# Patient Record
Sex: FEMALE | Race: BLACK OR AFRICAN AMERICAN | NOT HISPANIC OR LATINO | Employment: FULL TIME | URBAN - METROPOLITAN AREA
[De-identification: names, ages, dates, MRNs, and addresses within clinical notes are randomized per-mention and may not be internally consistent; named-entity substitution may affect disease eponyms.]

---

## 2017-04-17 ENCOUNTER — LAB VISIT (OUTPATIENT)
Dept: LAB | Facility: HOSPITAL | Age: 47
End: 2017-04-17
Attending: FAMILY MEDICINE
Payer: COMMERCIAL

## 2017-04-17 ENCOUNTER — OFFICE VISIT (OUTPATIENT)
Dept: FAMILY MEDICINE | Facility: CLINIC | Age: 47
End: 2017-04-17
Payer: COMMERCIAL

## 2017-04-17 VITALS
HEIGHT: 69 IN | SYSTOLIC BLOOD PRESSURE: 110 MMHG | OXYGEN SATURATION: 98 % | WEIGHT: 225.31 LBS | TEMPERATURE: 98 F | BODY MASS INDEX: 33.37 KG/M2 | DIASTOLIC BLOOD PRESSURE: 80 MMHG | HEART RATE: 76 BPM

## 2017-04-17 DIAGNOSIS — Z23 NEED FOR PROPHYLACTIC VACCINATION WITH TETANUS-DIPHTHERIA (TD): ICD-10-CM

## 2017-04-17 DIAGNOSIS — Z00.00 ANNUAL PHYSICAL EXAM: ICD-10-CM

## 2017-04-17 DIAGNOSIS — N93.9 ABNORMAL UTERINE BLEEDING (AUB): ICD-10-CM

## 2017-04-17 DIAGNOSIS — Z00.00 GENERAL MEDICAL EXAMINATION: Primary | ICD-10-CM

## 2017-04-17 LAB
ALBUMIN SERPL BCP-MCNC: 3.5 G/DL
ALP SERPL-CCNC: 65 U/L
ALT SERPL W/O P-5'-P-CCNC: 12 U/L
ANION GAP SERPL CALC-SCNC: 7 MMOL/L
AST SERPL-CCNC: 21 U/L
BASOPHILS # BLD AUTO: 0.03 K/UL
BASOPHILS NFR BLD: 0.5 %
BILIRUB SERPL-MCNC: 0.3 MG/DL
BUN SERPL-MCNC: 10 MG/DL
CALCIUM SERPL-MCNC: 9.5 MG/DL
CHLORIDE SERPL-SCNC: 109 MMOL/L
CHOLEST/HDLC SERPL: 2.1 {RATIO}
CO2 SERPL-SCNC: 24 MMOL/L
CREAT SERPL-MCNC: 0.8 MG/DL
DIFFERENTIAL METHOD: ABNORMAL
EOSINOPHIL # BLD AUTO: 0.4 K/UL
EOSINOPHIL NFR BLD: 6.7 %
ERYTHROCYTE [DISTWIDTH] IN BLOOD BY AUTOMATED COUNT: 15.2 %
EST. GFR  (AFRICAN AMERICAN): >60 ML/MIN/1.73 M^2
EST. GFR  (NON AFRICAN AMERICAN): >60 ML/MIN/1.73 M^2
GLUCOSE SERPL-MCNC: 75 MG/DL
HCT VFR BLD AUTO: 27.3 %
HDL/CHOLESTEROL RATIO: 48.1 %
HDLC SERPL-MCNC: 162 MG/DL
HDLC SERPL-MCNC: 78 MG/DL
HGB BLD-MCNC: 8.8 G/DL
LDLC SERPL CALC-MCNC: 73 MG/DL
LYMPHOCYTES # BLD AUTO: 1.9 K/UL
LYMPHOCYTES NFR BLD: 33.7 %
MCH RBC QN AUTO: 25.1 PG
MCHC RBC AUTO-ENTMCNC: 32.2 %
MCV RBC AUTO: 78 FL
MONOCYTES # BLD AUTO: 0.5 K/UL
MONOCYTES NFR BLD: 9.5 %
NEUTROPHILS # BLD AUTO: 2.7 K/UL
NEUTROPHILS NFR BLD: 49.2 %
NONHDLC SERPL-MCNC: 84 MG/DL
PLATELET # BLD AUTO: 412 K/UL
PMV BLD AUTO: 9.4 FL
POTASSIUM SERPL-SCNC: 4.2 MMOL/L
PROT SERPL-MCNC: 7.5 G/DL
RBC # BLD AUTO: 3.5 M/UL
SODIUM SERPL-SCNC: 140 MMOL/L
TRIGL SERPL-MCNC: 55 MG/DL
TSH SERPL DL<=0.005 MIU/L-ACNC: 0.61 UIU/ML
WBC # BLD AUTO: 5.49 K/UL

## 2017-04-17 PROCEDURE — 36415 COLL VENOUS BLD VENIPUNCTURE: CPT | Mod: PO

## 2017-04-17 PROCEDURE — 90714 TD VACC NO PRESV 7 YRS+ IM: CPT | Mod: S$GLB,,, | Performed by: FAMILY MEDICINE

## 2017-04-17 PROCEDURE — 80053 COMPREHEN METABOLIC PANEL: CPT

## 2017-04-17 PROCEDURE — 85025 COMPLETE CBC W/AUTO DIFF WBC: CPT

## 2017-04-17 PROCEDURE — 1160F RVW MEDS BY RX/DR IN RCRD: CPT | Mod: S$GLB,,, | Performed by: FAMILY MEDICINE

## 2017-04-17 PROCEDURE — 99999 PR PBB SHADOW E&M-EST. PATIENT-LVL III: CPT | Mod: PBBFAC,,, | Performed by: FAMILY MEDICINE

## 2017-04-17 PROCEDURE — 90471 IMMUNIZATION ADMIN: CPT | Mod: S$GLB,,, | Performed by: FAMILY MEDICINE

## 2017-04-17 PROCEDURE — 99214 OFFICE O/P EST MOD 30 MIN: CPT | Mod: 25,S$GLB,, | Performed by: FAMILY MEDICINE

## 2017-04-17 PROCEDURE — 83036 HEMOGLOBIN GLYCOSYLATED A1C: CPT

## 2017-04-17 PROCEDURE — 80061 LIPID PANEL: CPT

## 2017-04-17 PROCEDURE — 84443 ASSAY THYROID STIM HORMONE: CPT

## 2017-04-17 NOTE — MR AVS SNAPSHOT
[facility]  [address]  [place] [address]  Phone: [phone]  Fax: [phone]                  Vanessa Millan Solitario   2017 8:00 AM   Office Visit    Description:  Female : 1970   Provider:  Bethanie Sandovla MD   Department:  [facility]           Reason for Visit     Annual Exam     irregular menstrual cycle           Diagnoses this Visit        Comments    Abnormal uterine bleeding (AUB)    -  Primary     Annual physical exam         Need for prophylactic vaccination with tetanus-diphtheria (TD)                To Do List           Future Appointments        Provider Department Dept Phone    2017 9:00 AM LAB, [place] [facility] [phone]      Goals (5 Years of Data)     None      [facility]     [facility] Care Line -  Assistance  Unless otherwise directed by your provider, please contact [facility], our nurse care line that is available for  assistance.     Registered nurses in the [facility] provide: appointment scheduling, clinical advisement, health education, and other advisory services.  Call: [phone] (toll free)               Medications           Message regarding Medications     Verify the changes and/or additions to your medication regime listed below are the same as discussed with your clinician today.  If any of these changes or additions are incorrect, please notify your healthcare provider.             Verify that the below list of medications is an accurate representation of the medications you are currently taking.  If none reported, the list may be blank. If incorrect, please contact your healthcare provider. Carry this list with you in case of emergency.                Clinical Reference Information           Your Vitals Were     BP Pulse Temp Height Weight Last Period    110/80 (BP Location: Right arm, Patient Position: Sitting, BP Method: Manual) 76 98.1 °F (36.7 °C)  "(Oral) 5' 9" (1.753 m) 102.2 kg (225 lb 5 oz) 04/01/2017    SpO2 BMI             98% 33.27 kg/m2         Blood Pressure          Most Recent Value    BP  110/80      Allergies as of 4/17/2017     No Known Allergies      Immunizations Administered on Date of Encounter - 4/17/2017     Name Date Dose VIS Date Route    TD  Incomplete 0.5 mL 2/24/2015 Intramuscular      Orders Placed During Today's Visit      Normal Orders This Visit    Td Vaccine (Adult) - Preservative Free     Future Labs/Procedures Expected by Expires    CBC auto differential  4/17/2017 6/16/2018    Comprehensive metabolic panel  4/17/2017 6/16/2018    Hemoglobin A1c  4/17/2017 6/16/2018    Lipid panel  4/17/2017 6/16/2018    TSH  4/17/2017 6/16/2018    US Pelvis Comp with Transvag NON-OB (xpd  4/17/2017 4/17/2018      Language Assistance Services     ATTENTION: Language assistance services are available, free of charge. Please call [phone].      ATENCIÓN: Si habla español, tiene a murphy disposición servicios gratuitos de asistencia lingüística. Llame al [phone].     CHÚ Ý: N?u b?n nói Ti?ng Vi?t, có các d?ch v? h? tr? ngôn ng? mi?n phí dành cho b?n. G?i s? [phone].         [facility] complies with applicable Federal civil rights laws and does not discriminate on the basis of race, color, national origin, age, disability, or sex.        "

## 2017-04-17 NOTE — PROGRESS NOTES
Routine Office Visit    Patient Name: Vanessa Jerez    : 1970  MRN: [id number]    Subjective:  Vanessa is a 46 y.o. female who presents today for     1. General medical exam  2. Abnormal uterine bleeding - pt states -  she had irregular bleeding. She has never had this in the past. She states that she had bleeding with heavy and big clots. She felt weak and tired. She is feeling better now.       Review of Systems   Constitutional: Negative for chills and fever.   HENT: Negative for congestion.    Eyes: Negative for blurred vision.   Respiratory: Negative for cough.    Cardiovascular: Negative for chest pain.   Gastrointestinal: Negative for abdominal pain, constipation, diarrhea, heartburn, nausea and vomiting.   Genitourinary: Negative for dysuria.   Musculoskeletal: Negative for myalgias.   Skin: Negative for itching and rash.   Neurological: Negative for dizziness and headaches.   Psychiatric/Behavioral: Negative for depression.       Active Problem List  There is no problem list on file for this patient.      Past Surgical History  Past Surgical History:   Procedure Laterality Date    TUBAL LIGATION         Family History  Family History   Problem Relation Age of Onset    Diabetes Maternal Grandmother     Diabetes Maternal Grandfather     Cancer Neg Hx     Heart disease Neg Hx     Hypertension Neg Hx     Stroke Neg Hx        Social History  Social History     Social History    Marital status: Single     Spouse name: N/A    Number of children: N/A    Years of education: N/A     Occupational History    Not on file.     Social History Main Topics    Smoking status: Never Smoker    Smokeless tobacco: Not on file    Alcohol use No    Drug use: Not on file    Sexual activity: Not on file     Other Topics Concern    Not on file     Social History Narrative       Medications and Allergies  Reviewed and updated.   No current outpatient prescriptions on file.     No current  "facility-administered medications for this visit.        Physical Exam  /80 (BP Location: Right arm, Patient Position: Sitting, BP Method: Manual)  Pulse 76  Temp 98.1 °F (36.7 °C) (Oral)   Ht 5' 9" (1.753 m)  Wt 102.2 kg (225 lb 5 oz)  LMP 04/01/2017  SpO2 98%  BMI 33.27 kg/m2  Physical Exam   Constitutional: She is oriented to person, place, and time. She appears well-developed and well-nourished.   HENT:   Head: Normocephalic and atraumatic.   Eyes: Conjunctivae and EOM are normal. Pupils are equal, round, and reactive to light.   Neck: Normal range of motion. Neck supple.   Cardiovascular: Normal rate, regular rhythm and normal heart sounds.  Exam reveals no gallop and no friction rub.    No murmur heard.  Pulmonary/Chest: Breath sounds normal. No respiratory distress.   Abdominal: Soft. Bowel sounds are normal. She exhibits no distension. There is no tenderness.   Musculoskeletal: Normal range of motion.   Lymphadenopathy:     She has no cervical adenopathy.   Neurological: She is alert and oriented to person, place, and time.   Skin: Skin is warm.   Psychiatric: She has a normal mood and affect.         Assessment/Plan:  Vanessa Jerez is a 46 y.o. female who presents today for :    General medical examination  -     Lipid panel; Future; Expected date: 4/17/17  -     TSH; Future; Expected date: 4/17/17  -     CBC auto differential; Future; Expected date: 4/17/17  -     Comprehensive metabolic panel; Future; Expected date: 4/17/17  -     Hemoglobin A1c; Future; Expected date: 4/17/17    Abnormal uterine bleeding (AUB)  -     US Pelvis Comp with Transvag NON-OB (xpd; Future; Expected date: 4/17/17    Need for prophylactic vaccination with tetanus-diphtheria (TD)  -     Td Vaccine (Adult) - Preservative Free        Return if symptoms worsen or fail to improve, for yearly exam.    "

## 2017-04-18 LAB
ESTIMATED AVG GLUCOSE: 123 MG/DL
HBA1C MFR BLD HPLC: 5.9 %

## 2018-05-09 ENCOUNTER — OFFICE VISIT (OUTPATIENT)
Dept: FAMILY MEDICINE | Facility: CLINIC | Age: 48
End: 2018-05-09
Payer: COMMERCIAL

## 2018-05-09 ENCOUNTER — TELEPHONE (OUTPATIENT)
Dept: FAMILY MEDICINE | Facility: CLINIC | Age: 48
End: 2018-05-09

## 2018-05-09 ENCOUNTER — LAB VISIT (OUTPATIENT)
Dept: LAB | Facility: HOSPITAL | Age: 48
End: 2018-05-09
Attending: NURSE PRACTITIONER
Payer: COMMERCIAL

## 2018-05-09 VITALS
WEIGHT: 234 LBS | OXYGEN SATURATION: 98 % | DIASTOLIC BLOOD PRESSURE: 80 MMHG | HEIGHT: 69 IN | BODY MASS INDEX: 34.66 KG/M2 | HEART RATE: 66 BPM | TEMPERATURE: 99 F | SYSTOLIC BLOOD PRESSURE: 112 MMHG

## 2018-05-09 DIAGNOSIS — Z86.2 HISTORY OF IRON DEFICIENCY ANEMIA: Primary | ICD-10-CM

## 2018-05-09 DIAGNOSIS — M77.31 HEEL SPUR, RIGHT: ICD-10-CM

## 2018-05-09 DIAGNOSIS — Z00.00 ANNUAL PHYSICAL EXAM: Primary | ICD-10-CM

## 2018-05-09 DIAGNOSIS — M25.561 ACUTE PAIN OF RIGHT KNEE: ICD-10-CM

## 2018-05-09 DIAGNOSIS — Z00.00 ANNUAL PHYSICAL EXAM: ICD-10-CM

## 2018-05-09 LAB
ALBUMIN SERPL BCP-MCNC: 3.6 G/DL
ALP SERPL-CCNC: 102 U/L
ALT SERPL W/O P-5'-P-CCNC: 19 U/L
ANION GAP SERPL CALC-SCNC: 7 MMOL/L
AST SERPL-CCNC: 24 U/L
BASOPHILS # BLD AUTO: 0.03 K/UL
BASOPHILS NFR BLD: 0.5 %
BILIRUB SERPL-MCNC: 0.5 MG/DL
BUN SERPL-MCNC: 12 MG/DL
CALCIUM SERPL-MCNC: 9.7 MG/DL
CHLORIDE SERPL-SCNC: 109 MMOL/L
CHOLEST SERPL-MCNC: 168 MG/DL
CHOLEST/HDLC SERPL: 2.1 {RATIO}
CO2 SERPL-SCNC: 25 MMOL/L
CREAT SERPL-MCNC: 0.8 MG/DL
DIFFERENTIAL METHOD: ABNORMAL
EOSINOPHIL # BLD AUTO: 0.4 K/UL
EOSINOPHIL NFR BLD: 7.4 %
ERYTHROCYTE [DISTWIDTH] IN BLOOD BY AUTOMATED COUNT: 17.3 %
EST. GFR  (AFRICAN AMERICAN): >60 ML/MIN/1.73 M^2
EST. GFR  (NON AFRICAN AMERICAN): >60 ML/MIN/1.73 M^2
ESTIMATED AVG GLUCOSE: 114 MG/DL
GLUCOSE SERPL-MCNC: 85 MG/DL
HBA1C MFR BLD HPLC: 5.6 %
HCT VFR BLD AUTO: 35.2 %
HDLC SERPL-MCNC: 81 MG/DL
HDLC SERPL: 48.2 %
HGB BLD-MCNC: 11 G/DL
IMM GRANULOCYTES # BLD AUTO: 0.03 K/UL
IMM GRANULOCYTES NFR BLD AUTO: 0.5 %
LDLC SERPL CALC-MCNC: 78.6 MG/DL
LYMPHOCYTES # BLD AUTO: 2.1 K/UL
LYMPHOCYTES NFR BLD: 35.2 %
MCH RBC QN AUTO: 27.6 PG
MCHC RBC AUTO-ENTMCNC: 31.3 G/DL
MCV RBC AUTO: 88 FL
MONOCYTES # BLD AUTO: 0.4 K/UL
MONOCYTES NFR BLD: 7.4 %
NEUTROPHILS # BLD AUTO: 2.9 K/UL
NEUTROPHILS NFR BLD: 49 %
NONHDLC SERPL-MCNC: 87 MG/DL
NRBC BLD-RTO: 0 /100 WBC
PLATELET # BLD AUTO: 275 K/UL
PMV BLD AUTO: 11.3 FL
POTASSIUM SERPL-SCNC: 4.1 MMOL/L
PROT SERPL-MCNC: 7.1 G/DL
RBC # BLD AUTO: 3.99 M/UL
SODIUM SERPL-SCNC: 141 MMOL/L
TRIGL SERPL-MCNC: 42 MG/DL
TSH SERPL DL<=0.005 MIU/L-ACNC: 0.97 UIU/ML
WBC # BLD AUTO: 5.94 K/UL

## 2018-05-09 PROCEDURE — 99396 PREV VISIT EST AGE 40-64: CPT | Mod: S$GLB,,, | Performed by: NURSE PRACTITIONER

## 2018-05-09 PROCEDURE — 99999 PR PBB SHADOW E&M-EST. PATIENT-LVL IV: CPT | Mod: PBBFAC,,, | Performed by: NURSE PRACTITIONER

## 2018-05-09 PROCEDURE — 85025 COMPLETE CBC W/AUTO DIFF WBC: CPT

## 2018-05-09 PROCEDURE — 83036 HEMOGLOBIN GLYCOSYLATED A1C: CPT

## 2018-05-09 PROCEDURE — 36415 COLL VENOUS BLD VENIPUNCTURE: CPT | Mod: PO

## 2018-05-09 PROCEDURE — 80061 LIPID PANEL: CPT

## 2018-05-09 PROCEDURE — 80053 COMPREHEN METABOLIC PANEL: CPT

## 2018-05-09 PROCEDURE — 84443 ASSAY THYROID STIM HORMONE: CPT

## 2018-05-09 RX ORDER — NAPROXEN 500 MG/1
500 TABLET ORAL 2 TIMES DAILY WITH MEALS
Qty: 60 TABLET | Refills: 0 | Status: SHIPPED | OUTPATIENT
Start: 2018-05-09 | End: 2018-06-08

## 2018-05-09 NOTE — TELEPHONE ENCOUNTER
Please let the patient know that her labs do show a very slight anemia, but it is much improved from her last check. We can do some iron studies to ensure that is not low, and I have ordered this. All of her other labs are within normal limits. Let me know if she has any questions.    Thanks!  MARCELINO MancusoC

## 2018-05-09 NOTE — PROGRESS NOTES
History of Present Illness   Vanessa Jerez is a 47 y.o. woman with medical history as listed below who presents today for annual physical exam. She is currently not taking daily medications. She states gets exercise while at work and tries to monitor her diet. 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 She is up to date on healthcare maintenance. She did have mammogram, 10/17 at [facility]. She would like routine labs today. She does complain of right heel pain described as a burning pain that seems to be worse with prolonged standing and walking. She denies injury or trauma. She does have some associated right knee pain with swelling that is worse after working a 12 hour shift. She has tried Aleve for the pain which does help. She has no additional complaints and is otherwise healthy on today's visit.      Past Medical History:   Diagnosis Date    Anemia        Past Surgical History:   Procedure Laterality Date    HYSTERECTOMY      TUBAL LIGATION         Social History     Social History    Marital status: Single     Spouse name: N/A    Number of children: N/A    Years of education: N/A     Social History Main Topics    Smoking status: Never Smoker    Smokeless tobacco: None    Alcohol use No    Drug use: Unknown    Sexual activity: Not Asked     Other Topics Concern    None     Social History Narrative    None       Family History   Problem Relation Age of Onset    Diabetes Maternal Grandmother     Diabetes Maternal Grandfather     Cancer Neg Hx     Heart disease Neg Hx     Hypertension Neg Hx     Stroke Neg Hx        Review of Systems  Review of Systems   Constitutional: Negative for chills, fever and weight loss.   Eyes: Negative for blurred vision and double vision.   Respiratory: Negative for shortness of breath.    Cardiovascular: Negative for chest pain, palpitations and leg swelling.   Gastrointestinal: Negative for abdominal pain, nausea and vomiting.   Genitourinary: Negative  "for dysuria, flank pain and hematuria.   Musculoskeletal: Positive for joint pain. Negative for back pain and falls.   Skin: Negative for rash.   Neurological: Negative for dizziness, loss of consciousness and headaches.   Endo/Heme/Allergies: Negative for environmental allergies and polydipsia.   Psychiatric/Behavioral: Negative for depression. The patient is not nervous/anxious.      A complete review of systems was otherwise negative.    Physical Exam  /80 (BP Location: Right arm, Patient Position: Sitting, BP Method: X-Large (Manual))   Pulse 66   Temp 98.5 °F (36.9 °C) (Oral)   Ht 5' 9" (1.753 m)   Wt 106.1 kg (234 lb)   LMP 04/01/2017   SpO2 98%   BMI 34.56 kg/m²   General appearance: alert, appears stated age, cooperative and no distress  Eyes: negative findings: lids and lashes normal and conjunctivae and sclerae normal  Ears: normal TM's and external ear canals both ears  Nose: Nares normal. Septum midline. Mucosa normal. No drainage or sinus tenderness.  Throat: lips, mucosa, and tongue normal; teeth and gums normal  Neck: supple, symmetrical, trachea midline and thyroid not enlarged, symmetric, no tenderness/mass/nodules  Back: symmetric, no curvature. ROM normal. No CVA tenderness.  Lungs: clear to auscultation bilaterally  Heart: regular rate and rhythm, S1, S2 normal, no murmur, click, rub or gallop  Abdomen: soft, non-tender; bowel sounds normal; no masses,  no organomegaly  Extremities: extremities normal, atraumatic, no cyanosis or edema  Pulses: 2+ and symmetric  Skin: Skin color, texture, turgor normal. No rashes or lesions  Lymph nodes: Cervical, supraclavicular, and axillary nodes normal.  Neurologic: Grossly normal  Musculoskeletal: Right knee exhibits minimal anterior swelling with no TTP. There is no evidence for instability, negative anterior and posterior drawer sign. There is no joint crepitus. Full ROM present. Right ankle with full ROM. There is TTP of right heel with no " swelling or obvious deformity.     Assessment/Plan  Annual physical exam  All healthcare maintenance and age appropriate screening recommendations discussed with patient.  Discussed importance of heart healthy diet and exercise with weight loss to maximize healthy lifestyle.  Routine labs as listed below.  -     CBC auto differential; Future; Expected date: 05/09/2018  -     Comprehensive metabolic panel; Future; Expected date: 05/09/2018  -     Lipid panel; Future; Expected date: 05/09/2018  -     TSH; Future; Expected date: 05/09/2018  -     Hemoglobin A1c; Future; Expected date: 05/09/2018    Heel spur, right  Conservative management with NSAID, ice, elevation, and rest.   Continue to wear arch supporting shoes when at work.  If no improvement refer to podiatry.  -     naproxen (NAPROSYN) 500 MG tablet; Take 1 tablet (500 mg total) by mouth 2 (two) times daily with meals.  Dispense: 60 tablet; Refill: 0    Acute pain of right knee  We discussed use of brace or ace wrap at work with continued use of arch supporting shoes.  Conservative management with NSAID, rest, ice, elevation.  If no improvement, consider imaging and potential referral to orthopedics.  -     naproxen (NAPROSYN) 500 MG tablet; Take 1 tablet (500 mg total) by mouth 2 (two) times daily with meals.  Dispense: 60 tablet; Refill: 0    She has verbalized understanding and is in agreement with plan of care.    Follow-up if symptoms worsen or fail to improve.

## 2018-05-09 NOTE — PATIENT INSTRUCTIONS
Heel Spurs    The plantar fascia is a thick, fibrous layer of tissue that covers the bones on the bottom of your foot. It holds the foot bones in an arched position. Plantar fasciitis is a painful swelling of the plantar fascia.  A heel spur is an overgrowth of bone where the plantar fascia attaches to the heel bone. The heel spur itself usually doesnt cause pain. However, the heel spur might be a sign of plantar fasciitis which may cause your foot pain. There is no specific treatment for heel spurs.   Plantar fasciitis can develop slowly or suddenly. It usually affects one foot at a time. Heel pain can feel sharp, like a knife sticking into the bottom of your foot. You may feel pain after exercising, long-distance jogging, stair climbing, long periods of standing, or after standing up.  Risk factors for plantar fasciitis include: arthritis, diabetes, obesity or recent weight gain, flat foot, and having high arches. Wearing high heels, loose shoes, or shoes with poor arch support adds to the risk.    Foot pain is usually worse in the morning. But it improves with walking. By the end of the day there may be a dull aching. Treatment includes short-term rest and controlling inflammation. It may take up to 9 months before all symptoms go away. In rare cases, a steroid injection in the foot, or surgery, may be needed.  Home care  · If you are overweight, lose weight to help healing.  · Choose supportive shoes with good arch support and shock absorbency. Replace athletic shoes when they become worn out. Dont walk or run barefoot.  · Premade or custom-fitted shoe inserts may be helpful. Inserts made of silicone seem to be the most effective. Custom-made inserts can be provided by a podiatrist or foot specialist, physical therapist, or orthopedist.  · Premade or custom-made night splints keep the heel stretched out while you sleep. They may prevent morning pain.  · Avoid activities that stress the feet: jogging,  prolonged standing or walking, contact sports, etc.  · First thing in the morning and before sports, stretch the bottom of your foot. Gently flex your ankle so the toes move toward your knee.  · Icing may help control heel pain. Apply an ice pack to the heel for 10-20 minutes as a preventive. Or ice your heel after a severe flare-up of symptoms. You may repeat this every 1-2 hours as needed.  · You may use over-the-counter pain medicine to control pain, unless another medicine was prescribed. Anti-inflammatory pain medicines, such as ibuprofen or naproxen, may work better than acetaminophen. If you have chronic liver or kidney disease or ever had a stomach ulcer or GI bleeding, talk with your healthcare provider before using these medicines.  · Shoe inserts, a night splint, or a special boot may be needed. Use these as directed by your healthcare provider.  Follow-up care   Follow up with your healthcare provider, physical therapist, or podiatrist or foot specialist as advised.  When to seek medical advice  Call your healthcare provider right away if any of these occur:  · The pain worsens.  · There is no relief after a few weeks of home treatment.   Date Last Reviewed: 11/23/2015  © 8485-3442 [employer]. [address] [place] [address], [place] [address]. All rights reserved. This information is not intended as a substitute for professional medical care. Always follow your healthcare professional's instructions.

## 2018-05-14 ENCOUNTER — LAB VISIT (OUTPATIENT)
Dept: LAB | Facility: HOSPITAL | Age: 48
End: 2018-05-14
Attending: NURSE PRACTITIONER
Payer: COMMERCIAL

## 2018-05-14 DIAGNOSIS — Z86.2 HISTORY OF IRON DEFICIENCY ANEMIA: ICD-10-CM

## 2018-05-14 LAB
FERRITIN SERPL-MCNC: 6 NG/ML
IRON SERPL-MCNC: 60 UG/DL
SATURATED IRON: 14 %
TOTAL IRON BINDING CAPACITY: 440 UG/DL
TRANSFERRIN SERPL-MCNC: 297 MG/DL

## 2018-05-14 PROCEDURE — 83540 ASSAY OF IRON: CPT

## 2018-05-14 PROCEDURE — 36415 COLL VENOUS BLD VENIPUNCTURE: CPT | Mod: PO

## 2018-05-14 PROCEDURE — 82728 ASSAY OF FERRITIN: CPT

## 2018-05-16 ENCOUNTER — PATIENT MESSAGE (OUTPATIENT)
Dept: FAMILY MEDICINE | Facility: CLINIC | Age: 48
End: 2018-05-16

## 2018-05-16 DIAGNOSIS — D64.9 ANEMIA, UNSPECIFIED TYPE: Primary | ICD-10-CM

## 2018-05-18 NOTE — TELEPHONE ENCOUNTER
Can we call patient with this information please, looks like she did not read her [employer] message.    Thanks!  FREDDY Mancuso

## 2018-07-23 ENCOUNTER — HOSPITAL ENCOUNTER (OUTPATIENT)
Dept: RADIOLOGY | Facility: HOSPITAL | Age: 48
Discharge: HOME | End: 2018-07-23
Attending: NURSE PRACTITIONER
Payer: COMMERCIAL

## 2018-07-23 ENCOUNTER — OFFICE VISIT (OUTPATIENT)
Dept: FAMILY MEDICINE | Facility: CLINIC | Age: 48
End: 2018-07-23
Payer: COMMERCIAL

## 2018-07-23 VITALS
SYSTOLIC BLOOD PRESSURE: 114 MMHG | WEIGHT: 235.88 LBS | BODY MASS INDEX: 34.94 KG/M2 | DIASTOLIC BLOOD PRESSURE: 84 MMHG | TEMPERATURE: 98 F | HEART RATE: 83 BPM | HEIGHT: 69 IN | OXYGEN SATURATION: 97 %

## 2018-07-23 DIAGNOSIS — L72.0 EPIDERMOID CYST OF SKIN OF SHOULDER: Primary | ICD-10-CM

## 2018-07-23 DIAGNOSIS — L72.0 EPIDERMOID CYST OF SKIN OF SHOULDER: ICD-10-CM

## 2018-07-23 PROCEDURE — 3008F BODY MASS INDEX DOCD: CPT | Mod: CPTII,S$GLB,, | Performed by: NURSE PRACTITIONER

## 2018-07-23 PROCEDURE — 76881 US COMPL JOINT R-T W/IMG: CPT | Mod: 26,,, | Performed by: RADIOLOGY

## 2018-07-23 PROCEDURE — 76881 US COMPL JOINT R-T W/IMG: CPT | Mod: TC

## 2018-07-23 PROCEDURE — 99214 OFFICE O/P EST MOD 30 MIN: CPT | Mod: S$GLB,,, | Performed by: NURSE PRACTITIONER

## 2018-07-23 PROCEDURE — 99999 PR PBB SHADOW E&M-EST. PATIENT-LVL III: CPT | Mod: PBBFAC,,, | Performed by: NURSE PRACTITIONER

## 2018-07-23 RX ORDER — IBUPROFEN 800 MG/1
800 TABLET ORAL
COMMUNITY
End: 2018-09-26

## 2018-07-23 RX ORDER — FERROUS SULFATE 325(65) MG
325 TABLET ORAL
COMMUNITY
Start: 2017-08-18 | End: 2018-08-18

## 2018-07-23 NOTE — PROGRESS NOTES
Subjective:       Patient ID: Vanessa Jerez is a 47 y.o. female.    Chief Complaint: Cyst (on right shoulder)    Cyst   This is a new problem. The current episode started in the past 7 days. The problem occurs constantly. The problem has been unchanged. Pertinent negatives include no abdominal pain, arthralgias, chest pain, chills, diaphoresis, fatigue, fever, headaches, joint swelling, myalgias, neck pain, numbness, swollen glands or weakness. Associated symptoms comments: Nodule on right shoulder   . Nothing aggravates the symptoms. She has tried nothing for the symptoms.     Review of Systems   Constitutional: Negative for chills, diaphoresis, fatigue and fever.   Respiratory: Negative for chest tightness and wheezing.    Cardiovascular: Negative for chest pain and palpitations.   Gastrointestinal: Negative for abdominal pain.   Musculoskeletal: Negative for arthralgias, joint swelling, myalgias and neck pain.   Skin: Negative for color change and wound.        Knot on right shoulder     Neurological: Negative for weakness, numbness and headaches.   Hematological: Negative for adenopathy. Does not bruise/bleed easily.       Objective:      Physical Exam   Constitutional: She is oriented to person, place, and time. She appears well-developed.   Cardiovascular: Normal rate, regular rhythm and normal heart sounds.    Pulmonary/Chest: Effort normal and breath sounds normal.   Neurological: She is alert and oriented to person, place, and time.   Skin: Skin is warm. Capillary refill takes less than 2 seconds.        Psychiatric: She has a normal mood and affect.       Assessment:       1. Epidermoid cyst of skin of shoulder        Plan:       Vanessa was seen today for cyst.    Diagnoses and all orders for this visit:    Epidermoid cyst of skin of shoulder  -     US Extremity Non Vascular Complete Right; Future

## 2018-07-27 ENCOUNTER — TELEPHONE (OUTPATIENT)
Dept: FAMILY MEDICINE | Facility: CLINIC | Age: 48
End: 2018-07-27

## 2018-07-27 NOTE — TELEPHONE ENCOUNTER
----- Message from Iona Rodarte sent at 7/27/2018  2:59 PM CDT -----  Contact: Self  Pt is calling to speak with staff to get her results. Please call pt at [phone].

## 2018-08-01 NOTE — TELEPHONE ENCOUNTER
----- Message from Stefani Akbar sent at 8/1/2018  9:03 AM CDT -----  Contact: Self/ [phone]  Pt returning call for results. Thank you.

## 2018-09-26 ENCOUNTER — HOSPITAL ENCOUNTER (EMERGENCY)
Facility: HOSPITAL | Age: 48
Discharge: HOME | End: 2018-09-26
Attending: EMERGENCY MEDICINE
Payer: COMMERCIAL

## 2018-09-26 VITALS
OXYGEN SATURATION: 100 % | TEMPERATURE: 98 F | BODY MASS INDEX: 34.07 KG/M2 | SYSTOLIC BLOOD PRESSURE: 158 MMHG | RESPIRATION RATE: 18 BRPM | HEIGHT: 69 IN | DIASTOLIC BLOOD PRESSURE: 86 MMHG | WEIGHT: 230 LBS | HEART RATE: 61 BPM

## 2018-09-26 DIAGNOSIS — M54.9 BACK PAIN: Primary | ICD-10-CM

## 2018-09-26 PROCEDURE — 99283 EMERGENCY DEPT VISIT LOW MDM: CPT

## 2018-09-26 PROCEDURE — 25000003 PHARM REV CODE 250: Performed by: PHYSICIAN ASSISTANT

## 2018-09-26 RX ORDER — IBUPROFEN 600 MG/1
600 TABLET ORAL
Status: COMPLETED | OUTPATIENT
Start: 2018-09-26 | End: 2018-09-26

## 2018-09-26 RX ORDER — METHOCARBAMOL 500 MG/1
1000 TABLET, FILM COATED ORAL 3 TIMES DAILY PRN
Qty: 20 TABLET | Refills: 0 | Status: SHIPPED | OUTPATIENT
Start: 2018-09-26 | End: 2018-10-01

## 2018-09-26 RX ORDER — IBUPROFEN 600 MG/1
600 TABLET ORAL EVERY 6 HOURS PRN
Qty: 20 TABLET | Refills: 0 | Status: SHIPPED | OUTPATIENT
Start: 2018-09-26 | End: 2018-12-14

## 2018-09-26 RX ADMIN — IBUPROFEN 600 MG: 600 TABLET, FILM COATED ORAL at 06:09

## 2018-09-26 NOTE — DISCHARGE INSTRUCTIONS
Ibuprofen as needed for discomfort.  Robaxin for muscle soreness.  Be aware, Robaxin is sedating.  Do not mix with 0 sedating medications.  Follow up with her primary for re-evaluation and further recommendations.  Return to this ED if pain persists despite by treatment, if pain worsens, if any other problems occur.

## 2018-09-26 NOTE — ED PROVIDER NOTES
Encounter Date: 9/26/2018  SORT: This is a 47 y.o. female who presents for emergent consideration of back pain after holding a patient while being changed earlier today. Patient is an [employer] employee. Patient will be moved to a room when one is available. Orders placed.  CANDACE Kumar PA-C        History     Chief Complaint   Patient presents with    Back Pain     Lower back pain after moving patient in bed today.     47-year-old female with past medical history anemia presents to ED complaining of acute onset low back pain which began around 5:00 p.m. today.  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  She states she was holding the patient on the patient's side for an extended period of time while coworkers were cleaning and changing patient.  Denies previous injury or surgery.  No radiculopathy or paresthesia.  Dull ache to midline low back.  Pain is sharp and worse with movement.  Pain mildly alleviated with rest.  No radiation of pain. Severity 5/10.          Review of patient's allergies indicates:   Allergen Reactions    Morphine      Slowed down heart rate     Past Medical History:   Diagnosis Date    Anemia      Past Surgical History:   Procedure Laterality Date    HYSTERECTOMY      TUBAL LIGATION       Family History   Problem Relation Age of Onset    Diabetes Maternal Grandmother     Diabetes Maternal Grandfather     Cancer Neg Hx     Heart disease Neg Hx     Hypertension Neg Hx     Stroke Neg Hx      Social History     Tobacco Use    Smoking status: Never Smoker    Smokeless tobacco: Never Used   Substance Use Topics    Alcohol use: No     Alcohol/week: 0.0 oz    Drug use: No     Review of Systems   Constitutional: Negative for chills and fever.   HENT: Negative for sore throat.    Eyes: Negative.    Respiratory: Negative for shortness of breath.    Cardiovascular: Negative for chest pain.   Gastrointestinal: Negative for abdominal  pain, nausea and vomiting.   Endocrine: Negative.    Genitourinary: Negative for dysuria.   Musculoskeletal: Positive for back pain. Negative for gait problem, neck pain and neck stiffness.   Skin: Negative for rash.   Neurological: Negative for dizziness, weakness, light-headedness and headaches.   Hematological: Does not bruise/bleed easily.   Psychiatric/Behavioral: Negative.    All other systems reviewed and are negative.      Physical Exam     Initial Vitals [09/26/18 1743]   BP Pulse Resp Temp SpO2   (!) 158/86 61 18 98.2 °F (36.8 °C) 100 %      MAP       --         Physical Exam    Nursing note and vitals reviewed.  Constitutional: She appears well-developed and well-nourished. She is not diaphoretic. No distress.   Overall well-appearing, nontoxic.  Resting comfortably on exam table.  Ambulating about the ED with normal, steady gait.   HENT:   Head: Normocephalic and atraumatic.   Eyes: Conjunctivae and EOM are normal. Pupils are equal, round, and reactive to light.   Neck: Normal range of motion. Neck supple. No tracheal deviation present.   Cardiovascular: Intact distal pulses.   Pulmonary/Chest: Breath sounds normal. No stridor. No respiratory distress. She has no wheezes.   Abdominal: Soft. Bowel sounds are normal. She exhibits no distension. There is no tenderness.   Musculoskeletal:   Mild ttp lumbar paraspinal musculature. No obvious spasm.  No midline C/T/L tenderness to palpation. Mild discomfort with extension of spine.  Positive straight leg bilaterally at approximately 30°.  No pain with hip range of motion. No tenderness to posterior iliac spine.  No obvious leg shortening.  1+ DP bilaterally.   Lymphadenopathy:     She has no cervical adenopathy.   Neurological: She is alert and oriented to person, place, and time.   Skin: Skin is warm and dry. Capillary refill takes less than 2 seconds.   Psychiatric: She has a normal mood and affect. Her behavior is normal. Judgment and thought content  normal.         ED Course   Procedures  Labs Reviewed - No data to display       Imaging Results    None          Medical Decision Making:   Differential Diagnosis:   Fracture, contusion, sprain/strain, arthritis, degenerative disc disease  ED Management:  47-year-old female with atraumatic low back pain.  No abdominal pain.  No pulsatile character to pain. No retching/tearing character to pain. Pain is reproducible with palpation, certain range of motion of spine.  Neurovascularly intact distally.  No midline spinal tenderness.  Presentation suspicious for muscle strain.  Will DC with muscle relaxers, anti-inflammatories, have patient follow up with PCP.  She does understand and agree.  Return precautions given.                      Clinical Impression:   The encounter diagnosis was Back pain.      Disposition:   Disposition: Discharged  Condition: Stable                        Phong Mccollum PA-C  09/26/18 [id number]

## 2018-09-28 ENCOUNTER — OFFICE VISIT (OUTPATIENT)
Dept: URGENT CARE | Facility: CLINIC | Age: 48
End: 2018-09-28
Payer: OTHER MISCELLANEOUS

## 2018-09-28 VITALS — HEART RATE: 56 BPM | SYSTOLIC BLOOD PRESSURE: 157 MMHG | DIASTOLIC BLOOD PRESSURE: 97 MMHG

## 2018-09-28 DIAGNOSIS — S39.012A ACUTE MYOFASCIAL STRAIN OF LUMBAR REGION, INITIAL ENCOUNTER: Primary | ICD-10-CM

## 2018-09-28 DIAGNOSIS — M54.50 ACUTE BILATERAL LOW BACK PAIN WITHOUT SCIATICA: ICD-10-CM

## 2018-09-28 DIAGNOSIS — R03.0 ELEVATED BLOOD PRESSURE READING: ICD-10-CM

## 2018-09-28 PROCEDURE — 99203 OFFICE O/P NEW LOW 30 MIN: CPT | Mod: S$GLB,,, | Performed by: PHYSICIAN ASSISTANT

## 2018-09-28 PROCEDURE — 72100 X-RAY EXAM L-S SPINE 2/3 VWS: CPT | Mod: S$GLB,,, | Performed by: RADIOLOGY

## 2018-09-28 NOTE — PATIENT INSTRUCTIONS
Understanding Lumbosacral Strain    Lumbosacral strain is a medical term for an injury that causes low back pain. The lumbosacral area (low back) is between the bottom of the ribcage and the top of the buttocks. A strain is tearing of muscles and tendons. These tears can be very small but still cause pain.  How a lumbosacral strain happens  Muscles and tendons connected to the spine can be strained in a number of ways:  · Sitting or standing in the same position for long periods of time. This can harm the low back over time. Poor posture can make low back pain more likely.  · Moving the muscles and tendons past their usual range of motion. This can cause a sudden injury. This can happen when you twist, bend over, or lift something heavy. Not using correct technique for sports or tasks like lifting can make back injury more likely.  · Accidents or falls  Lumbosacral strain can be caused by other problems, but these are less common.  Symptoms of lumbosacral strain  Symptoms may include:  · Pain in the back, often on one side  · Pain that gets worse with movement and gets better with rest  · Inability to move as freely as usual  · Swelling, slight redness, and skin warmth in the painful area  Treatment for lumbosacral strain  Low back pain often goes away by itself within several weeks. But it often comes back. Treatment focuses on reducing pain and avoiding further injury. Bed rest is usually not recommended for low back pain. Treatments may include:  · Avoiding or changing the action that caused the problem. This helps prevent injuring the tissues again.  · Prescription or over-the-counter pain medicines. These help reduce inflammation, swelling, and pain.  · Cold or heat packs. These help reduce pain and swelling.  · Stretching and other exercises. These improve flexibility and strength.  · Physical therapy. This usually includes exercises and other treatments.  · Injections of medicine. This may relieve  symptoms.  If these treatments do not relieve symptoms, your healthcare provider may order imaging tests to learn more about the problem. Sometimes you may need surgery.  Possible complications of lumbosacral strain  If the cause of the pain is not addressed, symptoms may return or get worse. Follow your healthcare providers instructions on lifestyle changes and treating your back.     When to call your healthcare provider  Call your healthcare provider right away if you have any of these:  · Fever of 100.4°F (38°C) or higher, or as directed  · Numbness, tingling, or weakness  · Problems with bowel or bladder control, or problems having sex  · Pain that does not go away, or gets worse  · New symptoms   Date Last Reviewed: 3/10/2016  © 2856-9483 [employer]. [address] [place] [address]. All rights reserved. This information is not intended as a substitute for professional medical care. Always follow your healthcare professional's instructions.        Lumbar Stretch (Flexibility)    1. Lie on your back on the floor, with your knees bent and your feet flat on the floor. Dont press your neck or lower back to the floor.  2. Pull one knee up toward your chest. Clasp your hands under your thigh to help pull.  3. Hold for 30 to 60 seconds. Lower your leg back down to the floor.  4. Repeat 2 times, or as instructed.  5. Switch legs and repeat.  Date Last Reviewed: 3/10/2016  © 3753-9698 [employer]. [address] [place] [address]. All rights reserved. This information is not intended as a substitute for professional medical care. Always follow your healthcare professional's instructions.        Lumbar Rotation    6. Lie on your back on the floor, with your knees bent and your feet flat on the floor. Dont press your neck or lower back to the floor.  7. Lean both of your knees to one side. Turn your head in the opposite direction. Keep your shoulders flat on the floor. Be  gentle and dont push through pain.  8. Hold for 20 seconds. Then slowly move your knees and head in the other direction.  9. Repeat 2 to 5 times, or as instructed.   Date Last Reviewed: 3/10/2016  © 2169-3332 [employer]. [address] [place] [address]. All rights reserved. This information is not intended as a substitute for professional medical care. Always follow your healthcare professional's instructions.        Lumbar Extension (Flexibility)    10. Lie face down on your stomach, forehead on the floor. You can lie on a mat or towel.  11. Bend your arms next to your body and lift your upper body up on your forearms. Your palms and forearms should be flat on the floor. Keep your stomach and hips on the floor.  12. Hold your upper body up with your forearms for 20 seconds. Slowly lower back down to the floor.  13. Repeat 2 times, or as instructed.  Date Last Reviewed: 3/10/2016  © 8658-4393 [employer]. [address] [place] [address]. All rights reserved. This information is not intended as a substitute for professional medical care. Always follow your healthcare professional's instructions.

## 2018-09-28 NOTE — PROGRESS NOTES
Subjective:       Patient ID: Vanessa Jerez is a 47 y.o. female.    Chief Complaint: Work Related Injury and Back Pain    Pt states that she is having lower back pain from work related injury.  Pt states no previous injury to lower back.  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  She felt pain in her back and went to the ER.  There were no x-rays taken.  She was given pain medicine this however has not gotten them filled by the pharmacy yet.  She denies bilateral leg pain, tingling, or numbness.  She denies saddle paresthesias.  She denies bowel or bladder incontinence.      Back Pain   This is a new problem. The current episode started in the past 7 days. The problem occurs intermittently. The problem is unchanged. The pain is present in the lumbar spine. The quality of the pain is described as aching and burning. The pain does not radiate. The pain is at a severity of 7/10. The pain is mild. The symptoms are aggravated by bending, lying down and position. Pertinent negatives include no abdominal pain, bladder incontinence, bowel incontinence, chest pain, fever, numbness or paresthesias. She has tried NSAIDs for the symptoms. The treatment provided mild relief.     Review of Systems   Constitution: Negative for chills and fever.   HENT: Negative for hearing loss and nosebleeds.    Eyes: Negative for blurred vision and redness.   Cardiovascular: Negative for chest pain and syncope.   Respiratory: Negative for cough and shortness of breath.    Skin: Negative for itching and rash.   Musculoskeletal: Positive for back pain. Negative for joint pain.   Gastrointestinal: Negative for abdominal pain and bowel incontinence.   Genitourinary: Negative for bladder incontinence.   Neurological: Negative for numbness and paresthesias.   Psychiatric/Behavioral: The patient is not nervous/anxious.        Objective:      Physical Exam   Constitutional: She appears well-developed and well-nourished. She is active. No  distress.   HENT:   Head: Normocephalic and atraumatic.   Right Ear: Hearing and external ear normal.   Left Ear: Hearing and external ear normal.   Nose: Nose normal. No nasal deformity. No epistaxis.   Mouth/Throat: Oropharynx is clear and moist and mucous membranes are normal.   Eyes: Conjunctivae and lids are normal. Right conjunctiva is not injected. Left conjunctiva is not injected.   Neck: Trachea normal and normal range of motion. No spinous process tenderness and no muscular tenderness present.   Cardiovascular: Intact distal pulses and normal pulses.   Pulses:       Dorsalis pedis pulses are 2+ on the right side, and 2+ on the left side.        Posterior tibial pulses are 2+ on the right side, and 2+ on the left side.   Pulmonary/Chest: Effort normal. No stridor. No respiratory distress.   Abdominal: Normal appearance.   Musculoskeletal:        Cervical back: Normal.        Thoracic back: Normal.        Lumbar back: She exhibits tenderness. She exhibits normal range of motion, no deformity and normal pulse.        Back:    Neurological: She is alert. She has normal strength and normal reflexes. No sensory deficit. GCS eye subscore is 4. GCS verbal subscore is 5. GCS motor subscore is 6.   Reflex Scores:       Patellar reflexes are 2+ on the right side and 2+ on the left side.       Achilles reflexes are 2+ on the right side and 2+ on the left side.  SLR negative bilaterally.    Skin: Skin is warm, dry and intact. No abrasion and no bruising noted.   Psychiatric: She has a normal mood and affect. Her speech is normal and behavior is normal. Thought content normal. Cognition and memory are normal. She is attentive.   Nursing note reviewed.        Type of Reading: me.  Radiology Procedure Done: Lumbar Spine X-Ray.  Interpretation: 3V, No acute fx, spondylolisthesis. Degenerative changes.       Assessment:       1. Acute myofascial strain of lumbar region, initial encounter    2. Acute bilateral low back pain  without sciatica    3. Elevated blood pressure reading        Plan:            Patient Instructions: Daily home exercises/warm soaks   Restrictions: No lifting/pushing/pulling more than 25 lbs  Follow-up in about 1 week (around 10/5/2018).        Patient Instructions       Understanding Lumbosacral Strain    Lumbosacral strain is a medical term for an injury that causes low back pain. The lumbosacral area (low back) is between the bottom of the ribcage and the top of the buttocks. A strain is tearing of muscles and tendons. These tears can be very small but still cause pain.  How a lumbosacral strain happens  Muscles and tendons connected to the spine can be strained in a number of ways:  · Sitting or standing in the same position for long periods of time. This can harm the low back over time. Poor posture can make low back pain more likely.  · Moving the muscles and tendons past their usual range of motion. This can cause a sudden injury. This can happen when you twist, bend over, or lift something heavy. Not using correct technique for sports or tasks like lifting can make back injury more likely.  · Accidents or falls  Lumbosacral strain can be caused by other problems, but these are less common.  Symptoms of lumbosacral strain  Symptoms may include:  · Pain in the back, often on one side  · Pain that gets worse with movement and gets better with rest  · Inability to move as freely as usual  · Swelling, slight redness, and skin warmth in the painful area  Treatment for lumbosacral strain  Low back pain often goes away by itself within several weeks. But it often comes back. Treatment focuses on reducing pain and avoiding further injury. Bed rest is usually not recommended for low back pain. Treatments may include:  · Avoiding or changing the action that caused the problem. This helps prevent injuring the tissues again.  · Prescription or over-the-counter pain medicines. These help reduce inflammation, swelling,  and pain.  · Cold or heat packs. These help reduce pain and swelling.  · Stretching and other exercises. These improve flexibility and strength.  · Physical therapy. This usually includes exercises and other treatments.  · Injections of medicine. This may relieve symptoms.  If these treatments do not relieve symptoms, your healthcare provider may order imaging tests to learn more about the problem. Sometimes you may need surgery.  Possible complications of lumbosacral strain  If the cause of the pain is not addressed, symptoms may return or get worse. Follow your healthcare providers instructions on lifestyle changes and treating your back.     When to call your healthcare provider  Call your healthcare provider right away if you have any of these:  · Fever of 100.4°F (38°C) or higher, or as directed  · Numbness, tingling, or weakness  · Problems with bowel or bladder control, or problems having sex  · Pain that does not go away, or gets worse  · New symptoms   Date Last Reviewed: 3/10/2016  © 6536-7868 [employer]. [address] [place] [address]. All rights reserved. This information is not intended as a substitute for professional medical care. Always follow your healthcare professional's instructions.        Lumbar Stretch (Flexibility)    1. Lie on your back on the floor, with your knees bent and your feet flat on the floor. Dont press your neck or lower back to the floor.  2. Pull one knee up toward your chest. Clasp your hands under your thigh to help pull.  3. Hold for 30 to 60 seconds. Lower your leg back down to the floor.  4. Repeat 2 times, or as instructed.  5. Switch legs and repeat.  Date Last Reviewed: 3/10/2016  © 6950-3713 [employer]. [address] [place] [address]. All rights reserved. This information is not intended as a substitute for professional medical care. Always follow your healthcare professional's instructions.        Lumbar  Rotation    6. Lie on your back on the floor, with your knees bent and your feet flat on the floor. Dont press your neck or lower back to the floor.  7. Lean both of your knees to one side. Turn your head in the opposite direction. Keep your shoulders flat on the floor. Be gentle and dont push through pain.  8. Hold for 20 seconds. Then slowly move your knees and head in the other direction.  9. Repeat 2 to 5 times, or as instructed.   Date Last Reviewed: 3/10/2016  © 0256-7272 [employer]. [address] [place] [address]. All rights reserved. This information is not intended as a substitute for professional medical care. Always follow your healthcare professional's instructions.        Lumbar Extension (Flexibility)    10. Lie face down on your stomach, forehead on the floor. You can lie on a mat or towel.  11. Bend your arms next to your body and lift your upper body up on your forearms. Your palms and forearms should be flat on the floor. Keep your stomach and hips on the floor.  12. Hold your upper body up with your forearms for 20 seconds. Slowly lower back down to the floor.  13. Repeat 2 times, or as instructed.  Date Last Reviewed: 3/10/2016  © 7609-4985 [employer]. [address] [place] [address]. All rights reserved. This information is not intended as a substitute for professional medical care. Always follow your healthcare professional's instructions.

## 2018-09-28 NOTE — LETTER
[facility] [address], [place] [address]  Phone: [phone]  Fax: [phone]    Pt Name: Vanessa Jerez  Injury Date: 09/26/2018   Employee ID:  Date of First Treatment: 09/28/2018   Company: [employer]      Appointment Time: 08:45 AM Arrived: 9:11 AM   Provider: Benitez Garcia PA-C Time Out: 10:45 AM     Office Treatment:   1. Acute myofascial strain of lumbar region, initial encounter    2. Acute bilateral low back pain without sciatica    3. Elevated blood pressure reading          Light Duty   Patient Instructions: Daily home exercises/warm soaks    Restrictions: No lifting/pushing/pulling more than 25 lbs     Return Appointment: Friday 10/5/2018 at 8:30 AM

## 2018-10-05 ENCOUNTER — OFFICE VISIT (OUTPATIENT)
Dept: URGENT CARE | Facility: CLINIC | Age: 48
End: 2018-10-05
Payer: OTHER MISCELLANEOUS

## 2018-10-05 VITALS — HEART RATE: 97 BPM | SYSTOLIC BLOOD PRESSURE: 124 MMHG | DIASTOLIC BLOOD PRESSURE: 82 MMHG
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
## 2018-10-05 DIAGNOSIS — S39.012D ACUTE MYOFASCIAL STRAIN OF LUMBAR REGION, SUBSEQUENT ENCOUNTER: ICD-10-CM

## 2018-10-05 DIAGNOSIS — M54.50 ACUTE BILATERAL LOW BACK PAIN WITHOUT SCIATICA: Primary | ICD-10-CM

## 2018-10-05 PROCEDURE — 99214 OFFICE O/P EST MOD 30 MIN: CPT | Mod: S$GLB,,, | Performed by: NURSE PRACTITIONER

## 2018-10-05 RX ORDER — METHOCARBAMOL 750 MG/1
750 TABLET, FILM COATED ORAL 3 TIMES DAILY
Qty: 21 TABLET | Refills: 0 | Status: SHIPPED | OUTPATIENT
Start: 2018-10-05 | End: 2018-10-12 | Stop reason: SDUPTHER

## 2018-10-05 RX ORDER — IBUPROFEN 600 MG/1
600 TABLET ORAL 3 TIMES DAILY
Qty: 30 TABLET | Refills: 0 | Status: SHIPPED | OUTPATIENT
Start: 2018-10-05 | End: 2018-10-05

## 2018-10-05 RX ORDER — IBUPROFEN 600 MG/1
600 TABLET ORAL 3 TIMES DAILY
Qty: 30 TABLET | Refills: 0 | Status: SHIPPED | OUTPATIENT
Start: 2018-10-05 | End: 2018-10-12 | Stop reason: SDUPTHER

## 2018-10-05 RX ORDER — METHOCARBAMOL 750 MG/1
750 TABLET, FILM COATED ORAL 3 TIMES DAILY
Qty: 21 TABLET | Refills: 0 | Status: SHIPPED | OUTPATIENT
Start: 2018-10-05 | End: 2018-10-05

## 2018-10-05 NOTE — LETTER
[facility] [address], [address]  [place] [address]  Phone: [phone]  Fax: [phone]    Pt Name: Vanessa Jerez  Injury Date: 09/26/2018   Employee ID:  Date of First Treatment: 10/05/2018   Company: [employer]      Appointment Time: 08:20 AM Arrived: 835am   Provider: Mary Kay Yanez NP Time Out:905am     Office Treatment:   1. Acute bilateral low back pain without sciatica    2. Acute myofascial strain of lumbar region, subsequent encounter    3. Work related injury      Medications Ordered This Encounter   Medications    ibuprofen (ADVIL,MOTRIN) 600 MG tablet    methocarbamol (ROBAXIN) 750 MG Tab      Patient Instructions: Attention not to aggravate affected area, Daily home exercises/warm soaks, Apply ice 24-48 hours then apply heat/warm soaks, PT to be scheduled once authorized(please take medications as directed for pain and discomfort)    Restrictions: Sit or stand as needed, Avoid frequent bending/lifting/twisting, Avoid climbing/kneeling/squatting, No lifting/pushing/pulling more than 25 lbs(light duty)     Return Appointment: 10/12/2018 at 9am

## 2018-10-05 NOTE — PROGRESS NOTES
Subjective:       Patient ID: Vanessa Jerez is a 47 y.o. female.    Chief Complaint: Work Related Injury and Back Pain    Pt states that she is still having lower back pain from work related injury . Pt states that the Meds and ice and heat help.  Patient has been taking muscle relaxers and ibuprofen with some relief.  States that she is having stiffness and cannot sit for long.      Back Pain   This is a new problem. The current episode started 1 to 4 weeks ago. The problem occurs intermittently. The problem is unchanged. The pain is present in the lumbar spine. The quality of the pain is described as aching and burning. The pain does not radiate. The pain is at a severity of 5/10. The pain is moderate. The symptoms are aggravated by sitting, bending and position. Stiffness is present all day. Pertinent negatives include no abdominal pain, bladder incontinence, bowel incontinence, dysuria, leg pain, numbness, paresis, paresthesias, perianal numbness, tingling or weakness. Risk factors include recent trauma. She has tried ice, heat, NSAIDs and muscle relaxant (prescription meds) for the symptoms. The treatment provided mild relief.     Review of Systems   Constitution: Negative for weakness and malaise/fatigue.   Skin: Negative for rash.   Musculoskeletal: Positive for back pain and stiffness. Negative for muscle cramps, muscle weakness and neck pain.   Gastrointestinal: Negative for abdominal pain and bowel incontinence.   Genitourinary: Negative for bladder incontinence, dysuria, hematuria and urgency.   Neurological: Negative for disturbances in coordination, numbness, paresthesias, sensory change and tingling.   All other systems reviewed and are negative.      Objective:      Physical Exam   Constitutional: She is oriented to person, place, and time. Vital signs are normal. She appears well-developed and well-nourished. She is active. No distress.   Uncomfortable on assessment   HENT:   Head:  Normocephalic and atraumatic.   Right Ear: Hearing and external ear normal.   Left Ear: Hearing and external ear normal.   Nose: Nose normal. No nasal deformity. No epistaxis.   Mouth/Throat: Mucous membranes are normal.   Eyes: Conjunctivae and lids are normal. Right conjunctiva is not injected. Left conjunctiva is not injected.   Neck: Trachea normal, normal range of motion and full passive range of motion without pain. Neck supple. No spinous process tenderness and no muscular tenderness present. No neck rigidity. Normal range of motion present.   Cardiovascular: Intact distal pulses and normal pulses.   Pulses:       Radial pulses are 2+ on the right side, and 2+ on the left side.        Dorsalis pedis pulses are 2+ on the right side, and 2+ on the left side.        Posterior tibial pulses are 2+ on the right side, and 2+ on the left side.   Pulmonary/Chest: Effort normal. No stridor. No respiratory distress.   Abdominal: Soft. Normal appearance.   Musculoskeletal: She exhibits tenderness.        Cervical back: Normal.        Thoracic back: Normal.        Lumbar back: She exhibits tenderness, pain and spasm. She exhibits normal range of motion, no deformity and normal pulse.        Back:         Right upper leg: Normal.        Left upper leg: Normal.   Neurological: She is alert and oriented to person, place, and time. She has normal strength and normal reflexes. She displays normal reflexes. No sensory deficit. She exhibits normal muscle tone. Coordination normal. GCS eye subscore is 4. GCS verbal subscore is 5. GCS motor subscore is 6.   Reflex Scores:       Patellar reflexes are 2+ on the right side and 2+ on the left side.  SLR negative bilaterally.    Skin: Skin is warm, dry and intact. Capillary refill takes less than 2 seconds. No abrasion and no bruising noted.   Psychiatric: She has a normal mood and affect. Her speech is normal and behavior is normal. Thought content normal. Cognition and memory are  normal. She is attentive.   Nursing note and vitals reviewed.      Assessment:       1. Acute bilateral low back pain without sciatica    2. Acute myofascial strain of lumbar region, subsequent encounter    3. Work related injury        Plan:         Medications Ordered This Encounter   Medications    ibuprofen (ADVIL,MOTRIN) 600 MG tablet     Sig: Take 1 tablet (600 mg total) by mouth 3 (three) times daily.     Dispense:  30 tablet     Refill:  0    methocarbamol (ROBAXIN) 750 MG Tab     Sig: Take 1 tablet (750 mg total) by mouth 3 (three) times daily. for 7 days     Dispense:  21 tablet     Refill:  0     Patient Instructions: Attention not to aggravate affected area, Daily home exercises/warm soaks, Apply ice 24-48 hours then apply heat/warm soaks, PT to be scheduled once authorized(please take medications as directed for pain and discomfort)   Restrictions: Sit or stand as needed, Avoid frequent bending/lifting/twisting, Avoid climbing/kneeling/squatting, No lifting/pushing/pulling more than 25 lbs(light duty)  Follow-up in about 7 days (around 10/12/2018).

## 2018-10-11 ENCOUNTER — CLINICAL SUPPORT (OUTPATIENT)
Dept: REHABILITATION | Facility: HOSPITAL | Age: 48
End: 2018-10-11
Payer: OTHER MISCELLANEOUS

## 2018-10-11 DIAGNOSIS — M51.9 ACUTE LOW BACK PAIN WITH DISC SYMPTOMS, DURATION LESS THAN 6 WEEKS: ICD-10-CM

## 2018-10-11 DIAGNOSIS — M54.50 ACUTE LOW BACK PAIN WITH DISC SYMPTOMS, DURATION LESS THAN 6 WEEKS: ICD-10-CM

## 2018-10-11 PROCEDURE — 97140 MANUAL THERAPY 1/> REGIONS: CPT | Mod: PN

## 2018-10-11 PROCEDURE — 97161 PT EVAL LOW COMPLEX 20 MIN: CPT | Mod: PN

## 2018-10-11 NOTE — PROGRESS NOTES
Physical Therapy Evaluation    Name: Vanessa Jerez  Clinic Number: [id number]      Diagnosis: No diagnosis found.  Physician: Mary Kay Yanez NP  Treatment Orders: PT Eval and Treat    Past Medical History:   Diagnosis Date    Anemia      Current Outpatient Medications   Medication Sig    ibuprofen (ADVIL,MOTRIN) 600 MG tablet Take 1 tablet (600 mg total) by mouth every 6 (six) hours as needed for Pain.    ibuprofen (ADVIL,MOTRIN) 600 MG tablet Take 1 tablet (600 mg total) by mouth 3 (three) times daily.    methocarbamol (ROBAXIN) 750 MG Tab Take 1 tablet (750 mg total) by mouth 3 (three) times daily. for 7 days     No current facility-administered medications for this visit.      Review of patient's allergies indicates:   Allergen Reactions    Morphine      Slowed down heart rate     Precautions: Standard     Evaluation Date: 10/11/2018  Visit # authorized: 20  Authorization period: 12/31/18      Soco Mendez is a 47 y.o. female that presents to [facility] outpatient clinic secondary to low back pain. Patient reports that the pain began suddenly on September 26th, 2018 when patient was assisting a patient to roll at her job. Patient reports that she was standing on one side of the patient with two CNAs on the opposite side of the patient and the patient rolled easily toward her but when attempting to roll toward the opposite side to the other CNAs the patient had increased difficulty and then she felt increased back pain suddenly. Patient reports she was unable to support the patient and dropper her as she was unable to hold her up any longer.   Patient reports that initially the pain was severe and she was unable to find any relief. Patient reports that currently pain is physically limiting however is not excruciating. Patient reports that she has continued to work, but on light duty. Patient reports that she can stand for one hour prior to needing to sit down for relief, patient states she is  also unable to sit for a prolonged period. Patient also reports difficulty finding a comfortable position in bed.     Patient states that while she is still greatly limited by pain she feels a lot better than the day of injury.     Patient c/o: intermittent   Radicular symptoms: no   Onset: sudden, September 26, 2018  Pain Scale: Vanessa rates pain on a scale of 0-10 to be 10 at worst; 5 currently; 5 at best .  Aggravating factors: prolonged standing, prolonged sitting  Pain with coughing/sneezing, B&B, sleep disturbance: none   Relieving factors: hot tub relieves   Previous treatment: none   Bowel/Bladder Changes: none   Imaging: xray - no noted findings   Past surgical history: partial hysterectomy   Functional deficits: unable to work at full duties   Prior level of function: independent without pain   Occupation: CNA, work duties include: lifting, pushing, pulling   Environment: one story home with 3 steps to enter, 12 steps in home to upstairs  No cultural or spiritual barriers identified to treatment or learning.  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   Right Rotation   50% with P!    *= pain      Lower Extremity Strength    Right LE  Left LE    Hip flexion: 4+/5 Hip flexion: 4+/5   Knee extension: 5/5 Knee extension: 5/5   Knee flexion: 5/5 Knee flexion: 5/5   Hip IR: 4+/5 Hip IR: 4+/5   Hip ER: 4+/5 Hip ER: 4+/5   Hip extension:  4-/5 Hip extension: 4-/5   Hip abduction: 4-/5 Hip abduction: 4-/5   Hip adduction: 4/5 Hip adduction 4+/5   Ankle dorsiflexion: 5/5 Ankle dorsiflexion: 5/5   Ankle plantarflexion: 5/5 Ankle plantarflexion: 5/5       Special Tests:     Right (combined) Left   Trendelenburg Test - -   Quadrant  testing - -   STEPHEN + +   Scour - -   Repeated Flexion +    Repeated Ext -    Piriformis Test     Prone Instability Test     Bridge Test     Sustained extension          Neuro Dynamic Testing:  Sciatic nerve:      SLR: + with pain       Joint Mobility: unable to properly test due to patient pain level     Palpation: increased tone along lumbar paraspinals     Sensation: intact     Flexibility: HS testing limited due to patient pain     PT Evaluation Completed? Yes  Discussed Plan of Care with patient: Yes    TREATMENT:  Vanessa received therapeutic exercises to develop strength and endurance, flexibility for 10 minutes including:  Prone, repeated flexion x 10     Vanessa  received the following manual therapy techniques x 8 min: Joint mobilizations and soft tissue mobilization were applied to the lumbar spine to improve/decrease tissue tension      Pt received cold pack x 10 min to lumbar spine following treatment.    HEP provided: no  Instructed pt. Regarding: Proper technique with all exercises. Pt demo good understanding of the education provided. Vanessa demonstrated good return demonstration of activities.      Assessment     This is a 47 y.o. female referred to outpatient physical therapy and presents with a medical diagnosis of acute LBP and demonstrates limitations as described in the problem list. Patient will benefit from physcial therapy services in order to maximize pain free functional independence. The following goals were discussed with the patient and patient is in agreement with them as to be addressed in the treatment plan. Patient was given a HEP consisting of avoiding flexion. Patient verbally understood the instructions as they were given and demonstrated proper form and technique during therapy. Patient was advised to perform these exercises free of pain, and to stop performing them if pain occurs. Patient would benefit from skilled PT to address above stated problems, as well as, achieve pt goals  within a timely manner. Patient has set realistic goals and has verbalized good understanding and agreement with reported diagnosis, prognosis and treatment. Patient demonstrates no additional cultural, spiritual or educational need and currently has no barriers to learning.    History  Co-morbidities and personal factors that may impact the plan of care Examination  Body Structures and Functions, activity limitations and participation restrictions that may impact the plan of care Clinical Presentation   Decision Making/ Complexity Score   Co-morbidities:   Weight        Personal Factors:   Age 47  Occupation: CNA  Lifestyle: sedentary   Attitudes: pessimistic, pain focused    Body Regions: lumbar spine     Body Systems: Musculoskeletal (symmetry, ROM, strength, flexibility, joint mobility), Neuromuscular (coordination, posture, balance, gait, transfers, motor control/learning), Cardiovascular (endurance)    Activity limitations: dec motion, weakness     Participation Restrictions: unable to work without pain    Pain: 8/10   Complexity:  low         Prognosis: good    Anticipated barriers to physical therapy: weight    Medical necessity is demonstrated by the following IMPAIRMENTS/PROBLEM LIST:   1) Increase in pain level limiting function   2) decreased lumbar spine ROM due to pain    3) B LE weakness    4) poor seated, standing posture    5) Lack of HEP    GOALS: Short Term Goals:  4 weeks  1. Patient will be able to report decreased lumbar resting pain  <   / =  5 /10  to increase tolerance for increased standing.   2. Patient will be able to report a functional improvement in ability to stand without pain for greater than 1 hour at work  3. Patient will demonstrate an increased MMT in hip flexion  to 4+/5  to increase tolerance for walking  4. Patient demonstrate improved seated posture without cues   5. Patient will be able to tolerate HEP to improve ROM and independence with ADL's    Long Term Goals: 8  weeks  1.Report decreased in resting L hip pain  <   / =  2  /10  to increase tolerance for working tasks.   2.Patient will be able to demonstrate an increase in hamstring flexibility by 10 deg with no pain in testing to improve mobility   3. Patient will demonstrate an increased MMT  for  All affected LE musculature  to increase tolerance for ADL and work activities.  4. Patient will report at CJ level (20-40% impaired) on LEFS  to demonstrate increase in LE function with every day tasks.   5. Patient to be Independent with HEP to improve ROM and independence with ADL's      Plan     Patient will be treated by physical therapy 1-3 times a week for 8 weeks for Electrical Stimulation PRN, Iontophoresis (with dexamethasone PRN), Manual Therapy, Moist Heat/ Ice, Neuromuscular Re-ed, Patient Education, Therapeutic Activites, Therapeutic Exercise and Other therapeutic taping, dry needling, aquatic therapy to achieve established goals. Vanessa   may at times be seen by a PTA as part of the Rehab Team.        I certify the need for these services furnished under this plan of treatment and while under my care.______________________________ Physician/Referring Practitioner  Date of Signature      Alejandro Mariee, PT  10/11/2018

## 2018-10-12 ENCOUNTER — OFFICE VISIT (OUTPATIENT)
Dept: URGENT CARE | Facility: CLINIC | Age: 48
End: 2018-10-12
Payer: OTHER MISCELLANEOUS

## 2018-10-12 VITALS
TEMPERATURE: 98 F | WEIGHT: 230 LBS | HEART RATE: 78 BPM | HEIGHT: 69 IN | RESPIRATION RATE: 18 BRPM | DIASTOLIC BLOOD PRESSURE: 67 MMHG | OXYGEN SATURATION: 100 % | BODY MASS INDEX: 34.07 KG/M2 | SYSTOLIC BLOOD PRESSURE: 112 MMHG
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
## 2018-10-12 DIAGNOSIS — M54.50 ACUTE BILATERAL LOW BACK PAIN WITHOUT SCIATICA: ICD-10-CM

## 2018-10-12 DIAGNOSIS — S39.012D ACUTE MYOFASCIAL STRAIN OF LUMBAR REGION, SUBSEQUENT ENCOUNTER: ICD-10-CM

## 2018-10-12 PROBLEM — M51.9 ACUTE LOW BACK PAIN WITH DISC SYMPTOMS, DURATION LESS THAN 6 WEEKS: Status: ACTIVE | Noted: 2018-10-12

## 2018-10-12 PROCEDURE — 99214 OFFICE O/P EST MOD 30 MIN: CPT | Mod: S$GLB,,, | Performed by: NURSE PRACTITIONER

## 2018-10-12 RX ORDER — METHOCARBAMOL 750 MG/1
750 TABLET, FILM COATED ORAL 3 TIMES DAILY
Qty: 21 TABLET | Refills: 0 | Status: SHIPPED | OUTPATIENT
Start: 2018-10-12 | End: 2018-10-19

## 2018-10-12 RX ORDER — IBUPROFEN 600 MG/1
600 TABLET ORAL 3 TIMES DAILY
Qty: 30 TABLET | Refills: 0 | Status: SHIPPED | OUTPATIENT
Start: 2018-10-12 | End: 2018-12-14

## 2018-10-12 NOTE — LETTER
[facility] [address], [address]  [place] [address]  Phone: [phone]  Fax: [phone]    Pt Name: Vanessa Jerez  Injury Date: 09/26/2018   Employee ID:  Date of First Treatment: 10/12/2018   Company: [employer]      Appointment Time: 08:45 AM Arrived: 845am   Provider: Mary Kay Yanez NP Time Out:915am     Office Treatment:   1. Acute bilateral low back pain without sciatica    2. Acute myofascial strain of lumbar region, subsequent encounter    3. Work related injury      Medications Ordered This Encounter   Medications    ibuprofen (ADVIL,MOTRIN) 600 MG tablet    methocarbamol (ROBAXIN) 750 MG Tab      Patient Instructions: Attention not to aggravate affected area, Daily home exercises/warm soaks, Apply ice 24-48 hours then apply heat/warm soaks, Continue Physical Therapy    Restrictions: Sit or stand as needed, Avoid frequent bending/lifting/twisting, Avoid climbing/kneeling/squatting, No lifting/pushing/pulling more than 25 lbs(light duty)     Return Appointment: 10/26/18 at 9am

## 2018-10-12 NOTE — PLAN OF CARE
Physical Therapy Evaluation    Name: Vanessa Jerez  Clinic Number: [id number]      Diagnosis: No diagnosis found.  Physician: Mary Kay Yanez NP  Treatment Orders: PT Eval and Treat    Past Medical History:   Diagnosis Date    Anemia      Current Outpatient Medications   Medication Sig    ibuprofen (ADVIL,MOTRIN) 600 MG tablet Take 1 tablet (600 mg total) by mouth every 6 (six) hours as needed for Pain.    ibuprofen (ADVIL,MOTRIN) 600 MG tablet Take 1 tablet (600 mg total) by mouth 3 (three) times daily.    methocarbamol (ROBAXIN) 750 MG Tab Take 1 tablet (750 mg total) by mouth 3 (three) times daily. for 7 days     No current facility-administered medications for this visit.      Review of patient's allergies indicates:   Allergen Reactions    Morphine      Slowed down heart rate     Precautions: Standard     Evaluation Date: 10/11/2018  Visit # authorized: 20  Authorization period: 12/31/18      Soco Mendez is a 47 y.o. female that presents to [facility] outpatient clinic secondary to low back pain. Patient reports that the pain began suddenly on September 26th, 2018 when patient was assisting a patient to roll at her job. Patient reports that she was standing on one side of the patient with two CNAs on the opposite side of the patient and the patient rolled easily toward her but when attempting to roll toward the opposite side to the other CNAs the patient had increased difficulty and then she felt increased back pain suddenly. Patient reports she was unable to support the patient and dropper her as she was unable to hold her up any longer.   Patient reports that initially the pain was severe and she was unable to find any relief. Patient reports that currently pain is physically limiting however is not excruciating. Patient reports that she has continued to work, but on light duty. Patient reports that she can stand for one hour prior to needing to sit down for relief, patient states she is  also unable to sit for a prolonged period. Patient also reports difficulty finding a comfortable position in bed.     Patient states that while she is still greatly limited by pain she feels a lot better than the day of injury.     Patient c/o: intermittent   Radicular symptoms: no   Onset: sudden, September 26, 2018  Pain Scale: Vanessa rates pain on a scale of 0-10 to be 10 at worst; 5 currently; 5 at best .  Aggravating factors: prolonged standing, prolonged sitting  Pain with coughing/sneezing, B&B, sleep disturbance: none   Relieving factors: hot tub relieves   Previous treatment: none   Bowel/Bladder Changes: none   Imaging: xray - no noted findings   Past surgical history: partial hysterectomy   Functional deficits: unable to work at full duties   Prior level of function: independent without pain   Occupation: CNA, work duties include: lifting, pushing, pulling   Environment: one story home with 3 steps to enter, 12 steps in home to upstairs  No cultural or spiritual barriers identified to treatment or learning.  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   Right Rotation   50% with P!    *= pain      Lower Extremity Strength    Right LE  Left LE    Hip flexion: 4+/5 Hip flexion: 4+/5   Knee extension: 5/5 Knee extension: 5/5   Knee flexion: 5/5 Knee flexion: 5/5   Hip IR: 4+/5 Hip IR: 4+/5   Hip ER: 4+/5 Hip ER: 4+/5   Hip extension:  4-/5 Hip extension: 4-/5   Hip abduction: 4-/5 Hip abduction: 4-/5   Hip adduction: 4/5 Hip adduction 4+/5   Ankle dorsiflexion: 5/5 Ankle dorsiflexion: 5/5   Ankle plantarflexion: 5/5 Ankle plantarflexion: 5/5       Special Tests:     Right (combined) Left   Trendelenburg Test - -   Quadrant  testing - -   STEPHEN + +   Scour - -   Repeated Flexion +    Repeated Ext -    Piriformis Test     Prone Instability Test     Bridge Test     Sustained extension          Neuro Dynamic Testing:  Sciatic nerve:      SLR: + with pain       Joint Mobility: unable to properly test due to patient pain level     Palpation: increased tone along lumbar paraspinals     Sensation: intact     Flexibility: HS testing limited due to patient pain     PT Evaluation Completed? Yes  Discussed Plan of Care with patient: Yes    TREATMENT:  Vanessa received therapeutic exercises to develop strength and endurance, flexibility for 10 minutes including:  Prone, repeated flexion x 10     Vanessa  received the following manual therapy techniques x 8 min: Joint mobilizations and soft tissue mobilization were applied to the lumbar spine to improve/decrease tissue tension      Pt received cold pack x 10 min to lumbar spine following treatment.    HEP provided: no  Instructed pt. Regarding: Proper technique with all exercises. Pt demo good understanding of the education provided. Vanessa demonstrated good return demonstration of activities.      Assessment     This is a 47 y.o. female referred to outpatient physical therapy and presents with a medical diagnosis of acute LBP and demonstrates limitations as described in the problem list. Patient will benefit from physcial therapy services in order to maximize pain free functional independence. The following goals were discussed with the patient and patient is in agreement with them as to be addressed in the treatment plan. Patient was given a HEP consisting of avoiding flexion. Patient verbally understood the instructions as they were given and demonstrated proper form and technique during therapy. Patient was advised to perform these exercises free of pain, and to stop performing them if pain occurs. Patient would benefit from skilled PT to address above stated problems, as well as, achieve pt goals  within a timely manner. Patient has set realistic goals and has verbalized good understanding and agreement with reported diagnosis, prognosis and treatment. Patient demonstrates no additional cultural, spiritual or educational need and currently has no barriers to learning.    History  Co-morbidities and personal factors that may impact the plan of care Examination  Body Structures and Functions, activity limitations and participation restrictions that may impact the plan of care Clinical Presentation   Decision Making/ Complexity Score   Co-morbidities:   Weight        Personal Factors:   Age 47  Occupation: CNA  Lifestyle: sedentary   Attitudes: pessimistic, pain focused    Body Regions: lumbar spine     Body Systems: Musculoskeletal (symmetry, ROM, strength, flexibility, joint mobility), Neuromuscular (coordination, posture, balance, gait, transfers, motor control/learning), Cardiovascular (endurance)    Activity limitations: dec motion, weakness     Participation Restrictions: unable to work without pain    Pain: 8/10   Complexity:  low         Prognosis: good    Anticipated barriers to physical therapy: weight    Medical necessity is demonstrated by the following IMPAIRMENTS/PROBLEM LIST:   1) Increase in pain level limiting function   2) decreased lumbar spine ROM due to pain    3) B LE weakness    4) poor seated, standing posture    5) Lack of HEP    GOALS: Short Term Goals:  4 weeks  1. Patient will be able to report decreased lumbar resting pain  <   / =  5 /10  to increase tolerance for increased standing.   2. Patient will be able to report a functional improvement in ability to stand without pain for greater than 1 hour at work  3. Patient will demonstrate an increased MMT in hip flexion  to 4+/5  to increase tolerance for walking  4. Patient demonstrate improved seated posture without cues   5. Patient will be able to tolerate HEP to improve ROM and independence with ADL's    Long Term Goals: 8  weeks  1.Report decreased in resting L hip pain  <   / =  2  /10  to increase tolerance for working tasks.   2.Patient will be able to demonstrate an increase in hamstring flexibility by 10 deg with no pain in testing to improve mobility   3. Patient will demonstrate an increased MMT  for  All affected LE musculature  to increase tolerance for ADL and work activities.  4. Patient will report at CJ level (20-40% impaired) on LEFS  to demonstrate increase in LE function with every day tasks.   5. Patient to be Independent with HEP to improve ROM and independence with ADL's      Plan     Patient will be treated by physical therapy 1-3 times a week for 8 weeks for Electrical Stimulation PRN, Iontophoresis (with dexamethasone PRN), Manual Therapy, Moist Heat/ Ice, Neuromuscular Re-ed, Patient Education, Therapeutic Activites, Therapeutic Exercise and Other therapeutic taping, dry needling, aquatic therapy to achieve established goals. Vanessa   may at times be seen by a PTA as part of the Rehab Team.        I certify the need for these services furnished under this plan of treatment and while under my care.______________________________ Physician/Referring Practitioner  Date of Signature      Alejandro Mariee, PT  10/11/2018

## 2018-10-12 NOTE — PROGRESS NOTES
Subjective:       Patient ID: Vanessa Jerez is a 47 y.o. female.    Chief Complaint: Work Related Injury    Patient states her pain is getting a little better.  Had her physical therapy eval yesterday.  Starts PT next week.  States the medications are helping some.      Back Pain   This is a new problem. The current episode started 1 to 4 weeks ago. The problem occurs constantly. The problem has been gradually improving since onset. The pain is present in the lumbar spine. The quality of the pain is described as stabbing and aching. The pain does not radiate. The pain is at a severity of 6/10. The pain is mild. The pain is the same all the time. The symptoms are aggravated by bending, position and twisting. Stiffness is present all day. Pertinent negatives include no abdominal pain, bladder incontinence, bowel incontinence, dysuria, leg pain, numbness, paresis, paresthesias, perianal numbness, tingling or weakness. Risk factors include recent trauma and obesity. She has tried NSAIDs and muscle relaxant for the symptoms. The treatment provided mild relief.     Review of Systems   Constitution: Negative for weakness and malaise/fatigue.   Skin: Negative for rash.   Musculoskeletal: Positive for back pain and stiffness. Negative for muscle cramps and muscle weakness.   Gastrointestinal: Negative for abdominal pain and bowel incontinence.   Genitourinary: Negative for bladder incontinence, dysuria, hematuria and urgency.   Neurological: Negative for disturbances in coordination, numbness, paresthesias and tingling.   All other systems reviewed and are negative.      Objective:      Physical Exam   Constitutional: She is oriented to person, place, and time. Vital signs are normal. She appears well-developed and well-nourished. She is active. No distress.   HENT:   Head: Normocephalic and atraumatic.   Right Ear: Hearing and external ear normal.   Left Ear: Hearing and external ear normal.   Nose: Nose normal. No  nasal deformity. No epistaxis.   Mouth/Throat: Mucous membranes are normal.   Eyes: Conjunctivae and lids are normal. Right conjunctiva is not injected. Left conjunctiva is not injected.   Neck: Trachea normal, normal range of motion and full passive range of motion without pain. Neck supple. No spinous process tenderness and no muscular tenderness present. No neck rigidity. Normal range of motion present.   Cardiovascular: Normal rate, intact distal pulses and normal pulses.   Pulses:       Radial pulses are 2+ on the right side, and 2+ on the left side.        Dorsalis pedis pulses are 2+ on the right side, and 2+ on the left side.        Posterior tibial pulses are 2+ on the right side, and 2+ on the left side.   Pulmonary/Chest: Effort normal. No stridor. No respiratory distress.   Abdominal: Soft. Normal appearance.   Musculoskeletal: She exhibits tenderness.        Cervical back: Normal.        Thoracic back: Normal.        Lumbar back: She exhibits tenderness, pain and spasm. She exhibits normal range of motion, no bony tenderness, no deformity and normal pulse.        Back:         Right upper leg: Normal.        Left upper leg: Normal.   Neurological: She is alert and oriented to person, place, and time. She has normal strength and normal reflexes. She displays normal reflexes. No sensory deficit. She exhibits normal muscle tone. Coordination normal. GCS eye subscore is 4. GCS verbal subscore is 5. GCS motor subscore is 6.   Reflex Scores:       Patellar reflexes are 2+ on the right side and 2+ on the left side.  SLR negative bilaterally.    Skin: Skin is warm, dry and intact. Capillary refill takes less than 2 seconds. No abrasion and no bruising noted.   Psychiatric: She has a normal mood and affect. Her speech is normal and behavior is normal. Thought content normal. Cognition and memory are normal. She is attentive.   Nursing note and vitals reviewed.      Assessment:       1. Acute bilateral low back  pain without sciatica    2. Acute myofascial strain of lumbar region, subsequent encounter    3. Work related injury        Plan:         Medications Ordered This Encounter   Medications    ibuprofen (ADVIL,MOTRIN) 600 MG tablet     Sig: Take 1 tablet (600 mg total) by mouth 3 (three) times daily.     Dispense:  30 tablet     Refill:  0    methocarbamol (ROBAXIN) 750 MG Tab     Sig: Take 1 tablet (750 mg total) by mouth 3 (three) times daily. for 7 days     Dispense:  21 tablet     Refill:  0     Patient Instructions: Attention not to aggravate affected area, Daily home exercises/warm soaks, Apply ice 24-48 hours then apply heat/warm soaks, Continue Physical Therapy   Restrictions: Sit or stand as needed, Avoid frequent bending/lifting/twisting, Avoid climbing/kneeling/squatting, No lifting/pushing/pulling more than 25 lbs(light duty)  Follow-up in about 2 weeks (around 10/26/2018).

## 2018-10-24 ENCOUNTER — CLINICAL SUPPORT (OUTPATIENT)
Dept: REHABILITATION | Facility: HOSPITAL | Age: 48
End: 2018-10-24
Payer: OTHER MISCELLANEOUS

## 2018-10-24 DIAGNOSIS — M54.50 ACUTE LOW BACK PAIN WITH DISC SYMPTOMS, DURATION LESS THAN 6 WEEKS: ICD-10-CM

## 2018-10-24 DIAGNOSIS — M51.9 ACUTE LOW BACK PAIN WITH DISC SYMPTOMS, DURATION LESS THAN 6 WEEKS: ICD-10-CM

## 2018-10-24 PROCEDURE — 97140 MANUAL THERAPY 1/> REGIONS: CPT | Mod: PN

## 2018-10-24 PROCEDURE — 97110 THERAPEUTIC EXERCISES: CPT | Mod: PN

## 2018-10-24 NOTE — PROGRESS NOTES
"  Physical Therapy Daily Treatment Note     Name: Vanessa Jerez  Clinic Number: [id number]    Therapy Diagnosis:   Encounter Diagnosis   Name Primary?    Acute low back pain with disc symptoms, duration less than 6 weeks      Physician: Mary Kay Yanez NP    Visit Date: 10/24/2018  Visit #/Visits authorized: 2/ 20     Time In: 904AM  Time Out: 1005AM  Total Billable Time: 60 minutes    Precautions: Standard    Subjective     Pt reports: Patient reports that she feels "so, so". Patient continues to be on light duty at work. States she is not performing HEP as prescribed.   She was not compliant with home exercise program.  Response to previous treatment: "fine"  Functional change: none     Pain: 3/10  Location: bilateral, midline low back      Objective     Vanessa received therapeutic exercises to develop strength, ROM, flexibility, posture and core stabilization for 45 minutes including:  +LTR on theraball, 10" hold x 10 reps bilaterally   +SKTC on theraball, 10" hold x 10 reps   +HS stretch with strap, 20" hold x 3 reps B   +piriformis stretch, 20" hold x 3 reps B  +PPT, 5" hold x 12  +PPT with leg lift, x 10 B  +resisted knee flexion, 5" hold x 12 reps  +bridging, x 12 with 3" hold   +PPT with hip abduction, x 12 reps with 5" hold    Vanessa received the following manual therapy techniques: Soft tissue Mobilization were applied to the lumber spine, QL for 8 minutes, including:  STM to QL, trigger points noted     Vanessa participated in neuromuscular re-education activities to improve:  for  minutes. The following activities were included:      Vanessa received hot pack for 0 minutes to increase circulation and promote tissue healing.      Home Exercises Provided and Patient Education Provided     Education provided:   - HEP, written  - log roll technique when attempting to stand     Written Home Exercises Provided: yes.  Exercises were reviewed and Vanessa was able to demonstrate them prior to the end of the " session.  Vanessa demonstrated fair  understanding of the education provided.     See EMR under Patient Instructions for exercises provided 10/24/2018.    Assessment     Patient tolerated session well with no significant increase in symptoms with therapeutic exercises. Patient introduced to supine, supported core stabilization program in spinal neutral with cues required for appropriate recruitment. Manual therapy to lumbar spine performed to trigger points in QL. Patient with relief post treatment.   Vanessa is progressing well towards her goals.   Pt prognosis is Guarded.     Pt will continue to benefit from skilled outpatient physical therapy to address the deficits listed in the problem list box on initial evaluation, provide pt/family education and to maximize pt's level of independence in the home and community environment.     Pt's spiritual, cultural and educational needs considered and pt agreeable to plan of care and goals.    Anticipated barriers to physical therapy: patient condition    Goals: see assessment     Plan     Continue per BREE Mariee, PT

## 2018-10-25 ENCOUNTER — CLINICAL SUPPORT (OUTPATIENT)
Dept: REHABILITATION | Facility: HOSPITAL | Age: 48
End: 2018-10-25
Payer: OTHER MISCELLANEOUS

## 2018-10-25 DIAGNOSIS — M54.50 ACUTE LOW BACK PAIN WITH DISC SYMPTOMS, DURATION LESS THAN 6 WEEKS: ICD-10-CM

## 2018-10-25 DIAGNOSIS — M51.9 ACUTE LOW BACK PAIN WITH DISC SYMPTOMS, DURATION LESS THAN 6 WEEKS: ICD-10-CM

## 2018-10-25 PROCEDURE — 97110 THERAPEUTIC EXERCISES: CPT | Mod: PN

## 2018-10-25 NOTE — PROGRESS NOTES
"  Physical Therapy Daily Treatment Note     Name: Vanessa Jerez  Clinic Number: [id number]    Therapy Diagnosis:   Encounter Diagnosis   Name Primary?    Acute low back pain with disc symptoms, duration less than 6 weeks      Physician: Mary Kay Yanez NP    Visit Date: 10/25/2018  Visit #/Visits authorized: 2/ 20     Time In: 11:00AM  Time Out: 1205PM  Total Billable Time: 65 minutes (Pt 1;1 with PT for 45 minutes throughout session)    Precautions: Standard    Subjective     Pt reports: Patient reports that following previous treatment she was not sore however, she did feel fatigued. Patient did not perform HEP as her previous appointment was yesterday.   She was not compliant with home exercise program.  Response to previous treatment: "fine"  Functional change: none     Pain: 3/10  Location: bilateral, midline low back      Objective     Vanessa received therapeutic exercises to develop strength, ROM, flexibility, posture and core stabilization for 45 minutes including:    Elliptical, 8'    LTR on theraball, 10" hold x 10 reps bilaterally   SKTC on theraball, 10" hold x 10 reps   HS stretch with strap, 20" hold x 3 reps B   piriformis stretch, 20" hold x 3 reps B  PPT, 5" hold x 12  PPT with leg lift, x 10 B  resisted knee flexion, 5" hold x 12 reps  bridging, x 12 with 3" hold   PPT with hip abduction, x 12 reps with 5" hold  +gastroc stretch on wedge, 30" hold x 3 reps  +SLR with PPT, 1# ankle weight, x 10 B LE with 3" hold   +ITB stretch, 20" hold x3 reps B      Next visit:   +supine, shoulder pull downs  +anti rotation step outs, x 10 reps to each side       Vanessa received the following manual therapy techniques: Soft tissue Mobilization were applied to the lumber spine, QL for 8 minutes, including:  STM to QL, trigger points noted     Vanessa participated in neuromuscular re-education activities to improve:  for  minutes. The following activities were included:      Vanessa received hot pack for 0 " minutes to increase circulation and promote tissue healing.      Home Exercises Provided and Patient Education Provided     Education provided:   - HEP, written  - log roll technique when attempting to stand     Written Home Exercises Provided: yes.  Exercises were reviewed and Vanessa was able to demonstrate them prior to the end of the session.  Vanessa demonstrated fair  understanding of the education provided.     See EMR under Patient Instructions for exercises provided 10/24/2018.    Assessment     Patient tolerated session well with no significant increase in symptoms with therapeutic exercises. Patient introduced to supine, supported core stabilization program in spinal neutral with cues required for appropriate recruitment. Progressed patient to hip strengthening tasks today. Patient with no complaints of increased symptoms post treatment.   Vanessa is progressing well towards her goals.   Pt prognosis is Guarded.     Pt will continue to benefit from skilled outpatient physical therapy to address the deficits listed in the problem list box on initial evaluation, provide pt/family education and to maximize pt's level of independence in the home and community environment.     Pt's spiritual, cultural and educational needs considered and pt agreeable to plan of care and goals.    Anticipated barriers to physical therapy: patient condition    Goals: see assessment     Plan     Continue per BREE Mariee, PT

## 2018-10-26 ENCOUNTER — OFFICE VISIT (OUTPATIENT)
Dept: URGENT CARE | Facility: CLINIC | Age: 48
End: 2018-10-26
Payer: OTHER MISCELLANEOUS

## 2018-10-26 VITALS
SYSTOLIC BLOOD PRESSURE: 111 MMHG | HEART RATE: 93 BPM | WEIGHT: 234 LBS | TEMPERATURE: 97 F | HEIGHT: 69 IN | RESPIRATION RATE: 18 BRPM | DIASTOLIC BLOOD PRESSURE: 69 MMHG | OXYGEN SATURATION: 98 % | BODY MASS INDEX: 34.66 KG/M2

## 2018-10-26 DIAGNOSIS — S39.012D ACUTE MYOFASCIAL STRAIN OF LUMBAR REGION, SUBSEQUENT ENCOUNTER: ICD-10-CM

## 2018-10-26 DIAGNOSIS — M54.50 ACUTE BILATERAL LOW BACK PAIN WITHOUT SCIATICA: Primary | ICD-10-CM
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
## 2018-10-26 PROCEDURE — 99214 OFFICE O/P EST MOD 30 MIN: CPT | Mod: S$GLB,,, | Performed by: NURSE PRACTITIONER

## 2018-10-26 RX ORDER — IBUPROFEN 200 MG
400 TABLET ORAL EVERY 6 HOURS PRN
Refills: 0 | COMMUNITY
Start: 2018-10-26 | End: 2018-11-09 | Stop reason: SDUPTHER

## 2018-10-26 NOTE — PROGRESS NOTES
Subjective:       Patient ID: Vanessa Jerez is a 47 y.o. female.    Chief Complaint: Work Related Injury    States that her pain is slowly getting better.  Has been going to PT with some relief.      Back Pain   This is a new problem. The current episode started 1 to 4 weeks ago. The problem occurs constantly. The problem has been gradually improving since onset. The pain is present in the lumbar spine. The quality of the pain is described as aching. The pain does not radiate. The pain is at a severity of 3/10. The pain is mild. The pain is the same all the time. The symptoms are aggravated by bending, position and twisting. Stiffness is present all day. Pertinent negatives include no abdominal pain, bladder incontinence, bowel incontinence, dysuria, numbness, paresthesias or weakness. Risk factors include recent trauma. She has tried NSAIDs for the symptoms. The treatment provided moderate relief.     Review of Systems   Constitution: Negative for weakness and malaise/fatigue.   Skin: Negative for rash.   Musculoskeletal: Positive for back pain and stiffness. Negative for muscle cramps and muscle weakness.   Gastrointestinal: Negative for abdominal pain and bowel incontinence.   Genitourinary: Negative for bladder incontinence, dysuria, hematuria and urgency.   Neurological: Negative for disturbances in coordination, numbness and paresthesias.   All other systems reviewed and are negative.      Objective:      Physical Exam   Constitutional: She is oriented to person, place, and time. Vital signs are normal. She appears well-developed and well-nourished. She is active. No distress.   HENT:   Head: Normocephalic and atraumatic.   Right Ear: Hearing and external ear normal.   Left Ear: Hearing and external ear normal.   Nose: Nose normal. No nasal deformity. No epistaxis.   Mouth/Throat: Mucous membranes are normal.   Eyes: Conjunctivae and lids are normal. Right conjunctiva is not injected. Left conjunctiva is  not injected.   Neck: Trachea normal, normal range of motion and full passive range of motion without pain. Neck supple. No spinous process tenderness and no muscular tenderness present. No neck rigidity. Normal range of motion present.   Cardiovascular: Normal rate, intact distal pulses and normal pulses.   Pulses:       Radial pulses are 2+ on the right side, and 2+ on the left side.        Dorsalis pedis pulses are 2+ on the right side, and 2+ on the left side.        Posterior tibial pulses are 2+ on the right side, and 2+ on the left side.   Pulmonary/Chest: Effort normal. No stridor. No respiratory distress.   Abdominal: Soft. Normal appearance.   Musculoskeletal: She exhibits tenderness.        Cervical back: Normal.        Thoracic back: Normal.        Lumbar back: She exhibits tenderness, pain and spasm. She exhibits normal range of motion, no bony tenderness, no deformity and normal pulse.        Back:         Right upper leg: Normal.        Left upper leg: Normal.   Neurological: She is alert and oriented to person, place, and time. She has normal strength and normal reflexes. She displays normal reflexes. No sensory deficit. She exhibits normal muscle tone. Coordination normal. GCS eye subscore is 4. GCS verbal subscore is 5. GCS motor subscore is 6.   Reflex Scores:       Patellar reflexes are 2+ on the right side and 2+ on the left side.  SLR negative bilaterally.    Skin: Skin is warm, dry and intact. Capillary refill takes less than 2 seconds. No abrasion and no bruising noted.   Psychiatric: She has a normal mood and affect. Her speech is normal and behavior is normal. Thought content normal. Cognition and memory are normal. She is attentive.   Nursing note and vitals reviewed.      Assessment:       1. Acute bilateral low back pain without sciatica    2. Acute myofascial strain of lumbar region, subsequent encounter    3. Work related injury        Plan:         Medications Ordered This Encounter    Medications    ibuprofen (ADVIL,MOTRIN) 200 MG tablet     Sig: Take 2 tablets (400 mg total) by mouth every 6 (six) hours as needed for Pain.     Refill:  0     Patient Instructions: Attention not to aggravate affected area, Daily home exercises/warm soaks, Apply ice 24-48 hours then apply heat/warm soaks, Continue Physical Therapy(Please continue Robaxin and ibuprofen as directed for pain and discomfort)   Restrictions: Sit or stand as needed, Avoid frequent bending/lifting/twisting, No lifting/pushing/pulling more than 50 lbs  Follow-up in about 2 weeks (around 11/9/2018).

## 2018-10-26 NOTE — LETTER
[facility] [address] [facility], [place] [address]  Phone: [phone]  Fax: [phone]  [facility]: [id number] Pt Name: Vanessa Jerez  Injury Date: 09/26/2018   Employee ID:  Date of First Treatment: 10/26/2018   Company: [employer]      Appointment Time: 08:45 AM Arrived: 845am   Provider: Mary Kay Yanez NP Time Out:945am     Office Treatment:   1. Acute bilateral low back pain without sciatica    2. Acute myofascial strain of lumbar region, subsequent encounter    3. Work related injury      Medications Ordered This Encounter   Medications    ibuprofen (ADVIL,MOTRIN) 200 MG tablet      Patient Instructions: Attention not to aggravate affected area, Daily home exercises/warm soaks, Apply ice 24-48 hours then apply heat/warm soaks, Continue Physical Therapy(Please continue Robaxin and ibuprofen as directed for pain and discomfort)    Restrictions: Sit or stand as needed, Avoid frequent bending/lifting/twisting, No lifting/pushing/pulling more than 50 lbs     Return Appointment: 11/09/18 at 830am

## 2018-11-09 ENCOUNTER — OFFICE VISIT (OUTPATIENT)
Dept: URGENT CARE | Facility: CLINIC | Age: 48
End: 2018-11-09
Payer: OTHER MISCELLANEOUS

## 2018-11-09 VITALS
WEIGHT: 234 LBS | HEART RATE: 85 BPM | SYSTOLIC BLOOD PRESSURE: 116 MMHG | HEIGHT: 69 IN | BODY MASS INDEX: 34.66 KG/M2 | DIASTOLIC BLOOD PRESSURE: 82 MMHG

## 2018-11-09 DIAGNOSIS — M54.50 ACUTE BILATERAL LOW BACK PAIN WITHOUT SCIATICA: ICD-10-CM
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
## 2018-11-09 DIAGNOSIS — S39.012D ACUTE MYOFASCIAL STRAIN OF LUMBAR REGION, SUBSEQUENT ENCOUNTER: ICD-10-CM

## 2018-11-09 PROCEDURE — 99214 OFFICE O/P EST MOD 30 MIN: CPT | Mod: S$GLB,,, | Performed by: NURSE PRACTITIONER

## 2018-11-09 RX ORDER — IBUPROFEN 200 MG
400 TABLET ORAL EVERY 6 HOURS PRN
Refills: 0 | COMMUNITY
Start: 2018-11-09 | End: 2018-12-14 | Stop reason: SDUPTHER

## 2018-11-09 NOTE — PROGRESS NOTES
Subjective:       Patient ID: Vanessa Jerez is a 48 y.o. female.    Chief Complaint: Work Related Injury    Patient states that she is slowly getting better.  Patient has been seen by physical therapy 3 times.  Last appointment was on 10/24 and 1025.  Was told that they were too busy to schedule her again in till November 21st.  Stated they would have to look at the schedule to see when they could schedule her after that.  Has been taking ibuprofen with some relief and has been doing stretches at home.      Back Pain   This is a new problem. The current episode started more than 1 month ago. The problem occurs intermittently. The problem has been gradually improving since onset. The pain is present in the lumbar spine. The quality of the pain is described as aching. The pain does not radiate. The pain is at a severity of 3/10. The pain is mild. The symptoms are aggravated by sitting. Pertinent negatives include no abdominal pain, bladder incontinence, bowel incontinence, dysuria or numbness. She has tried muscle relaxant and NSAIDs (physical therapy) for the symptoms. The treatment provided moderate relief.     Review of Systems   Constitution: Negative for malaise/fatigue.   Skin: Negative for rash.   Musculoskeletal: Positive for back pain and stiffness. Negative for muscle cramps and muscle weakness.   Gastrointestinal: Negative for abdominal pain and bowel incontinence.   Genitourinary: Negative for bladder incontinence, dysuria, hematuria and urgency.   Neurological: Negative for disturbances in coordination and numbness.   All other systems reviewed and are negative.      Objective:      Physical Exam   Constitutional: She is oriented to person, place, and time. Vital signs are normal. She appears well-developed and well-nourished. She is active. No distress.   HENT:   Head: Normocephalic and atraumatic.   Right Ear: Hearing and external ear normal.   Left Ear: Hearing and external ear normal.   Nose:  Nose normal. No nasal deformity. No epistaxis.   Mouth/Throat: Mucous membranes are normal.   Eyes: Conjunctivae and lids are normal. Right conjunctiva is not injected. Left conjunctiva is not injected.   Neck: Trachea normal, normal range of motion and full passive range of motion without pain. Neck supple. No spinous process tenderness and no muscular tenderness present. No neck rigidity. Normal range of motion present.   Cardiovascular: Normal rate, intact distal pulses and normal pulses.   Pulses:       Radial pulses are 2+ on the right side, and 2+ on the left side.        Dorsalis pedis pulses are 2+ on the right side, and 2+ on the left side.        Posterior tibial pulses are 2+ on the right side, and 2+ on the left side.   Pulmonary/Chest: Effort normal. No stridor. No respiratory distress.   Abdominal: Soft. Normal appearance.   Musculoskeletal: She exhibits tenderness.        Cervical back: Normal.        Thoracic back: Normal.        Lumbar back: She exhibits tenderness, pain and spasm. She exhibits normal range of motion, no bony tenderness, no deformity and normal pulse.        Back:         Right upper leg: Normal.        Left upper leg: Normal.   Neurological: She is alert and oriented to person, place, and time. She has normal strength and normal reflexes. She displays normal reflexes. No sensory deficit. She exhibits normal muscle tone. Coordination normal. GCS eye subscore is 4. GCS verbal subscore is 5. GCS motor subscore is 6.   Reflex Scores:       Patellar reflexes are 2+ on the right side and 2+ on the left side.  SLR negative bilaterally.    Skin: Skin is warm, dry and intact. Capillary refill takes less than 2 seconds. No abrasion and no bruising noted.   Psychiatric: She has a normal mood and affect. Her speech is normal and behavior is normal. Thought content normal. Cognition and memory are normal. She is attentive.   Nursing note and vitals reviewed.      Assessment:       1. Work  related injury    2. Acute myofascial strain of lumbar region, subsequent encounter    3. Acute bilateral low back pain without sciatica        Plan:       Discussed physical therapy with patient.  Informed that I was going to be calling the higher ups to discuss getting her into physical therapy more consistently in sooner.  Voiced understanding.  Medications Ordered This Encounter   Medications    ibuprofen (ADVIL,MOTRIN) 200 MG tablet     Sig: Take 2 tablets (400 mg total) by mouth every 6 (six) hours as needed for Pain.     Refill:  0     Patient Instructions: Attention not to aggravate affected area, Daily home exercises/warm soaks, Continue Physical Therapy, Apply ice 24-48 hours then apply heat/warm soaks(Please continue medications as directed for pain and discomfort\)   Restrictions: Sit or stand as needed, Avoid frequent bending/lifting/twisting, No lifting/pushing/pulling more than 50 lbs(11/09/18)  Follow-up in about 3 weeks (around 11/30/2018).

## 2018-11-09 NOTE — LETTER
[facility] [address] [facility], [place] [address]  Phone: [phone]  Fax: [phone]  [facility]: [id number]    Pt Name: Vanessa Jerez  Injury Date: 09/26/2018   Employee ID:  Date of First Treatment: 11/09/2018   Company: [employer]      Appointment Time: 08:15 AM Arrived: 815am   Provider: Mary Kay Yanez NP Time Out:841pm     Office Treatment:   1. Work related injury    2. Acute myofascial strain of lumbar region, subsequent encounter    3. Acute bilateral low back pain without sciatica      Medications Ordered This Encounter   Medications    ibuprofen (ADVIL,MOTRIN) 200 MG tablet      Patient Instructions: Attention not to aggravate affected area, Daily home exercises/warm soaks, Continue Physical Therapy, Apply ice 24-48 hours then apply heat/warm soaks(Please continue medications as directed for pain and discomfort\)    Restrictions: Sit or stand as needed, Avoid frequent bending/lifting/twisting, No lifting/pushing/pulling more than 50 lbs(11/09/18)     Return Appointment: 11/30/2018 at 9am

## 2018-11-15 ENCOUNTER — OFFICE VISIT (OUTPATIENT)
Dept: FAMILY MEDICINE | Facility: CLINIC | Age: 48
End: 2018-11-15
Payer: COMMERCIAL

## 2018-11-15 VITALS
HEIGHT: 69 IN | WEIGHT: 233.44 LBS | TEMPERATURE: 98 F | DIASTOLIC BLOOD PRESSURE: 60 MMHG | HEART RATE: 90 BPM | OXYGEN SATURATION: 98 % | SYSTOLIC BLOOD PRESSURE: 120 MMHG | BODY MASS INDEX: 34.58 KG/M2

## 2018-11-15 DIAGNOSIS — J32.9 SINUSITIS, UNSPECIFIED CHRONICITY, UNSPECIFIED LOCATION: Primary | ICD-10-CM

## 2018-11-15 DIAGNOSIS — R35.0 URINARY FREQUENCY: ICD-10-CM

## 2018-11-15 DIAGNOSIS — N39.0 URINARY TRACT INFECTION WITH HEMATURIA, SITE UNSPECIFIED: ICD-10-CM

## 2018-11-15 DIAGNOSIS — R31.9 URINARY TRACT INFECTION WITH HEMATURIA, SITE UNSPECIFIED: ICD-10-CM

## 2018-11-15 DIAGNOSIS — R05.9 COUGHING: ICD-10-CM

## 2018-11-15 LAB
BACTERIA #/AREA URNS AUTO: ABNORMAL /HPF
BILIRUB SERPL-MCNC: ABNORMAL MG/DL
BILIRUB UR QL STRIP: NEGATIVE
BLOOD URINE, POC: 250
CLARITY UR REFRACT.AUTO: ABNORMAL
COLOR UR AUTO: YELLOW
COLOR, POC UA: ABNORMAL
GLUCOSE UR QL STRIP: NEGATIVE
GLUCOSE UR QL STRIP: NORMAL
HGB UR QL STRIP: ABNORMAL
HYALINE CASTS UR QL AUTO: 0 /LPF
KETONES UR QL STRIP: ABNORMAL
KETONES UR QL STRIP: NEGATIVE
LEUKOCYTE ESTERASE UR QL STRIP: ABNORMAL
LEUKOCYTE ESTERASE URINE, POC: ABNORMAL
MICROSCOPIC COMMENT: ABNORMAL
NITRITE UR QL STRIP: NEGATIVE
NITRITE, POC UA: ABNORMAL
NON-SQ EPI CELLS #/AREA URNS AUTO: 1 /HPF
PH UR STRIP: 5 [PH] (ref 5–8)
PH, POC UA: 5
PROT UR QL STRIP: ABNORMAL
PROTEIN, POC: ABNORMAL
RBC #/AREA URNS AUTO: 21 /HPF (ref 0–4)
SP GR UR STRIP: 1.01 (ref 1–1.03)
SPECIFIC GRAVITY, POC UA: 1.01
SQUAMOUS #/AREA URNS AUTO: 5 /HPF
URN SPEC COLLECT METH UR: ABNORMAL
UROBILINOGEN, POC UA: NORMAL
WBC #/AREA URNS AUTO: >100 /HPF (ref 0–5)
WBC CLUMPS UR QL AUTO: ABNORMAL

## 2018-11-15 PROCEDURE — 3008F BODY MASS INDEX DOCD: CPT | Mod: CPTII,S$GLB,, | Performed by: NURSE PRACTITIONER

## 2018-11-15 PROCEDURE — 81001 URINALYSIS AUTO W/SCOPE: CPT | Mod: S$GLB,,, | Performed by: NURSE PRACTITIONER

## 2018-11-15 PROCEDURE — 99214 OFFICE O/P EST MOD 30 MIN: CPT | Mod: 25,S$GLB,, | Performed by: NURSE PRACTITIONER

## 2018-11-15 PROCEDURE — 81001 URINALYSIS AUTO W/SCOPE: CPT

## 2018-11-15 PROCEDURE — 87186 SC STD MICRODIL/AGAR DIL: CPT

## 2018-11-15 PROCEDURE — 99999 PR PBB SHADOW E&M-EST. PATIENT-LVL IV: CPT | Mod: PBBFAC,,, | Performed by: NURSE PRACTITIONER

## 2018-11-15 PROCEDURE — 87086 URINE CULTURE/COLONY COUNT: CPT

## 2018-11-15 PROCEDURE — 87077 CULTURE AEROBIC IDENTIFY: CPT

## 2018-11-15 PROCEDURE — 87088 URINE BACTERIA CULTURE: CPT

## 2018-11-15 RX ORDER — PROMETHAZINE HYDROCHLORIDE AND DEXTROMETHORPHAN HYDROBROMIDE 6.25; 15 MG/5ML; MG/5ML
5 SYRUP ORAL EVERY 12 HOURS PRN
Qty: 180 ML | Refills: 0 | Status: SHIPPED | OUTPATIENT
Start: 2018-11-15 | End: 2018-11-25

## 2018-11-15 RX ORDER — AMOXICILLIN AND CLAVULANATE POTASSIUM 875; 125 MG/1; MG/1
1 TABLET, FILM COATED ORAL 2 TIMES DAILY
Qty: 20 TABLET | Refills: 0 | Status: SHIPPED | OUTPATIENT
Start: 2018-11-15 | End: 2018-11-25

## 2018-11-15 NOTE — PATIENT INSTRUCTIONS
Be sure to complete all of the antibiotics and take them with food  Drink lots of water  Cough syrup may cause downiness   AZO standard one three a day with food will cause urine to be bright orange   Urine culture is pending if not sensitive to Augmentin will call you   Mucinex DM during the day   Claritin

## 2018-11-15 NOTE — PROGRESS NOTES
Subjective:       Patient ID: Vanessa Jerez is a 48 y.o. female.    Chief Complaint: Urinary Tract Infection and Cough    48-year-old female presents to the clinic today with a fever of 99.8 yesterday with chills.  She also complains of urinary frequency and pain after urination since yesterday.  She denies any hematuria, difficulty with urination, back pain, abdominal pain, nausea, vomiting, or diarrhea.  She states she has had a normal appetite.  She denies any flank pain. She has also had a cold for the past 2 weeks.  She said she has had sinus congestion, ear pain, coughing, and headache with coughing.  She denies any sore throat, shortness of breath, wheezing, blurred vision, or dizziness.  She denies any cardiac chest pain, heart palpitations, shortness of breath, or swelling to lower extremities.  She has been taking Le[place] Plus and Robitussin which she states is not helping.  The cough is keeping her up at night.      Past Medical History:   Diagnosis Date    Anemia      Past Surgical History:   Procedure Laterality Date    HYSTERECTOMY      TUBAL LIGATION        reports that  has never smoked. she has never used smokeless tobacco. She reports that she does not drink alcohol or use drugs.  Review of Systems   Constitutional: Positive for chills and fever.   HENT: Positive for congestion, ear pain, rhinorrhea and sinus pressure. Negative for ear discharge, postnasal drip, sneezing and sore throat.    Eyes: Negative for pain, discharge and itching.   Respiratory: Positive for cough. Negative for shortness of breath and wheezing.    Cardiovascular: Negative for chest pain, palpitations and leg swelling.   Gastrointestinal: Negative for abdominal pain, constipation, diarrhea, nausea and vomiting.   Genitourinary: Positive for frequency. Negative for difficulty urinating, dysuria, flank pain and hematuria.   Musculoskeletal: Negative for back pain, gait problem, neck pain and neck stiffness.    Skin: Negative for rash.   Neurological: Positive for headaches. Negative for dizziness and light-headedness.   Hematological: Negative for adenopathy.       Objective:      Physical Exam   Constitutional: She is oriented to person, place, and time. She appears well-developed and well-nourished. No distress.   HENT:   Head: Normocephalic and atraumatic.   Right Ear: External ear normal.   Left Ear: External ear normal.   Mouth/Throat: Oropharynx is clear and moist. No oropharyngeal exudate.   Both nares red and inflamed with tenderness over both maxillary sinuses    Eyes: Conjunctivae and EOM are normal. Pupils are equal, round, and reactive to light. Right eye exhibits no discharge. Left eye exhibits no discharge. No scleral icterus.   Neck: Normal range of motion. Neck supple.   Bilateral cervical adenopathy    Cardiovascular: Normal rate and regular rhythm. Exam reveals no gallop and no friction rub.   No murmur heard.  Pulmonary/Chest: Effort normal and breath sounds normal. No respiratory distress. She has no wheezes. She has no rales.   Abdominal: Soft. Bowel sounds are normal. There is no tenderness.   Genitourinary:   Genitourinary Comments: No CVA tendereness   Musculoskeletal: Normal range of motion. She exhibits no edema.   Lymphadenopathy:     She has cervical adenopathy.   Neurological: She is alert and oriented to person, place, and time.   Skin: Skin is warm and dry. No rash noted. She is not diaphoretic.   Psychiatric: She has a normal mood and affect.       Assessment:       1. Sinusitis, unspecified chronicity, unspecified location    2. Urinary frequency    3. Urinary tract infection with hematuria, site unspecified    4. Coughing        Plan:         Sinusitis, unspecified chronicity, unspecified location  -     amoxicillin-clavulanate 875-125mg (AUGMENTIN) 875-125 mg per tablet; Take 1 tablet by mouth 2 (two) times daily. for 10 days  Dispense: 20 tablet; Refill: 0    Urinary frequency  -      POCT urinalysis, dipstick or tablet reag  - UA plus 2 Leukocytes 250 blood    Urinary tract infection with hematuria, site unspecified  -     Urinalysis  -     Urine culture    Coughing  -     promethazine-dextromethorphan (PROMETHAZINE-DM) 6.25-15 mg/5 mL Syrp; Take 5 mLs by mouth every 12 (twelve) hours as needed.  Dispense: 180 mL; Refill: 0

## 2018-11-17 LAB — BACTERIA UR CULT: NORMAL

## 2018-11-19 ENCOUNTER — TELEPHONE (OUTPATIENT)
Dept: FAMILY MEDICINE | Facility: CLINIC | Age: 48
End: 2018-11-19

## 2018-11-19 DIAGNOSIS — N39.0 URINARY TRACT INFECTION WITHOUT HEMATURIA, SITE UNSPECIFIED: Primary | ICD-10-CM

## 2018-11-19 RX ORDER — FLUCONAZOLE 150 MG/1
150 TABLET ORAL ONCE
Qty: 1 TABLET | Refills: 0 | Status: SHIPPED | OUTPATIENT
Start: 2018-11-19 | End: 2018-11-19

## 2018-11-19 RX ORDER — CIPROFLOXACIN 500 MG/1
500 TABLET ORAL EVERY 12 HOURS
Qty: 10 TABLET | Refills: 0 | Status: SHIPPED | OUTPATIENT
Start: 2018-11-19 | End: 2018-11-24

## 2018-11-19 NOTE — TELEPHONE ENCOUNTER
I spoke with the patient and explained that Augmentin sensitivity was not run. She said that she still is having discomfort with urination. I told her that I would send a prescription of Cipro and Diflucan to her pharmacy and I also wanted her to complete the Augmentin prescription for her sinus infection. Patient verbalized understanding of above.

## 2018-11-21 ENCOUNTER — CLINICAL SUPPORT (OUTPATIENT)
Dept: REHABILITATION | Facility: HOSPITAL | Age: 48
End: 2018-11-21
Payer: OTHER MISCELLANEOUS

## 2018-11-21 DIAGNOSIS — M51.9 ACUTE LOW BACK PAIN WITH DISC SYMPTOMS, DURATION LESS THAN 6 WEEKS: ICD-10-CM

## 2018-11-21 DIAGNOSIS — M54.50 ACUTE LOW BACK PAIN WITH DISC SYMPTOMS, DURATION LESS THAN 6 WEEKS: ICD-10-CM

## 2018-11-21 PROCEDURE — 97110 THERAPEUTIC EXERCISES: CPT | Mod: PN

## 2018-11-21 NOTE — PROGRESS NOTES
"  Physical Therapy Daily Treatment Note     Name: Vanessa Jerez  Clinic Number: [id number]    Therapy Diagnosis:   Encounter Diagnosis   Name Primary?    Acute low back pain with disc symptoms, duration less than 6 weeks      Physician: Mary Kay Yanez NP    Visit Date: 11/21/2018  Visit #/Visits authorized: 4 / 20     Time In: 0800  Time Out: 0900  Total Billable Time: 50 minutes (1:1 with PTA for duration of treatment session)    Precautions: Standard   Certification Period: 10/11/18 to 12/6/18 (PN due by 12/6/18)    Subjective     Pt reports: to therapy after not being in since her last appointment on 10/25. Patient states that she has been performing her HEP and is currently on light duty at work. Patient reports that her low back will continue to bother her and comes on randomly while she is sitting down at work. Patient notes that she will need to stand up and walk around to help relieve her symptoms. Patient reports no change in her status since evaluation.   She was compliant with home exercise program.  Response to previous treatment: good  Functional change: none     Pain: 3/10, currently and 5/10 at worst and 2-3/10 at best  Location: bilateral, midline low back      Objective     Lower Extremity Strength     Right LE   Left LE     Hip flexion: 4+/5 Hip flexion: 4+/5   Knee extension: 5/5 Knee extension: 5/5   Knee flexion: 5/5 Knee flexion: 5/5   Hip IR: 4+/5 Hip IR: 4+/5   Hip ER: 4+/5 Hip ER: 4+/5   Hip extension:  4-/5 Hip extension: 4-/5   Hip abduction: 4-/5 Hip abduction: 4-/5   Hip adduction: 4/5 Hip adduction 4+/5   Ankle dorsiflexion: 5/5 Ankle dorsiflexion: 5/5   Ankle plantarflexion: 5/5 Ankle plantarflexion: 5/5      Vanessa received therapeutic exercises to develop strength, ROM, flexibility, posture and core stabilization for 50 minutes including:    Elliptical x 6 minutes  LTR on theraball, 10" hold x 10 reps bilaterally   SKTC on theraball, 10" hold x 10 reps   HS stretch with " "strap, 20" hold x 3 reps B   Piriformis stretch, 20" hold x 3 reps B  PPT, 5" hold x 12  PPT with leg lift, x 10 B  Resisted knee flexion, 5" hold x 12 reps  Bridging, x 12 with 3" hold   PPT with hip abduction, x 12 reps with 5" hold  Gastroc stretch on wedge, 30" hold x 3 reps  SLR with PPT, 1# ankle weight, x 10 B LE with 3" hold   ITB stretch, 20" hold x 3 reps B    Consider adding next visit: Supine, shoulder pull downs; Anti rotation step outs, x 10 reps to each side     Vanessa received the following manual therapy techniques: Soft tissue Mobilization were applied to the lumber spine, QL for 00 minutes including:  STM to QL, trigger points noted     Vansesa received hot pack for 10 minutes to increase circulation and promote tissue healing in Z-lying.    Home Exercises Provided and Patient Education Provided.    Education provided:   - HEP, written   - log roll technique when attempting to stand     Written Home Exercises Provided: yes.  Exercises were reviewed and Vanessa was able to demonstrate them prior to the end of the session.  Vanessa demonstrated fair  understanding of the education provided.     See EMR under Patient Instructions for exercises provided 10/24/2018.    CMS Impairment/Limitation/Restriction for FOTO Lumbar Spine Survey  Status Limitation G-Code CMS Severity Modifier  Intake 56% 44%  Predicted 71% 29% Goal Status+ CJ - At least 20 percent but less than 40 percent  11/21/2018 44% 56% Current Status CK - At least 40 percent but less than 60 percent  D/C Status CK **only report if this is discharge survey    Assessment     Patient tolerated treatment session well today. Patient with good recall of exercises requiring minimal verbal cueing for proper technique. Patient with visible decrease in bilateral hamstring tissue extensibility. Patient has met 1/5 STG's and 1/5 LTG's since her initial evaluation.     Vanessa is progressing well towards her goals.   Pt prognosis is Guarded.     Pt will " continue to benefit from skilled outpatient physical therapy to address the deficits listed in the problem list box on initial evaluation, provide pt/family education and to maximize pt's level of independence in the home and community environment.     Pt's spiritual, cultural and educational needs considered and pt agreeable to plan of care and goals.    Anticipated barriers to physical therapy: patient condition    Goals:  Short Term Goals:  4 weeks  1. Patient will be able to report decreased lumbar resting pain  <  / =  5 /10  to increase tolerance for increased standing. - Appropriate, ongoing  2. Patient will be able to report a functional improvement in ability to stand without pain for greater than 1 hour at work - Met (11/21/2018)  3. Patient will demonstrate an increased MMT in hip flexion  to 4+/5  to increase tolerance for walking - Appropriate, ongoing  4. Patient demonstrate improved seated posture without cues - Appropriate, ongoing  5. Patient will be able to tolerate HEP to improve ROM and independence with ADL's - Appropriate, ongoing     Long Term Goals: 8 weeks  1.Report decreased in resting L hip pain  <   / =  2  /10  to increase tolerance for working tasks. - Met (11/21/2018)  2.Patient will be able to demonstrate an increase in hamstring flexibility by 10 deg with no pain in testing to improve mobility - Appropriate, ongoing  3. Patient will demonstrate an increased MMT for all affected LE musculature  to increase tolerance for ADL and work activities. - Appropriate, ongoing  4. Patient will report at CJ level (20-40% impaired) on LEFS  to demonstrate increase in LE function with every day tasks. - Appropriate, ongoing  5. Patient to be Independent with HEP to improve ROM and independence with ADL's - Appropriate, ongoing    Plan     Patient will be treated by physical therapy 1-3 times a week for 8 weeks for Electrical Stimulation PRN, Iontophoresis (with dexamethasone PRN), Manual Therapy,  Moist Heat/ Ice, Neuromuscular Re-ed, Patient Education, Therapeutic Activites, Therapeutic Exercise and Other therapeutic taping, dry needling, aquatic therapy to achieve established goals. Vanessa may at times be seen by a PTA as part of the Rehab Team.     Shellie Vasquez, PTA   11/21/2018

## 2018-11-30 ENCOUNTER — OFFICE VISIT (OUTPATIENT)
Dept: URGENT CARE | Facility: CLINIC | Age: 48
End: 2018-11-30
Payer: OTHER MISCELLANEOUS

## 2018-11-30 VITALS
TEMPERATURE: 98 F | HEART RATE: 71 BPM | HEIGHT: 69 IN | BODY MASS INDEX: 34.51 KG/M2 | DIASTOLIC BLOOD PRESSURE: 70 MMHG | SYSTOLIC BLOOD PRESSURE: 116 MMHG | WEIGHT: 233 LBS | RESPIRATION RATE: 18 BRPM | OXYGEN SATURATION: 98 %

## 2018-11-30 DIAGNOSIS — S39.012D ACUTE MYOFASCIAL STRAIN OF LUMBAR REGION, SUBSEQUENT ENCOUNTER: ICD-10-CM

## 2018-11-30 DIAGNOSIS — M54.50 ACUTE BILATERAL LOW BACK PAIN WITHOUT SCIATICA: ICD-10-CM
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
## 2018-11-30 PROCEDURE — 99214 OFFICE O/P EST MOD 30 MIN: CPT | Mod: S$GLB,,, | Performed by: NURSE PRACTITIONER

## 2018-11-30 RX ORDER — METHOCARBAMOL 750 MG/1
750 TABLET, FILM COATED ORAL 4 TIMES DAILY
Qty: 60 TABLET | Refills: 0 | Status: SHIPPED | OUTPATIENT
Start: 2018-11-30 | End: 2018-12-15

## 2018-11-30 NOTE — LETTER
[facility] [address], [place] [address]  Phone: [phone]  Fax: [phone]  [facility]: [phone]    Pt Name: Vanessa Jerez  Injury Date: 09/26/2018   Employee ID:  Date of First Treatment: 11/30/2018   Company: [employer]      Appointment Time: 08:45 AM Arrived: 845AM   Provider: Mary Kay Yanez NP Time Out:939AM     Office Treatment:   1. Work related injury    2. Acute myofascial strain of lumbar region, subsequent encounter    3. Acute bilateral low back pain without sciatica      Medications Ordered This Encounter   Medications    methocarbamol (ROBAXIN) 750 MG Tab      Patient Instructions: Attention not to aggravate affected area, Daily home exercises/warm soaks, Apply ice 24-48 hours then apply heat/warm soaks, Continue Physical Therapy(please continue meds as directed for pain)    Restrictions: Sit or stand as needed, Avoid frequent bending/lifting/twisting, Avoid climbing/kneeling/squatting, No lifting/pushing/pulling more than 10 lbs, No Prolonged standing/walking(NO FREQUENT BENDING/LIFTING/TURNING; LIGHT DUTY 11/30/18)     Return Appointment: 12/14/2018 at 9AM

## 2018-11-30 NOTE — PROGRESS NOTES
"Subjective:       Patient ID: Vanessa Jerez is a 48 y.o. female.    Vitals:  height is 5' 9" (1.753 m) and weight is 105.7 kg (233 lb). Her temperature is 97.8 °F (36.6 °C). Her blood pressure is 116/70 and her pulse is 71. Her respiration is 18 and oxygen saturation is 98%.     Chief Complaint: Work Related Injury    Patient states they have been having her do a lot of bending and lifting at work and it is aggravating her back.  States she was last seen at PT last week and has another appointment next week.  States that they are only able to fit her in once a week.  Has been taking medication with mild relief.  Pain is worse on the right than the left.      Back Pain   This is a new problem. The current episode started more than 1 month ago. The problem occurs constantly. The problem has been gradually improving since onset. The pain is present in the lumbar spine. The quality of the pain is described as aching. The pain does not radiate. The pain is at a severity of 4/10. The pain is moderate. The pain is the same all the time. The symptoms are aggravated by position, twisting and bending. Stiffness is present in the morning. Pertinent negatives include no abdominal pain, bladder incontinence, bowel incontinence, dysuria or numbness. Risk factors include recent trauma. She has tried NSAIDs and muscle relaxant for the symptoms. The treatment provided moderate relief.       Constitution: Negative for fatigue.   Gastrointestinal: Negative for abdominal pain and bowel incontinence.   Genitourinary: Negative for dysuria, urgency, bladder incontinence and hematuria.   Musculoskeletal: Positive for pain, trauma and back pain. Negative for muscle cramps and history of spine disorder.   Skin: Negative for rash.   Neurological: Negative for coordination disturbances, numbness and tingling.       Objective:      Physical Exam   Constitutional: She is oriented to person, place, and time. Vital signs are normal. She " appears well-developed and well-nourished. She is active. No distress.   HENT:   Head: Normocephalic and atraumatic.   Right Ear: Hearing and external ear normal.   Left Ear: Hearing and external ear normal.   Nose: Nose normal. No nasal deformity. No epistaxis.   Mouth/Throat: Mucous membranes are normal.   Eyes: Conjunctivae and lids are normal. Right conjunctiva is not injected. Left conjunctiva is not injected.   Neck: Trachea normal, normal range of motion and full passive range of motion without pain. Neck supple. No spinous process tenderness and no muscular tenderness present. No neck rigidity. Normal range of motion present.   Cardiovascular: Normal rate, intact distal pulses and normal pulses.   Pulses:       Radial pulses are 2+ on the right side, and 2+ on the left side.        Dorsalis pedis pulses are 2+ on the right side, and 2+ on the left side.        Posterior tibial pulses are 2+ on the right side, and 2+ on the left side.   Pulmonary/Chest: Effort normal. No stridor. No respiratory distress.   Abdominal: Soft. Normal appearance.   Musculoskeletal: She exhibits tenderness.        Cervical back: Normal.        Thoracic back: Normal.        Lumbar back: She exhibits tenderness, pain and spasm. She exhibits normal range of motion, no bony tenderness, no deformity and normal pulse.        Back:         Right upper leg: Normal.        Left upper leg: Normal.   Neurological: She is alert and oriented to person, place, and time. She has normal strength and normal reflexes. She displays normal reflexes. No sensory deficit. She exhibits normal muscle tone. Coordination normal. GCS eye subscore is 4. GCS verbal subscore is 5. GCS motor subscore is 6.   Reflex Scores:       Patellar reflexes are 2+ on the right side and 2+ on the left side.  SLR negative bilaterally.    Skin: Skin is warm, dry and intact. Capillary refill takes less than 2 seconds. No abrasion and no bruising noted.   Psychiatric: She has a  normal mood and affect. Her speech is normal and behavior is normal. Thought content normal. Cognition and memory are normal. She is attentive.   Nursing note and vitals reviewed.      Assessment:       1. Work related injury    2. Acute myofascial strain of lumbar region, subsequent encounter    3. Acute bilateral low back pain without sciatica        Plan:         Work related injury  -     methocarbamol (ROBAXIN) 750 MG Tab; Take 1 tablet (750 mg total) by mouth 4 (four) times daily. for 15 days  Dispense: 60 tablet; Refill: 0    Acute myofascial strain of lumbar region, subsequent encounter  -     methocarbamol (ROBAXIN) 750 MG Tab; Take 1 tablet (750 mg total) by mouth 4 (four) times daily. for 15 days  Dispense: 60 tablet; Refill: 0    Acute bilateral low back pain without sciatica  -     methocarbamol (ROBAXIN) 750 MG Tab; Take 1 tablet (750 mg total) by mouth 4 (four) times daily. for 15 days  Dispense: 60 tablet; Refill: 0

## 2018-12-07 ENCOUNTER — CLINICAL SUPPORT (OUTPATIENT)
Dept: REHABILITATION | Facility: HOSPITAL | Age: 48
End: 2018-12-07
Payer: OTHER MISCELLANEOUS

## 2018-12-07 DIAGNOSIS — M51.9 ACUTE LOW BACK PAIN WITH DISC SYMPTOMS, DURATION LESS THAN 6 WEEKS: ICD-10-CM

## 2018-12-07 DIAGNOSIS — M54.50 ACUTE LOW BACK PAIN WITH DISC SYMPTOMS, DURATION LESS THAN 6 WEEKS: ICD-10-CM

## 2018-12-07 PROCEDURE — 97110 THERAPEUTIC EXERCISES: CPT | Mod: PN

## 2018-12-07 NOTE — PROGRESS NOTES
"  Physical Therapy Progress Note     Name: Vanessa Jerez  Clinic Number: [id number]    Therapy Diagnosis:   No diagnosis found.  Physician: Mary Kay Yanez NP    Visit Date: 12/7/2018  Visit #/Visits authorized: 5 / 20     Time In: 0300PM  Time Out: 0355PM  Total Billable Time: 55 minutes (1:1 with PT for 30 minutes of session)    Precautions: Standard   Certification Period: 10/11/18 to 12/6/18 (PN due by 1/6/18)    Subjective     Pt reports: that since previous visit she has had a "flare up"; pt reports significant increase in symptoms after she returned to full duty at work after 1 12 hours shift on the floor at work. Patient reports that she was limited for 48 hours and did seek medical treatment at urgent care. Patient has since been returned to light duty. Patient reports non compliance to HEP after re injury due to increased pain   Patient reports that she did feel as if she was improving initially but states she feels like she has gone backwards with care since re injury.   She was not compliant with home exercise program.  Response to previous treatment: good  Functional change: none     Pain: 3/10, currently and 5/10 at worst and 2-3/10 at best  Location: bilateral, midline low back      Objective     Lower Extremity Strength     Right LE   Left LE     Hip flexion: 4+/5 Hip flexion: 4+/5   Knee extension: 5/5 Knee extension: 5/5   Knee flexion: 5/5 Knee flexion: 5/5   Hip IR: 4+/5 Hip IR: 4+/5   Hip ER: 4+/5 Hip ER: 4+/5   Hip extension:  4-/5 Hip extension: 4-/5   Hip abduction: 4-/5 Hip abduction: 4-/5   Hip adduction: 4/5 Hip adduction 4+/5   Ankle dorsiflexion: 5/5 Ankle dorsiflexion: 5/5   Ankle plantarflexion: 5/5 Ankle plantarflexion: 5/5      Vanessa received therapeutic exercises to develop strength, ROM, flexibility, posture and core stabilization for 50 minutes including:    Elliptical x 6 minutes  LTR on theraball, 10" hold x 10 reps bilaterally   SKTC on theraball, 10" hold x 10 reps " "  HS stretch with strap, 20" hold x 3 reps B   Piriformis stretch, 20" hold x 3 reps B  PPT, 5" hold x 12  PPT with leg lift, x 10 B  Resisted knee flexion, 5" hold x 12 reps  Bridging, x 12 with 3" hold   PPT with hip abduction, x 12 reps with 5" hold  Gastroc stretch on wedge, 30" hold x 3 reps  SLR with PPT, 1# ankle weight, x 10 B LE with 3" hold   ITB stretch, 20" hold x 3 reps B    Consider adding next visit: Supine, shoulder pull downs; Anti rotation step outs, x 10 reps to each side     Vanessa received the following manual therapy techniques: Soft tissue Mobilization were applied to the lumber spine, QL for 10 minutes including:  STM to QL, trigger points noted     Vanessa received hot pack for 10 minutes to increase circulation and promote tissue healing in Z-lying.    Home Exercises Provided and Patient Education Provided.    Education provided:   - HEP, written   - log roll technique when attempting to stand     Written Home Exercises Provided: yes.  Exercises were reviewed and Vanessa was able to demonstrate them prior to the end of the session.  Vanessa demonstrated fair  understanding of the education provided.       Assessment     Patient is making fair progress toward goals. Patient with initial improvement that has since declined after return to full duty at work prior to resolution of initial deficits. Patient treatment has utilized a core stabilization program in supine supported in combination with a posterior chain stretching program to improve tissue length and manual therapy. Patient will continue to benefit from skilled care to address remaining deficits and return patient to prior level of pain free function.     Vanessa is progressing well towards her goals.   Pt prognosis is Guarded.     Pt will continue to benefit from skilled outpatient physical therapy to address the deficits listed in the problem list box on initial evaluation, provide pt/family education and to maximize pt's level of " independence in the home and community environment.     Pt's spiritual, cultural and educational needs considered and pt agreeable to plan of care and goals.    Anticipated barriers to physical therapy: patient condition    Goals:  Short Term Goals:  4 weeks  1. Patient will be able to report decreased lumbar resting pain  <  / =  5 /10  to increase tolerance for increased standing. - Appropriate, ongoing  2. Patient will be able to report a functional improvement in ability to stand without pain for greater than 1 hour at work - Met (11/21/2018); currently not met @45 min 12/7/18  3. Patient will demonstrate an increased MMT in hip flexion  to 4+/5  to increase tolerance for walking - Appropriate, ongoing  4. Patient demonstrate improved seated posture without cues - Appropriate, ongoing  5. Patient will be able to tolerate HEP to improve ROM and independence with ADL's - Appropriate, ongoing     Long Term Goals: 8 weeks  1.Report decreased in resting L hip pain  <   / =  2  /10  to increase tolerance for working tasks. - Met (11/21/2018)  2.Patient will be able to demonstrate an increase in hamstring flexibility by 10 deg with no pain in testing to improve mobility - Appropriate, ongoing  3. Patient will demonstrate an increased MMT for all affected LE musculature  to increase tolerance for ADL and work activities. - Appropriate, ongoing  4. Patient will report at CJ level (20-40% impaired) on LEFS  to demonstrate increase in LE function with every day tasks. - Appropriate, ongoing  5. Patient to be Independent with HEP to improve ROM and independence with ADL's - Appropriate, ongoing    Plan     Patient will be treated by physical therapy 1-2 times a week for 4 additional weeks for Electrical Stimulation PRN, Iontophoresis (with dexamethasone PRN), Manual Therapy, Moist Heat/ Ice, Neuromuscular Re-ed, Patient Education, Therapeutic Activites, Therapeutic Exercise and Other therapeutic taping, dry needling,  aquatic therapy to achieve established goals. Vanessa may at times be seen by a PTA as part of the Rehab Team.     Alejandro Mariee, PT   12/07/2018

## 2018-12-07 NOTE — PLAN OF CARE
"  Physical Therapy Progress Note     Name: Vanessa Jerez  Clinic Number: [id number]    Therapy Diagnosis:   No diagnosis found.  Physician: Mary Kay Yanez NP    Visit Date: 12/7/2018  Visit #/Visits authorized: 5 / 20     Time In: 0300PM  Time Out: 0355PM  Total Billable Time: 55 minutes (1:1 with PT for 30 minutes of session)    Precautions: Standard   Certification Period: 10/11/18 to 12/6/18 (PN due by 1/6/18)    Subjective     Pt reports: that since previous visit she has had a "flare up"; pt reports significant increase in symptoms after she returned to full duty at work after 1 12 hours shift on the floor at work. Patient reports that she was limited for 48 hours and did seek medical treatment at urgent care. Patient has since been returned to light duty. Patient reports non compliance to HEP after re injury due to increased pain   Patient reports that she did feel as if she was improving initially but states she feels like she has gone backwards with care since re injury.   She was not compliant with home exercise program.  Response to previous treatment: good  Functional change: none     Pain: 3/10, currently and 5/10 at worst and 2-3/10 at best  Location: bilateral, midline low back      Objective     Lower Extremity Strength     Right LE   Left LE     Hip flexion: 4+/5 Hip flexion: 4+/5   Knee extension: 5/5 Knee extension: 5/5   Knee flexion: 5/5 Knee flexion: 5/5   Hip IR: 4+/5 Hip IR: 4+/5   Hip ER: 4+/5 Hip ER: 4+/5   Hip extension:  4-/5 Hip extension: 4-/5   Hip abduction: 4-/5 Hip abduction: 4-/5   Hip adduction: 4/5 Hip adduction 4+/5   Ankle dorsiflexion: 5/5 Ankle dorsiflexion: 5/5   Ankle plantarflexion: 5/5 Ankle plantarflexion: 5/5      Vanessa received therapeutic exercises to develop strength, ROM, flexibility, posture and core stabilization for 50 minutes including:    Elliptical x 6 minutes  LTR on theraball, 10" hold x 10 reps bilaterally   SKTC on theraball, 10" hold x 10 reps " "  HS stretch with strap, 20" hold x 3 reps B   Piriformis stretch, 20" hold x 3 reps B  PPT, 5" hold x 12  PPT with leg lift, x 10 B  Resisted knee flexion, 5" hold x 12 reps  Bridging, x 12 with 3" hold   PPT with hip abduction, x 12 reps with 5" hold  Gastroc stretch on wedge, 30" hold x 3 reps  SLR with PPT, 1# ankle weight, x 10 B LE with 3" hold   ITB stretch, 20" hold x 3 reps B    Consider adding next visit: Supine, shoulder pull downs; Anti rotation step outs, x 10 reps to each side     Vanessa received the following manual therapy techniques: Soft tissue Mobilization were applied to the lumber spine, QL for 10 minutes including:  STM to QL, trigger points noted     Vanessa received hot pack for 10 minutes to increase circulation and promote tissue healing in Z-lying.    Home Exercises Provided and Patient Education Provided.    Education provided:   - HEP, written   - log roll technique when attempting to stand     Written Home Exercises Provided: yes.  Exercises were reviewed and Vanessa was able to demonstrate them prior to the end of the session.  Vanessa demonstrated fair  understanding of the education provided.       Assessment     Patient is making fair progress toward goals. Patient with initial improvement that has since declined after return to full duty at work prior to resolution of initial deficits. Patient treatment has utilized a core stabilization program in supine supported in combination with a posterior chain stretching program to improve tissue length and manual therapy. Patient will continue to benefit from skilled care to address remaining deficits and return patient to prior level of pain free function.     Vanessa is progressing well towards her goals.   Pt prognosis is Guarded.     Pt will continue to benefit from skilled outpatient physical therapy to address the deficits listed in the problem list box on initial evaluation, provide pt/family education and to maximize pt's level of " independence in the home and community environment.     Pt's spiritual, cultural and educational needs considered and pt agreeable to plan of care and goals.    Anticipated barriers to physical therapy: patient condition    Goals:  Short Term Goals:  4 weeks  1. Patient will be able to report decreased lumbar resting pain  <  / =  5 /10  to increase tolerance for increased standing. - Appropriate, ongoing  2. Patient will be able to report a functional improvement in ability to stand without pain for greater than 1 hour at work - Met (11/21/2018); currently not met @45 min 12/7/18  3. Patient will demonstrate an increased MMT in hip flexion  to 4+/5  to increase tolerance for walking - Appropriate, ongoing  4. Patient demonstrate improved seated posture without cues - Appropriate, ongoing  5. Patient will be able to tolerate HEP to improve ROM and independence with ADL's - Appropriate, ongoing     Long Term Goals: 8 weeks  1.Report decreased in resting L hip pain  <   / =  2  /10  to increase tolerance for working tasks. - Met (11/21/2018)  2.Patient will be able to demonstrate an increase in hamstring flexibility by 10 deg with no pain in testing to improve mobility - Appropriate, ongoing  3. Patient will demonstrate an increased MMT for all affected LE musculature  to increase tolerance for ADL and work activities. - Appropriate, ongoing  4. Patient will report at CJ level (20-40% impaired) on LEFS  to demonstrate increase in LE function with every day tasks. - Appropriate, ongoing  5. Patient to be Independent with HEP to improve ROM and independence with ADL's - Appropriate, ongoing    Plan     Patient will be treated by physical therapy 1-2 times a week for 4 additional weeks for Electrical Stimulation PRN, Iontophoresis (with dexamethasone PRN), Manual Therapy, Moist Heat/ Ice, Neuromuscular Re-ed, Patient Education, Therapeutic Activites, Therapeutic Exercise and Other therapeutic taping, dry needling,  aquatic therapy to achieve established goals. Vanessa may at times be seen by a PTA as part of the Rehab Team.   Patient has been seen only 1 time per week due to limitations in availability however, patient will be scheduled 2x per week as often as possible in hopes to speed recovery process.   Alejandro Mariee, PT   12/07/2018

## 2018-12-12 ENCOUNTER — CLINICAL SUPPORT (OUTPATIENT)
Dept: REHABILITATION | Facility: HOSPITAL | Age: 48
End: 2018-12-12
Payer: OTHER MISCELLANEOUS

## 2018-12-12 DIAGNOSIS — M54.50 ACUTE LOW BACK PAIN WITH DISC SYMPTOMS, DURATION LESS THAN 6 WEEKS: ICD-10-CM

## 2018-12-12 DIAGNOSIS — M51.9 ACUTE LOW BACK PAIN WITH DISC SYMPTOMS, DURATION LESS THAN 6 WEEKS: ICD-10-CM

## 2018-12-12 PROCEDURE — 97110 THERAPEUTIC EXERCISES: CPT | Mod: PN

## 2018-12-12 NOTE — PROGRESS NOTES
"  Physical Therapy Progress Note     Name: Vanessa Jerez  Clinic Number: [id number]    Therapy Diagnosis:   Encounter Diagnosis   Name Primary?    Acute low back pain with disc symptoms, duration less than 6 weeks      Physician: Mary Kay Yanez NP    Visit Date: 12/12/2018  Visit #/Visits authorized: 6/20     Time In: 0300PM  Time Out: 0355PM  Total Billable Time: 55 minutes (1:1 with PT for 30 minutes of session)    Precautions: Standard   Certification Period: 10/11/18 to 12/6/18 (PN due by 1/6/18)    Subjective     Pt reports: she continues to work "light duty" at work with slight inc in pain  Patient reports that she did feel as if she was improving initially but states she feels like she has gone backwards with care since re injury.   She was not compliant with home exercise program.  Response to previous treatment: good  Functional change: none     Pain: 3/10, currently and 5/10 at worst and 2-3/10 at best  Location: bilateral, midline low back      Objective     Lower Extremity Strength     Right LE   Left LE     Hip flexion: 4+/5 Hip flexion: 4+/5   Knee extension: 5/5 Knee extension: 5/5   Knee flexion: 5/5 Knee flexion: 5/5   Hip IR: 4+/5 Hip IR: 4+/5   Hip ER: 4+/5 Hip ER: 4+/5   Hip extension:  4-/5 Hip extension: 4-/5   Hip abduction: 4-/5 Hip abduction: 4-/5   Hip adduction: 4/5 Hip adduction 4+/5   Ankle dorsiflexion: 5/5 Ankle dorsiflexion: 5/5   Ankle plantarflexion: 5/5 Ankle plantarflexion: 5/5      Vanessa received therapeutic exercises to develop strength, ROM, flexibility, posture and core stabilization for 45 minutes including:    Elliptical x 6 minutes - np 12/12/18  LTR on theraball, 10" hold x 10 reps bilaterally   SKTC on theraball, 10" hold x 10 reps   HS stretch with strap, 20" hold x 3 reps B   Piriformis stretch, 20" hold x 3 reps B  PPT, 5" hold x 12  PPT with leg lift, x 10 B  Resisted knee flexion, 5" hold x 12 reps  Bridging, x 12 with 3" hold   PPT with hip abduction, x " "12 reps with 5" hold  Gastroc stretch on wedge, 30" hold x 3 reps  SLR with PPT, 1# ankle weight, x 10 B LE with 3" hold   ITB stretch, 20" hold x 3 reps B    Consider adding next visit: Supine, shoulder pull downs; Anti rotation step outs, x 10 reps to each side     Vanessa received the following manual therapy techniques: Soft tissue Mobilization were applied to the lumber spine, QL for 10 minutes including:  STM to QL, trigger points noted     Vanessa received hot pack for 10 minutes to increase circulation and promote tissue healing in Z-lying.    Home Exercises Provided and Patient Education Provided.    Education provided:   - HEP, written   -reviewed    Written Home Exercises Provided: yes.  Exercises were reviewed and Vanessa was able to demonstrate them prior to the end of the session.  Vanessa demonstrated fair  understanding of the education provided.       Assessment     Patient reports decreased symptoms post treatment. Patient with min cues required for technique today. Patient with good posture.     Vanessa is progressing well towards her goals.   Pt prognosis is Guarded.     Pt will continue to benefit from skilled outpatient physical therapy to address the deficits listed in the problem list box on initial evaluation, provide pt/family education and to maximize pt's level of independence in the home and community environment.     Pt's spiritual, cultural and educational needs considered and pt agreeable to plan of care and goals.    Anticipated barriers to physical therapy: patient condition    Goals:  Short Term Goals:  4 weeks  1. Patient will be able to report decreased lumbar resting pain  <  / =  5 /10  to increase tolerance for increased standing. - Appropriate, ongoing  2. Patient will be able to report a functional improvement in ability to stand without pain for greater than 1 hour at work - Met (11/21/2018); currently not met @45 min 12/7/18  3. Patient will demonstrate an increased MMT in hip " flexion  to 4+/5  to increase tolerance for walking - Appropriate, ongoing  4. Patient demonstrate improved seated posture without cues - Appropriate, ongoing  5. Patient will be able to tolerate HEP to improve ROM and independence with ADL's - Appropriate, ongoing     Long Term Goals: 8 weeks  1.Report decreased in resting L hip pain  <   / =  2  /10  to increase tolerance for working tasks. - Met (11/21/2018)  2.Patient will be able to demonstrate an increase in hamstring flexibility by 10 deg with no pain in testing to improve mobility - Appropriate, ongoing  3. Patient will demonstrate an increased MMT for all affected LE musculature  to increase tolerance for ADL and work activities. - Appropriate, ongoing  4. Patient will report at CJ level (20-40% impaired) on LEFS  to demonstrate increase in LE function with every day tasks. - Appropriate, ongoing  5. Patient to be Independent with HEP to improve ROM and independence with ADL's - Appropriate, ongoing    Plan     Patient will be treated by physical therapy 1-2 times a week for 4 additional weeks for Electrical Stimulation PRN, Iontophoresis (with dexamethasone PRN), Manual Therapy, Moist Heat/ Ice, Neuromuscular Re-ed, Patient Education, Therapeutic Activites, Therapeutic Exercise and Other therapeutic taping, dry needling, aquatic therapy to achieve established goals. Vanessa may at times be seen by a PTA as part of the Rehab Team.     Alejandro Mariee, PT   12/12/2018

## 2018-12-14 ENCOUNTER — CLINICAL SUPPORT (OUTPATIENT)
Dept: REHABILITATION | Facility: HOSPITAL | Age: 48
End: 2018-12-14
Payer: OTHER MISCELLANEOUS

## 2018-12-14 ENCOUNTER — OFFICE VISIT (OUTPATIENT)
Dept: URGENT CARE | Facility: CLINIC | Age: 48
End: 2018-12-14
Payer: OTHER MISCELLANEOUS

## 2018-12-14 VITALS — HEART RATE: 74 BPM | SYSTOLIC BLOOD PRESSURE: 119 MMHG | DIASTOLIC BLOOD PRESSURE: 75 MMHG

## 2018-12-14 DIAGNOSIS — M54.50 ACUTE LOW BACK PAIN WITH DISC SYMPTOMS, DURATION LESS THAN 6 WEEKS: ICD-10-CM

## 2018-12-14 DIAGNOSIS — M51.9 ACUTE LOW BACK PAIN WITH DISC SYMPTOMS, DURATION LESS THAN 6 WEEKS: ICD-10-CM
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
## 2018-12-14 DIAGNOSIS — S39.012D ACUTE MYOFASCIAL STRAIN OF LUMBAR REGION, SUBSEQUENT ENCOUNTER: ICD-10-CM

## 2018-12-14 DIAGNOSIS — M54.50 ACUTE BILATERAL LOW BACK PAIN WITHOUT SCIATICA: ICD-10-CM

## 2018-12-14 PROCEDURE — 99214 OFFICE O/P EST MOD 30 MIN: CPT | Mod: S$GLB,,, | Performed by: NURSE PRACTITIONER

## 2018-12-14 PROCEDURE — 97110 THERAPEUTIC EXERCISES: CPT | Mod: PN

## 2018-12-14 RX ORDER — IBUPROFEN 200 MG
400 TABLET ORAL EVERY 6 HOURS PRN
Refills: 0 | COMMUNITY
Start: 2018-12-14 | End: 2018-12-28 | Stop reason: SDUPTHER

## 2018-12-14 NOTE — PROGRESS NOTES
Subjective:       Patient ID: Vanessa Jerez is a 48 y.o. female.    Chief Complaint: Work Related Injury and Back Pain    Pt states that she is still having some lower back pain .  States that physical therapy has been helping.      Back Pain   This is a new problem. The current episode started more than 1 month ago. The problem occurs intermittently. The problem has been gradually improving since onset. The pain is present in the lumbar spine. The quality of the pain is described as aching. The pain does not radiate. The pain is at a severity of 3/10. The symptoms are aggravated by bending, standing and sitting. Stiffness is present all day. Pertinent negatives include no bladder incontinence, bowel incontinence, leg pain, numbness or tingling. The treatment provided mild relief.     Review of Systems   Musculoskeletal: Positive for back pain and stiffness.   Gastrointestinal: Negative for bowel incontinence.   Genitourinary: Negative for bladder incontinence.   Neurological: Negative for numbness and tingling.   All other systems reviewed and are negative.      Objective:      Physical Exam   Constitutional: She is oriented to person, place, and time. Vital signs are normal. She appears well-developed and well-nourished. She is active. No distress.   HENT:   Head: Normocephalic and atraumatic.   Right Ear: Hearing and external ear normal.   Left Ear: Hearing and external ear normal.   Nose: Nose normal. No nasal deformity. No epistaxis.   Mouth/Throat: Mucous membranes are normal.   Eyes: Conjunctivae and lids are normal. Right conjunctiva is not injected. Left conjunctiva is not injected.   Neck: Trachea normal, normal range of motion and full passive range of motion without pain. Neck supple. No spinous process tenderness and no muscular tenderness present. No neck rigidity. Normal range of motion present.   Cardiovascular: Normal rate, intact distal pulses and normal pulses.   Pulses:       Radial pulses  are 2+ on the right side, and 2+ on the left side.        Dorsalis pedis pulses are 2+ on the right side, and 2+ on the left side.        Posterior tibial pulses are 2+ on the right side, and 2+ on the left side.   Pulmonary/Chest: Effort normal. No stridor. No respiratory distress.   Abdominal: Soft. Normal appearance.   Musculoskeletal: She exhibits tenderness.        Cervical back: Normal.        Thoracic back: Normal.        Lumbar back: She exhibits tenderness, pain and spasm. She exhibits normal range of motion, no bony tenderness, no deformity and normal pulse.        Back:         Right upper leg: Normal.        Left upper leg: Normal.   Neurological: She is alert and oriented to person, place, and time. She has normal strength and normal reflexes. She displays normal reflexes. No sensory deficit. She exhibits normal muscle tone. Coordination normal. GCS eye subscore is 4. GCS verbal subscore is 5. GCS motor subscore is 6.   Reflex Scores:       Patellar reflexes are 2+ on the right side and 2+ on the left side.  SLR negative bilaterally.    Skin: Skin is warm, dry and intact. Capillary refill takes less than 2 seconds. No abrasion and no bruising noted.   Psychiatric: She has a normal mood and affect. Her speech is normal and behavior is normal. Thought content normal. Cognition and memory are normal. She is attentive.   Nursing note and vitals reviewed.      Assessment:       1. Work related injury    2. Acute myofascial strain of lumbar region, subsequent encounter    3. Acute bilateral low back pain without sciatica        Plan:         Medications Ordered This Encounter   Medications    ibuprofen (ADVIL,MOTRIN) 200 MG tablet     Sig: Take 2 tablets (400 mg total) by mouth every 6 (six) hours as needed for Pain.     Refill:  0     Patient Instructions: Attention not to aggravate affected area, Daily home exercises/warm soaks, Apply ice 24-48 hours then apply heat/warm soaks, Continue Physical  Therapy(Please take meds as directed for pain and discomfort)   Restrictions: Sit or stand as needed, Avoid frequent bending/lifting/twisting, Avoid climbing/kneeling/squatting, No lifting/pushing/pulling more than 25 lbs(light duty; 12/14/18)  Follow-up in about 7 days (around 12/21/2018).

## 2018-12-14 NOTE — PROGRESS NOTES
"  Physical Therapy Progress Note     Name: Vanessa Jerez  Clinic Number: [id number]    Therapy Diagnosis:   Encounter Diagnosis   Name Primary?    Acute low back pain with disc symptoms, duration less than 6 weeks      Physician: Mary Kay Yanez NP    Visit Date: 12/14/2018  Visit #/Visits authorized: 6/20     Time In: 0701M  Time Out: 0800APM  Total Billable Time: 59 minutes (1:1 with PT for 60 minutes of session)    Precautions: Standard   Certification Period: 10/11/18 to 12/6/18 (PN due by 1/6/18)    Subjective     Pt reports: patient with no new complaints today; patient is following up with MD today after PT session   Patient reports that she did feel as if she was improving initially but states she feels like she has gone backwards with care since re injury.   She was not compliant with home exercise program.  Response to previous treatment: good  Functional change: none     Pain: 1/10, currently and 5/10 at worst and 2-3/10 at best  Location: bilateral, midline low back      Objective     Lower Extremity Strength     Right LE   Left LE     Hip flexion: 4+/5 Hip flexion: 4+/5   Knee extension: 5/5 Knee extension: 5/5   Knee flexion: 5/5 Knee flexion: 5/5   Hip IR: 4+/5 Hip IR: 4+/5   Hip ER: 4+/5 Hip ER: 4+/5   Hip extension:  4-/5 Hip extension: 4-/5   Hip abduction: 4-/5 Hip abduction: 4-/5   Hip adduction: 4/5 Hip adduction 4+/5   Ankle dorsiflexion: 5/5 Ankle dorsiflexion: 5/5   Ankle plantarflexion: 5/5 Ankle plantarflexion: 5/5      Vanessa received therapeutic exercises to develop strength, ROM, flexibility, posture and core stabilization for 45 minutes including:    Elliptical x 6 minutes - np 12/12/18  LTR on theraball, 10" hold x 10 reps bilaterally   SKTC on theraball, 10" hold x 10 reps   HS stretch with strap, 20" hold x 3 reps B   Piriformis stretch, 20" hold x 3 reps B  PPT, 5" hold x 12  PPT with leg lift, x 10 B  Resisted knee flexion, 5" hold x 12 reps  Bridging, x 12 with 3" hold " "  PPT with hip abduction, x 12 reps with 5" hold  Gastroc stretch on wedge, 30" hold x 3 reps  SLR with PPT, 1# ankle weight, x 10 B LE with 3" hold   ITB stretch, 20" hold x 3 reps B    Consider adding next visit: Supine, shoulder pull downs; Anti rotation step outs, x 10 reps to each side     Vanessa received the following manual therapy techniques: Soft tissue Mobilization were applied to the lumber spine, QL for 10 minutes including:  STM to QL, trigger points noted     Vanessa received hot pack for 10 minutes to increase circulation and promote tissue healing in Z-lying.    Home Exercises Provided and Patient Education Provided.    Education provided:   - HEP, written   -reviewed    Written Home Exercises Provided: yes.  Exercises were reviewed and Vanessa was able to demonstrate them prior to the end of the session.  Vanessa demonstrated fair  understanding of the education provided.       Assessment     Patient reports decreased symptoms post treatment. Patient with min cues required for technique today. Patient with good posture.     Vanessa is progressing well towards her goals.   Pt prognosis is Guarded.     Pt will continue to benefit from skilled outpatient physical therapy to address the deficits listed in the problem list box on initial evaluation, provide pt/family education and to maximize pt's level of independence in the home and community environment.     Pt's spiritual, cultural and educational needs considered and pt agreeable to plan of care and goals.    Anticipated barriers to physical therapy: patient condition    Goals:  Short Term Goals:  4 weeks  1. Patient will be able to report decreased lumbar resting pain  <  / =  5 /10  to increase tolerance for increased standing. - Appropriate, ongoing  2. Patient will be able to report a functional improvement in ability to stand without pain for greater than 1 hour at work - Met (11/21/2018); currently not met @45 min 12/7/18  3. Patient will " demonstrate an increased MMT in hip flexion  to 4+/5  to increase tolerance for walking - Appropriate, ongoing  4. Patient demonstrate improved seated posture without cues - Appropriate, ongoing  5. Patient will be able to tolerate HEP to improve ROM and independence with ADL's - Appropriate, ongoing     Long Term Goals: 8 weeks  1.Report decreased in resting L hip pain  <   / =  2  /10  to increase tolerance for working tasks. - Met (11/21/2018)  2.Patient will be able to demonstrate an increase in hamstring flexibility by 10 deg with no pain in testing to improve mobility - Appropriate, ongoing  3. Patient will demonstrate an increased MMT for all affected LE musculature  to increase tolerance for ADL and work activities. - Appropriate, ongoing  4. Patient will report at CJ level (20-40% impaired) on LEFS  to demonstrate increase in LE function with every day tasks. - Appropriate, ongoing  5. Patient to be Independent with HEP to improve ROM and independence with ADL's - Appropriate, ongoing    Plan     Patient will be treated by physical therapy 1-2 times a week for 4 additional weeks for Electrical Stimulation PRN, Iontophoresis (with dexamethasone PRN), Manual Therapy, Moist Heat/ Ice, Neuromuscular Re-ed, Patient Education, Therapeutic Activites, Therapeutic Exercise and Other therapeutic taping, dry needling, aquatic therapy to achieve established goals. Vanessa may at times be seen by a PTA as part of the Rehab Team.     Alejandro Mariee, PT   12/14/2018

## 2018-12-21 ENCOUNTER — CLINICAL SUPPORT (OUTPATIENT)
Dept: REHABILITATION | Facility: HOSPITAL | Age: 48
End: 2018-12-21
Payer: OTHER MISCELLANEOUS

## 2018-12-21 ENCOUNTER — OFFICE VISIT (OUTPATIENT)
Dept: URGENT CARE | Facility: CLINIC | Age: 48
End: 2018-12-21
Payer: OTHER MISCELLANEOUS

## 2018-12-21 VITALS
DIASTOLIC BLOOD PRESSURE: 70 MMHG | HEIGHT: 69 IN | HEART RATE: 76 BPM | WEIGHT: 233 LBS | SYSTOLIC BLOOD PRESSURE: 115 MMHG | BODY MASS INDEX: 34.51 KG/M2

## 2018-12-21 DIAGNOSIS — M54.50 ACUTE BILATERAL LOW BACK PAIN WITHOUT SCIATICA: ICD-10-CM
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
## 2018-12-21 DIAGNOSIS — S39.012D ACUTE MYOFASCIAL STRAIN OF LUMBAR REGION, SUBSEQUENT ENCOUNTER: ICD-10-CM

## 2018-12-21 DIAGNOSIS — M51.9 ACUTE LOW BACK PAIN WITH DISC SYMPTOMS, DURATION LESS THAN 6 WEEKS: ICD-10-CM

## 2018-12-21 DIAGNOSIS — M54.50 ACUTE LOW BACK PAIN WITH DISC SYMPTOMS, DURATION LESS THAN 6 WEEKS: ICD-10-CM

## 2018-12-21 PROCEDURE — 97110 THERAPEUTIC EXERCISES: CPT | Mod: PN

## 2018-12-21 PROCEDURE — 97140 MANUAL THERAPY 1/> REGIONS: CPT | Mod: PN

## 2018-12-21 PROCEDURE — 99214 OFFICE O/P EST MOD 30 MIN: CPT | Mod: 25,S$GLB,, | Performed by: NURSE PRACTITIONER

## 2018-12-21 RX ORDER — DEXTROMETHORPHAN HYDROBROMIDE, GUAIFENESIN 5; 100 MG/5ML; MG/5ML
650 LIQUID ORAL EVERY 8 HOURS
Refills: 0 | COMMUNITY
Start: 2018-12-21 | End: 2018-12-28 | Stop reason: SDUPTHER

## 2018-12-21 NOTE — PROGRESS NOTES
Subjective:       Patient ID: Vanessa Jerez is a 48 y.o. female.    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  States her back is now hurting again.  Has been taking her medication with some relief.  Has physical therapy today.      Back Pain   This is a new problem. The current episode started more than 1 month ago. The problem occurs intermittently. The problem has been gradually improving since onset. The pain is present in the lumbar spine. The quality of the pain is described as aching. The pain does not radiate. The pain is at a severity of 4/10. The pain is mild. The pain is the same all the time (pain came yesterday). The symptoms are aggravated by sitting, standing, position and bending. Pertinent negatives include no abdominal pain, bladder incontinence, bowel incontinence, dysuria, numbness, perianal numbness or tingling. She has tried NSAIDs and muscle relaxant (physical therapy) for the symptoms. The treatment provided significant relief.     Review of Systems   Constitution: Negative for malaise/fatigue.   Skin: Negative for rash.   Musculoskeletal: Positive for back pain and stiffness. Negative for muscle cramps and muscle weakness.   Gastrointestinal: Negative for abdominal pain and bowel incontinence.   Genitourinary: Negative for bladder incontinence, dysuria, hematuria and urgency.   Neurological: Negative for disturbances in coordination, numbness and tingling.   All other systems reviewed and are negative.      Objective:      Physical Exam   Constitutional: She is oriented to person, place, and time. Vital signs are normal. She appears well-developed and well-nourished. She is active. No distress.   HENT:   Head: Normocephalic and atraumatic.   Right Ear: Hearing and external ear normal.   Left Ear: Hearing and external ear normal.   Nose: Nose normal. No nasal deformity. No  epistaxis.   Mouth/Throat: Mucous membranes are normal.   Eyes: Conjunctivae and lids are normal. Right conjunctiva is not injected. Left conjunctiva is not injected.   Neck: Trachea normal, normal range of motion and full passive range of motion without pain. Neck supple. No spinous process tenderness and no muscular tenderness present. No neck rigidity. Normal range of motion present.   Cardiovascular: Normal rate, intact distal pulses and normal pulses.   Pulses:       Radial pulses are 2+ on the right side, and 2+ on the left side.        Dorsalis pedis pulses are 2+ on the right side, and 2+ on the left side.        Posterior tibial pulses are 2+ on the right side, and 2+ on the left side.   Pulmonary/Chest: Effort normal. No stridor. No respiratory distress.   Abdominal: Soft. Normal appearance.   Musculoskeletal: She exhibits tenderness. She exhibits no edema or deformity.        Cervical back: Normal.        Thoracic back: Normal.        Lumbar back: She exhibits tenderness, pain and spasm. She exhibits normal range of motion, no bony tenderness, no deformity and normal pulse.        Back:         Right upper leg: Normal.        Left upper leg: Normal.   Neurological: She is alert and oriented to person, place, and time. She has normal strength and normal reflexes. She displays normal reflexes. No sensory deficit. She exhibits normal muscle tone. Coordination normal. GCS eye subscore is 4. GCS verbal subscore is 5. GCS motor subscore is 6.   Reflex Scores:       Patellar reflexes are 2+ on the right side and 2+ on the left side.  SLR negative bilaterally.    Skin: Skin is warm, dry and intact. Capillary refill takes less than 2 seconds. No abrasion and no bruising noted.   Psychiatric: She has a normal mood and affect. Her speech is normal and behavior is normal. Thought content normal. Cognition and memory are normal. She is attentive.   Nursing note and vitals reviewed.      Assessment:       1. Work  related injury    2. Acute myofascial strain of lumbar region, subsequent encounter    3. Acute bilateral low back pain without sciatica        Plan:         Medications Ordered This Encounter   Medications    acetaminophen (TYLENOL) 650 MG TbSR     Sig: Take 1 tablet (650 mg total) by mouth every 8 (eight) hours.     Refill:  0     Patient Instructions: Attention not to aggravate affected area, Daily home exercises/warm soaks, Apply ice 24-48 hours then apply heat/warm soaks, Continue Physical Therapy(Please continue take medications as directed for pain and discomfort)   Restrictions: Sit or stand as needed, Avoid frequent bending/lifting/twisting, Avoid climbing/kneeling/squatting, No lifting/pushing/pulling more than 10 lbs, No Prolonged standing/walking(light duty; 12/21/18)  Follow-up in about 7 days (around 12/28/2018).
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
## 2018-12-21 NOTE — LETTER
[facility] [address] [facility], [facility], [place] [address]  Phone: [phone]  Fax: [phone]  [employer] [employer]: [email or web address]    Pt Name: Vanessa Jerez  Injury Date: 09/26/2018   Employee ID:  Date of First Treatment: 12/21/2018   Company: [employer]      Appointment Time: 09:45 AM Arrived: 945am   Provider: Mary Kay Yanez NP Time Out:1035am     Office Treatment:   1. Work related injury    2. Acute myofascial strain of lumbar region, subsequent encounter    3. Acute bilateral low back pain without sciatica      Medications Ordered This Encounter   Medications    acetaminophen (TYLENOL) 650 MG TbSR      Patient Instructions: Attention not to aggravate affected area, Daily home exercises/warm soaks, Apply ice 24-48 hours then apply heat/warm soaks, Continue Physical Therapy(Please continue take medications as directed for pain and discomfort)    Restrictions: Sit or stand as needed, Avoid frequent bending/lifting/twisting, Avoid climbing/kneeling/squatting, No lifting/pushing/pulling more than 10 lbs, No Prolonged standing/walking(light duty; 12/21/18)     Return Appointment: 12/28/18 at 10am

## 2018-12-28 ENCOUNTER — OFFICE VISIT (OUTPATIENT)
Dept: URGENT CARE | Facility: CLINIC | Age: 48
End: 2018-12-28
Payer: OTHER MISCELLANEOUS

## 2018-12-28 VITALS
TEMPERATURE: 98 F | OXYGEN SATURATION: 98 % | DIASTOLIC BLOOD PRESSURE: 72 MMHG | RESPIRATION RATE: 18 BRPM | HEIGHT: 69 IN | HEART RATE: 64 BPM | SYSTOLIC BLOOD PRESSURE: 110 MMHG | WEIGHT: 233 LBS | BODY MASS INDEX: 34.51 KG/M2
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
## 2018-12-28 DIAGNOSIS — S39.012D ACUTE MYOFASCIAL STRAIN OF LUMBAR REGION, SUBSEQUENT ENCOUNTER: ICD-10-CM

## 2018-12-28 DIAGNOSIS — M54.50 ACUTE BILATERAL LOW BACK PAIN WITHOUT SCIATICA: ICD-10-CM

## 2018-12-28 PROCEDURE — 99214 OFFICE O/P EST MOD 30 MIN: CPT | Mod: S$GLB,,, | Performed by: NURSE PRACTITIONER

## 2018-12-28 RX ORDER — DEXTROMETHORPHAN HYDROBROMIDE, GUAIFENESIN 5; 100 MG/5ML; MG/5ML
650 LIQUID ORAL EVERY 8 HOURS
Refills: 0 | COMMUNITY
Start: 2018-12-28 | End: 2019-01-11 | Stop reason: SDUPTHER

## 2018-12-28 RX ORDER — METHOCARBAMOL 750 MG/1
750 TABLET, FILM COATED ORAL 4 TIMES DAILY
Qty: 56 TABLET | Refills: 0 | Status: SHIPPED | OUTPATIENT
Start: 2018-12-28 | End: 2019-01-11 | Stop reason: SDUPTHER

## 2018-12-28 RX ORDER — IBUPROFEN 200 MG
400 TABLET ORAL EVERY 6 HOURS PRN
Refills: 0 | COMMUNITY
Start: 2018-12-28 | End: 2019-01-11 | Stop reason: SDUPTHER

## 2018-12-28 NOTE — PROGRESS NOTES
"Subjective:       Patient ID: Vanessa Jerez is a 48 y.o. female.    Vitals:  height is 5' 9" (1.753 m) and weight is 105.7 kg (233 lb). Her temperature is 98.3 °F (36.8 °C). Her blood pressure is 110/72 and her pulse is 64. Her respiration is 18 and oxygen saturation is 98%.     Chief Complaint: Work Related Injury and Back Injury    Patient states that the pain has been the same.  Her last 2 physical therapy appointments were canceled as they said they were still and pending mood.  Has been taking a muscle relaxers with some relief.      Back Pain   This is a new problem. The current episode started more than 1 month ago. The problem occurs constantly. The problem has been gradually improving since onset. The pain is present in the lumbar spine. The quality of the pain is described as aching. The pain does not radiate. The pain is at a severity of 2/10. The pain is mild. The pain is worse during the night. The symptoms are aggravated by position and bending. Stiffness is present in the morning. Pertinent negatives include no abdominal pain, bladder incontinence, bowel incontinence, dysuria or numbness. Risk factors include recent trauma. She has tried NSAIDs and muscle relaxant (physical therapy) for the symptoms. The treatment provided moderate relief.       Constitution: Negative for fatigue.   Gastrointestinal: Negative for abdominal pain and bowel incontinence.   Genitourinary: Negative for dysuria, urgency, bladder incontinence and hematuria.   Musculoskeletal: Positive for back pain. Negative for muscle cramps and history of spine disorder.   Skin: Negative for rash.   Neurological: Negative for coordination disturbances, numbness and tingling.       Objective:      Physical Exam   Constitutional: She is oriented to person, place, and time. Vital signs are normal. She appears well-developed and well-nourished. She is active. No distress.   HENT:   Head: Normocephalic and atraumatic.   Right Ear: Hearing " and external ear normal.   Left Ear: Hearing and external ear normal.   Nose: Nose normal. No nasal deformity. No epistaxis.   Mouth/Throat: Mucous membranes are normal.   Eyes: Conjunctivae and lids are normal. Right conjunctiva is not injected. Left conjunctiva is not injected.   Neck: Trachea normal, normal range of motion and full passive range of motion without pain. Neck supple. No spinous process tenderness and no muscular tenderness present. No neck rigidity. Normal range of motion present.   Cardiovascular: Normal rate, intact distal pulses and normal pulses.   Pulses:       Radial pulses are 2+ on the right side, and 2+ on the left side.        Dorsalis pedis pulses are 2+ on the right side, and 2+ on the left side.        Posterior tibial pulses are 2+ on the right side, and 2+ on the left side.   Pulmonary/Chest: Effort normal. No stridor. No respiratory distress.   Abdominal: Soft. Normal appearance.   Musculoskeletal: She exhibits tenderness. She exhibits no edema or deformity.        Cervical back: Normal.        Thoracic back: Normal.        Lumbar back: She exhibits tenderness, pain and spasm. She exhibits normal range of motion, no bony tenderness, no deformity and normal pulse.        Back:         Right upper leg: Normal.        Left upper leg: Normal.   Neurological: She is alert and oriented to person, place, and time. She has normal strength and normal reflexes. She displays normal reflexes. No sensory deficit. She exhibits normal muscle tone. Coordination normal. GCS eye subscore is 4. GCS verbal subscore is 5. GCS motor subscore is 6.   Reflex Scores:       Patellar reflexes are 2+ on the right side and 2+ on the left side.  SLR negative bilaterally.    Skin: Skin is warm, dry and intact. Capillary refill takes less than 2 seconds. No abrasion and no bruising noted.   Psychiatric: She has a normal mood and affect. Her speech is normal and behavior is normal. Thought content normal. Cognition  and memory are normal. She is attentive.   Nursing note and vitals reviewed.      Assessment:       1. Work related injury    2. Acute myofascial strain of lumbar region, subsequent encounter    3. Acute bilateral low back pain without sciatica        Plan:         Work related injury  -     ibuprofen (ADVIL,MOTRIN) 200 MG tablet; Take 2 tablets (400 mg total) by mouth every 6 (six) hours as needed for Pain.; Refill: 0    Acute myofascial strain of lumbar region, subsequent encounter  -     ibuprofen (ADVIL,MOTRIN) 200 MG tablet; Take 2 tablets (400 mg total) by mouth every 6 (six) hours as needed for Pain.; Refill: 0    Acute bilateral low back pain without sciatica  -     ibuprofen (ADVIL,MOTRIN) 200 MG tablet; Take 2 tablets (400 mg total) by mouth every 6 (six) hours as needed for Pain.; Refill: 0    Other orders  -     acetaminophen (TYLENOL) 650 MG TbSR; Take 1 tablet (650 mg total) by mouth every 8 (eight) hours.; Refill: 0  -     methocarbamol (ROBAXIN) 750 MG Tab; Take 1 tablet (750 mg total) by mouth 4 (four) times daily. for 14 days  Dispense: 56 tablet; Refill: 0

## 2018-12-28 NOTE — LETTER
[facility] [address] [facility], [place] [address]  Phone: [phone]  Fax: [phone]  [facility]: [id number]    Pt Name: Vanessa Jerez  Injury Date: 09/26/2018   Employee ID:  Date of First Treatment: 12/28/2018   Company: [employer]      Appointment Time: 09:45 AM Arrived: 945am   Provider: Mary Kay Yanez NP Time Out:1038am     Office Treatment:   1. Work related injury    2. Acute myofascial strain of lumbar region, subsequent encounter    3. Acute bilateral low back pain without sciatica      Medications Ordered This Encounter   Medications    acetaminophen (TYLENOL) 650 MG TbSR    ibuprofen (ADVIL,MOTRIN) 200 MG tablet    methocarbamol (ROBAXIN) 750 MG Tab      Patient Instructions: Attention not to aggravate affected area, Daily home exercises/warm soaks, Apply ice 24-48 hours then apply heat/warm soaks, Continue Physical Therapy(Please take medications as directed for pain and discomfort)    Restrictions: Sit or stand as needed, Avoid frequent bending/lifting/twisting, Avoid climbing/kneeling/squatting, No lifting/pushing/pulling more than 25 lbs(Light duty; 12/28/2018)     Return Appointment: 1/11/2019 at 930am

## 2019-01-09 ENCOUNTER — CLINICAL SUPPORT (OUTPATIENT)
Dept: REHABILITATION | Facility: HOSPITAL | Age: 49
End: 2019-01-09
Payer: OTHER MISCELLANEOUS

## 2019-01-09 DIAGNOSIS — M51.9 ACUTE LOW BACK PAIN WITH DISC SYMPTOMS, DURATION LESS THAN 6 WEEKS: ICD-10-CM

## 2019-01-09 DIAGNOSIS — M54.50 ACUTE LOW BACK PAIN WITH DISC SYMPTOMS, DURATION LESS THAN 6 WEEKS: ICD-10-CM

## 2019-01-09 PROCEDURE — 97140 MANUAL THERAPY 1/> REGIONS: CPT | Mod: PN

## 2019-01-09 PROCEDURE — 97110 THERAPEUTIC EXERCISES: CPT | Mod: PN

## 2019-01-09 NOTE — PROGRESS NOTES
"  Physical Therapy Progress Note     Name: Vanessa Jerez  Clinic Number: [id number]    Therapy Diagnosis:   No diagnosis found.  Physician: Mary Kay Yanez NP    Visit Date: 1/9/2019  Visit #/Visits authorized: 9/20     Time In: 1100AM  Time Out: 1155AM  Total Billable Time: 55 minutes (1:1 with PT for 55 minutes of session)    Precautions: Standard   Certification Period: 11/9/19-2/20/19 (PN due 2/6/19)    Subjective     Pt reports:Patient reports that since her initial evaluation she does feel significant improvements in symptoms. Patient reports that she does feel like she has had multiple set backs including the insurance not approving her visits on time so there was a lapse in her therapy. Patient reports that she feels like she might need to find a new job as this one is too unpredictable    She was not compliant with home exercise program.  Response to previous treatment: good  Functional change: none     Pain: 1/10, currently and 5/10 at worst and 2-3/10 at best  Location: bilateral, midline low back      Objective     Lower Extremity Strength     Right LE   Left LE     Hip flexion: 4+/5 Hip flexion: 4+/5   Knee extension: 5/5 Knee extension: 5/5   Knee flexion: 5/5 Knee flexion: 5/5   Hip IR: 4+/5 Hip IR: 4+/5   Hip ER: 4+/5 Hip ER: 4+/5   Hip extension:  4/5 Hip extension: 4-/5   Hip abduction: 4/5 Hip abduction: 4-/5   Hip adduction: 4/5 Hip adduction 4+/5   Ankle dorsiflexion: 5/5 Ankle dorsiflexion: 5/5   Ankle plantarflexion: 5/5 Ankle plantarflexion: 5/5      Vanessa received therapeutic exercises to develop strength, ROM, flexibility, posture and core stabilization for 45 minutes including:    Elliptical x 6 minutes   LTR on theraball, 10" hold x 10 reps bilaterally   SKTC on theraball, 10" hold x 10 reps   HS stretch with strap, 20" hold x 3 reps B    Piriformis stretch, 20" hold x 3 reps B  PPT, 5" hold x 12  PPT with leg lift, x 10 B  Resisted knee flexion, 5" hold x 12 reps  Bridging, x 12 " "with 3" hold   PPT with hip abduction, x 12 reps with 5" hold  Gastroc stretch on wedge, 30" hold x 3 reps  SLR with PPT, 1# ankle weight, x 10 B LE with 3" hold   ITB stretch, 20" hold x 3 reps B  open book stretch, 10" x 10, B    Consider adding next visit: Supine, shoulder pull downs; Anti rotation step outs, x 10 reps to each side     Vanessa received the following manual therapy techniques: Soft tissue Mobilization were applied to the lumber spine, QL for 10 minutes including:  STM to QL, trigger points noted     Vanessa received hot pack for 10 minutes to increase circulation and promote tissue healing in Z-lying.    Home Exercises Provided and Patient Education Provided.    Education provided:   - HEP, written   -reviewed    Written Home Exercises Provided: yes.  Exercises were reviewed and Vanessa was able to demonstrate them prior to the end of the session.  Vanessa demonstrated fair  understanding of the education provided.       Assessment     Modified treatment session today secondary to increased patient pain level. Patient reported relief post session. Patient will return to strengthening program at next visit.     Vanessa is progressing well towards her goals.   Pt prognosis is Guarded.     Pt will continue to benefit from skilled outpatient physical therapy to address the deficits listed in the problem list box on initial evaluation, provide pt/family education and to maximize pt's level of independence in the home and community environment.     Pt's spiritual, cultural and educational needs considered and pt agreeable to plan of care and goals.    Anticipated barriers to physical therapy: patient condition    Goals:  Short Term Goals:  4 weeks  1. Patient will be able to report decreased lumbar resting pain  <  / =  5 /10  to increase tolerance for increased standing. - met (1/9/19)  2. Patient will be able to report a functional improvement in ability to stand without pain for greater than 1 hour at " work - Met (11/21/2018); met (1/9/18)  3. Patient will demonstrate an increased MMT in hip flexion  to 4+/5  to increase tolerance for walking - Appropriate, ongoing  4. Patient demonstrate improved seated posture without cues - met (1/9/19)  5. Patient will be able to tolerate HEP to improve ROM and independence with ADL's - Appropriate, ongoing     Long Term Goals: 8 weeks  1.Report decreased in resting L hip pain  <   / =  2  /10  to increase tolerance for working tasks. - Met (11/21/2018)  2.Patient will be able to demonstrate an increase in hamstring flexibility by 10 deg with no pain in testing to improve mobility - Appropriate, ongoing  3. Patient will demonstrate an increased MMT for all affected LE musculature  to increase tolerance for ADL and work activities. - Appropriate, ongoing  4. Patient will report at CJ level (20-40% impaired) on LEFS  to demonstrate increase in LE function with every day tasks. - Appropriate, ongoing  5. Patient to be Independent with HEP to improve ROM and independence with ADL's - Appropriate, ongoing    Plan     Patient will be treated by physical therapy 1-2 times a week for 4 additional weeks for Electrical Stimulation PRN, Iontophoresis (with dexamethasone PRN), Manual Therapy, Moist Heat/ Ice, Neuromuscular Re-ed, Patient Education, Therapeutic Activites, Therapeutic Exercise and Other therapeutic taping, dry needling, aquatic therapy to achieve established goals. Vanessa may at times be seen by a PTA as part of the Rehab Team.     Alejandro Mariee, PT   01/09/2019

## 2019-01-11 ENCOUNTER — OFFICE VISIT (OUTPATIENT)
Dept: URGENT CARE | Facility: CLINIC | Age: 49
End: 2019-01-11
Payer: OTHER MISCELLANEOUS

## 2019-01-11 VITALS — SYSTOLIC BLOOD PRESSURE: 133 MMHG | DIASTOLIC BLOOD PRESSURE: 85 MMHG | HEART RATE: 71 BPM
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
## 2019-01-11 DIAGNOSIS — S39.012D ACUTE MYOFASCIAL STRAIN OF LUMBAR REGION, SUBSEQUENT ENCOUNTER: ICD-10-CM

## 2019-01-11 DIAGNOSIS — M54.50 ACUTE BILATERAL LOW BACK PAIN WITHOUT SCIATICA: ICD-10-CM

## 2019-01-11 PROCEDURE — 99214 OFFICE O/P EST MOD 30 MIN: CPT | Mod: S$GLB,,, | Performed by: NURSE PRACTITIONER

## 2019-01-11 PROCEDURE — 99214 PR OFFICE/OUTPT VISIT, EST, LEVL IV, 30-39 MIN: ICD-10-PCS | Mod: S$GLB,,, | Performed by: NURSE PRACTITIONER

## 2019-01-11 RX ORDER — METHOCARBAMOL 750 MG/1
750 TABLET, FILM COATED ORAL 4 TIMES DAILY
Qty: 56 TABLET | Refills: 0 | Status: SHIPPED | OUTPATIENT
Start: 2019-01-11 | End: 2019-01-25

## 2019-01-11 RX ORDER — IBUPROFEN 200 MG
400 TABLET ORAL EVERY 6 HOURS PRN
Refills: 0 | COMMUNITY
Start: 2019-01-11 | End: 2019-01-16 | Stop reason: ALTCHOICE

## 2019-01-11 RX ORDER — DEXTROMETHORPHAN HYDROBROMIDE, GUAIFENESIN 5; 100 MG/5ML; MG/5ML
650 LIQUID ORAL EVERY 8 HOURS
Refills: 0 | COMMUNITY
Start: 2019-01-11 | End: 2019-09-12 | Stop reason: SDUPTHER

## 2019-01-11 NOTE — PROGRESS NOTES
Subjective:       Patient ID: Vanessa Jerez is a 48 y.o. female.    Chief Complaint: Work Related Injury and Back Injury    Pt states that she is still having some lower back pain  From her work related injury .  Patient has been going to physical therapy.  They have extended physical therapy for another 12 visit secondary to the pain she is having.      Back Pain   This is a new problem. The current episode started more than 1 month ago. The problem occurs intermittently. The problem is unchanged. The pain is present in the lumbar spine. The quality of the pain is described as shooting. The pain does not radiate. The pain is at a severity of 2/10 (Ranges from a 2 to a 7/10 of pain). The pain is mild. The pain is the same all the time. The symptoms are aggravated by bending, position, standing, twisting and sitting. Stiffness is present all day. Pertinent negatives include no bladder incontinence, bowel incontinence, leg pain, numbness, tingling or weakness. She has tried NSAIDs and muscle relaxant (Physical therapy) for the symptoms. The treatment provided mild relief.     Review of Systems   Constitution: Negative for weakness.   Musculoskeletal: Positive for back pain and stiffness.   Gastrointestinal: Negative for bowel incontinence.   Genitourinary: Negative for bladder incontinence.   Neurological: Negative for numbness and tingling.   All other systems reviewed and are negative.      Objective:      Physical Exam   Constitutional: She is oriented to person, place, and time. Vital signs are normal. She appears well-developed and well-nourished. She is active. No distress.   HENT:   Head: Normocephalic and atraumatic.   Right Ear: Hearing and external ear normal.   Left Ear: Hearing and external ear normal.   Nose: Nose normal. No nasal deformity. No epistaxis.   Mouth/Throat: Mucous membranes are normal.   Eyes: Conjunctivae and lids are normal. Right conjunctiva is not injected. Left conjunctiva is not  injected.   Neck: Trachea normal, normal range of motion and full passive range of motion without pain. Neck supple. No spinous process tenderness and no muscular tenderness present. No neck rigidity. Normal range of motion present.   Cardiovascular: Normal rate, intact distal pulses and normal pulses.   Pulses:       Radial pulses are 2+ on the right side, and 2+ on the left side.        Dorsalis pedis pulses are 2+ on the right side, and 2+ on the left side.        Posterior tibial pulses are 2+ on the right side, and 2+ on the left side.   Pulmonary/Chest: Effort normal. No stridor. No respiratory distress.   Abdominal: Soft. Normal appearance.   Musculoskeletal: She exhibits tenderness. She exhibits no edema or deformity.        Cervical back: Normal.        Thoracic back: Normal.        Lumbar back: She exhibits tenderness, pain and spasm. She exhibits normal range of motion, no bony tenderness, no deformity and normal pulse.        Back:         Right upper leg: Normal.        Left upper leg: Normal.   Neurological: She is alert and oriented to person, place, and time. She has normal strength and normal reflexes. She displays normal reflexes. No sensory deficit. She exhibits normal muscle tone. Coordination normal. GCS eye subscore is 4. GCS verbal subscore is 5. GCS motor subscore is 6.   Reflex Scores:       Patellar reflexes are 2+ on the right side and 2+ on the left side.  SLR negative bilaterally.    Skin: Skin is warm, dry and intact. Capillary refill takes less than 2 seconds. No abrasion and no bruising noted.   Psychiatric: She has a normal mood and affect. Her speech is normal and behavior is normal. Thought content normal. Cognition and memory are normal. She is attentive.   Nursing note and vitals reviewed.      Assessment:       1. Work related injury    2. Acute myofascial strain of lumbar region, subsequent encounter    3. Acute bilateral low back pain without sciatica        Plan:          Medications Ordered This Encounter   Medications    acetaminophen (TYLENOL) 650 MG TbSR     Sig: Take 1 tablet (650 mg total) by mouth every 8 (eight) hours.     Refill:  0    ibuprofen (ADVIL,MOTRIN) 200 MG tablet     Sig: Take 2 tablets (400 mg total) by mouth every 6 (six) hours as needed for Pain.     Refill:  0    methocarbamol (ROBAXIN) 750 MG Tab     Sig: Take 1 tablet (750 mg total) by mouth 4 (four) times daily. for 14 days     Dispense:  56 tablet     Refill:  0     Patient Instructions: Attention not to aggravate affected area, Daily home exercises/warm soaks, Apply ice 24-48 hours then apply heat/warm soaks, Continue Physical Therapy(Please take medications as directed for pain and discomfort)   Restrictions: Sit or stand as needed, Avoid frequent bending/lifting/twisting, Avoid climbing/kneeling/squatting, No lifting/pushing/pulling more than 25 lbs(Light duty; 01/11/2019)  Follow-up in about 5 days (around 1/16/2019).

## 2019-01-11 NOTE — LETTER
[facility] [address], [address]  [place] [address]  Phone: [phone]  Fax: [phone]  [facility]: [id number]    Pt Name: Vanessa Jerez  Injury Date: 09/26/2018   Employee ID:  Date of First Treatment: 01/11/2019   Company: [employer]      Appointment Time: 09:15 AM Arrived: 915am   Provider: Mary Kay Yanez NP Time Out:1006am     Office Treatment:   1. Work related injury    2. Acute myofascial strain of lumbar region, subsequent encounter    3. Acute bilateral low back pain without sciatica      Medications Ordered This Encounter   Medications    acetaminophen (TYLENOL) 650 MG TbSR    ibuprofen (ADVIL,MOTRIN) 200 MG tablet    methocarbamol (ROBAXIN) 750 MG Tab      Patient Instructions: Attention not to aggravate affected area, Daily home exercises/warm soaks, Apply ice 24-48 hours then apply heat/warm soaks, Continue Physical Therapy(Please take medications as directed for pain and discomfort)    Restrictions: Sit or stand as needed, Avoid frequent bending/lifting/twisting, Avoid climbing/kneeling/squatting, No lifting/pushing/pulling more than 25 lbs(Light duty; 01/11/2019)     Return Appointment: 01/16/19 at 2pm at our [place] clinic with Dr. Beth  [address], [address]  [place], [place] [address] [phone]

## 2019-01-16 ENCOUNTER — OFFICE VISIT (OUTPATIENT)
Dept: URGENT CARE | Facility: CLINIC | Age: 49
End: 2019-01-16
Payer: OTHER MISCELLANEOUS

## 2019-01-16 ENCOUNTER — CLINICAL SUPPORT (OUTPATIENT)
Dept: REHABILITATION | Facility: HOSPITAL | Age: 49
End: 2019-01-16
Payer: OTHER MISCELLANEOUS

## 2019-01-16 DIAGNOSIS — M51.9 ACUTE LOW BACK PAIN WITH DISC SYMPTOMS, DURATION LESS THAN 6 WEEKS: ICD-10-CM

## 2019-01-16 DIAGNOSIS — S39.012D ACUTE MYOFASCIAL STRAIN OF LUMBAR REGION, SUBSEQUENT ENCOUNTER: Primary | ICD-10-CM

## 2019-01-16 DIAGNOSIS — M54.50 ACUTE LOW BACK PAIN WITH DISC SYMPTOMS, DURATION LESS THAN 6 WEEKS: ICD-10-CM

## 2019-01-16 DIAGNOSIS — M54.50 ACUTE BILATERAL LOW BACK PAIN WITHOUT SCIATICA: ICD-10-CM

## 2019-01-16 PROCEDURE — 99214 PR OFFICE/OUTPT VISIT, EST, LEVL IV, 30-39 MIN: ICD-10-PCS | Mod: S$GLB,,, | Performed by: PREVENTIVE MEDICINE

## 2019-01-16 PROCEDURE — 97140 MANUAL THERAPY 1/> REGIONS: CPT | Mod: PN

## 2019-01-16 PROCEDURE — 97110 THERAPEUTIC EXERCISES: CPT | Mod: PN

## 2019-01-16 PROCEDURE — 99214 OFFICE O/P EST MOD 30 MIN: CPT | Mod: S$GLB,,, | Performed by: PREVENTIVE MEDICINE

## 2019-01-16 RX ORDER — ACETAMINOPHEN AND CODEINE PHOSPHATE 300; 30 MG/1; MG/1
1 TABLET ORAL
Qty: 30 TABLET | Refills: 0 | Status: SHIPPED | OUTPATIENT
Start: 2019-01-16 | End: 2019-01-30 | Stop reason: SDUPTHER

## 2019-01-16 RX ORDER — IBUPROFEN 600 MG/1
600 TABLET ORAL EVERY 6 HOURS PRN
Qty: 60 TABLET | Refills: 0 | Status: SHIPPED | OUTPATIENT
Start: 2019-01-16 | End: 2019-01-30 | Stop reason: SDUPTHER

## 2019-01-16 NOTE — LETTER
[facility] [place]  [address]  [place] [address]  Phone: [phone]  Fax: [phone]  [facility]: [id number]    Pt Name: Vanessa Jerez  Injury Date: 09/26/2018   Employee ID: [id number] Date of Treatment: 01/16/2019   Company: [employer]      Appointment Time: 02:00 PM PM Arrived: 1:30 PM   Provider: Kobi Beth MD Time Out: 3:15 PM     Office Treatment:   1. Acute myofascial strain of lumbar region, subsequent encounter    2. Acute bilateral low back pain without sciatica      Medications Ordered This Encounter   Medications    acetaminophen-codeine 300-30mg (TYLENOL #3) 300-30 mg Tab    ibuprofen (ADVIL,MOTRIN) 600 MG tablet      Patient Instructions: Daily home exercises/warm soaks, Begin Physical Therapy(Continue taking Robaxin at night regulary)    Restrictions: No lifting/pushing/pulling more than 10 lbs, Sedentary work only, Avoid frequent bending/lifting/twisting     Return Appointment: 1/30/2019 at  3:30 PM

## 2019-01-16 NOTE — PROGRESS NOTES
Subjective:       Patient ID: Vanessa Jerez is a 48 y.o. female.    Chief Complaint: Back Injury.    F/u from [facility]. (doi 9/26/2018) Pt has been treating for a low back injury with Mary Kay Yanez NP. She began therapy in October with mild temporary relief. Pt continues to experience intermittent low back pain that is worse with increased activity.      Review of Systems   Constitution: Negative for malaise/fatigue.   Skin: Negative for rash.   Musculoskeletal: Positive for back pain. Negative for muscle cramps, muscle weakness and stiffness.   Gastrointestinal: Negative for abdominal pain and bowel incontinence.   Genitourinary: Negative for bladder incontinence, dysuria, hematuria and urgency.   Neurological: Negative for disturbances in coordination and numbness.   All other systems reviewed and are negative.      Objective:      Physical Exam   Constitutional: She appears well-developed and well-nourished.   HENT:   Head: Normocephalic and atraumatic.   Eyes: EOM are normal.   Neck: Normal range of motion.   Cardiovascular: Normal rate.   Pulmonary/Chest: Effort normal.   Musculoskeletal:        Lumbar back: She exhibits decreased range of motion, tenderness, pain and spasm. She exhibits no bony tenderness, no swelling, no edema, no deformity, no laceration and normal pulse.        Back:    Pain and spasm noted with palpation of low back. Pain with flexion to 45 degrees, extension to 10 degrees,and rotation and bending to 25 degrees of low back. Pain radiates to buttocks bilaterally. No motor or sensory deficits about the lower extremities.    Neurological: She is alert.   No focal neurologic deficits   Skin: Skin is warm and dry.   Psychiatric: She has a normal mood and affect.   Nursing note and vitals reviewed.      Assessment:       1. Acute myofascial strain of lumbar region, subsequent encounter    2. Acute bilateral low back pain without sciatica        Plan:         Medications Ordered  This Encounter   Medications    acetaminophen-codeine 300-30mg (TYLENOL #3) 300-30 mg Tab     Sig: Take 1 tablet by mouth every 4 to 6 hours as needed.     Dispense:  30 tablet     Refill:  0    ibuprofen (ADVIL,MOTRIN) 600 MG tablet     Sig: Take 1 tablet (600 mg total) by mouth every 6 (six) hours as needed for Pain (Take medication with food).     Dispense:  60 tablet     Refill:  0     Patient Instructions: Daily home exercises/warm soaks, Begin Physical Therapy(Continue taking Robaxin at night regulary)   Restrictions: No lifting/pushing/pulling more than 10 lbs, Sedentary work only, Avoid frequent bending/lifting/twisting  Follow-up in about 2 weeks (around 1/30/2019).

## 2019-01-17 NOTE — PROGRESS NOTES
"  Physical Therapy Daily Treatment Note     Name: Vanessa Jerez  Clinic Number: [id number]    Therapy Diagnosis:   Encounter Diagnosis   Name Primary?   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  Response to previous treatment: good  Functional change: none     Pain: 1/10, currently and 5/10 at worst and 2-3/10 at best  Location: bilateral, midline low back      Objective     Lower Extremity Strength     Right LE   Left LE     Hip flexion: 4+/5 Hip flexion: 4+/5   Knee extension: 5/5 Knee extension: 5/5   Knee flexion: 5/5 Knee flexion: 5/5   Hip IR: 4+/5 Hip IR: 4+/5   Hip ER: 4+/5 Hip ER: 4+/5   Hip extension:  4-/5 Hip extension: 4-/5   Hip abduction: 4-/5 Hip abduction: 4-/5   Hip adduction: 4/5 Hip adduction 4+/5   Ankle dorsiflexion: 5/5 Ankle dorsiflexion: 5/5   Ankle plantarflexion: 5/5 Ankle plantarflexion: 5/5      Vanessa received therapeutic exercises to develop strength, ROM, flexibility, posture and core stabilization for 45 minutes including:    Elliptical x 6 minutes   LTR on theraball, 10" hold x 10 reps bilaterally   SKTC on theraball, 10" hold x 10 reps   HS stretch with strap, 20" hold x 3 reps B    Piriformis stretch, 20" hold x 3 reps B  PPT, 5" hold x 12  PPT with leg lift, x 10 B  Resisted knee flexion, 5" hold x 12 reps  Bridging, x 12 with 3" hold   PPT with hip abduction, x 12 reps with 5" hold  Gastroc stretch on wedge, 30" hold x 3 reps  SLR with PPT, 1# ankle weight, x 10 B LE " "with 3" hold   ITB stretch, 20" hold x 3 reps B  open book stretch, 10" x 10, B    Consider adding next visit: Supine, shoulder pull downs; Anti rotation step outs, x 10 reps to each side     Vanessa received the following manual therapy techniques: Soft tissue Mobilization were applied to the lumber spine, QL for 10 minutes including:  STM to QL, trigger points noted     Vanessa received hot pack for 10 minutes to increase circulation and promote tissue healing in Z-lying.    Home Exercises Provided and Patient Education Provided.    Education provided:   - HEP, written   -reviewed    Written Home Exercises Provided: yes.  Exercises were reviewed and Vanessa was able to demonstrate them prior to the end of the session.  Vanessa demonstrated fair  understanding of the education provided.       Assessment     Modified treatment session today secondary to increased patient pain level. Patient reported relief post session. Patient will return to strengthening program at next visit if patient is able to tolerate. Patient encouraged to share lack of progress with physician due to poor improvement with slight increases in activity level.      Vanessa is progressing well towards her goals.   Pt prognosis is Guarded.     Pt will continue to benefit from skilled outpatient physical therapy to address the deficits listed in the problem list box on initial evaluation, provide pt/family education and to maximize pt's level of independence in the home and community environment.     Pt's spiritual, cultural and educational needs considered and pt agreeable to plan of care and goals.    Anticipated barriers to physical therapy: patient condition    Goals:  Short Term Goals:  4 weeks  1. Patient will be able to report decreased lumbar resting pain  <  / =  5 /10  to increase tolerance for increased standing. - Appropriate, ongoing  2. Patient will be able to report a functional improvement in ability to stand without pain for greater " than 1 hour at work - Met (11/21/2018); currently not met @45 min 12/7/18  3. Patient will demonstrate an increased MMT in hip flexion  to 4+/5  to increase tolerance for walking - Appropriate, ongoing  4. Patient demonstrate improved seated posture without cues - Appropriate, ongoing  5. Patient will be able to tolerate HEP to improve ROM and independence with ADL's - Appropriate, ongoing     Long Term Goals: 8 weeks  1.Report decreased in resting L hip pain  <   / =  2  /10  to increase tolerance for working tasks. - Met (11/21/2018)  2.Patient will be able to demonstrate an increase in hamstring flexibility by 10 deg with no pain in testing to improve mobility - Appropriate, ongoing  3. Patient will demonstrate an increased MMT for all affected LE musculature  to increase tolerance for ADL and work activities. - Appropriate, ongoing  4. Patient will report at CJ level (20-40% impaired) on LEFS  to demonstrate increase in LE function with every day tasks. - Appropriate, ongoing  5. Patient to be Independent with HEP to improve ROM and independence with ADL's - Appropriate, ongoing    Plan     Patient will be treated by physical therapy 1-2 times a week for 4 additional weeks for Electrical Stimulation PRN, Iontophoresis (with dexamethasone PRN), Manual Therapy, Moist Heat/ Ice, Neuromuscular Re-ed, Patient Education, Therapeutic Activites, Therapeutic Exercise and Other therapeutic taping, dry needling, aquatic therapy to achieve established goals. Vanessa may at times be seen by a PTA as part of the Rehab Team.     Alejandro Mariee, PT   01/17/2019

## 2019-01-18 ENCOUNTER — CLINICAL SUPPORT (OUTPATIENT)
Dept: REHABILITATION | Facility: HOSPITAL | Age: 49
End: 2019-01-18
Payer: OTHER MISCELLANEOUS

## 2019-01-18 DIAGNOSIS — M51.9 ACUTE LOW BACK PAIN WITH DISC SYMPTOMS, DURATION LESS THAN 6 WEEKS: ICD-10-CM

## 2019-01-18 DIAGNOSIS — M54.50 ACUTE LOW BACK PAIN WITH DISC SYMPTOMS, DURATION LESS THAN 6 WEEKS: ICD-10-CM

## 2019-01-18 PROCEDURE — 97110 THERAPEUTIC EXERCISES: CPT | Mod: PN

## 2019-01-18 PROCEDURE — 97140 MANUAL THERAPY 1/> REGIONS: CPT | Mod: PN

## 2019-01-18 NOTE — PROGRESS NOTES
"  Physical Therapy Daily Treatment Note     Name: Vanessa Jerez  Clinic Number: [id number]    Therapy Diagnosis:   Encounter Diagnosis   Name Primary?   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 Patient reported follow up with MD who advised continued  PT and continued light duty.   She was not compliant with home exercise program.  Response to previous treatment: good  Functional change: none     Pain: 1/10, currently and 5/10 at worst and 2-3/10 at best  Location: bilateral, midline low back      Objective     Lower Extremity Strength     Right LE   Left LE     Hip flexion: 4+/5 Hip flexion: 4+/5   Knee extension: 5/5 Knee extension: 5/5   Knee flexion: 5/5 Knee flexion: 5/5   Hip IR: 4+/5 Hip IR: 4+/5   Hip ER: 4+/5 Hip ER: 4+/5   Hip extension:  4-/5 Hip extension: 4-/5   Hip abduction: 4-/5 Hip abduction: 4-/5   Hip adduction: 4/5 Hip adduction 4+/5   Ankle dorsiflexion: 5/5 Ankle dorsiflexion: 5/5   Ankle plantarflexion: 5/5 Ankle plantarflexion: 5/5      Vanessa received therapeutic exercises to develop strength, ROM, flexibility, posture and core stabilization for 45 minutes including:    Elliptical x 6 minutes   LTR on theraball, 10" hold x 10 reps bilaterally   SKTC on theraball, 10" hold x 10 reps   HS stretch with strap, 20" hold x 3 reps B    Piriformis stretch, 20" hold x 3 reps B  PPT, 5" hold x 12  PPT with leg lift, x 10 B  Resisted knee flexion, 5" hold x 12 reps  Bridging, x 12 with 3" hold   PPT with hip abduction, x 12 reps with 5" " "hold  Gastroc stretch on wedge, 30" hold x 3 reps  SLR with PPT, 1# ankle weight, x 10 B LE with 3" hold   ITB stretch, 20" hold x 3 reps B  open book stretch, 10" x 10, B    Consider adding next visit: Supine, shoulder pull downs; Anti rotation step outs, x 10 reps to each side     Vanessa received the following manual therapy techniques: Soft tissue Mobilization were applied to the lumber spine, QL for 10 minutes including:  STM to QL, trigger points noted     Vanessa received hot pack for 10 minutes to increase circulation and promote tissue healing in Z-lying.    Home Exercises Provided and Patient Education Provided.    Education provided:   - HEP, written   -reviewed    Written Home Exercises Provided: yes.  Exercises were reviewed and Vanessa was able to demonstrate them prior to the end of the session.  Vanessa demonstrated fair  understanding of the education provided.       Assessment     Patient able to tolerate progressed treatment session today due to decreased pain level. Manual therapy performed for release of lumbar paraspinal tightness and increased tone in right QL. Patient reported relief post session. Patient will return to strengthening program at next visit if patient is able to tolerate. Patient encouraged to share lack of progress with physician due to poor improvement with slight increases in activity level.      Vanessa is progressing well towards her goals.   Pt prognosis is Guarded.     Pt will continue to benefit from skilled outpatient physical therapy to address the deficits listed in the problem list box on initial evaluation, provide pt/family education and to maximize pt's level of independence in the home and community environment.     Pt's spiritual, cultural and educational needs considered and pt agreeable to plan of care and goals.    Anticipated barriers to physical therapy: patient condition    Goals:  Short Term Goals:  4 weeks  1. Patient will be able to report decreased lumbar " resting pain  <  / =  5 /10  to increase tolerance for increased standing. - Appropriate, ongoing  2. Patient will be able to report a functional improvement in ability to stand without pain for greater than 1 hour at work - Met (11/21/2018); currently not met @45 min 12/7/18  3. Patient will demonstrate an increased MMT in hip flexion  to 4+/5  to increase tolerance for walking - Appropriate, ongoing  4. Patient demonstrate improved seated posture without cues - Appropriate, ongoing  5. Patient will be able to tolerate HEP to improve ROM and independence with ADL's - Appropriate, ongoing     Long Term Goals: 8 weeks  1.Report decreased in resting L hip pain  <   / =  2  /10  to increase tolerance for working tasks. - Met (11/21/2018)  2.Patient will be able to demonstrate an increase in hamstring flexibility by 10 deg with no pain in testing to improve mobility - Appropriate, ongoing  3. Patient will demonstrate an increased MMT for all affected LE musculature  to increase tolerance for ADL and work activities. - Appropriate, ongoing  4. Patient will report at CJ level (20-40% impaired) on LEFS  to demonstrate increase in LE function with every day tasks. - Appropriate, ongoing  5. Patient to be Independent with HEP to improve ROM and independence with ADL's - Appropriate, ongoing    Plan     Patient will be treated by physical therapy 1-2 times a week for 4 additional weeks for Electrical Stimulation PRN, Iontophoresis (with dexamethasone PRN), Manual Therapy, Moist Heat/ Ice, Neuromuscular Re-ed, Patient Education, Therapeutic Activites, Therapeutic Exercise and Other therapeutic taping, dry needling, aquatic therapy to achieve established goals. Vanessa may at times be seen by a PTA as part of the Rehab Team.     Alejandro Mariee, PT   01/18/2019

## 2019-01-21 ENCOUNTER — CLINICAL SUPPORT (OUTPATIENT)
Dept: REHABILITATION | Facility: HOSPITAL | Age: 49
End: 2019-01-21
Payer: OTHER MISCELLANEOUS

## 2019-01-21 DIAGNOSIS — M51.9 ACUTE LOW BACK PAIN WITH DISC SYMPTOMS, DURATION LESS THAN 6 WEEKS: ICD-10-CM

## 2019-01-21 DIAGNOSIS — M54.50 ACUTE LOW BACK PAIN WITH DISC SYMPTOMS, DURATION LESS THAN 6 WEEKS: ICD-10-CM

## 2019-01-21 PROCEDURE — 97110 THERAPEUTIC EXERCISES: CPT | Mod: PN

## 2019-01-21 NOTE — PROGRESS NOTES
"  Physical Therapy Daily Treatment Note     Name: Vanessa Jerez  Clinic Number: [id number]    Therapy Diagnosis:   No diagnosis found.  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 Patient reported follow up with MD who advised continued  PT and continued light duty.   She was not compliant with home exercise program.  Response to previous treatment: good  Functional change: none     Pain: 1/10, currently and 5/10 at worst and 2-3/10 at best  Location: bilateral, midline low back      Objective     Lower Extremity Strength     Right LE   Left LE     Hip flexion: 4+/5 Hip flexion: 4+/5   Knee extension: 5/5 Knee extension: 5/5   Knee flexion: 5/5 Knee flexion: 5/5   Hip IR: 4+/5 Hip IR: 4+/5   Hip ER: 4+/5 Hip ER: 4+/5   Hip extension:  4-/5 Hip extension: 4-/5   Hip abduction: 4-/5 Hip abduction: 4-/5   Hip adduction: 4/5 Hip adduction 4+/5   Ankle dorsiflexion: 5/5 Ankle dorsiflexion: 5/5   Ankle plantarflexion: 5/5 Ankle plantarflexion: 5/5      Vanessa received therapeutic exercises to develop strength, ROM, flexibility, posture and core stabilization for 45 minutes including:    Elliptical x 6 minutes   LTR on theraball, 10" hold x 10 reps bilaterally   SKTC on theraball, 10" hold x 10 reps   HS stretch with strap, 20" hold x 3 reps B    Piriformis stretch, 20" hold x 3 reps B  PPT, 5" hold x 12  PPT with leg lift, x 10 B  Resisted knee flexion, 5" hold x 12 reps  Bridging, x 12 with 3" hold   PPT with hip abduction, x 12 reps with 5" hold  Gastroc stretch on wedge, 30" hold x 3 reps  SLR with PPT, 1# ankle weight, x 10 B LE with " "3" hold   ITB stretch, 20" hold x 3 reps B  open book stretch, 10" x 10, B    Consider adding next visit: Supine, shoulder pull downs; Anti rotation step outs, x 10 reps to each side     Vanessa received the following manual therapy techniques: Soft tissue Mobilization were applied to the lumber spine, QL for 10 minutes including:  STM to QL, trigger points noted     Vanessa received hot pack for 10 minutes to increase circulation and promote tissue healing in Z-lying.    Home Exercises Provided and Patient Education Provided.    Education provided:   - HEP, written   -reviewed    Written Home Exercises Provided: yes.  Exercises were reviewed and Vanessa was able to demonstrate them prior to the end of the session.  Vanessa demonstrated fair  understanding of the education provided.       Assessment     Patient able to tolerate progressed treatment session today due to decreased pain level. Manual therapy performed for release of lumbar paraspinal tightness and increased tone in right QL. Patient reported relief post session. Patient will return to progression of core strengthening program at next visit if patient is able to tolerate.   Vanessa is progressing well towards her goals.   Pt prognosis is Guarded.     Pt will continue to benefit from skilled outpatient physical therapy to address the deficits listed in the problem list box on initial evaluation, provide pt/family education and to maximize pt's level of independence in the home and community environment.     Pt's spiritual, cultural and educational needs considered and pt agreeable to plan of care and goals.    Anticipated barriers to physical therapy: patient condition    Goals:  Short Term Goals:  4 weeks  1. Patient will be able to report decreased lumbar resting pain  <  / =  5 /10  to increase tolerance for increased standing. - Appropriate, ongoing  2. Patient will be able to report a functional improvement in ability to stand without pain for greater " than 1 hour at work - Met (11/21/2018); currently not met @45 min 12/7/18  3. Patient will demonstrate an increased MMT in hip flexion  to 4+/5  to increase tolerance for walking - Appropriate, ongoing  4. Patient demonstrate improved seated posture without cues - Appropriate, ongoing  5. Patient will be able to tolerate HEP to improve ROM and independence with ADL's - Appropriate, ongoing     Long Term Goals: 8 weeks  1.Report decreased in resting L hip pain  <   / =  2  /10  to increase tolerance for working tasks. - Met (11/21/2018)  2.Patient will be able to demonstrate an increase in hamstring flexibility by 10 deg with no pain in testing to improve mobility - Appropriate, ongoing  3. Patient will demonstrate an increased MMT for all affected LE musculature  to increase tolerance for ADL and work activities. - Appropriate, ongoing  4. Patient will report at CJ level (20-40% impaired) on LEFS  to demonstrate increase in LE function with every day tasks. - Appropriate, ongoing  5. Patient to be Independent with HEP to improve ROM and independence with ADL's - Appropriate, ongoing    Plan     Patient will be treated by physical therapy 1-2 times a week for 4 additional weeks for Electrical Stimulation PRN, Iontophoresis (with dexamethasone PRN), Manual Therapy, Moist Heat/ Ice, Neuromuscular Re-ed, Patient Education, Therapeutic Activites, Therapeutic Exercise and Other therapeutic taping, dry needling, aquatic therapy to achieve established goals. Vanessa may at times be seen by a PTA as part of the Rehab Team.     Alejandro Mariee, PT   01/21/2019

## 2019-01-29 ENCOUNTER — CLINICAL SUPPORT (OUTPATIENT)
Dept: REHABILITATION | Facility: HOSPITAL | Age: 49
End: 2019-01-29
Payer: OTHER MISCELLANEOUS

## 2019-01-29 DIAGNOSIS — M51.9 ACUTE LOW BACK PAIN WITH DISC SYMPTOMS, DURATION LESS THAN 6 WEEKS: ICD-10-CM

## 2019-01-29 DIAGNOSIS — M54.50 ACUTE LOW BACK PAIN WITH DISC SYMPTOMS, DURATION LESS THAN 6 WEEKS: ICD-10-CM

## 2019-01-29 PROCEDURE — 97110 THERAPEUTIC EXERCISES: CPT | Mod: PN

## 2019-01-29 NOTE — PROGRESS NOTES
"  Physical Therapy Daily Treatment Note     Name: Vanessa Jerez  Clinic Number: [id number]    Therapy Diagnosis:   Encounter Diagnosis   Name Primary?    Acute low back pain with disc symptoms, duration less than 6 weeks      Physician: Mary Kay Yanez NP    Visit Date: 1/29/2019  Visit #/Visits authorized: 5/20  Exp: 2/15/19  Total 13 visits    Time In: 1400  Time Out: 1500  Total Billable Time: 60 minutes (1:1 with PTA for 30 minutes of session)    Precautions: Standard   Certification Period: 1/17/19-2/20/19 (PN due by 2/21/18)    Subjective     Pt reports: that she is having some increased pain in her back today.     She was not compliant with home exercise program.  Response to previous treatment: good  Functional change: none     Pain: 5/10   Location: bilateral, midline low back      Objective       Vanessa received therapeutic exercises to develop strength, ROM, flexibility, posture and core stabilization for 45 minutes including:    + Upright bike x 8'   Elliptical x 6 minutes   Gastroc stretch on wedge, 30" hold x 3 reps  LTR on theraball, 10" hold x 10 reps bilaterally   DKTC on theraball, 10" hold x 10 reps   HS stretch with strap, 20" hold x 3 reps B    Piriformis stretch, 20" hold x 3 reps B  PPT, 5" hold x 12  PPT with leg lift, x 10 B  Resisted knee flexion, 5" hold x 12 reps  Bridging, x 15 with 3" hold   PPT with hip abduction, x 12 reps with 5" hold    SLR with PPT, 1# ankle weight, x 10 B LE with 3" hold   ITB stretch, 20" hold x 3 reps B  open book stretch, 10" x 10, B     shoulder pull downs   Anti rotation step outs, x 10 reps to each side     Vanessa received the following manual therapy techniques: Soft tissue Mobilization were applied to the lumber spine, QL for 10 minutes including:  STM to QL, trigger points noted     Vanessa received hot pack for 10 minutes to increase circulation and promote tissue healing in z-lying    Home Exercises Provided and Patient Education " Provided.    Education provided:   - HEP, written   -reviewed    Written Home Exercises Provided: no   Exercises were reviewed and Vanessa was able to demonstrate them prior to the end of the session.  Vanessa demonstrated fair  understanding of the education provided.       Assessment     Patient tolerated treatment session well.  Patient with good tolerance to exercises with appropriate training effect achieved.   Pt continues with increased tissue restrictions throughout the right QL, however responded favorably to manual care with reduction of pain achieved.     Vanessa is progressing well towards her goals.   Pt prognosis is Guarded.     Pt will continue to benefit from skilled outpatient physical therapy to address the deficits listed in the problem list box on initial evaluation, provide pt/family education and to maximize pt's level of independence in the home and community environment.     Pt's spiritual, cultural and educational needs considered and pt agreeable to plan of care and goals.    Anticipated barriers to physical therapy: patient condition    Goals:  Short Term Goals:  4 weeks  1. Patient will be able to report decreased lumbar resting pain  <  / =  5 /10  to increase tolerance for increased standing. - Appropriate, ongoing  2. Patient will be able to report a functional improvement in ability to stand without pain for greater than 1 hour at work - Met (11/21/2018); currently not met @45 min 12/7/18  3. Patient will demonstrate an increased MMT in hip flexion  to 4+/5  to increase tolerance for walking - Appropriate, ongoing  4. Patient demonstrate improved seated posture without cues - Appropriate, ongoing  5. Patient will be able to tolerate HEP to improve ROM and independence with ADL's - Appropriate, ongoing     Long Term Goals: 8 weeks  1.Report decreased in resting L hip pain  <   / =  2  /10  to increase tolerance for working tasks. - Met (11/21/2018)  2.Patient will be able to demonstrate  an increase in hamstring flexibility by 10 deg with no pain in testing to improve mobility - Appropriate, ongoing  3. Patient will demonstrate an increased MMT for all affected LE musculature  to increase tolerance for ADL and work activities. - Appropriate, ongoing  4. Patient will report at CJ level (20-40% impaired) on LEFS  to demonstrate increase in LE function with every day tasks. - Appropriate, ongoing  5. Patient to be Independent with HEP to improve ROM and independence with ADL's - Appropriate, ongoing    Plan     Continue with current Plan of care      Rolanda Antony, ABIGAIL   01/29/2019

## 2019-01-30 ENCOUNTER — OFFICE VISIT (OUTPATIENT)
Dept: URGENT CARE | Facility: CLINIC | Age: 49
End: 2019-01-30
Payer: OTHER MISCELLANEOUS

## 2019-01-30 DIAGNOSIS — S33.5XXD LUMBAR SPRAIN, SUBSEQUENT ENCOUNTER: ICD-10-CM

## 2019-01-30 DIAGNOSIS — S39.012D ACUTE MYOFASCIAL STRAIN OF LUMBAR REGION, SUBSEQUENT ENCOUNTER: Primary | ICD-10-CM

## 2019-01-30 DIAGNOSIS — M54.50 ACUTE BILATERAL LOW BACK PAIN WITHOUT SCIATICA: ICD-10-CM

## 2019-01-30 PROCEDURE — 99214 OFFICE O/P EST MOD 30 MIN: CPT | Mod: S$GLB,,, | Performed by: PREVENTIVE MEDICINE

## 2019-01-30 PROCEDURE — 99214 PR OFFICE/OUTPT VISIT, EST, LEVL IV, 30-39 MIN: ICD-10-PCS | Mod: S$GLB,,, | Performed by: PREVENTIVE MEDICINE

## 2019-01-30 RX ORDER — ACETAMINOPHEN AND CODEINE PHOSPHATE 300; 30 MG/1; MG/1
1 TABLET ORAL
Qty: 30 TABLET | Refills: 0 | Status: SHIPPED | OUTPATIENT
Start: 2019-01-30 | End: 2019-04-05 | Stop reason: SDUPTHER

## 2019-01-30 RX ORDER — IBUPROFEN 600 MG/1
600 TABLET ORAL EVERY 6 HOURS PRN
Qty: 60 TABLET | Refills: 0 | Status: SHIPPED | OUTPATIENT
Start: 2019-01-30 | End: 2020-09-14

## 2019-01-30 NOTE — LETTER
[facility] [place]  [address]  [place] [address]  Phone: [phone]  Fax: [phone]  [facility]: [email or web address]    Pt Name: Vanessa Jerez  Injury Date: 09/26/2018   Employee ID: [id number] Date 01/30/2019   Company: [employer]      Appointment Time: 03:30 PM Arrived: 3:10 PM   Provider: Kobi Beth MD Time Out: 4:06 PM     Office Treatment:   1. Acute myofascial strain of lumbar region, subsequent encounter    2. Acute bilateral low back pain without sciatica    3. Lumbar sprain, subsequent encounter      Medications Ordered This Encounter   Medications    acetaminophen-codeine 300-30mg (TYLENOL #3) 300-30 mg Tab    ibuprofen (ADVIL,MOTRIN) 600 MG tablet      Patient Instructions: Daily home exercises/warm soaks, Continue Physical Therapy(Increase physical therapy to 3X per week if possible.)      Restrictions: No lifting/pushing/pulling more than 10 lbs,   Sedentary work only,   Avoid frequent bending/lifting/twisting     Return Appointment: 2/6/2019 at 11:30 AM       hollie

## 2019-01-30 NOTE — PROGRESS NOTES
Subjective:       Patient ID: Vanessa Jerez is a 48 y.o. female.    Chief Complaint: Back Pain    F/u for low back injury (9/26/2018) Pt c/o constant low back pain that radiates up her spine to mid back. Pt states that pain is worse with increased activity. Pt continues to go to therapy. Pt states that her job has not been compliant with light duty work restrictions. Pt is doing her regular tech work. She takes tylenol # 3 at bedtime with moderate relief. Current pain 6/10 on pain scale. ajd       Review of Systems   Constitution: Negative for malaise/fatigue.   Skin: Negative for rash.   Musculoskeletal: Positive for back pain, muscle cramps, myalgias and stiffness. Negative for muscle weakness.   Gastrointestinal: Negative for abdominal pain and bowel incontinence.   Genitourinary: Negative for bladder incontinence, dysuria, hematuria and urgency.   Neurological: Negative for disturbances in coordination and numbness.   All other systems reviewed and are negative.      Objective:      Physical Exam   Constitutional: She appears well-developed and well-nourished.   HENT:   Head: Normocephalic.   Eyes: Pupils are equal, round, and reactive to light.   Neck: Normal range of motion.   Cardiovascular: Normal rate.   Pulmonary/Chest: Effort normal.   Musculoskeletal:        Lumbar back: She exhibits decreased range of motion, tenderness and pain. She exhibits no bony tenderness, no swelling, no edema, no deformity, no laceration, no spasm and normal pulse.        Back:    Pain across low back radiating to both buttocks persists with palpation and all range of motion testing. Pain with flexion to 45 degrees, extension to 10 degrees and lateral rotation to 25 degrees. No motor of sensory deficits.   Neurological: She is alert.   No focal neurologic deficits   Skin: Skin is warm.   Psychiatric: She has a normal mood and affect.   Nursing note and vitals reviewed.      Assessment:       1. Acute myofascial strain of  lumbar region, subsequent encounter    2. Acute bilateral low back pain without sciatica    3. Lumbar sprain, subsequent encounter        Plan:         Medications Ordered This Encounter   Medications    acetaminophen-codeine 300-30mg (TYLENOL #3) 300-30 mg Tab     Sig: Take 1 tablet by mouth every 4 to 6 hours as needed.     Dispense:  30 tablet     Refill:  0    ibuprofen (ADVIL,MOTRIN) 600 MG tablet     Sig: Take 1 tablet (600 mg total) by mouth every 6 (six) hours as needed for Pain (Take medication with food).     Dispense:  60 tablet     Refill:  0     Patient Instructions: Daily home exercises/warm soaks, Continue Physical Therapy(Increase physical therapy to 3X per week if possible.)   Restrictions: No lifting/pushing/pulling more than 10 lbs, Sedentary work only, Avoid frequent bending/lifting/twisting  Follow-up in about 1 week (around 2/6/2019).

## 2019-02-05 ENCOUNTER — DOCUMENTATION ONLY (OUTPATIENT)
Dept: REHABILITATION | Facility: HOSPITAL | Age: 49
End: 2019-02-05

## 2019-02-05 NOTE — PROGRESS NOTES
Physical Therapy: No show/Cancellation of Visit  Date: 02/05/2019    Patient was a no show to today's PT appointment.  Pt stated that she thought her appt was for tomorrow. Patient's next scheduled appointment is 2/12.        Cancel: 4 since start of care ( 2 due to insurance pending)   No show: 1    Therapist: Rolanda Antony, PTA

## 2019-02-06 ENCOUNTER — OFFICE VISIT (OUTPATIENT)
Dept: URGENT CARE | Facility: CLINIC | Age: 49
End: 2019-02-06
Payer: OTHER MISCELLANEOUS

## 2019-02-06 DIAGNOSIS — M54.50 ACUTE BILATERAL LOW BACK PAIN WITHOUT SCIATICA: ICD-10-CM

## 2019-02-06 DIAGNOSIS — S39.012D ACUTE MYOFASCIAL STRAIN OF LUMBAR REGION, SUBSEQUENT ENCOUNTER: Primary | ICD-10-CM

## 2019-02-06 DIAGNOSIS — S33.5XXD LUMBAR SPRAIN, SUBSEQUENT ENCOUNTER: ICD-10-CM

## 2019-02-06 PROCEDURE — 99213 OFFICE O/P EST LOW 20 MIN: CPT | Mod: S$GLB,,, | Performed by: PREVENTIVE MEDICINE

## 2019-02-06 PROCEDURE — 99213 PR OFFICE/OUTPT VISIT, EST, LEVL III, 20-29 MIN: ICD-10-PCS | Mod: S$GLB,,, | Performed by: PREVENTIVE MEDICINE

## 2019-02-06 NOTE — LETTER
[facility]  [address], [address]  [facility] [place] [address]  Phone: [phone]  Fax: [phone]  [facility]: [id number]    Pt Name: Vanessa Jerez  Injury Date: 09/26/2018   Employee ID: [id number] Date : 02/06/2019   Company: [employer]      Appointment Time: 10:30 AM Arrived: 10:10 AM   Provider: Kobi Beth MD Time Out:11:58 AM     Office Treatment:   1. Acute myofascial strain of lumbar region, subsequent encounter    2. Acute bilateral low back pain without sciatica    3. Lumbar sprain, subsequent encounter          Patient Instructions: Daily home exercises/warm soaks, Continue Physical Therapy(Continue ibuprofen 600mg TID with food and Tylenol #3 for pain as needed. Increase physical therapy to 3X per week if possible.)      Restrictions: No lifting/pushing/pulling more than 10 lbs,   Avoid frequent bending/lifting/twisting,   Sedentary work only     Return Appointment: 2/13/2019 at 9:30 AM       hollie

## 2019-02-06 NOTE — PROGRESS NOTES
Subjective:       Patient ID: Vanessa Jerez is a 48 y.o. female.    Chief Complaint: Back Injury    F/u for low back injury (9/26/2018) Pt c/o constant low back pain that radiates up her spine to mid back. Pt reports no changes in her condition. She states that her job continues to have her do her regular job duties. Current pain 5/10 on pain scale. ajd       Review of Systems   Constitution: Negative for chills, fever and weakness.   HENT: Negative for congestion and sore throat.    Eyes: Negative for blurred vision and pain.   Cardiovascular: Negative for chest pain, palpitations and syncope.   Respiratory: Negative for cough, shortness of breath and wheezing.    Skin: Negative for dry skin, itching and rash.   Musculoskeletal: Positive for back pain and myalgias. Negative for muscle weakness and stiffness.   Gastrointestinal: Negative for abdominal pain, constipation, diarrhea, nausea and vomiting.   Genitourinary: Negative for bladder incontinence, dysuria and hematuria.   Neurological: Negative for dizziness, headaches and numbness.   All other systems reviewed and are negative.      Objective:      Physical Exam   Musculoskeletal:        Lumbar back: She exhibits decreased range of motion, tenderness and pain. She exhibits no bony tenderness, no swelling, no edema, no deformity, no laceration, no spasm and normal pulse.        Back:    Pain across low back persists with palpation and all range of motion testing. She has mild spasm with range of motion and palpation. Pain does not radiate. No motor or sensory deficits in lower extremities.       Assessment:       1. Acute myofascial strain of lumbar region, subsequent encounter    2. Acute bilateral low back pain without sciatica    3. Lumbar sprain, subsequent encounter        Plan:            Patient Instructions: Daily home exercises/warm soaks, Continue Physical Therapy(Continue ibuprofen 600mg TID with food and Tylenol #3 for pain as needed. Increase  physical therapy to 3X per week if possible.)   Restrictions: No lifting/pushing/pulling more than 10 lbs, Avoid frequent bending/lifting/twisting, Sedentary work only  Follow-up in about 1 week (around 2/13/2019).

## 2019-02-12 ENCOUNTER — CLINICAL SUPPORT (OUTPATIENT)
Dept: REHABILITATION | Facility: HOSPITAL | Age: 49
End: 2019-02-12
Payer: OTHER MISCELLANEOUS

## 2019-02-12 DIAGNOSIS — M51.9 ACUTE LOW BACK PAIN WITH DISC SYMPTOMS, DURATION LESS THAN 6 WEEKS: ICD-10-CM

## 2019-02-12 DIAGNOSIS — M54.50 ACUTE LOW BACK PAIN WITH DISC SYMPTOMS, DURATION LESS THAN 6 WEEKS: ICD-10-CM

## 2019-02-12 PROCEDURE — 97110 THERAPEUTIC EXERCISES: CPT | Mod: PN

## 2019-02-12 NOTE — PROGRESS NOTES
"  Physical Therapy Daily Treatment Note     Name: Vanessa Jerez  Clinic Number: [id number]    Therapy Diagnosis:   Encounter Diagnosis   Name Primary?    Acute low back pain with disc symptoms, duration less than 6 weeks      Physician: Mary Kay Yanez NP    Visit Date: 2/12/2019  Visit #/Visits authorized:  6/20  Exp: 2/15/19  Total 14 visits    Time In: 1400  Time Out: 1455  Total Billable Time:  30'     Precautions: Standard   Certification Period: 1/17/19-2/20/19 (PN due by 2/21/18)    Subjective     Pt reports: that she is doing much better today than previous session.  Pt states that she is having very minimal pain today.     She was not compliant with home exercise program.  Response to previous treatment: good  Functional change: none     Pain: 1/10   Location: bilateral, midline low back      Objective       Vanessa received therapeutic exercises to develop strength, ROM, flexibility, posture and core stabilization for 45 minutes including:    Elliptical x 6 minutes   Gastroc stretch on wedge, 30" hold x 3 reps  LTR on theraball, 10" hold x 10 reps bilaterally   DKTC on theraball, 10" hold x 10 reps   HS stretch with strap, 20" hold x 3 reps B    Piriformis stretch, 20" hold x 3 reps B  PPT, 5" hold x 12  PPT with Marches , x 10 B    Bridging, x 15 with 3" hold   PPT with hip abduction, x 12 reps with 5" hold    SLR with PPT, 1# ankle weight, x 10 B LE with 3" hold   ITB stretch, 20" hold x 3 reps B  open book stretch, 10" x 10, B     + BUE shoulder pull downs with TrA 2 x 10    + Anti rotation step outs, x 10 reps to each side  Red TB  + Step ups 6'' x 15 ea LE lead    Vanessa received the following manual therapy techniques: Soft tissue Mobilization were applied to the lumber spine, QL for 5 minutes including:  STM to QL, trigger points noted     Vanessa received hot pack for 0 minutes to increase circulation and promote tissue healing in z-lying - np today     Home Exercises Provided and Patient " Education Provided.    Education provided:   - HEP, written   -reviewed    Written Home Exercises Provided: no   Exercises were reviewed and Vanessa was able to demonstrate them prior to the end of the session.  Vanessa demonstrated fair  understanding of the education provided.       Assessment     Patient tolerated treatment session well.  Patient with good response to progression of core/lumbar strengthening this session without provocation of pain.  Pt with displayed improvements in QL tissue mobility with very minimal restrictions palpated.  Plan to continue progression with strengthening as tolerated.     Vanessa is progressing well towards her goals.   Pt prognosis is Guarded.     Pt will continue to benefit from skilled outpatient physical therapy to address the deficits listed in the problem list box on initial evaluation, provide pt/family education and to maximize pt's level of independence in the home and community environment.     Pt's spiritual, cultural and educational needs considered and pt agreeable to plan of care and goals.    Anticipated barriers to physical therapy: patient condition    Goals:  Short Term Goals:  4 weeks  1. Patient will be able to report decreased lumbar resting pain  <  / =  5 /10  to increase tolerance for increased standing. - Appropriate, ongoing  2. Patient will be able to report a functional improvement in ability to stand without pain for greater than 1 hour at work - Met (11/21/2018); currently not met @45 min 12/7/18  3. Patient will demonstrate an increased MMT in hip flexion  to 4+/5  to increase tolerance for walking - Appropriate, ongoing  4. Patient demonstrate improved seated posture without cues - Appropriate, ongoing  5. Patient will be able to tolerate HEP to improve ROM and independence with ADL's - Appropriate, ongoing     Long Term Goals: 8 weeks  1.Report decreased in resting L hip pain  <   / =  2  /10  to increase tolerance for working tasks. - Met  (11/21/2018)  2.Patient will be able to demonstrate an increase in hamstring flexibility by 10 deg with no pain in testing to improve mobility - Appropriate, ongoing  3. Patient will demonstrate an increased MMT for all affected LE musculature  to increase tolerance for ADL and work activities. - Appropriate, ongoing  4. Patient will report at CJ level (20-40% impaired) on LEFS  to demonstrate increase in LE function with every day tasks. - Appropriate, ongoing  5. Patient to be Independent with HEP to improve ROM and independence with ADL's - Appropriate, ongoing    Plan     Continue with current Plan of care      Rolanda Antony, PTA   02/12/2019

## 2019-02-15 ENCOUNTER — OFFICE VISIT (OUTPATIENT)
Dept: URGENT CARE | Facility: CLINIC | Age: 49
End: 2019-02-15
Payer: OTHER MISCELLANEOUS

## 2019-02-15 DIAGNOSIS — S39.012D ACUTE MYOFASCIAL STRAIN OF LUMBAR REGION, SUBSEQUENT ENCOUNTER: Primary | ICD-10-CM

## 2019-02-15 DIAGNOSIS — S33.5XXD LUMBAR SPRAIN, SUBSEQUENT ENCOUNTER: ICD-10-CM

## 2019-02-15 DIAGNOSIS — M54.50 ACUTE BILATERAL LOW BACK PAIN WITHOUT SCIATICA: ICD-10-CM

## 2019-02-15 PROCEDURE — 99214 PR OFFICE/OUTPT VISIT, EST, LEVL IV, 30-39 MIN: ICD-10-PCS | Mod: S$GLB,,, | Performed by: PREVENTIVE MEDICINE

## 2019-02-15 PROCEDURE — 99214 OFFICE O/P EST MOD 30 MIN: CPT | Mod: S$GLB,,, | Performed by: PREVENTIVE MEDICINE

## 2019-02-15 NOTE — LETTER
[facility] [place]  [address], [address]  [facility] [address]  Phone: [phone]  Fax: [phone]  [facility]: [id number]    Pt Name: Vanessa Jerez  Injury Date: 09/26/2018   Employee ID: [id number] Date of Treatment: 02/15/2019   Company: [employer]      Appointment Time: 08:30 AM Arrived: 8:30 AM   Provider: Kobi Beth MD Time Out: 9:18 AM     Office Treatment:   1. Acute myofascial strain of lumbar region, subsequent encounter    2. Acute bilateral low back pain without sciatica    3. Lumbar sprain, subsequent encounter          Patient Instructions: Daily home exercises/warm soaks, Continue Physical Therapy(Continue Ibuprofen 600mg TID and Tylenol#3 as needed. )    Restrictions: No lifting/pushing/pulling more than 10 lbs, Avoid frequent bending/lifting/twisting, Sedentary work only     Return Appointment: 3/1/2019 at 9:30 AM       ARIANE

## 2019-02-15 NOTE — PROGRESS NOTES
Subjective:       Patient ID: Vanessa Jerez is a 48 y.o. female.    Chief Complaint: Back Pain    WC Follow-up of Back Inj ( 09-26-18 ) Pain score today is 3/10 with complaints of constant low back pain that radiates up her spine to Mid Back. Patient reports getting better but still has difficulty with bending and twisting motions. Goes to Phy Therapy weekly with good results. Taking IBP 600mg and Tylenol #3 with daily home exercises/warm soaks. BERKLEY      Back Pain   Pertinent negatives include no numbness.     Review of Systems   Musculoskeletal: Positive for back pain, joint pain and stiffness.   Neurological: Negative for numbness.   All other systems reviewed and are negative.      Objective:      Physical Exam   Musculoskeletal:        Lumbar back: She exhibits decreased range of motion, tenderness and pain. She exhibits no bony tenderness, no swelling, no edema, no deformity, no laceration, no spasm and normal pulse.        Back:    Less Pain across low back with palpation and all range of motion testing. Range of motion has improved on flexion, extension and rotation of back. She has mild spasm with range of motion and palpation. Pain does not radiate. No motor or sensory deficits in lower extremities.       Assessment:       1. Acute myofascial strain of lumbar region, subsequent encounter    2. Acute bilateral low back pain without sciatica    3. Lumbar sprain, subsequent encounter        Plan:            Patient Instructions: Daily home exercises/warm soaks, Continue Physical Therapy(Continue Ibuprofen 600mg TID and Tylenol#3 as needed. )   Restrictions: No lifting/pushing/pulling more than 10 lbs, Avoid frequent bending/lifting/twisting, Sedentary work only  Follow-up in about 2 weeks (around 3/1/2019).

## 2019-02-26 ENCOUNTER — CLINICAL SUPPORT (OUTPATIENT)
Dept: REHABILITATION | Facility: HOSPITAL | Age: 49
End: 2019-02-26
Payer: OTHER MISCELLANEOUS

## 2019-02-26 DIAGNOSIS — M51.9 ACUTE LOW BACK PAIN WITH DISC SYMPTOMS, DURATION LESS THAN 6 WEEKS: ICD-10-CM

## 2019-02-26 DIAGNOSIS — M54.50 ACUTE LOW BACK PAIN WITH DISC SYMPTOMS, DURATION LESS THAN 6 WEEKS: ICD-10-CM

## 2019-02-26 PROCEDURE — 97110 THERAPEUTIC EXERCISES: CPT | Mod: PN

## 2019-02-26 NOTE — PROGRESS NOTES
"  Physical Therapy Daily Treatment Note     Name: Vanessa Jerez  Clinic Number: [id number]    Therapy Diagnosis:   Encounter Diagnosis   Name Primary?    Acute low back pain with disc symptoms, duration less than 6 weeks      Physician: Mary Kay Yanez NP    Visit Date: 2/26/2019  Visit #/Visits authorized:  1/12 (new referral)  Exp: 2/20/20  Total 13 visits    Time In: 7:54am  Time Out: 8:40am  Total Billable Time:  46     Precautions: Standard   Certification Period: 1/17/19-2/20/19 (PN due by 3/26/18)    Subjective     Pt reports: that she can only stay for 45 minutes, she feels good today.     She was not compliant with home exercise program.  Response to previous treatment: good  Functional change: none     Pain: 0/10   Location: bilateral, midline low back      Objective     CMS Impairment/Limitation/Restriction for FOTO Lumbar Spine Survey  Status Limitation G-Code CMS Severity Modifier  Intake 56% 44%  Predicted 71% 29% Goal Status+ CJ - At least 20 percent but less than 40 percent  11/21/2018 44% 56%  2/26/2019 48% 52% Current Status CK - At least 40 percent but less than 60 percent    Hamstring flexibility: R = 20, L = 25 - no pain      Lower Extremity Strength     Right LE   Left LE     Hip flexion: 4+/5 Hip flexion: 4+/5   Knee extension: 5/5 Knee extension: 5/5   Knee flexion: 5/5 Knee flexion: 5/5   Hip IR: 4+/5 Hip IR: 4+/5   Hip ER: 4+/5 Hip ER: 4+/5   Hip extension:  4-/5 Hip extension: 4-/5   Hip abduction: 4-/5 Hip abduction: 4-/5   Hip adduction: 4/5 Hip adduction 4+/5   Ankle dorsiflexion: 5/5 Ankle dorsiflexion: 5/5   Ankle plantarflexion: 5/5 Ankle plantarflexion: 5/5       Vanessa received therapeutic exercises to develop strength, ROM, flexibility, posture and core stabilization for 45 minutes including:    Elliptical x 6 minutes   Gastroc stretch on wedge, 30" hold x 3 reps  LTR on theraball, 10" hold x 10 reps bilaterally   DKTC on theraball, 10" hold x 10 reps   HS stretch with " "strap, 20" hold x 3 reps B    Piriformis stretch, 20" hold x 3 reps B  PPT, 5" hold x 12  PPT with Marches , x 20 B    Bridging, x 15 with 3" hold   PPT with hip abduction, x 12 reps with 5" hold    SLR with PPT, 1# ankle weight, x 10 B LE with 3" hold   ITB stretch, 20" hold x 3 reps B  open book stretch, 10" x 10, B    BUE shoulder pull downs on PB with TrA 2 x 10   Anti rotation step outs, x 10 reps to each side  Red TB  Step ups 6'' x 15 ea LE lead    Vanessa received the following manual therapy techniques: Soft tissue Mobilization were applied to the lumber spine, QL for 00 minutes including:  STM to QL, trigger points noted     Vanessa received hot pack for 0 minutes to increase circulation and promote tissue healing in z-lying - np today     Home Exercises Provided and Patient Education Provided.    Education provided:   - HEP, written   -reviewed    Written Home Exercises Provided: no   Exercises were reviewed and Vanessa was able to demonstrate them prior to the end of the session.  Vanessa demonstrated fair  understanding of the education provided.       Assessment     Patient tolerated treatment session well. A progress note was performed today. Pt has met 6 goals as of today. She has improved her ability to stand for an hour at a time without pain. She has decreased her resting pain in her back to 4/10. She has decreased her pain with hamstring stretch and has improved her hamstring flexibility. She is still requiring cues for posture in sitting and standing due to lack of awareness. She would benefit from increasing core strength in supine, sitting, and standing to improve core activation during functional mobility. Instructed to perform stretches that we were unable to do at home in order to get most benefit from physical therapy treatment due to time limitation. Plan to continue progression with strengthening as tolerated.     Vanessa is progressing well towards her goals.   Pt prognosis is Guarded. "     Pt will continue to benefit from skilled outpatient physical therapy to address the deficits listed in the problem list box on initial evaluation, provide pt/family education and to maximize pt's level of independence in the home and community environment.     Pt's spiritual, cultural and educational needs considered and pt agreeable to plan of care and goals.    Anticipated barriers to physical therapy: patient condition    Goals:  Short Term Goals:  4 weeks  1. Patient will be able to report decreased lumbar resting pain  <  / =  5 /10  to increase tolerance for increased standing. - Goal met (2/26/2019)  2. Patient will be able to report a functional improvement in ability to stand without pain for greater than 1 hour at work - Goal met (2/26/2019)   3. Patient will demonstrate an increased MMT in hip flexion  to 4+/5  to increase tolerance for walking - Goal Met (1/16/2019)  4. Patient demonstrate improved seated posture without cues - Appropriate, ongoing  5. Patient will be able to tolerate HEP to improve ROM and independence with ADL's - Goal met (2/26/2019)     Long Term Goals: 8 weeks  1.Report decreased in resting L hip pain  <   / =  2  /10  to increase tolerance for working tasks. - Met (11/21/2018)  2.Patient will be able to demonstrate an increase in hamstring flexibility by 10 deg with no pain in testing to improve mobility - Goal met (2/26/2019)   3. Patient will demonstrate an increased MMT for all affected LE musculature  to increase tolerance for ADL and work activities. - Appropriate, ongoing  4. Patient will report at CJ level (20-40% impaired) on LEFS  to demonstrate increase in LE function with every day tasks. - Appropriate, ongoing  5. Patient to be Independent with HEP to improve ROM and independence with ADL's - Appropriate, ongoing    Plan     Continue with current Plan of care      Julián Flores, PT   02/26/2019

## 2019-03-01 ENCOUNTER — OFFICE VISIT (OUTPATIENT)
Dept: URGENT CARE | Facility: CLINIC | Age: 49
End: 2019-03-01
Payer: OTHER MISCELLANEOUS

## 2019-03-01 DIAGNOSIS — S33.5XXD LUMBAR SPRAIN, SUBSEQUENT ENCOUNTER: ICD-10-CM

## 2019-03-01 DIAGNOSIS — M54.50 ACUTE BILATERAL LOW BACK PAIN WITHOUT SCIATICA: ICD-10-CM

## 2019-03-01 DIAGNOSIS — S39.012D ACUTE MYOFASCIAL STRAIN OF LUMBAR REGION, SUBSEQUENT ENCOUNTER: Primary | ICD-10-CM

## 2019-03-01 PROCEDURE — 99214 PR OFFICE/OUTPT VISIT, EST, LEVL IV, 30-39 MIN: ICD-10-PCS | Mod: S$GLB,,, | Performed by: PREVENTIVE MEDICINE

## 2019-03-01 PROCEDURE — 99214 OFFICE O/P EST MOD 30 MIN: CPT | Mod: S$GLB,,, | Performed by: PREVENTIVE MEDICINE

## 2019-03-01 RX ORDER — METHOCARBAMOL 500 MG/1
500 TABLET, FILM COATED ORAL NIGHTLY
Qty: 30 TABLET | Refills: 1 | Status: SHIPPED | OUTPATIENT
Start: 2019-03-01 | End: 2020-09-14

## 2019-03-01 NOTE — LETTER
[facility] [place]  [address], [address]  [place] [address]  Phone: [phone]  Fax: [phone]  [facility]: [id number]    Pt Name: Vanessa Jerez  Injury Date: 09/26/2018   Employee ID: [id number] Date: 03/01/2019   Company: [employer]      Appointment Time: 09:30 AM Arrived: 931   Provider: Kobi Beth MD Time Out: 10:02 AM     Office Treatment:   1. Acute myofascial strain of lumbar region, subsequent encounter    2. Acute bilateral low back pain without sciatica    3. Lumbar sprain, subsequent encounter      Medications Ordered This Encounter   Medications    methocarbamol (ROBAXIN) 500 MG Tab      Patient Instructions: Daily home exercises/warm soaks, Continue Physical Therapy(Continue Ibuprofen 600mg TID with food and Tylenol #3 for pain as needed.)      Restrictions: No lifting/pushing/pulling more than 10 lbs,   Sedentary work only,   Avoid frequent bending/lifting/twisting     Return Appointment: 3/22/2019 at 10:00 AM

## 2019-03-01 NOTE — PROGRESS NOTES
Subjective:       Patient ID: Vanessa Jerez is a 48 y.o. female.    Chief Complaint: Back Pain    F/u for low back injury (9/26/2018) Pt c/o constant right sided low back pain. Pt began therapy Tuesday. ajd        Review of Systems   Constitution: Negative for malaise/fatigue.   Skin: Negative for rash.   Musculoskeletal: Positive for back pain. Negative for muscle cramps, muscle weakness and stiffness.   Gastrointestinal: Negative for abdominal pain and bowel incontinence.   Genitourinary: Negative for bladder incontinence, dysuria, hematuria and urgency.   Neurological: Negative for disturbances in coordination and numbness.   All other systems reviewed and are negative.      Objective:      Physical Exam   Musculoskeletal:        Lumbar back: She exhibits decreased range of motion, tenderness and pain. She exhibits no bony tenderness, no swelling, no edema, no deformity, no laceration, no spasm and normal pulse.        Back:    Pain persists across low back with palpation and all range of motion testing. Range of motion has improved on flexion to 90 degrees, extension to 25 degrees  and rotation of back to 25 degrees. She has mild spasm with range of motion and palpation. Pain does not radiate. No motor or sensory deficits in lower extremities.       Assessment:       1. Acute myofascial strain of lumbar region, subsequent encounter    2. Acute bilateral low back pain without sciatica    3. Lumbar sprain, subsequent encounter        Plan:         Medications Ordered This Encounter   Medications    methocarbamol (ROBAXIN) 500 MG Tab     Sig: Take 1 tablet (500 mg total) by mouth nightly.     Dispense:  30 tablet     Refill:  1     Patient Instructions: Daily home exercises/warm soaks, Continue Physical Therapy(Continue Ibuprofen 600mg TID with food and Tylenol #3 for pain as needed.)   Restrictions: No lifting/pushing/pulling more than 10 lbs, Sedentary work only, Avoid frequent  bending/lifting/twisting  Follow-up in about 3 weeks (around 3/22/2019).

## 2019-03-04 ENCOUNTER — CLINICAL SUPPORT (OUTPATIENT)
Dept: REHABILITATION | Facility: HOSPITAL | Age: 49
End: 2019-03-04
Payer: OTHER MISCELLANEOUS

## 2019-03-04 DIAGNOSIS — M51.9 ACUTE LOW BACK PAIN WITH DISC SYMPTOMS, DURATION LESS THAN 6 WEEKS: ICD-10-CM

## 2019-03-04 DIAGNOSIS — M54.50 ACUTE LOW BACK PAIN WITH DISC SYMPTOMS, DURATION LESS THAN 6 WEEKS: ICD-10-CM

## 2019-03-04 PROCEDURE — 97140 MANUAL THERAPY 1/> REGIONS: CPT | Mod: PN

## 2019-03-04 PROCEDURE — 97110 THERAPEUTIC EXERCISES: CPT | Mod: PN

## 2019-03-04 NOTE — PROGRESS NOTES
"  Physical Therapy Daily Treatment Note     Name: Vanessa Jerez  Clinic Number: [id number]    Therapy Diagnosis:   Encounter Diagnosis   Name Primary?    Acute low back pain with disc symptoms, duration less than 6 weeks      Physician: Mary Kay Yanez NP    Visit Date: 3/4/2019  Visit #/Visits authorized:  2/12 (new referral)  Exp: 2/20/20  Total 13 visits    Time In: 9:30 am  Time Out: 10:30 am  Total Billable Time:  55     Precautions: Standard   Certification Period: 1/17/19-2/20/19 (PN due by 3/26/18)    Subjective     Pt reports: that she feel lower back pain about 3/10 today. Pt reports she felt good after last PT session. Pt reports she has to go back to work after therapy today.     She was not compliant with home exercise program.  Response to previous treatment: good  Functional change: none     Pain: 3/10   Location: bilateral, midline low back      Objective     Vanessa received therapeutic exercises to develop strength, ROM, flexibility, posture and core stabilization for 45 minutes including:    Elliptical x 6 minutes   Gastroc stretch on wedge, 30" hold x 3 reps  LTR on theraball with RTB 2x10  DKTC on theraball with towel for overpressure 2x10   Bridging, x 15 with 3" hold   +Supine hip flexors add next visit   open book stretch 2x10 RTB   Cat camel 1x1'   Bird dog 1x1'  POE1x1'  Prone press up 2x10   Seated ball roll out 1x2'   Stairs hamstring stretch 3x30''  Anti rotation step outs, x 10 reps to each side  Red TB  Step ups 6'' x 15 ea LE lead     NP:   Gastroc stretch on wedge, 30" hold x 3 reps  Piriformis stretch, 20" hold x 3 reps B  PPT, 5" hold x 12  PPT with Marches , x 20 B  PPT with hip abduction, x 12 reps with 5" hold  SLR with PPT, 1# ankle weight, x 10 B LE with 3" hold   ITB stretch, 20" hold x 3 reps B  BUE shoulder pull downs on PB with TrA 2 x 10     Vanessa received the following manual therapy techniques: Soft tissue Mobilization were applied to the lumber spine, QL for " 00 minutes including:  STM to QL, trigger points noted     Vaenssa received hot pack for 0 minutes to increase circulation and promote tissue healing in z-lying - np today     Home Exercises Provided and Patient Education Provided.    Education provided:   - HEP, written   -reviewed    Written Home Exercises Provided: no   Exercises were reviewed and Vanessa was able to demonstrate them prior to the end of the session.  Vanessa demonstrated fair  understanding of the education provided.       Assessment     Patient tolerated treatment session well. Pt tolerated progression of therapeutic exercises well. Pt demonstrated decrease of muscle endurance during exercises and muscle fatigue. Rest breaks were required during therapeutic exercises. Pt required v/c's to perform bird dog, and cat camel to proper TrA activation and proper form.  Pt responded well to prone on elbow exercises with no aggravation in lower back pain. During manual therapy PT noted increase in lumbar lordosis in prone, which could be aggravating lower back pain. Add hip flexor stretches next PT session.  Plan to continue progression with strengthening as tolerated.     Vanessa is progressing well towards her goals.   Pt prognosis is Guarded.     Pt will continue to benefit from skilled outpatient physical therapy to address the deficits listed in the problem list box on initial evaluation, provide pt/family education and to maximize pt's level of independence in the home and community environment.     Pt's spiritual, cultural and educational needs considered and pt agreeable to plan of care and goals.    Anticipated barriers to physical therapy: patient condition    Goals:  Short Term Goals:  4 weeks  1. Patient will be able to report decreased lumbar resting pain  <  / =  5 /10  to increase tolerance for increased standing. - Goal met (2/26/2019)  2. Patient will be able to report a functional improvement in ability to stand without pain for greater  than 1 hour at work - Goal met (2/26/2019)   3. Patient will demonstrate an increased MMT in hip flexion  to 4+/5  to increase tolerance for walking - Goal Met (1/16/2019)  4. Patient demonstrate improved seated posture without cues - Appropriate, ongoing  5. Patient will be able to tolerate HEP to improve ROM and independence with ADL's - Goal met (2/26/2019)     Long Term Goals: 8 weeks  1.Report decreased in resting L hip pain  <   / =  2  /10  to increase tolerance for working tasks. - Met (11/21/2018)  2.Patient will be able to demonstrate an increase in hamstring flexibility by 10 deg with no pain in testing to improve mobility - Goal met (2/26/2019)   3. Patient will demonstrate an increased MMT for all affected LE musculature  to increase tolerance for ADL and work activities. - Appropriate, ongoing  4. Patient will report at CJ level (20-40% impaired) on LEFS  to demonstrate increase in LE function with every day tasks. - Appropriate, ongoing  5. Patient to be Independent with HEP to improve ROM and independence with ADL's - Appropriate, ongoing    Plan     Continue with current Plan of care      Julián Flores, PT   03/04/2019

## 2019-03-15 ENCOUNTER — CLINICAL SUPPORT (OUTPATIENT)
Dept: REHABILITATION | Facility: HOSPITAL | Age: 49
End: 2019-03-15
Payer: OTHER MISCELLANEOUS

## 2019-03-15 DIAGNOSIS — M51.9 ACUTE LOW BACK PAIN WITH DISC SYMPTOMS, DURATION LESS THAN 6 WEEKS: ICD-10-CM

## 2019-03-15 DIAGNOSIS — M54.50 ACUTE LOW BACK PAIN WITH DISC SYMPTOMS, DURATION LESS THAN 6 WEEKS: ICD-10-CM

## 2019-03-15 PROCEDURE — 97110 THERAPEUTIC EXERCISES: CPT | Mod: PN

## 2019-03-15 NOTE — PROGRESS NOTES
"  Physical Therapy Daily Treatment Note     Name: Vanessa Jerez  Clinic Number: [id number]    Therapy Diagnosis:   Encounter Diagnosis   Name Primary?    Acute low back pain with disc symptoms, duration less than 6 weeks      Physician: Mary Kay Yanez NP    Visit Date: 3/15/2019  Visit #/Visits authorized:  3/12 (new referral)  Exp: 2/20/20  Total 15 visits    Time In: 1434  Time Out: 1530  Total Billable Time:  30'      Precautions: Standard   Certification Period: 1/17/19-2/20/19 (PN due by 3/26/18)    Subjective     Pt reports: that she is feeling good today.  Pt states that she has not been having any difficulty lately.  Pt states that her legs feel like jello after she finished therapy.     She was semi- compliant with home exercise program.  Response to previous treatment: good  Functional change: none     Pain: 0/10   Location: bilateral, midline low back      Objective     Vanessa received therapeutic exercises to develop strength, ROM, flexibility, posture and core stabilization for 45 minutes including:    Elliptical x 6 minutes   Gastroc stretch on wedge, 30" hold x 3 reps  LTR on theraball with RTB 2x10  DKTC on theraball with towel for overpressure 2x10   Bridging, x 15 with 3" hold   Supine hip flexors 3 x 30''   open book stretch 2x10 RTB   Cat camel 1x1'   Bird dog 1x1'  POE1x1'  Prone press up 2x10   Seated ball roll out 1x2'   Stairs hamstring stretch 3x30''  Anti rotation step outs, x 10 reps to each side  Red TB  Step ups 6'' x 15 ea LE lead         Vanessa received the following manual therapy techniques: Soft tissue Mobilization were applied to the lumber spine, QL for 00 minutes including:  STM to QL, trigger points noted     Vanessa received hot pack for 10 minutes to increase circulation and promote tissue healing in z-lying     Home Exercises Provided and Patient Education Provided.    Education provided:   - HEP, written   -reviewed     Written Home Exercises Provided: no "   Exercises were reviewed and Vanessa was able to demonstrate them prior to the end of the session.  Vanessa demonstrated fair  understanding of the education provided.       Assessment     Patient tolerated treatment session well.  Patient with good tolerance to exercises with appropriate training effect achieved.  Patient with good response to progression of hip flexor stretch with appropriate training effect achieved.  Plan to progress with trunk and core strengthening as tolerated.     Vanessa is progressing well towards her goals.   Pt prognosis is Guarded.     Pt will continue to benefit from skilled outpatient physical therapy to address the deficits listed in the problem list box on initial evaluation, provide pt/family education and to maximize pt's level of independence in the home and community environment.     Pt's spiritual, cultural and educational needs considered and pt agreeable to plan of care and goals.    Anticipated barriers to physical therapy: patient condition    Goals:  Short Term Goals:  4 weeks  1. Patient will be able to report decreased lumbar resting pain  <  / =  5 /10  to increase tolerance for increased standing. - Goal met (2/26/2019)  2. Patient will be able to report a functional improvement in ability to stand without pain for greater than 1 hour at work - Goal met (2/26/2019)   3. Patient will demonstrate an increased MMT in hip flexion  to 4+/5  to increase tolerance for walking - Goal Met (1/16/2019)  4. Patient demonstrate improved seated posture without cues - Appropriate, ongoing  5. Patient will be able to tolerate HEP to improve ROM and independence with ADL's - Goal met (2/26/2019)     Long Term Goals: 8 weeks  1.Report decreased in resting L hip pain  <   / =  2  /10  to increase tolerance for working tasks. - Met (11/21/2018)  2.Patient will be able to demonstrate an increase in hamstring flexibility by 10 deg with no pain in testing to improve mobility - Goal met  (2/26/2019)   3. Patient will demonstrate an increased MMT for all affected LE musculature  to increase tolerance for ADL and work activities. - Appropriate, ongoing  4. Patient will report at CJ level (20-40% impaired) on LEFS  to demonstrate increase in LE function with every day tasks. - Appropriate, ongoing  5. Patient to be Independent with HEP to improve ROM and independence with ADL's - Appropriate, ongoing    Plan     Continue with current Plan of care.      Rolanda Antony, PTA   03/15/2019

## 2019-03-20 ENCOUNTER — CLINICAL SUPPORT (OUTPATIENT)
Dept: REHABILITATION | Facility: HOSPITAL | Age: 49
End: 2019-03-20
Payer: OTHER MISCELLANEOUS

## 2019-03-20 DIAGNOSIS — M51.9 ACUTE LOW BACK PAIN WITH DISC SYMPTOMS, DURATION LESS THAN 6 WEEKS: Primary | ICD-10-CM

## 2019-03-20 DIAGNOSIS — M54.50 ACUTE LOW BACK PAIN WITH DISC SYMPTOMS, DURATION LESS THAN 6 WEEKS: Primary | ICD-10-CM

## 2019-03-20 PROCEDURE — 97110 THERAPEUTIC EXERCISES: CPT | Mod: PN

## 2019-03-20 NOTE — PROGRESS NOTES
"  Physical Therapy Daily Treatment Note     Name: Vanessa Jerez  Clinic Number: [id number]    Therapy Diagnosis:   Encounter Diagnosis   Name Primary?    Acute low back pain with disc symptoms, duration less than 6 weeks Yes     Physician: Mary Kay Yanez NP    Visit Date: 3/20/2019  Visit #/Visits authorized:  3/12 (new referral)  Exp: 2/20/20  Total 15 visits  PTA Visit 2/6     Time In: 1630 (Patient arrives late for session)  Time Out: 1700  Total Billable Time:  15'      Precautions: Standard   Certification Period: 1/17/19-2/20/19 (PN due by 3/26/18)    Subjective     Pt reports: Pain is okay today. Had another appointment before PT today.     She was semi- compliant with home exercise program.  Response to previous treatment: good  Functional change: none     Pain: 0/10   Location: bilateral, midline low back      Objective     Vanessa received therapeutic exercises to develop strength, ROM, flexibility, posture and core stabilization for 30 minutes including:    Elliptical x 6 minutes   Gastroc stretch on wedge, 30" hold x 3 reps  LTR on theraball with RTB 2x10  DKTC on theraball with towel for overpressure 2x10   Bridging, x 15 with 3" hold   Supine hip flexors 3 x 30''   open book stretch 2x10 RTB   Cat camel 1x1'   Bird dog 1x1'  POE1x1'  Prone press up 2x10   Seated ball roll out 1x2'   Stairs hamstring stretch 3x30''  Anti rotation step outs, x 10 reps to each side  Red TB  Step ups 6'' x 15 ea LE lead         Vanessa received the following manual therapy techniques: Soft tissue Mobilization were applied to the lumber spine, QL for 00 minutes including:  STM to QL, trigger points noted     Vanessa received hot pack for 10 minutes to increase circulation and promote tissue healing in z-lying     Home Exercises Provided and Patient Education Provided.    Education provided:   - HEP, written   -reviewed     Written Home Exercises Provided: no   Exercises were reviewed and Vanessa was able to " demonstrate them prior to the end of the session.  Vanessa demonstrated fair  understanding of the education provided.       Assessment     Minor cues during session for proper technique. Patient reports fatigue with bird dog exercise. Continues to benefit from skilled therapies for progression of exercise routine. No increase in pain with any of today's activities.     Vanessa is progressing well towards her goals.   Pt prognosis is Guarded.     Pt will continue to benefit from skilled outpatient physical therapy to address the deficits listed in the problem list box on initial evaluation, provide pt/family education and to maximize pt's level of independence in the home and community environment.     Pt's spiritual, cultural and educational needs considered and pt agreeable to plan of care and goals.    Anticipated barriers to physical therapy: patient condition    Goals:  Short Term Goals:  4 weeks  1. Patient will be able to report decreased lumbar resting pain  <  / =  5 /10  to increase tolerance for increased standing. - Goal met (2/26/2019)  2. Patient will be able to report a functional improvement in ability to stand without pain for greater than 1 hour at work - Goal met (2/26/2019)   3. Patient will demonstrate an increased MMT in hip flexion  to 4+/5  to increase tolerance for walking - Goal Met (1/16/2019)  4. Patient demonstrate improved seated posture without cues - Appropriate, ongoing  5. Patient will be able to tolerate HEP to improve ROM and independence with ADL's - Goal met (2/26/2019)     Long Term Goals: 8 weeks  1.Report decreased in resting L hip pain  <   / =  2  /10  to increase tolerance for working tasks. - Met (11/21/2018)  2.Patient will be able to demonstrate an increase in hamstring flexibility by 10 deg with no pain in testing to improve mobility - Goal met (2/26/2019)   3. Patient will demonstrate an increased MMT for all affected LE musculature  to increase tolerance for ADL and  work activities. - Appropriate, ongoing  4. Patient will report at CJ level (20-40% impaired) on LEFS  to demonstrate increase in LE function with every day tasks. - Appropriate, ongoing  5. Patient to be Independent with HEP to improve ROM and independence with ADL's - Appropriate, ongoing    Plan     Continue with current Plan of care.      Marlon Delong, PTA   03/20/2019

## 2019-03-22 ENCOUNTER — OFFICE VISIT (OUTPATIENT)
Dept: URGENT CARE | Facility: CLINIC | Age: 49
End: 2019-03-22
Payer: OTHER MISCELLANEOUS

## 2019-03-22 DIAGNOSIS — S39.012D ACUTE MYOFASCIAL STRAIN OF LUMBAR REGION, SUBSEQUENT ENCOUNTER: Primary | ICD-10-CM

## 2019-03-22 DIAGNOSIS — S33.5XXD LUMBAR SPRAIN, SUBSEQUENT ENCOUNTER: ICD-10-CM

## 2019-03-22 DIAGNOSIS — M54.50 ACUTE BILATERAL LOW BACK PAIN WITHOUT SCIATICA: ICD-10-CM

## 2019-03-22 PROCEDURE — 99214 PR OFFICE/OUTPT VISIT, EST, LEVL IV, 30-39 MIN: ICD-10-PCS | Mod: S$GLB,,, | Performed by: PREVENTIVE MEDICINE

## 2019-03-22 PROCEDURE — 99214 OFFICE O/P EST MOD 30 MIN: CPT | Mod: S$GLB,,, | Performed by: PREVENTIVE MEDICINE

## 2019-03-22 NOTE — LETTER
[facility]  [address], [address]  [place] [address]  Phone: [phone]  Fax: [phone]  [facility]: [email or web address]    Pt Name: Vanessa Jerez  Injury Date: 09/26/2018   Employee ID: [id number] Date of  Treatment: 03/22/2019   Company: [employer]      Appointment Time: 10:00  AM Arrived:  9:35 AM   Provider: Kobi Beth MD Time Out: 11:01 AM     Office Treatment:   1. Acute myofascial strain of lumbar region, subsequent encounter    2. Acute bilateral low back pain without sciatica    3. Lumbar sprain, subsequent encounter          Patient Instructions: Daily home exercises/warm soaks, Continue Physical Therapy(Continue Methocarbamol for back pain.)    Restrictions: No lifting/pushing/pulling more than 10 lbs, Sedentary work only     Return Appointment: 4/5/2019 at 9:30 AM       ARIANE

## 2019-03-22 NOTE — PROGRESS NOTES
Subjective:       Patient ID: Vanessa Jerez is a 48 y.o. female.    Chief Complaint: Back Pain    Pt is being seen today for a follow up from a back injury 9/26/2018.  Pt continues to have constant right sided back pain.  Pain level today is 2/10.  KD      Review of Systems   Constitution: Negative for malaise/fatigue.   Skin: Negative for rash.   Musculoskeletal: Positive for back pain. Negative for muscle cramps, muscle weakness and stiffness.   Gastrointestinal: Negative for abdominal pain and bowel incontinence.   Genitourinary: Negative for bladder incontinence, dysuria, hematuria and urgency.   Neurological: Negative for disturbances in coordination and numbness.   All other systems reviewed and are negative.      Objective:      Physical Exam   Musculoskeletal:        Lumbar back: She exhibits decreased range of motion, tenderness and pain. She exhibits no bony tenderness, no swelling, no edema, no deformity, no laceration, no spasm and normal pulse.        Back:    Less Pain across low back with palpation and all range of motion testing. Range of motion has improved on flexion to 90 degrees, extension to 25 degrees  and rotation of back to 25 degrees. She has much less spasm with range of motion and palpation. Pain does not radiate. No motor or sensory deficits in lower extremities.       Assessment:       1. Acute myofascial strain of lumbar region, subsequent encounter    2. Acute bilateral low back pain without sciatica    3. Lumbar sprain, subsequent encounter        Plan:            Patient Instructions: Daily home exercises/warm soaks, Continue Physical Therapy(Continue Methocarbamol for back pain.)   Restrictions: No lifting/pushing/pulling more than 10 lbs, Sedentary work only  Follow-up in about 2 weeks (around 4/5/2019).

## 2019-03-27 ENCOUNTER — CLINICAL SUPPORT (OUTPATIENT)
Dept: REHABILITATION | Facility: HOSPITAL | Age: 49
End: 2019-03-27
Payer: OTHER MISCELLANEOUS

## 2019-03-27 DIAGNOSIS — M54.50 ACUTE LOW BACK PAIN WITH DISC SYMPTOMS, DURATION LESS THAN 6 WEEKS: ICD-10-CM

## 2019-03-27 DIAGNOSIS — M51.9 ACUTE LOW BACK PAIN WITH DISC SYMPTOMS, DURATION LESS THAN 6 WEEKS: ICD-10-CM

## 2019-03-27 PROCEDURE — 97110 THERAPEUTIC EXERCISES: CPT | Mod: PN

## 2019-03-27 NOTE — PROGRESS NOTES
"  Physical Therapy Daily Treatment Note/PN     Name: Vanessa Jerez     Clinic Number: [id number]    Therapy Diagnosis:   Encounter Diagnosis   Name Primary?    Acute low back pain with disc symptoms, duration less than 6 weeks      Physician: Mary Kay Yanez NP    Visit Date: 3/27/2019  Visit #/Visits authorized:  4/12 (new referral)  Exp: 2/20/20  Total 15 visits  PTA Visit 2/6     Time In: 1545   Time Out: 1640  Total Billable Time: 55  Minutes    Precautions: Standard   Certification Period: 1/17/19-2/20/19   (PN due by 4/27/19)    Subjective     Vanessa arrives to PT and reports 2/10 lower back pain. She has persistent pain with lifting and carrying. She denies any radicular symptoms to LE's     She was semi- compliant with home exercise program.  Response to previous treatment: good  Functional change: none     Pain: 2/10   Location: bilateral, midline low back      Objective       MOVEMENT LOSS GROSS ASSESSMENT 3/27/19   ROM Loss   Flexion WNL with pain at end range; no worse after    Extension minimal loss pain at end range and during movement    Side bending Right minimal loss   Side bending Left minimal loss   Rotation Right WNL   Rotation Left WNL     Lower Extremity Strength 3/27/19     Right LE   Left LE     Hip flexion: 4+/5 Hip flexion: 4+/5   Knee extension: 5/5 Knee extension: 5/5   Knee flexion: 5/5 Knee flexion: 5/5   Hip IR: 4+/5 Hip IR: 4+/5   Hip ER: 4/5 Hip ER: 4/5   Hip extension:  4/5 Hip extension: 4+/5   Hip abduction: 4+/5 Hip abduction: 4+/5   Hip adduction: 4+/5 Hip adduction 4+/5   Ankle dorsiflexion: 5/5 Ankle dorsiflexion: 5/5   Ankle plantarflexion: 5/5 Ankle plantarflexion:                5/5     Vanessa received therapeutic exercises to develop strength, ROM, flexibility, posture and core stabilization for 50  minutes including:    Elliptical x 6 minutes     Gastroc stretch on wedge, 30" hold x 3 reps  LTR on theraball  x15 with RTB   DKTC on theraball with towel for " "overpressure x20  +Passive Piriformis at R side 4x 15"  +Passive SKTC 3x 15 ea  Bridging, 2x10 with 3-5" hold  +SLR with Tr A x15 ea  +Knee to chest marches with Tr A x15 ea  +Prone Hip ext x15 ea  +Quadruped leg extension x10 ea    Supine hip flexors 3 x 30''   open book stretch 2x10 RTB   Cat camel 1x1'   Bird dog 1x1'  POE1x1'  Prone press up 2x10   Seated ball roll out 1x2'   Stairs hamstring stretch 3x30''  Anti rotation step outs, x 10 reps to each side  Red TB  Step ups 6'' x 15 ea LE lead     Vanessa received the following manual therapy techniques: 5 Minutes  Xfibers massage and MFR with Bebo to lumbosacral  in prone    Soft tissue Mobilization were applied to the lumber spine, QL for 00 minutes including: STM to QL, trigger points noted     Vanessa received hot pack for 10 minutes to increase circulation and promote tissue healing in z-lying     Home Exercises Provided and Patient Education Provided.    Education provided:   - HEP, written   -reviewed     Written Home Exercises Provided: no   Exercises were reviewed and Vanessa was able to demonstrate them prior to the end of the session.  Vanessa demonstrated fair  understanding of the education provided.       Assessment     Patient displayed some improvement with lumbar spine AROM, and B LE's MMT strength per testing. She tolerated new exercises today and had no new concerns at this time. Continue with current POC as tolerated with exercise progression.      Vanessa is progressing well towards her goals.   Pt prognosis is Guarded.     Pt will continue to benefit from skilled outpatient physical therapy to address the deficits listed in the problem list box on initial evaluation, provide pt/family education and to maximize pt's level of independence in the home and community environment.     Pt's spiritual, cultural and educational needs considered and pt agreeable to plan of care and goals.    Anticipated barriers to physical therapy: patient " condition    Goals:  Short Term Goals:  4 weeks  1. Patient will be able to report decreased lumbar resting pain  <  / =  5 /10  to increase tolerance for increased standing. - Goal met (2/26/2019)  2. Patient will be able to report a functional improvement in ability to stand without pain for greater than 1 hour at work - Goal met (2/26/2019)   3. Patient will demonstrate an increased MMT in hip flexion  to 4+/5  to increase tolerance for walking - Goal Met (1/16/2019)  4. Patient demonstrate improved seated posture without cues - Appropriate, ongoing  5. Patient will be able to tolerate HEP to improve ROM and independence with ADL's - Goal met (2/26/2019)     Long Term Goals: 8 weeks  1.Report decreased in resting L hip pain  <   / =  2  /10  to increase tolerance for working tasks. - Met (11/21/2018)  2.Patient will be able to demonstrate an increase in hamstring flexibility by 10 deg with no pain in testing to improve mobility - Goal met (2/26/2019)   3. Patient will demonstrate an increased MMT for all affected LE musculature  to increase tolerance for ADL and work activities. - Appropriate, ongoing  4. Patient will report at CJ level (20-40% impaired) on LEFS  to demonstrate increase in LE function with every day tasks. - Appropriate, ongoing  5. Patient to be Independent with HEP to improve ROM and independence with ADL's - Appropriate, ongoing    Plan     Continue with current Plan of care.      Bruna Reynolds, PT   03/27/2019

## 2019-03-27 NOTE — PROGRESS NOTES
"  Physical Therapy Daily Treatment Note     Name: Vanessa Jerez  Clinic Number: [id number]    Therapy Diagnosis:   No diagnosis found.  Physician: Mary Kay Yanez NP    Visit Date: 3/27/2019  Visit #/Visits authorized:  3/12 (new referral)  Exp: 2/20/20  Total 15 visits  PTA Visit 2/6     Time In: 1630 (Patient arrives late for session)  Time Out: 1700  Total Billable Time:  15'      Precautions: Standard   Certification Period: 1/17/19-2/20/19 (PN due by 3/26/18)    Subjective     Pt reports: Pain is okay today. Had another appointment before PT today.     She was semi- compliant with home exercise program.  Response to previous treatment: good  Functional change: none     Pain: 0/10   Location: bilateral, midline low back      Objective     Vanessa received therapeutic exercises to develop strength, ROM, flexibility, posture and core stabilization for 30 minutes including:    Elliptical x 6 minutes   Gastroc stretch on wedge, 30" hold x 3 reps  LTR on theraball with RTB 2x10  DKTC on theraball with towel for overpressure 2x10   Bridging, x 15 with 3" hold   Supine hip flexors 3 x 30''   open book stretch 2x10 RTB   Cat camel 1x1'   Bird dog 1x1'  POE1x1'  Prone press up 2x10   Seated ball roll out 1x2'   Stairs hamstring stretch 3x30''  Anti rotation step outs, x 10 reps to each side  Red TB  Step ups 6'' x 15 ea LE lead         Vanessa received the following manual therapy techniques: Soft tissue Mobilization were applied to the lumber spine, QL for 00 minutes including:  STM to QL, trigger points noted     Vanessa received hot pack for 10 minutes to increase circulation and promote tissue healing in z-lying     Home Exercises Provided and Patient Education Provided.    Education provided:   - HEP, written   -reviewed     Written Home Exercises Provided: no   Exercises were reviewed and Vanessa was able to demonstrate them prior to the end of the session.  Vanessa demonstrated fair  understanding of the " education provided.       Assessment     Minor cues during session for proper technique. Patient reports fatigue with bird dog exercise. Continues to benefit from skilled therapies for progression of exercise routine. No increase in pain with any of today's activities.     Vanessa is progressing well towards her goals.   Pt prognosis is Guarded.     Pt will continue to benefit from skilled outpatient physical therapy to address the deficits listed in the problem list box on initial evaluation, provide pt/family education and to maximize pt's level of independence in the home and community environment.     Pt's spiritual, cultural and educational needs considered and pt agreeable to plan of care and goals.    Anticipated barriers to physical therapy: patient condition    Goals:  Short Term Goals:  4 weeks  1. Patient will be able to report decreased lumbar resting pain  <  / =  5 /10  to increase tolerance for increased standing. - Goal met (2/26/2019)  2. Patient will be able to report a functional improvement in ability to stand without pain for greater than 1 hour at work - Goal met (2/26/2019)   3. Patient will demonstrate an increased MMT in hip flexion  to 4+/5  to increase tolerance for walking - Goal Met (1/16/2019)  4. Patient demonstrate improved seated posture without cues - Appropriate, ongoing  5. Patient will be able to tolerate HEP to improve ROM and independence with ADL's - Goal met (2/26/2019)     Long Term Goals: 8 weeks  1.Report decreased in resting L hip pain  <   / =  2  /10  to increase tolerance for working tasks. - Met (11/21/2018)  2.Patient will be able to demonstrate an increase in hamstring flexibility by 10 deg with no pain in testing to improve mobility - Goal met (2/26/2019)   3. Patient will demonstrate an increased MMT for all affected LE musculature  to increase tolerance for ADL and work activities. - Appropriate, ongoing  4. Patient will report at CJ level (20-40% impaired) on  LEFS  to demonstrate increase in LE function with every day tasks. - Appropriate, ongoing  5. Patient to be Independent with HEP to improve ROM and independence with ADL's - Appropriate, ongoing    Plan     Continue with current Plan of care.      Julián Flores, PT   03/27/2019

## 2019-03-29 ENCOUNTER — DOCUMENTATION ONLY (OUTPATIENT)
Dept: REHABILITATION | Facility: HOSPITAL | Age: 49
End: 2019-03-29

## 2019-03-29 NOTE — PROGRESS NOTES
Physical Therapy: No show/Cancellation of Visit  Date: 03/29/2019        Patient cancelled today's PT appointment. Reason for cancellation: not feeling well . Patient's next scheduled appointment is 4/3.       Cancel: 5 since start of care ( 2 due to insurance pending)   No show: 1      Therapist: Rolanda Antony PTA

## 2019-04-03 ENCOUNTER — CLINICAL SUPPORT (OUTPATIENT)
Dept: REHABILITATION | Facility: HOSPITAL | Age: 49
End: 2019-04-03
Payer: OTHER MISCELLANEOUS

## 2019-04-03 DIAGNOSIS — M51.9 ACUTE LOW BACK PAIN WITH DISC SYMPTOMS, DURATION LESS THAN 6 WEEKS: ICD-10-CM

## 2019-04-03 DIAGNOSIS — M54.50 ACUTE LOW BACK PAIN WITH DISC SYMPTOMS, DURATION LESS THAN 6 WEEKS: ICD-10-CM

## 2019-04-03 PROCEDURE — 97110 THERAPEUTIC EXERCISES: CPT | Mod: PN

## 2019-04-03 NOTE — PROGRESS NOTES
"  Physical Therapy Daily Treatment Note/PN     Name: Vanessa Jerez     Clinic Number: [id number]    Therapy Diagnosis:   Encounter Diagnosis   Name Primary?    Acute low back pain with disc symptoms, duration less than 6 weeks      Physician: Mary Kay Yanez NP    Visit Date: 4/3/2019  Visit #/Visits authorized:  4/12 (new referral)  Exp: 2/20/20  Total 15 visits  PTA Visit 2/6     Time In: 4:00 pm  Time Out: 4:50 pm  Total Billable Time: 30  Minutes    Precautions: Standard   Certification Period: 1/17/19-2/20/19   (PN due by 4/27/19)    Subjective     Vanessa arrives to PT and reports 2/10 lower back pain. She has persistent pain with lifting and carrying. She denies any radicular symptoms to LE's     She was semi- compliant with home exercise program.  Response to previous treatment: good  Functional change: none     Pain: 2/10   Location: bilateral, midline low back      Objective       MOVEMENT LOSS GROSS ASSESSMENT 3/27/19   ROM Loss   Flexion WNL with pain at end range; no worse after    Extension minimal loss pain at end range and during movement    Side bending Right minimal loss   Side bending Left minimal loss   Rotation Right WNL   Rotation Left WNL     Lower Extremity Strength 3/27/19     Right LE   Left LE     Hip flexion: 4+/5 Hip flexion: 4+/5   Knee extension: 5/5 Knee extension: 5/5   Knee flexion: 5/5 Knee flexion: 5/5   Hip IR: 4+/5 Hip IR: 4+/5   Hip ER: 4/5 Hip ER: 4/5   Hip extension:  4/5 Hip extension: 4+/5   Hip abduction: 4+/5 Hip abduction: 4+/5   Hip adduction: 4+/5 Hip adduction 4+/5   Ankle dorsiflexion: 5/5 Ankle dorsiflexion: 5/5   Ankle plantarflexion: 5/5 Ankle plantarflexion:                5/5     Vanessa received therapeutic exercises to develop strength, ROM, flexibility, posture and core stabilization for 50  minutes including:    Elliptical x 6 minutes     Gastroc stretch on wedge, 30" hold x 3 reps  LTR on theraball  x15 with RTB   DKTC on theraball with towel for " "overpressure x20  +Passive Piriformis at R side 4x 15"  +Passive SKTC 3x 15 ea  Bridging, 2x10 with 3-5" hold  +SLR with Tr A x15 ea  +Knee to chest marches with Tr A x15 ea  +Prone Hip ext x15 ea  +Quadruped leg extension x10 ea    Supine hip flexors 3 x 30''   open book stretch 2x10 RTB   Cat camel 1x1'   Bird dog 1x1'  POE1x1'  Prone press up 2x10   Seated ball roll out 1x2'   Stairs hamstring stretch 3x30''  Anti rotation step outs, x 10 reps to each side  Red TB  Step ups 6'' x 15 ea LE lead     Vanessa received the following manual therapy techniques: 5 Minutes  Xfibers massage and MFR with Bebo to lumbosacral  in prone    Soft tissue Mobilization were applied to the lumber spine, QL for 00 minutes including: STM to QL, trigger points noted     Vanessa received hot pack for 10 minutes to increase circulation and promote tissue healing in z-lying     Home Exercises Provided and Patient Education Provided.    Education provided:   - HEP, written   -reviewed     Written Home Exercises Provided: no   Exercises were reviewed and Vanessa was able to demonstrate them prior to the end of the session.  Vanessa demonstrated fair  understanding of the education provided.       Assessment     Patient tolerated therapeutic exercises well today. Pt has been in therapy since 10/11/2018. Pt has completed about 19 visits.   Pt has been with average of lower back pain about/10 for last  4 weeks. Pt's lower back pain has been controlled to minimal. Pt has been independent with therapeutic exercises. Pt is ready to be d/c on 4/17/2019.  Pt has 4 visits left. Pt agreed with d/c. Continue therapy towards d/c..      Vanessa is progressing well towards her goals.   Pt prognosis is Guarded.     Pt will continue to benefit from skilled outpatient physical therapy to address the deficits listed in the problem list box on initial evaluation, provide pt/family education and to maximize pt's level of independence in the home and community " environment.     Pt's spiritual, cultural and educational needs considered and pt agreeable to plan of care and goals.    Anticipated barriers to physical therapy: patient condition    Goals:  Short Term Goals:  4 weeks  1. Patient will be able to report decreased lumbar resting pain  <  / =  5 /10  to increase tolerance for increased standing. - Goal met (2/26/2019)  2. Patient will be able to report a functional improvement in ability to stand without pain for greater than 1 hour at work - Goal met (2/26/2019)   3. Patient will demonstrate an increased MMT in hip flexion  to 4+/5  to increase tolerance for walking - Goal Met (1/16/2019)  4. Patient demonstrate improved seated posture without cues - Appropriate, ongoing  5. Patient will be able to tolerate HEP to improve ROM and independence with ADL's - Goal met (2/26/2019)     Long Term Goals: 8 weeks  1.Report decreased in resting L hip pain  <   / =  2  /10  to increase tolerance for working tasks. - Met (11/21/2018)  2.Patient will be able to demonstrate an increase in hamstring flexibility by 10 deg with no pain in testing to improve mobility - Goal met (2/26/2019)   3. Patient will demonstrate an increased MMT for all affected LE musculature  to increase tolerance for ADL and work activities. - Appropriate, ongoing  4. Patient will report at CJ level (20-40% impaired) on LEFS  to demonstrate increase in LE function with every day tasks. - Appropriate, ongoing  5. Patient to be Independent with HEP to improve ROM and independence with ADL's - Appropriate, ongoing    Plan   Pt will be d/c on 4/17/2019.    Continue with current Plan of care.      Julián Flores, PT   04/03/2019

## 2019-04-04 NOTE — PROGRESS NOTES
"  Physical Therapy Daily Treatment Note/PN     Name: Vanessa Jerez     Clinic Number: [id number]    Therapy Diagnosis:   Encounter Diagnosis   Name Primary?    Acute low back pain with disc symptoms, duration less than 6 weeks Yes     Physician: Mary Kay Yanez NP    Visit Date: 4/5/2019  Visit #/Visits authorized:  5/12 (new referral)  Exp: 2/20/20  Total 15 visits  PTA Visit 1/6     Time In: 1115  Time Out: 1200  Total Billable Time: 45  Minutes    Precautions: Standard   Certification Period: 1/17/19-2/20/19   (PN due by 4/27/19)    Subjective     Patient went to see workman comp MD prior to therapy session. Patient states MD feels 4 more visits prior to discharge is appropriate. 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     She was semi- compliant with home exercise program.  Response to previous treatment: good  Functional change: none     Pain: 2/10   Location: bilateral, midline low back      Objective     Vanessa received therapeutic exercises to develop strength, ROM, flexibility, posture and core stabilization for 45  minutes including:    Elliptical x 6 minutes     Gastroc stretch on wedge, 30" hold x 3 reps  LTR on theraball  x15 with RTB   DKTC on theraball with towel for overpressure x20  Passive Piriformis at R side 4x 15"  Passive SKTC 3x 15 ea  Bridging, 2x10 with 3-5" hold  SLR with Tr A x15 ea  Knee to chest marches with Tr A x15 ea  Prone Hip ext x15 ea  Quadruped leg extension x10 ea    Supine hip flexors 3 x 30''   open book stretch 2x10 RTB   Cat camel 1x1'   Bird dog 1x1'  POE1x1'  Prone press up 2x10   Seated ball roll out 1x2'   Stairs hamstring stretch 3x30''  Anti rotation step outs, x 10 reps to each side  Red TB  Step ups 6'' x 15 ea LE lead     Vanessa received the following manual therapy techniques: 0 Minutes  Xfibers massage and MFR with Bebo to lumbosacral  in prone    Soft tissue Mobilization were " applied to the lumber spine, QL for 00 minutes including: STM to QL, trigger points noted     Vanessa received hot pack for 10 minutes to increase circulation and promote tissue healing in z-lying     Home Exercises Provided and Patient Education Provided.    Education provided:   - HEP, written   -reviewed     Written Home Exercises Provided: no   Exercises were reviewed and Vanessa was able to demonstrate them prior to the end of the session.  Vanessa demonstrated fair  understanding of the education provided.       Assessment     Patient tolerated today's treatment session well. Minor cues required for proper technique with exercises. No change in pain end of session today. Patient encouraged to continue with HEP as tolerated outside of sessions.     Vanessa is progressing well towards her goals.   Pt prognosis is Guarded.     Pt will continue to benefit from skilled outpatient physical therapy to address the deficits listed in the problem list box on initial evaluation, provide pt/family education and to maximize pt's level of independence in the home and community environment.     Pt's spiritual, cultural and educational needs considered and pt agreeable to plan of care and goals.    Anticipated barriers to physical therapy: patient condition    Goals:  Short Term Goals:  4 weeks  1. Patient will be able to report decreased lumbar resting pain  <  / =  5 /10  to increase tolerance for increased standing. - Goal met (2/26/2019)  2. Patient will be able to report a functional improvement in ability to stand without pain for greater than 1 hour at work - Goal met (2/26/2019)   3. Patient will demonstrate an increased MMT in hip flexion  to 4+/5  to increase tolerance for walking - Goal Met (1/16/2019)  4. Patient demonstrate improved seated posture without cues - Appropriate, ongoing  5. Patient will be able to tolerate HEP to improve ROM and independence with ADL's - Goal met (2/26/2019)     Long Term Goals: 8  weeks  1.Report decreased in resting L hip pain  <   / =  2  /10  to increase tolerance for working tasks. - Met (11/21/2018)  2.Patient will be able to demonstrate an increase in hamstring flexibility by 10 deg with no pain in testing to improve mobility - Goal met (2/26/2019)   3. Patient will demonstrate an increased MMT for all affected LE musculature  to increase tolerance for ADL and work activities. - Appropriate, ongoing  4. Patient will report at CJ level (20-40% impaired) on LEFS  to demonstrate increase in LE function with every day tasks. - Appropriate, ongoing  5. Patient to be Independent with HEP to improve ROM and independence with ADL's - Appropriate, ongoing    Plan   Pt will be d/c on 4/17/2019.    Continue with current Plan of care.      Marlon Delong, PTA   04/04/2019

## 2019-04-05 ENCOUNTER — OFFICE VISIT (OUTPATIENT)
Dept: URGENT CARE | Facility: CLINIC | Age: 49
End: 2019-04-05
Payer: OTHER MISCELLANEOUS

## 2019-04-05 ENCOUNTER — CLINICAL SUPPORT (OUTPATIENT)
Dept: REHABILITATION | Facility: HOSPITAL | Age: 49
End: 2019-04-05
Payer: OTHER MISCELLANEOUS

## 2019-04-05 DIAGNOSIS — S33.5XXD LUMBAR SPRAIN, SUBSEQUENT ENCOUNTER: ICD-10-CM

## 2019-04-05 DIAGNOSIS — S39.012D ACUTE MYOFASCIAL STRAIN OF LUMBAR REGION, SUBSEQUENT ENCOUNTER: Primary | ICD-10-CM

## 2019-04-05 DIAGNOSIS — M54.50 ACUTE BILATERAL LOW BACK PAIN WITHOUT SCIATICA: ICD-10-CM

## 2019-04-05 DIAGNOSIS — M54.50 ACUTE LOW BACK PAIN WITH DISC SYMPTOMS, DURATION LESS THAN 6 WEEKS: Primary | ICD-10-CM

## 2019-04-05 DIAGNOSIS — M51.9 ACUTE LOW BACK PAIN WITH DISC SYMPTOMS, DURATION LESS THAN 6 WEEKS: Primary | ICD-10-CM

## 2019-04-05 PROCEDURE — 99214 OFFICE O/P EST MOD 30 MIN: CPT | Mod: S$GLB,,, | Performed by: PREVENTIVE MEDICINE

## 2019-04-05 PROCEDURE — 99214 PR OFFICE/OUTPT VISIT, EST, LEVL IV, 30-39 MIN: ICD-10-PCS | Mod: S$GLB,,, | Performed by: PREVENTIVE MEDICINE

## 2019-04-05 PROCEDURE — 97110 THERAPEUTIC EXERCISES: CPT | Mod: PN

## 2019-04-05 RX ORDER — ACETAMINOPHEN AND CODEINE PHOSPHATE 300; 30 MG/1; MG/1
1 TABLET ORAL
Qty: 30 TABLET | Refills: 0 | Status: SHIPPED | OUTPATIENT
Start: 2019-04-05 | End: 2020-09-14

## 2019-04-05 NOTE — LETTER
[facility]  [address], [address]  [place] [address]  Phone: [phone]  Fax: [phone]  [facility]: [email or web address]    Pt Name: Vanessa Jerez  Injury Date: 09/26/2018   Employee ID: [id number] Date of Treatment: 04/05/2019   Company: [employer]      Appointment Time: 09:30 AM Arrived: 9:11 AM   Provider: Kobi Beth MD Time Out: 10:20 AM     Office Treatment:   1. Acute myofascial strain of lumbar region, subsequent encounter    2. Acute bilateral low back pain without sciatica    3. Lumbar sprain, subsequent encounter      Medications Ordered This Encounter   Medications    acetaminophen-codeine 300-30mg (TYLENOL #3) 300-30 mg Tab      Patient Instructions: Continue Physical Therapy(Patient will complete current physical therapy script )    Restrictions: No lifting/pushing/pulling more than 10 lbs, Sedentary work only     Return Appointment: 4/26/2019 at 9:30 AM     JOSELIN

## 2019-04-05 NOTE — PROGRESS NOTES
Subjective:       Patient ID: Vanessa Jerez is a 48 y.o. female.    Chief Complaint: Back Pain     Follow-up of Back Pain ( DOI 09-26-18 ) Pain score today is 2/10 with No Complaints. Still taking Methocarbamol for pain, Daily Home exercises/warm soaks, still doing PT. Physical Therapy told her that she has 4 more visits then she will be discharged. [facility]    Back Pain       Review of Systems   Musculoskeletal: Positive for back pain.   All other systems reviewed and are negative.      Objective:      Physical Exam   Constitutional: She appears well-developed and well-nourished.   HENT:   Head: Normocephalic.   Eyes: Pupils are equal, round, and reactive to light.   Neck: Normal range of motion.   Cardiovascular: Normal rate.   Pulmonary/Chest: Effort normal.   Musculoskeletal:        Lumbar back: She exhibits tenderness and pain. She exhibits normal range of motion, no bony tenderness, no swelling, no edema, no deformity, no laceration, no spasm and normal pulse.        Back:    Range of motion continues to improve with much less pain on flexion to 90 degrees, extension to 25 degrees  and rotation of back to 25 degrees. She has no spasm with range of motion and palpation.Her back pain does not radiate. No motor or sensory deficits in lower extremities.   Neurological: She is alert.   No focal neurologic deficits   Skin: Skin is warm.   Psychiatric: She has a normal mood and affect.   Nursing note and vitals reviewed.      Assessment:       1. Acute myofascial strain of lumbar region, subsequent encounter    2. Acute bilateral low back pain without sciatica    3. Lumbar sprain, subsequent encounter        Plan:            Patient Instructions: Continue Physical Therapy(Patient will complete current physical therapy script )   Restrictions: No lifting/pushing/pulling more than 10 lbs, Sedentary work only  Follow up in about 3 weeks (around 4/26/2019).

## 2019-04-09 NOTE — PROGRESS NOTES
"  Physical Therapy Daily Treatment Note/PN     Name: Vanessa Jerez     Clinic Number: [id number]    Therapy Diagnosis:   Encounter Diagnosis   Name Primary?    Acute low back pain with disc symptoms, duration less than 6 weeks      Physician: Mary Kay Yanez NP    Visit Date: 4/10/2019  Visit #/Visits authorized:  8/12 (new referral)  Exp: 2/20/20  Total 15 visits  PTA Visit 1/6     Time In: 4:00 pm  Time Out: 5:00 pm  Total Billable Time: 55 Minutes    Precautions: Standard   Certification Period: 1/17/19-2/20/19   (PN due by 4/27/19)    Subjective     Pt states she is feeling good today. Pt reports she cont with minimal lower back pain.     She was semi- compliant with home exercise program.  Response to previous treatment: Increase tiredness.   Functional change: Work     Pain: 2/10   Location: bilateral, midline low back      Objective     Vanessa received therapeutic exercises to develop strength, ROM, flexibility, posture and core stabilization for 53  minutes including:    Elliptical x 6 minutes     Gastroc stretch on wedge, 30" hold x 3 reps  LTR on theraball  x15 with RTB   DKTC on theraball with towel for overpressure x20  Passive Piriformis at R side 4x 15"  Passive SKTC 3x 15 ea  Bridging, 2x10 with 3-5" hold  SLR with Tr A x15 ea  Knee to chest marches with Tr A x15 ea  Prone Hip ext x15 ea  Quadruped leg extension x10 ea    Supine hip flexors 3 x 30''   open book stretch 2x10 RTB   Cat camel 1x1'   Bird dog 1x1'  POE1x1'  Prone press up 2x10   Seated ball roll out 1x2'   Stairs hamstring stretch 3x30''  Anti rotation step outs, x 10 reps to each side  Red TB  Step ups 6'' x 15 ea LE lead     Vanessa received the following manual therapy techniques: 0 Minutes  Xfibers massage and MFR with Bebo to lumbosacral  in prone    Soft tissue Mobilization were applied to the lumber spine, QL for 00 minutes including: STM to QL, trigger points noted     Vanessa received hot pack for 10 minutes to increase " circulation and promote tissue healing in z-lying     Home Exercises Provided and Patient Education Provided.    Education provided:   - HEP, written   -reviewed     Written Home Exercises Provided: no   Exercises were reviewed and Vanessa was able to demonstrate them prior to the end of the session.  Vanessa demonstrated fair  understanding of the education provided.       Assessment     Patient tolerated today's treatment session well. Pt is independent on therapeutic exercises. Pt will be re-assessed next visit by PT and possible d/c.     Vanessa is progressing well towards her goals.   Pt prognosis is Guarded.     Pt will continue to benefit from skilled outpatient physical therapy to address the deficits listed in the problem list box on initial evaluation, provide pt/family education and to maximize pt's level of independence in the home and community environment.     Pt's spiritual, cultural and educational needs considered and pt agreeable to plan of care and goals.    Anticipated barriers to physical therapy: patient condition    Goals:  Short Term Goals:  4 weeks  1. Patient will be able to report decreased lumbar resting pain  <  / =  5 /10  to increase tolerance for increased standing. - Goal met (2/26/2019)  2. Patient will be able to report a functional improvement in ability to stand without pain for greater than 1 hour at work - Goal met (2/26/2019)   3. Patient will demonstrate an increased MMT in hip flexion  to 4+/5  to increase tolerance for walking - Goal Met (1/16/2019)  4. Patient demonstrate improved seated posture without cues - Appropriate, ongoing  5. Patient will be able to tolerate HEP to improve ROM and independence with ADL's - Goal met (2/26/2019)     Long Term Goals: 8 weeks  1.Report decreased in resting L hip pain  <   / =  2  /10  to increase tolerance for working tasks. - Met (11/21/2018)  2.Patient will be able to demonstrate an increase in hamstring flexibility by 10 deg with no  pain in testing to improve mobility - Goal met (2/26/2019)   3. Patient will demonstrate an increased MMT for all affected LE musculature  to increase tolerance for ADL and work activities. - Appropriate, ongoing  4. Patient will report at CJ level (20-40% impaired) on LEFS  to demonstrate increase in LE function with every day tasks. - Appropriate, ongoing  5. Patient to be Independent with HEP to improve ROM and independence with ADL's - Appropriate, ongoing    Plan   Pt will be d/c on 4/17/2019.    Continue with current Plan of care.      Julián Flores, PT   04/10/2019

## 2019-04-10 ENCOUNTER — CLINICAL SUPPORT (OUTPATIENT)
Dept: REHABILITATION | Facility: HOSPITAL | Age: 49
End: 2019-04-10
Payer: OTHER MISCELLANEOUS

## 2019-04-10 DIAGNOSIS — M51.9 ACUTE LOW BACK PAIN WITH DISC SYMPTOMS, DURATION LESS THAN 6 WEEKS: ICD-10-CM

## 2019-04-10 DIAGNOSIS — M54.50 ACUTE LOW BACK PAIN WITH DISC SYMPTOMS, DURATION LESS THAN 6 WEEKS: ICD-10-CM

## 2019-04-10 PROCEDURE — 97110 THERAPEUTIC EXERCISES: CPT | Mod: PN

## 2019-04-12 ENCOUNTER — CLINICAL SUPPORT (OUTPATIENT)
Dept: REHABILITATION | Facility: HOSPITAL | Age: 49
End: 2019-04-12
Payer: OTHER MISCELLANEOUS

## 2019-04-12 DIAGNOSIS — M54.50 ACUTE LOW BACK PAIN WITH DISC SYMPTOMS, DURATION LESS THAN 6 WEEKS: Primary | ICD-10-CM

## 2019-04-12 DIAGNOSIS — M51.9 ACUTE LOW BACK PAIN WITH DISC SYMPTOMS, DURATION LESS THAN 6 WEEKS: Primary | ICD-10-CM

## 2019-04-12 PROCEDURE — 97110 THERAPEUTIC EXERCISES: CPT | Mod: PN

## 2019-04-12 NOTE — PROGRESS NOTES
"  Physical Therapy Daily Treatment Note/PN     Name: Vanessa Jerez     Clinic Number: [id number]    Therapy Diagnosis:   Encounter Diagnosis   Name Primary?    Acute low back pain with disc symptoms, duration less than 6 weeks Yes     Physician: Mary Kay Yanez NP    Visit Date: 4/12/2019  Visit #/Visits authorized:  8/12 (new referral)  Exp: 2/20/20  Total 15 visits  PTA Visit 1/6     Time In: 1055  Time Out: 1155  Total Billable Time: 30 Minutes    Precautions: Standard   Certification Period: 1/17/19-2/20/19   (PN due by 4/27/19)    Subjective     Feels good today. Minimal pain today.     She was semi- compliant with home exercise program.  Response to previous treatment: Increase tiredness.   Functional change: Work     Pain: 2/10   Location: bilateral, midline low back      Objective     Vanessa received therapeutic exercises to develop strength, ROM, flexibility, posture and core stabilization for 55  minutes including:    Elliptical x 6 minutes     Gastroc stretch on wedge, 30" hold x 3 reps  LTR on theraball  x15 with RTB   DKTC on theraball with towel for overpressure x20  Passive Piriformis at R side 4x 15"  Passive SKTC 3x 15 ea  Bridging, 2x10 with 3-5" hold  SLR with Tr A x15 ea  Knee to chest marches with Tr A x15 ea  Prone Hip ext x15 ea  Quadruped leg extension x10 ea    Supine hip flexors 3 x 30''   open book stretch 2x10 RTB   Cat camel 1x1'   Bird dog 1x1'  POE1x1'  Prone press up 2x10   Seated ball roll out 1x2'   Stairs hamstring stretch 3x30''  Anti rotation step outs, x 10 reps to each side  Red TB  Step ups 6'' x 15 ea LE lead     Vanessa received the following manual therapy techniques: 0 Minutes  Xfibers massage and MFR with Bebo to lumbosacral  in prone    Soft tissue Mobilization were applied to the lumber spine, QL for 00 minutes including: STM to QL, trigger points noted     Vanessa received hot pack for 10 minutes to increase circulation and promote tissue healing in z-lying "     Home Exercises Provided and Patient Education Provided.    Education provided:   - HEP, written   -reviewed     Written Home Exercises Provided: no   Exercises were reviewed and Vanessa was able to demonstrate them prior to the end of the session.  Vanessa demonstrated fair  understanding of the education provided.       Assessment     Pain reduction reported end of session, 1/10. Minor cues for technique. Patient continues to benefit from skilled therapies for progression of strengthening activities. Next session would benefit from trial of Med X machines.     Vanessa is progressing well towards her goals.   Pt prognosis is Guarded.     Pt will continue to benefit from skilled outpatient physical therapy to address the deficits listed in the problem list box on initial evaluation, provide pt/family education and to maximize pt's level of independence in the home and community environment.     Pt's spiritual, cultural and educational needs considered and pt agreeable to plan of care and goals.    Anticipated barriers to physical therapy: patient condition    Goals:  Short Term Goals:  4 weeks  1. Patient will be able to report decreased lumbar resting pain  <  / =  5 /10  to increase tolerance for increased standing. - Goal met (2/26/2019)  2. Patient will be able to report a functional improvement in ability to stand without pain for greater than 1 hour at work - Goal met (2/26/2019)   3. Patient will demonstrate an increased MMT in hip flexion  to 4+/5  to increase tolerance for walking - Goal Met (1/16/2019)  4. Patient demonstrate improved seated posture without cues - Appropriate, ongoing  5. Patient will be able to tolerate HEP to improve ROM and independence with ADL's - Goal met (2/26/2019)     Long Term Goals: 8 weeks  1.Report decreased in resting L hip pain  <   / =  2  /10  to increase tolerance for working tasks. - Met (11/21/2018)  2.Patient will be able to demonstrate an increase in hamstring  flexibility by 10 deg with no pain in testing to improve mobility - Goal met (2/26/2019)   3. Patient will demonstrate an increased MMT for all affected LE musculature  to increase tolerance for ADL and work activities. - Appropriate, ongoing  4. Patient will report at CJ level (20-40% impaired) on LEFS  to demonstrate increase in LE function with every day tasks. - Appropriate, ongoing  5. Patient to be Independent with HEP to improve ROM and independence with ADL's - Appropriate, ongoing    Plan   Pt will be d/c on 4/17/2019.    Continue with current Plan of care.      Marlon Delong, PTA   04/12/2019

## 2019-04-17 ENCOUNTER — CLINICAL SUPPORT (OUTPATIENT)
Dept: REHABILITATION | Facility: HOSPITAL | Age: 49
End: 2019-04-17
Payer: OTHER MISCELLANEOUS

## 2019-04-17 DIAGNOSIS — M54.50 ACUTE LOW BACK PAIN WITH DISC SYMPTOMS, DURATION LESS THAN 6 WEEKS: ICD-10-CM

## 2019-04-17 DIAGNOSIS — M51.9 ACUTE LOW BACK PAIN WITH DISC SYMPTOMS, DURATION LESS THAN 6 WEEKS: ICD-10-CM

## 2019-04-17 PROCEDURE — 97110 THERAPEUTIC EXERCISES: CPT | Mod: PN

## 2019-04-17 NOTE — PROGRESS NOTES
Physical Therapy Daily Treatment Note/PN     Name: Vanessa Jerez     Clinic Number: [id number]    Therapy Diagnosis:   Encounter Diagnosis   Name Primary?    Acute low back pain with disc symptoms, duration less than 6 weeks      Physician: Mary Kay Yanez NP    Visit Date: 4/17/2019  Visit #/Visits authorized:  9/12 (new referral)  Exp: 2/20/20  Total 15 visits  PTA Visit 1/6     Time In: 4:00 pm  Time Out: 4:30 pm  Total Billable Time: 30 Minutes    Precautions: Standard   Certification Period: 1/17/19-2/20/19   (PN due by 4/27/19)    Subjective     Pt reports minimal lower back pain today.     She was semi- compliant with home exercise program.  Response to previous treatment: Increase tiredness.   Functional change: Work     Pain: 2/10   Location: bilateral, midline low back      Objective     CMS Impairment/Limitation/Restriction for FOTO Lumbar Spine Survey  Status Limitation G-Code CMS Severity Modifier  Intake 56% 44%  Predicted 71% 29% Goal Status+ CJ - At least 20 percent but less than 40 percent  11/21/2018 44% 56%  2/26/2019 48% 52%  4/17/2019 54% 46% Current Status CK - At least 40 percent but less than 60 percent  D/C Status CK **only report if this is discharge survey  +Based on FOTO predicted change score     MOVEMENT LOSS GROSS ASSESSMENT     ROM Loss   Flexion WNL with pain at end range; no worse after    Extension minimal loss pain at end range and during movement    Side bending Right minimal loss   Side bending Left minimal loss   Rotation Right WNL   Rotation Left WNL      Lower Extremity Strength 3/27/19     Right LE   Left LE     Hip flexion: 4+/5 Hip flexion: 4+/5   Knee extension: 5/5 Knee extension: 5/5   Knee flexion: 5/5 Knee flexion: 5/5   Hip IR: 4+/5 Hip IR: 4+/5   Hip ER: 4/5 Hip ER: 4/5   Hip extension:  4/5 Hip extension: 4+/5   Hip abduction: 4+/5 Hip abduction: 4+/5   Hip adduction: 4+/5 Hip adduction 4+/5   Ankle dorsiflexion: 5/5 Ankle dorsiflexion: 5/5   Ankle  "plantarflexion: 5/5 Ankle plantarflexion:                5/5        Vanessa received therapeutic exercises to develop strength, ROM, flexibility, posture and core stabilization for 30  minutes including:    Elliptical x 6 minutes     Gastroc stretch on wedge, 30" hold x 3 reps  LTR on theraball  x15 with RTB   DKTC on theraball with towel for overpressure x20  Passive Piriformis at R side 4x 15"  Passive SKTC 3x 15 ea  Bridging, 2x10 with 3-5" hold  SLR with Tr A x15 ea  Knee to chest marches with Tr A x15 ea  Prone Hip ext x15 ea  Quadruped leg extension x10 ea    Supine hip flexors 3 x 30''   open book stretch 2x10 RTB   Cat camel 1x1'   Bird dog 1x1'  POE1x1'  Prone press up 2x10   Seated ball roll out 1x2'   Stairs hamstring stretch 3x30''  Anti rotation step outs, x 10 reps to each side  Red TB  Step ups 6'' x 15 ea LE lead     Vanessa received the following manual therapy techniques: 0 Minutes  Xfibers massage and MFR with Bebo to lumbosacral  in prone    Soft tissue Mobilization were applied to the lumber spine, QL for 00 minutes including: STM to QL, trigger points noted     Vanessa received hot pack for 10 minutes to increase circulation and promote tissue healing in z-lying     Home Exercises Provided and Patient Education Provided.    Education provided:   - HEP, written   -reviewed     Written Home Exercises Provided: no   Exercises were reviewed and Vanessa was able to demonstrate them prior to the end of the session.  Vanessa demonstrated fair  understanding of the education provided.       Assessment     D/c note. Pt tolerated PT session Good. Pt is ready to be d/c. Pt has reached a plateau since last re-assessment.FOTO spine survey demonstrated plateau in score since last re-assessment. Pt has an overall LE muscle strength 4/5 during MMT.  Pt demonstrated minor improvement in lumbar range of motion. Pt has met 3/5 LTGs. Overall, pt has reached plateau in therapy.  Pt has not demonstrated increase in " lumbar range of motion and muscle sntrength since last re-assessment. Pt will be discharged today with HEP.    Vanessa is progressing well towards her goals.   Pt prognosis is Guarded.     Pt will continue to benefit from skilled outpatient physical therapy to address the deficits listed in the problem list box on initial evaluation, provide pt/family education and to maximize pt's level of independence in the home and community environment.     Pt's spiritual, cultural and educational needs considered and pt agreeable to plan of care and goals.    Anticipated barriers to physical therapy: patient condition    Goals:  Short Term Goals:  4 weeks  1. Patient will be able to report decreased lumbar resting pain  <  / =  5 /10  to increase tolerance for increased standing. - Goal met (2/26/2019)  2. Patient will be able to report a functional improvement in ability to stand without pain for greater than 1 hour at work - Goal met (2/26/2019)   3. Patient will demonstrate an increased MMT in hip flexion  to 4+/5  to increase tolerance for walking - Goal Met (1/16/2019)  4. Patient demonstrate improved seated posture without cues - Appropriate, ongoing  5. Patient will be able to tolerate HEP to improve ROM and independence with ADL's - Goal met (2/26/2019)     Long Term Goals: 8 weeks  1.Report decreased in resting L hip pain  <   / =  2  /10  to increase tolerance for working tasks. - Met (11/21/2018)  2.Patient will be able to demonstrate an increase in hamstring flexibility by 10 deg with no pain in testing to improve mobility - Goal met (2/26/2019)   3. Patient will demonstrate an increased MMT for all affected LE musculature  to increase tolerance for ADL and work activities. - Goal partial met  4. Patient will report at CJ level (20-40% impaired) on LEFS  to demonstrate increase in LE function with every day tasks. Goal not met  5. Patient to be Independent with HEP to improve ROM and independence with ADL's - goal  met    Plan   Plan to be d/c with HEP today       Julián Flores, PT   04/17/2019

## 2019-04-29 ENCOUNTER — TELEPHONE (OUTPATIENT)
Dept: URGENT CARE | Facility: CLINIC | Age: 49
End: 2019-04-29

## 2019-08-19 ENCOUNTER — TELEPHONE (OUTPATIENT)
Dept: FAMILY MEDICINE | Facility: CLINIC | Age: 49
End: 2019-08-19

## 2019-08-19 DIAGNOSIS — Z00.00 ANNUAL PHYSICAL EXAM: Primary | ICD-10-CM

## 2019-08-19 DIAGNOSIS — D50.8 OTHER IRON DEFICIENCY ANEMIA: ICD-10-CM

## 2019-08-19 NOTE — TELEPHONE ENCOUNTER
----- Message from Diana Justice sent at 8/19/2019  7:56 AM CDT -----  Contact: Self   Type: Lab    Caller is requesting to schedule their Lab appointment prior to annual appointment.    Order is not listed in EPIC.  Please enter order and contact patient to schedule.    Name of Caller: Self     Preferred Date and Time of Labs: anytime     Date of EPP Appointment:9/12/19    Where would they like the lab performed? [facility] lab     Would the patient rather a call back or a response via [facility]?  Call   Best Call Back Number:[phone]

## 2019-08-29 ENCOUNTER — PATIENT OUTREACH (OUTPATIENT)
Dept: ADMINISTRATIVE | Facility: HOSPITAL | Age: 49
End: 2019-08-29

## 2019-08-29 DIAGNOSIS — Z12.31 ENCOUNTER FOR SCREENING MAMMOGRAM FOR MALIGNANT NEOPLASM OF BREAST: Primary | ICD-10-CM

## 2019-08-29 RX ORDER — ACETAMINOPHEN AND CODEINE PHOSPHATE 300; 30 MG/1; MG/1
TABLET ORAL
COMMUNITY
End: 2019-09-12 | Stop reason: SDUPTHER

## 2019-08-29 RX ORDER — METHOCARBAMOL 500 MG/1
TABLET, FILM COATED ORAL
COMMUNITY
End: 2019-09-12 | Stop reason: SDUPTHER

## 2019-08-29 RX ORDER — IBUPROFEN 600 MG/1
TABLET ORAL
COMMUNITY
End: 2019-09-12 | Stop reason: SDUPTHER

## 2019-09-06 ENCOUNTER — HOSPITAL ENCOUNTER (OUTPATIENT)
Dept: RADIOLOGY | Facility: HOSPITAL | Age: 49
Discharge: HOME | End: 2019-09-06
Attending: FAMILY MEDICINE
Payer: COMMERCIAL

## 2019-09-06 DIAGNOSIS — Z12.31 ENCOUNTER FOR SCREENING MAMMOGRAM FOR MALIGNANT NEOPLASM OF BREAST: ICD-10-CM

## 2019-09-06 PROCEDURE — 77067 MAMMO DIGITAL SCREENING BILAT WITH TOMOSYNTHESIS_CAD: ICD-10-PCS | Mod: 26,,, | Performed by: RADIOLOGY

## 2019-09-06 PROCEDURE — 77063 MAMMO DIGITAL SCREENING BILAT WITH TOMOSYNTHESIS_CAD: ICD-10-PCS | Mod: 26,,, | Performed by: RADIOLOGY

## 2019-09-06 PROCEDURE — 77063 BREAST TOMOSYNTHESIS BI: CPT | Mod: 26,,, | Performed by: RADIOLOGY

## 2019-09-06 PROCEDURE — 77067 SCR MAMMO BI INCL CAD: CPT | Mod: TC,PO

## 2019-09-06 PROCEDURE — 77067 SCR MAMMO BI INCL CAD: CPT | Mod: 26,,, | Performed by: RADIOLOGY

## 2019-09-12 ENCOUNTER — OFFICE VISIT (OUTPATIENT)
Dept: FAMILY MEDICINE | Facility: CLINIC | Age: 49
End: 2019-09-12
Payer: COMMERCIAL

## 2019-09-12 VITALS
WEIGHT: 244.25 LBS | SYSTOLIC BLOOD PRESSURE: 132 MMHG | DIASTOLIC BLOOD PRESSURE: 80 MMHG | OXYGEN SATURATION: 98 % | HEART RATE: 71 BPM | TEMPERATURE: 98 F | HEIGHT: 69 IN | BODY MASS INDEX: 36.18 KG/M2

## 2019-09-12 DIAGNOSIS — Z00.00 ANNUAL PHYSICAL EXAM: Primary | ICD-10-CM

## 2019-09-12 PROCEDURE — 99396 PREV VISIT EST AGE 40-64: CPT | Mod: S$GLB,,, | Performed by: FAMILY MEDICINE

## 2019-09-12 PROCEDURE — 99999 PR PBB SHADOW E&M-EST. PATIENT-LVL IV: CPT | Mod: PBBFAC,,, | Performed by: FAMILY MEDICINE

## 2019-09-12 PROCEDURE — 99396 PR PREVENTIVE VISIT,EST,40-64: ICD-10-PCS | Mod: S$GLB,,, | Performed by: FAMILY MEDICINE

## 2019-09-12 PROCEDURE — 99999 PR PBB SHADOW E&M-EST. PATIENT-LVL IV: ICD-10-PCS | Mod: PBBFAC,,, | Performed by: FAMILY MEDICINE

## 2019-09-12 NOTE — PROGRESS NOTES
Routine Office Visit    Patient Name: Vanessa Jerez    : 1970  MRN: [id number]    Subjective:  Vanessa is a 48 y.o. female who presents today for     1. Annual physical  2. Patient had hysterectomy last year. She has not follow-up.     Review of Systems   Constitutional: Negative for chills and fever.   HENT: Negative for congestion.    Eyes: Negative for blurred vision.   Respiratory: Negative for cough.    Cardiovascular: Negative for chest pain.   Gastrointestinal: Negative for abdominal pain, constipation, diarrhea, heartburn, nausea and vomiting.   Genitourinary: Negative for dysuria.   Musculoskeletal: Negative for myalgias.   Skin: Negative for itching and rash.   Neurological: Negative for dizziness and headaches.   Psychiatric/Behavioral: Negative for depression.       Active Problem List  Patient Active Problem List   Diagnosis    Acute low back pain with disc symptoms, duration less than 6 weeks       Past Surgical History  Past Surgical History:   Procedure Laterality Date    HYSTERECTOMY      TUBAL LIGATION         Family History  Family History   Problem Relation Age of Onset    Diabetes Maternal Grandmother     Diabetes Maternal Grandfather     Cancer Neg Hx     Heart disease Neg Hx     Hypertension Neg Hx     Stroke Neg Hx        Social History  Social History     Socioeconomic History    Marital status: Single     Spouse name: Not on file    Number of children: Not on file    Years of education: Not on file    Highest education level: Not on file   Occupational History    Not on file   Social Needs    Financial resource strain: Not on file    Food insecurity:     Worry: Not on file     Inability: Not on file    Transportation needs:     Medical: Not on file     Non-medical: Not on file   Tobacco Use    Smoking status: Never Smoker    Smokeless tobacco: Never Used   Substance and Sexual Activity    Alcohol use: No     Alcohol/week: 0.0 oz    Drug use: No    Sexual  "activity: Yes     Partners: Male   Lifestyle    Physical activity:     Days per week: Not on file     Minutes per session: Not on file    Stress: Not at all   Relationships    Social connections:     Talks on phone: Not on file     Gets together: Not on file     Attends Buddhism service: Not on file     Active member of club or organization: Not on file     Attends meetings of clubs or organizations: Not on file     Relationship status: Not on file   Other Topics Concern    Not on file   Social History Narrative    Not on file       Medications and Allergies  Reviewed and updated.   Current Outpatient Medications   Medication Sig    acetaminophen-codeine 300-30mg (TYLENOL #3) 300-30 mg Tab Take 1 tablet by mouth every 4 to 6 hours as needed.    ibuprofen (ADVIL,MOTRIN) 600 MG tablet Take 1 tablet (600 mg total) by mouth every 6 (six) hours as needed for Pain (Take medication with food).    methocarbamol (ROBAXIN) 500 MG Tab Take 1 tablet (500 mg total) by mouth nightly.     No current facility-administered medications for this visit.        Physical Exam  /80 (BP Location: Left arm, Patient Position: Sitting, BP Method: Large (Manual))   Pulse 71   Temp 98 °F (36.7 °C) (Oral)   Ht 5' 9" (1.753 m)   Wt 110.8 kg (244 lb 4.3 oz)   LMP 04/01/2017   SpO2 98%   BMI 36.07 kg/m²   Physical Exam   Constitutional: She is oriented to person, place, and time. She appears well-developed and well-nourished.   HENT:   Head: Normocephalic and atraumatic.   Eyes: Pupils are equal, round, and reactive to light. Conjunctivae and EOM are normal.   Neck: Normal range of motion. Neck supple.   Cardiovascular: Normal rate, regular rhythm and normal heart sounds. Exam reveals no gallop and no friction rub.   No murmur heard.  Pulmonary/Chest: Breath sounds normal. No respiratory distress.   Abdominal: Soft. Bowel sounds are normal. She exhibits no distension. There is no tenderness.   Musculoskeletal: Normal range of " motion.   Lymphadenopathy:     She has no cervical adenopathy.   Neurological: She is alert and oriented to person, place, and time.   Skin: Skin is warm.   Psychiatric: She has a normal mood and affect.         Assessment/Plan:  Vanessa Jerez is a 48 y.o. female who presents today for :    Problem List Items Addressed This Visit     None      Visit Diagnoses     Annual physical exam    -  Primary    Relevant Orders    Ambulatory referral to Obstetrics / Gynecology  I addressed all major concerns as it related to health maintenance.  All were ordered and scheduled based on the patients wishes.  Any additional health maintenance will be readdressed at the next physical if declined or deferred by the patient.  Recommend regular exercise       Health Maintenance       Date Due Completion Date    Influenza Vaccine (1) 09/01/2019 10/9/2018    Mammogram 09/06/2021 9/6/2019    Override on 10/25/2017: Done (reconciled from [facility] outside record with patient approval)    Lipid Panel 09/06/2024 9/6/2019    TETANUS VACCINE 04/17/2027 4/17/2017        .Patient to get influenza at work             Follow up in about 1 year (around 9/12/2020) for yearly exam.

## 2019-10-10 ENCOUNTER — TELEPHONE (OUTPATIENT)
Dept: FAMILY MEDICINE | Facility: CLINIC | Age: 49
End: 2019-10-10

## 2019-10-10 NOTE — LETTER
October 10, 2019    Vanessa Millan Jerez  [address] [place] [address]             [facility]  [address] [place] [place] [address]  Phone: [phone]  Fax: [phone] Dear Mrs. Jerez:    Sorry we were unable to contact you to schedule your OB-GYN appointment. Please give the referral department a call at [phone].      If you have any questions or concerns, please don't hesitate to call.    Sincerely,        Nile Carlisle MA

## 2019-10-15 ENCOUNTER — PATIENT OUTREACH (OUTPATIENT)
Dept: ADMINISTRATIVE | Facility: OTHER | Age: 49
End: 2019-10-15

## 2019-11-12 ENCOUNTER — PATIENT OUTREACH (OUTPATIENT)
Dept: ADMINISTRATIVE | Facility: OTHER | Age: 49
End: 2019-11-12

## 2020-03-26 DIAGNOSIS — R50.9 FEVER, UNSPECIFIED: Primary | ICD-10-CM

## 2020-03-27 ENCOUNTER — TELEPHONE (OUTPATIENT)
Dept: FAMILY MEDICINE | Facility: CLINIC | Age: 50
End: 2020-03-27

## 2020-03-27 DIAGNOSIS — R05.9 COUGHING: Primary | ICD-10-CM

## 2020-03-27 RX ORDER — PROMETHAZINE HYDROCHLORIDE AND DEXTROMETHORPHAN HYDROBROMIDE 6.25; 15 MG/5ML; MG/5ML
5 SYRUP ORAL EVERY 12 HOURS PRN
Qty: 180 ML | Refills: 0 | Status: SHIPPED | OUTPATIENT
Start: 2020-03-27 | End: 2020-04-06

## 2020-03-27 NOTE — TELEPHONE ENCOUNTER
----- Message from Stefani Castillo sent at 3/27/2020 12:19 PM CDT -----  Contact: pt  Type: Patient Call Back    Who called:pt    What is the request in detail:pt had covid 19 test on Wednesday and would like something for her cough. She had test at [facility]. Call pt    Can the clinic reply by [facility]?    Would the patient rather a call back or a response via [facility]? call    Best call back number:[phone]    Additional Information:

## 2020-03-30 ENCOUNTER — NURSE TRIAGE (OUTPATIENT)
Dept: ADMINISTRATIVE | Facility: CLINIC | Age: 50
End: 2020-03-30

## 2020-03-30 NOTE — TELEPHONE ENCOUNTER
Covid 19 Symptom Home Monitoring call    Patient reports feeling better.  Reports fevers have improved, none since 5pm yesterday.  Body aches and cough continue.  She reports a new rash on both arms down to hands and under her neck x2 days.  She reports it has not got worse.  She reports only thing new she has done was started the rx cough medicine.  Patient reports she has been using hydrocortisone, advised ok to take benadryl for the itching and if the rash worsens to send portal message to primary.     Reassurance given  Continue rest and hydration  Continue symptom management with OTC  Continue Self isolation  Report to ED for severe symptoms and/or difficulty breathing      Reason for Disposition   Cough with no complications   Mild localized rash    Additional Information   Negative: Severe difficulty breathing (e.g., struggling for each breath, speak in single words, bluish lips)   Negative: Sounds like a life-threatening emergency to the triager   Negative: Bluish (or gray) lips or face   Negative: Severe difficulty breathing (e.g., struggling for each breath, speaks in single words)   Negative: Rapid onset of cough and has hives   Negative: Coughing started suddenly after medicine, an allergic food or bee sting   Negative: Difficulty breathing after exposure to flames, smoke, or fumes   Negative: Sounds like a life-threatening emergency to the triager   Negative: Possible contact with poison ivy or oak   Negative: Insect bite(s) suspected    Protocols used: COUGH-A-OH, RASH OR REDNESS - [id number], CORONAVIRUS (COVID-19) EXPOSURE-A-AH

## 2020-03-31 ENCOUNTER — NURSE TRIAGE (OUTPATIENT)
Dept: ADMINISTRATIVE | Facility: CLINIC | Age: 50
End: 2020-03-31

## 2020-03-31 NOTE — TELEPHONE ENCOUNTER
Below information given to patient, verbalized   understanding. States she did want to see Dr Nevarez

## 2020-03-31 NOTE — TELEPHONE ENCOUNTER
Covid -19 Home Symptom monitoring Program Triage RN initiated call to patient. Covid-19 suspect- awaiting test results from [facility] Covid testing site. Self quarantine. Not working now- possible exposure at work. Continues with fever, taking tylenol. Has body aches & productive cough w green sputum. Denies YEE/SOB/CP. Maintaining food & fluid intake. Pt has already reached out for her PCP appt. Protocols reviewed with pt. Verbalized understanding.         Reason for Disposition   [1] Fever (or feeling feverish) OR cough occurs AND [2] living in area with major community spread AND [3] testing being done for symptoms   COVID-19 Testing, questions about     Awaiting results from [place]   COVID-19 Prevention and Healthy Living, questions about    Additional Information   Negative: SEVERE difficulty breathing (e.g., struggling for each breath, speak in single words, bluish lips)   Negative: Sounds like a life-threatening emergency to the triager   Negative: [1] Adult has symptoms of COVID-19 (fever, cough, or SOB) AND [2] lab test positive   Negative: [1] Adult has symptoms of COVID-19 (fever, cough or SOB) AND [2] major community spread where patient lives AND [3] testing not being done for mild symptoms   Negative: [1] Difficulty breathing (shortness of breath) occurs AND [2] onset > 14 days after COVID-19 EXPOSURE (Close Contact) AND [3] no major community spread   Negative: [1] Dry cough occurs AND [2] onset > 14 days after COVID-19 EXPOSURE AND [3] no major community spread   Negative: [1] Wet cough (i.e., white-yellow, yellow, green, or gabriel colored sputum) AND [2] onset > 14 days after COVID-19 EXPOSURE AND [3] no major community spread   Negative: [1] Common cold symptoms AND [2] onset > 14 days after COVID-19 EXPOSURE AND [3] no major community spread   Negative: [1] Difficulty breathing occurs AND [2] within 14 days of COVID-19 EXPOSURE (Close Contact)   Negative: Patient sounds  very sick or weak to the triager   Negative: [1] COVID-19 EXPOSURE within last 14 days AND [2] NO cough, fever, or breathing difficulty AND [3] exposed person is a healthcare worker who was NOT using all recommended personal protective equipment (i.e., a respirator-N95 mask, eye protection, gloves, and gown)   Negative: [1] Mild body aches, chills, diarrhea, headache, runny nose, or sore throat AND [2] within 14 days of COVID-Exposure   Negative: [1] Fever (or feeling feverish) OR cough occurs AND [2] within 14 days of travel from a city or area with major community spread   Negative: [1] Fever (or feeling feverish) OR cough occurs AND [2] within 14 days of travel from another country (international travel)   Negative: [1] Fever (or feeling feverish) OR cough AND [2] within 14 Days of COVID-19 EXPOSURE (Close Contact)   Negative: [1] COVID-19 EXPOSURE (Close Contact) within last 14 days AND [2] NO cough, fever, or breathing difficulty   Negative: [1] Living in area with major community spread within last 14 days AND [2] NO cough or fever or breathing difficulty   Negative: [1] Travel from city or country with major community spread within last 14 days AND [2] NO cough or fever or breathing difficulty   Negative: [1] COVID-19 EXPOSURE AND [2] 15 or more days ago AND [3] NO cough or fever or breathing difficulty   Negative: [1] No COVID-19 EXPOSURE BUT [2] living with someone who was exposed and who has no fever or cough   Negative: [1] Caller concerned that exposure to COVID-19 occurred BUT [2] does not meet COVID-19 EXPOSURE criteria from CDC   Negative: COVID -19, questions about    Protocols used: CORONAVIRUS (COVID-19) EXPOSURE-A-AH

## 2020-03-31 NOTE — TELEPHONE ENCOUNTER
Patient scheduled for Dr. Nevarez   Is that who she wanted to see?  She can also schedule a virtual visit since she has a pending test.

## 2020-04-01 ENCOUNTER — NURSE TRIAGE (OUTPATIENT)
Dept: ADMINISTRATIVE | Facility: CLINIC | Age: 50
End: 2020-04-01

## 2020-04-01 NOTE — TELEPHONE ENCOUNTER
Patient went to [employer] [facility] [employer] drive-through testing site last Thursday at [facility]- still awaiting results. She still has a cough and body aches. Some relief with Tylenol. States she has a new rash from hand to elbow that is resolving after using anti-bacterial cream for 2 days. Will follow disposition and call back if symptoms worsen.     Reason for Disposition   1] COVID-19 infection diagnosed or suspected AND [2] mild symptoms (fever, cough) AND [2] no trouble breathing or other complications    Additional Information   Negative: SEVERE difficulty breathing (e.g., struggling for each breath, speaks in single words)   Negative: Difficult to awaken or acting confused (e.g., disoriented, slurred speech)   Negative: Bluish (or gray) lips or face now   Negative: Shock suspected (e.g., cold/pale/clammy skin, too weak to stand, low BP, rapid pulse)   Negative: Sounds like a life-threatening emergency to the triager   Negative: [1] COVID-19 exposure AND [2] no symptoms   Negative: COVID-19 and Breastfeeding, questions about   Negative: SEVERE or constant chest pain (Exception: mild central chest pain, present only when coughing)   Negative: MODERATE difficulty breathing (e.g., speaks in phrases, SOB even at rest, pulse 100-120)   Negative: Patient sounds very sick or weak to the triager   Negative: MILD difficulty breathing (e.g., minimal/no SOB at rest, SOB with walking, pulse <100)   Negative: Chest pain   Negative: Fever > 103 F (39.4 C)   Negative: [1] Fever > 101 F (38.3 C) AND [2] age > 60   Negative: [1] Fever > 100.0 F (37.8 C) AND [2] bedridden (e.g., nursing home patient, CVA, chronic illness, recovering from surgery)   Negative: HIGH RISK patient (e.g., age > 64 years, diabetes, heart or lung disease, weak immune system)   Negative: Cough present > 3 weeks    Protocols used: CORONAVIRUS (COVID-19) DIAGNOSED OR [id number]

## 2020-04-01 NOTE — TELEPHONE ENCOUNTER
Patient states she will  Do virtual visit after getting in touch with tech support at [facility] as she is having some issues, number given

## 2020-04-02 ENCOUNTER — TELEPHONE (OUTPATIENT)
Dept: FAMILY MEDICINE | Facility: CLINIC | Age: 50
End: 2020-04-02

## 2020-04-02 NOTE — TELEPHONE ENCOUNTER
Pt would like to schedule virtual visit with PCP but unable to log into her [facility] account. Pt was given the number to the [facility] technical support team for help and then will proceed with scheduling an appointment via virtual visit.

## 2020-04-21 DIAGNOSIS — Z01.84 ANTIBODY RESPONSE EXAMINATION: ICD-10-CM

## 2020-04-30 ENCOUNTER — LAB VISIT (OUTPATIENT)
Dept: LAB | Facility: HOSPITAL | Age: 50
End: 2020-04-30
Attending: INTERNAL MEDICINE
Payer: COMMERCIAL

## 2020-04-30 DIAGNOSIS — Z01.84 ANTIBODY RESPONSE EXAMINATION: ICD-10-CM

## 2020-04-30 LAB — SARS-COV-2 IGG SERPLBLD QL IA.RAPID: POSITIVE

## 2020-04-30 PROCEDURE — 86769 SARS-COV-2 COVID-19 ANTIBODY: CPT

## 2020-04-30 PROCEDURE — 36415 COLL VENOUS BLD VENIPUNCTURE: CPT

## 2020-08-14 DIAGNOSIS — Z11.59 NEED FOR HEPATITIS C SCREENING TEST: ICD-10-CM

## 2020-09-14 ENCOUNTER — OFFICE VISIT (OUTPATIENT)
Dept: FAMILY MEDICINE | Facility: CLINIC | Age: 50
End: 2020-09-14
Payer: COMMERCIAL

## 2020-09-14 VITALS
HEART RATE: 66 BPM | HEIGHT: 69 IN | OXYGEN SATURATION: 97 % | SYSTOLIC BLOOD PRESSURE: 120 MMHG | DIASTOLIC BLOOD PRESSURE: 80 MMHG | TEMPERATURE: 98 F | WEIGHT: 252.63 LBS | BODY MASS INDEX: 37.42 KG/M2

## 2020-09-14 DIAGNOSIS — Z12.31 ENCOUNTER FOR SCREENING MAMMOGRAM FOR BREAST CANCER: ICD-10-CM

## 2020-09-14 DIAGNOSIS — N76.0 ACUTE VAGINITIS: ICD-10-CM

## 2020-09-14 DIAGNOSIS — Z00.00 ANNUAL PHYSICAL EXAM: Primary | ICD-10-CM

## 2020-09-14 PROCEDURE — 99396 PR PREVENTIVE VISIT,EST,40-64: ICD-10-PCS | Mod: S$GLB,,, | Performed by: FAMILY MEDICINE

## 2020-09-14 PROCEDURE — 99396 PREV VISIT EST AGE 40-64: CPT | Mod: S$GLB,,, | Performed by: FAMILY MEDICINE

## 2020-09-14 PROCEDURE — 3008F PR BODY MASS INDEX (BMI) DOCUMENTED: ICD-10-PCS | Mod: CPTII,S$GLB,, | Performed by: FAMILY MEDICINE

## 2020-09-14 PROCEDURE — 99999 PR PBB SHADOW E&M-EST. PATIENT-LVL III: ICD-10-PCS | Mod: PBBFAC,,, | Performed by: FAMILY MEDICINE

## 2020-09-14 PROCEDURE — 3008F BODY MASS INDEX DOCD: CPT | Mod: CPTII,S$GLB,, | Performed by: FAMILY MEDICINE

## 2020-09-14 PROCEDURE — 99999 PR PBB SHADOW E&M-EST. PATIENT-LVL III: CPT | Mod: PBBFAC,,, | Performed by: FAMILY MEDICINE

## 2020-09-14 RX ORDER — FLUCONAZOLE 150 MG/1
150 TABLET ORAL ONCE
Qty: 2 TABLET | Refills: 0 | Status: SHIPPED | OUTPATIENT
Start: 2020-09-14 | End: 2020-09-14

## 2020-09-14 NOTE — PROGRESS NOTES
Routine Office Visit    Patient Name: Vanessa Jerez    : 1970  MRN: [id number]    Subjective:  Vanessa is a 49 y.o. female who presents today for     1. Annual physical   2. vaginitis - started 1 week ago - Patient c/o vaginal itching and irritation. Patient denies any vaginal discharge. She had change in soap. No changes in detergents or lotions.     Review of Systems   Constitutional: Negative for chills and fever.   HENT: Negative for congestion.    Eyes: Negative for blurred vision.   Respiratory: Negative for cough.    Cardiovascular: Negative for chest pain.   Gastrointestinal: Negative for abdominal pain, constipation, diarrhea, heartburn, nausea and vomiting.   Genitourinary: Negative for dysuria.   Musculoskeletal: Negative for myalgias.   Skin: Negative for itching and rash.   Neurological: Negative for dizziness and headaches.   Psychiatric/Behavioral: Negative for depression.       Active Problem List  Patient Active Problem List   Diagnosis    Acute low back pain with disc symptoms, duration less than 6 weeks       Past Surgical History  Past Surgical History:   Procedure Laterality Date    HYSTERECTOMY      TUBAL LIGATION         Family History  Family History   Problem Relation Age of Onset    Diabetes Maternal Grandmother     Diabetes Maternal Grandfather     Cancer Neg Hx     Heart disease Neg Hx     Hypertension Neg Hx     Stroke Neg Hx        Social History  Social History     Socioeconomic History    Marital status: Single     Spouse name: Not on file    Number of children: Not on file    Years of education: Not on file    Highest education level: Not on file   Occupational History    Not on file   Social Needs    Financial resource strain: Not on file    Food insecurity     Worry: Not on file     Inability: Not on file    Transportation needs     Medical: Not on file     Non-medical: Not on file   Tobacco Use    Smoking status: Never Smoker    Smokeless tobacco:  "Never Used   Substance and Sexual Activity    Alcohol use: No     Alcohol/week: 0.0 standard drinks    Drug use: No    Sexual activity: Yes     Partners: Male   Lifestyle    Physical activity     Days per week: Not on file     Minutes per session: Not on file    Stress: Not at all   Relationships    Social connections     Talks on phone: Not on file     Gets together: Not on file     Attends Buddhism service: Not on file     Active member of club or organization: Not on file     Attends meetings of clubs or organizations: Not on file     Relationship status: Not on file   Other Topics Concern    Not on file   Social History Narrative    Not on file       Medications and Allergies  Reviewed and updated.   Current Outpatient Medications   Medication Sig    fluconazole (DIFLUCAN) 150 MG Tab Take 1 tablet (150 mg total) by mouth once. May repeat in 1 week if needed for 1 dose     No current facility-administered medications for this visit.        Physical Exam  /80 (BP Location: Left arm, Patient Position: Sitting, BP Method: Large (Manual))   Pulse 66   Temp 98.3 °F (36.8 °C) (Oral)   Ht 5' 9" (1.753 m)   Wt 114.6 kg (252 lb 10.4 oz)   LMP 04/01/2017   SpO2 97%   BMI 37.31 kg/m²   Physical Exam  Constitutional:       Appearance: She is well-developed.   HENT:      Head: Normocephalic and atraumatic.   Eyes:      Conjunctiva/sclera: Conjunctivae normal.      Pupils: Pupils are equal, round, and reactive to light.   Neck:      Musculoskeletal: Normal range of motion and neck supple.   Cardiovascular:      Rate and Rhythm: Normal rate and regular rhythm.      Heart sounds: Normal heart sounds. No murmur. No friction rub. No gallop.    Pulmonary:      Effort: No respiratory distress.      Breath sounds: Normal breath sounds.   Abdominal:      General: Bowel sounds are normal. There is no distension.      Palpations: Abdomen is soft.      Tenderness: There is no abdominal tenderness. "   Musculoskeletal: Normal range of motion.   Lymphadenopathy:      Cervical: No cervical adenopathy.   Skin:     General: Skin is warm.   Neurological:      Mental Status: She is alert and oriented to person, place, and time.           Assessment/Plan:  Vanessa Jerez is a 49 y.o. female who presents today for :    Problem List Items Addressed This Visit     None      Visit Diagnoses     Annual physical exam    -  Primary    Relevant Orders    CBC auto differential    Comprehensive metabolic panel    Lipid Panel    Hemoglobin A1C    TSH    HIV 1/2 Ag/Ab (4th Gen)  Health Maintenance       Date Due Completion Date    Hepatitis C Screening 1970 ---    HIV Screening 11/04/1985 ---    Influenza Vaccine (1) 08/01/2020 10/3/2019    Mammogram 09/06/2021 9/6/2019    Override on 10/25/2017: Done (reconciled from [facility] outside record with patient approval)    Lipid Panel 09/06/2024 9/6/2019    TETANUS VACCINE 04/17/2027 4/17/2017        I addressed all major concerns as it related to health maintenance.  All were ordered and scheduled based on the patients wishes.  Any additional health maintenance will be readdressed at the next physical if declined or deferred by the patient.      Encounter for screening mammogram for breast cancer        Relevant Orders    Mammo Digital Screening Bilat    Acute vaginitis        Relevant Medications    fluconazole (DIFLUCAN) 150 MG Tab          Follow up in about 1 year (around 9/14/2021), or if symptoms worsen or fail to improve, for yearly exam.

## 2020-09-24 ENCOUNTER — HOSPITAL ENCOUNTER (OUTPATIENT)
Dept: RADIOLOGY | Facility: HOSPITAL | Age: 50
Discharge: HOME | End: 2020-09-24
Attending: FAMILY MEDICINE
Payer: COMMERCIAL

## 2020-09-24 DIAGNOSIS — Z12.31 ENCOUNTER FOR SCREENING MAMMOGRAM FOR BREAST CANCER: ICD-10-CM

## 2020-09-24 PROCEDURE — 77067 SCR MAMMO BI INCL CAD: CPT | Mod: 26,,, | Performed by: RADIOLOGY

## 2020-09-24 PROCEDURE — 77063 BREAST TOMOSYNTHESIS BI: CPT | Mod: 26,,, | Performed by: RADIOLOGY

## 2020-09-24 PROCEDURE — 77067 MAMMO DIGITAL SCREENING BILAT WITH TOMO: ICD-10-PCS | Mod: 26,,, | Performed by: RADIOLOGY

## 2020-09-24 PROCEDURE — 77063 MAMMO DIGITAL SCREENING BILAT WITH TOMO: ICD-10-PCS | Mod: 26,,, | Performed by: RADIOLOGY

## 2020-09-24 PROCEDURE — 77067 SCR MAMMO BI INCL CAD: CPT | Mod: TC,PO

## 2020-10-08 ENCOUNTER — OFFICE VISIT (OUTPATIENT)
Dept: FAMILY MEDICINE | Facility: CLINIC | Age: 50
End: 2020-10-08
Payer: COMMERCIAL

## 2020-10-08 VITALS
DIASTOLIC BLOOD PRESSURE: 76 MMHG | HEART RATE: 82 BPM | OXYGEN SATURATION: 98 % | BODY MASS INDEX: 36.43 KG/M2 | HEIGHT: 69 IN | TEMPERATURE: 98 F | SYSTOLIC BLOOD PRESSURE: 110 MMHG | WEIGHT: 246 LBS

## 2020-10-08 DIAGNOSIS — R19.06 EPIGASTRIC FULLNESS: Primary | ICD-10-CM

## 2020-10-08 PROBLEM — Z12.11 SCREENING FOR COLON CANCER: Status: ACTIVE | Noted: 2020-10-08

## 2020-10-08 PROBLEM — Z90.710 STATUS POST HYSTERECTOMY: Status: ACTIVE | Noted: 2017-08-17

## 2020-10-08 PROCEDURE — 3008F BODY MASS INDEX DOCD: CPT | Mod: CPTII,S$GLB,, | Performed by: INTERNAL MEDICINE

## 2020-10-08 PROCEDURE — 99999 PR PBB SHADOW E&M-EST. PATIENT-LVL III: CPT | Mod: PBBFAC,,, | Performed by: INTERNAL MEDICINE

## 2020-10-08 PROCEDURE — 99213 OFFICE O/P EST LOW 20 MIN: CPT | Mod: S$GLB,,, | Performed by: INTERNAL MEDICINE

## 2020-10-08 PROCEDURE — 3008F PR BODY MASS INDEX (BMI) DOCUMENTED: ICD-10-PCS | Mod: CPTII,S$GLB,, | Performed by: INTERNAL MEDICINE

## 2020-10-08 PROCEDURE — 99999 PR PBB SHADOW E&M-EST. PATIENT-LVL III: ICD-10-PCS | Mod: PBBFAC,,, | Performed by: INTERNAL MEDICINE

## 2020-10-08 PROCEDURE — 99213 PR OFFICE/OUTPT VISIT, EST, LEVL III, 20-29 MIN: ICD-10-PCS | Mod: S$GLB,,, | Performed by: INTERNAL MEDICINE

## 2020-10-08 RX ORDER — PANTOPRAZOLE SODIUM 40 MG/1
40 TABLET, DELAYED RELEASE ORAL DAILY
Qty: 30 TABLET | Refills: 0 | Status: SHIPPED | OUTPATIENT
Start: 2020-10-08 | End: 2022-07-14

## 2020-10-08 NOTE — PROGRESS NOTES
"This note was created by combination of typed  and M-Modal dictation.  Transcription errors may be present.  If there are any questions, please contact me.    Assessment and Plan:   Epigastric fullness  -normal exam. Will treat for possible GERD equivalent, protonix  If no improvement - call - next step check abd US evaluate for atypical presentation of gallbladder  And if negative and persisting, then to GI  -     pantoprazole (PROTONIX) 40 MG tablet; Take 1 tablet (40 mg total) by mouth once daily.  Dispense: 30 tablet; Refill: 0    There are no discontinued medications.    meds sent this encounter:  Medications Ordered This Encounter   Medications    pantoprazole (PROTONIX) 40 MG tablet     Sig: Take 1 tablet (40 mg total) by mouth once daily.     Dispense:  30 tablet     Refill:  0       Follow Up: No follow-ups on file.      Subjective:     Chief Complaint   Patient presents with    GI Problem     feels like the food is just sitting after eating for about a week now    Nausea       RAMÓN Mendez is a 49 y.o. female, last appointment with this clinic was 9/14/2020.    Patient's last menstrual period was 04/01/2017.    Pt of Dr. Sandoval  Last seen in Sept for PEx.     Past week - sensation that food sits in her stomach and makes her nauseated. But does not vomit.  No matter what she eats.  Not with liquids.   And belching all day after drinking coffee.   BMs are "pretty good".  Not obviously constipated.  BM every day/every other day. The volume of stool has not changed.   No fever no chills.   No recent illness  Hx of heartburn years ago. Estimates maybe 15 years ago. Improved with stopping spicy foods.   No smoking  EtOH occasional, last use 2 days ago.     Takes a chewable MVI.  No recent rx's in the past 2-3 weeks.   Had a colonoscopy done around the time of her hysterectomy. About 3 years ago.   To rule out alternative causes for anemia  Dr. Meng Guider  11/9/2017  c scope with diverticulosis. Repeat " 10 years.     Tried otc probiotic withotu relief.  No chronic nsaids      Patient Care Team:  Bethanie Sandoval MD as PCP - General (Family Medicine)  Tegan Pollock LPN as Licensed Practical Nurse    Patient Active Problem List    Diagnosis Date Noted    Screening for colon cancer 10/08/2020     11/9/2017  c scope with diverticulosis. Repeat 10 years.       Acute low back pain with disc symptoms, duration less than 6 weeks 10/12/2018    Status post hysterectomy 08/17/2017       PAST MEDICAL HISTORY:  Past Medical History:   Diagnosis Date    Anemia        PAST SURGICAL HISTORY:  Past Surgical History:   Procedure Laterality Date    HYSTERECTOMY      TUBAL LIGATION         SOCIAL HISTORY:  Social History     Socioeconomic History    Marital status: Single     Spouse name: Not on file    Number of children: Not on file    Years of education: Not on file    Highest education level: Not on file   Occupational History    Occupation: [employer], [place] [employer]     Employer: [employer]   Social Needs    Financial resource strain: Not on file    Food insecurity     Worry: Not on file     Inability: Not on file    Transportation needs     Medical: Not on file     Non-medical: Not on file   Tobacco Use    Smoking status: Never Smoker    Smokeless tobacco: Never Used   Substance and Sexual Activity    Alcohol use: No     Alcohol/week: 0.0 standard drinks    Drug use: No    Sexual activity: Yes     Partners: Male   Lifestyle    Physical activity     Days per week: Not on file     Minutes per session: Not on file    Stress: Not at all   Relationships    Social connections     Talks on phone: Not on file     Gets together: Not on file     Attends Gnosticist service: Not on file     Active member of club or organization: Not on file     Attends meetings of clubs or organizations: Not on file     Relationship status: Not on file   Other Topics Concern    Not on file   Social History  "Narrative    Not on file        ALLERGIES AND MEDICATIONS: updated and reviewed.  Review of patient's allergies indicates:   Allergen Reactions    Morphine      Slowed down heart rate           Review of Systems   Constitutional: Negative for chills and fever.   Respiratory: Negative for cough.    Cardiovascular: Negative for chest pain and palpitations.   Gastrointestinal: Negative for blood in stool and constipation.   Genitourinary: Negative for dysuria.       Objective:   Physical Exam   Vitals:    10/08/20 1051   BP: 110/76   BP Location: Left arm   Patient Position: Sitting   BP Method: Large (Manual)   Pulse: 82   Temp: 97.7 °F (36.5 °C)   TempSrc: Temporal   SpO2: 98%   Weight: 111.6 kg (246 lb)   Height: 5' 9" (1.753 m)    Body mass index is 36.33 kg/m².            Physical Exam  Constitutional:       General: She is not in acute distress.     Appearance: She is well-developed.   Cardiovascular:      Rate and Rhythm: Normal rate and regular rhythm.      Heart sounds: Normal heart sounds. No murmur.   Pulmonary:      Effort: Pulmonary effort is normal.      Breath sounds: Normal breath sounds.   Abdominal:      General: Abdomen is flat. Bowel sounds are normal. There is no distension.      Tenderness: There is no abdominal tenderness. There is no guarding.      Comments: Moscoso's sign negative  No flank pain/tenderness  Bowel sounds normal   Musculoskeletal: Normal range of motion.   Skin:     General: Skin is warm and dry.   Neurological:      Mental Status: She is alert and oriented to person, place, and time.      Coordination: Coordination normal.   Psychiatric:         Behavior: Behavior normal.         Thought Content: Thought content normal.         "

## 2021-04-16 ENCOUNTER — PATIENT MESSAGE (OUTPATIENT)
Dept: RESEARCH | Facility: HOSPITAL | Age: 51
End: 2021-04-16

## 2021-05-06 ENCOUNTER — IMMUNIZATION (OUTPATIENT)
Dept: OBSTETRICS AND GYNECOLOGY | Facility: CLINIC | Age: 51
End: 2021-05-06
Payer: COMMERCIAL

## 2021-05-06 DIAGNOSIS — Z23 NEED FOR VACCINATION: Primary | ICD-10-CM

## 2021-05-06 PROCEDURE — 91300 COVID-19, MRNA, LNP-S, PF, 30 MCG/0.3 ML DOSE VACCINE: CPT | Mod: PBBFAC | Performed by: FAMILY MEDICINE

## 2021-05-27 ENCOUNTER — IMMUNIZATION (OUTPATIENT)
Dept: OBSTETRICS AND GYNECOLOGY | Facility: CLINIC | Age: 51
End: 2021-05-27
Payer: COMMERCIAL

## 2021-05-27 DIAGNOSIS — Z23 NEED FOR VACCINATION: Primary | ICD-10-CM

## 2021-05-27 PROCEDURE — 0002A COVID-19, MRNA, LNP-S, PF, 30 MCG/0.3 ML DOSE VACCINE: CPT | Mod: PBBFAC | Performed by: FAMILY MEDICINE

## 2021-05-27 PROCEDURE — 91300 COVID-19, MRNA, LNP-S, PF, 30 MCG/0.3 ML DOSE VACCINE: CPT | Mod: PBBFAC | Performed by: FAMILY MEDICINE

## 2021-09-21 ENCOUNTER — LAB VISIT (OUTPATIENT)
Dept: LAB | Facility: HOSPITAL | Age: 51
End: 2021-09-21
Attending: FAMILY MEDICINE
Payer: COMMERCIAL

## 2021-09-21 ENCOUNTER — OFFICE VISIT (OUTPATIENT)
Dept: FAMILY MEDICINE | Facility: CLINIC | Age: 51
End: 2021-09-21
Payer: COMMERCIAL

## 2021-09-21 VITALS
OXYGEN SATURATION: 95 % | HEART RATE: 79 BPM | DIASTOLIC BLOOD PRESSURE: 80 MMHG | BODY MASS INDEX: 37.08 KG/M2 | WEIGHT: 251.13 LBS | TEMPERATURE: 98 F | SYSTOLIC BLOOD PRESSURE: 116 MMHG

## 2021-09-21 DIAGNOSIS — Z23 NEED FOR SHINGLES VACCINE: ICD-10-CM

## 2021-09-21 DIAGNOSIS — Z00.00 ANNUAL PHYSICAL EXAM: Primary | ICD-10-CM

## 2021-09-21 DIAGNOSIS — Z00.00 ANNUAL PHYSICAL EXAM: ICD-10-CM

## 2021-09-21 DIAGNOSIS — Z12.11 SCREENING FOR MALIGNANT NEOPLASM OF COLON: ICD-10-CM

## 2021-09-21 DIAGNOSIS — Z12.31 ENCOUNTER FOR SCREENING MAMMOGRAM FOR BREAST CANCER: ICD-10-CM

## 2021-09-21 DIAGNOSIS — L50.9 HIVES: ICD-10-CM

## 2021-09-21 LAB
ALBUMIN SERPL BCP-MCNC: 3.6 G/DL (ref 3.5–5.2)
ALP SERPL-CCNC: 91 U/L (ref 55–135)
ALT SERPL W/O P-5'-P-CCNC: 24 U/L (ref 10–44)
ANION GAP SERPL CALC-SCNC: 10 MMOL/L (ref 8–16)
AST SERPL-CCNC: 31 U/L (ref 10–40)
BASOPHILS # BLD AUTO: 0.03 K/UL (ref 0–0.2)
BASOPHILS NFR BLD: 0.5 % (ref 0–1.9)
BILIRUB SERPL-MCNC: 0.3 MG/DL (ref 0.1–1)
BUN SERPL-MCNC: 8 MG/DL (ref 6–20)
CALCIUM SERPL-MCNC: 9.7 MG/DL (ref 8.7–10.5)
CHLORIDE SERPL-SCNC: 109 MMOL/L (ref 95–110)
CHOLEST SERPL-MCNC: 184 MG/DL (ref 120–199)
CHOLEST/HDLC SERPL: 2.6 {RATIO} (ref 2–5)
CO2 SERPL-SCNC: 22 MMOL/L (ref 23–29)
CREAT SERPL-MCNC: 0.7 MG/DL (ref 0.5–1.4)
DIFFERENTIAL METHOD: ABNORMAL
EOSINOPHIL # BLD AUTO: 0.3 K/UL (ref 0–0.5)
EOSINOPHIL NFR BLD: 5.6 % (ref 0–8)
ERYTHROCYTE [DISTWIDTH] IN BLOOD BY AUTOMATED COUNT: 13.3 % (ref 11.5–14.5)
EST. GFR  (AFRICAN AMERICAN): >60 ML/MIN/1.73 M^2
EST. GFR  (NON AFRICAN AMERICAN): >60 ML/MIN/1.73 M^2
ESTIMATED AVG GLUCOSE: 117 MG/DL (ref 68–131)
GLUCOSE SERPL-MCNC: 88 MG/DL (ref 70–110)
HBA1C MFR BLD: 5.7 % (ref 4–5.6)
HCT VFR BLD AUTO: 36.5 % (ref 37–48.5)
HDLC SERPL-MCNC: 71 MG/DL (ref 40–75)
HDLC SERPL: 38.6 % (ref 20–50)
HGB BLD-MCNC: 12.3 G/DL (ref 12–16)
IMM GRANULOCYTES # BLD AUTO: 0.02 K/UL (ref 0–0.04)
IMM GRANULOCYTES NFR BLD AUTO: 0.4 % (ref 0–0.5)
LDLC SERPL CALC-MCNC: 101.4 MG/DL (ref 63–159)
LYMPHOCYTES # BLD AUTO: 2.4 K/UL (ref 1–4.8)
LYMPHOCYTES NFR BLD: 44.4 % (ref 18–48)
MCH RBC QN AUTO: 30.5 PG (ref 27–31)
MCHC RBC AUTO-ENTMCNC: 33.7 G/DL (ref 32–36)
MCV RBC AUTO: 91 FL (ref 82–98)
MONOCYTES # BLD AUTO: 0.4 K/UL (ref 0.3–1)
MONOCYTES NFR BLD: 7.5 % (ref 4–15)
NEUTROPHILS # BLD AUTO: 2.3 K/UL (ref 1.8–7.7)
NEUTROPHILS NFR BLD: 41.6 % (ref 38–73)
NONHDLC SERPL-MCNC: 113 MG/DL
NRBC BLD-RTO: 0 /100 WBC
PLATELET # BLD AUTO: 285 K/UL (ref 150–450)
PMV BLD AUTO: 10.9 FL (ref 9.2–12.9)
POTASSIUM SERPL-SCNC: 4.2 MMOL/L (ref 3.5–5.1)
PROT SERPL-MCNC: 7.4 G/DL (ref 6–8.4)
RBC # BLD AUTO: 4.03 M/UL (ref 4–5.4)
SODIUM SERPL-SCNC: 141 MMOL/L (ref 136–145)
TRIGL SERPL-MCNC: 58 MG/DL (ref 30–150)
TSH SERPL DL<=0.005 MIU/L-ACNC: 0.7 UIU/ML (ref 0.4–4)
WBC # BLD AUTO: 5.49 K/UL (ref 3.9–12.7)

## 2021-09-21 PROCEDURE — 3074F PR MOST RECENT SYSTOLIC BLOOD PRESSURE < 130 MM HG: ICD-10-PCS | Mod: CPTII,S$GLB,, | Performed by: FAMILY MEDICINE

## 2021-09-21 PROCEDURE — 1159F PR MEDICATION LIST DOCUMENTED IN MEDICAL RECORD: ICD-10-PCS | Mod: CPTII,S$GLB,, | Performed by: FAMILY MEDICINE

## 2021-09-21 PROCEDURE — 80053 COMPREHEN METABOLIC PANEL: CPT | Performed by: FAMILY MEDICINE

## 2021-09-21 PROCEDURE — 1160F RVW MEDS BY RX/DR IN RCRD: CPT | Mod: CPTII,S$GLB,, | Performed by: FAMILY MEDICINE

## 2021-09-21 PROCEDURE — 85025 COMPLETE CBC W/AUTO DIFF WBC: CPT | Performed by: FAMILY MEDICINE

## 2021-09-21 PROCEDURE — 99396 PREV VISIT EST AGE 40-64: CPT | Mod: 25,S$GLB,, | Performed by: FAMILY MEDICINE

## 2021-09-21 PROCEDURE — 1159F MED LIST DOCD IN RCRD: CPT | Mod: CPTII,S$GLB,, | Performed by: FAMILY MEDICINE

## 2021-09-21 PROCEDURE — 90471 ZOSTER RECOMBINANT VACCINE: ICD-10-PCS | Mod: S$GLB,,, | Performed by: FAMILY MEDICINE

## 2021-09-21 PROCEDURE — 3008F PR BODY MASS INDEX (BMI) DOCUMENTED: ICD-10-PCS | Mod: CPTII,S$GLB,, | Performed by: FAMILY MEDICINE

## 2021-09-21 PROCEDURE — 3074F SYST BP LT 130 MM HG: CPT | Mod: CPTII,S$GLB,, | Performed by: FAMILY MEDICINE

## 2021-09-21 PROCEDURE — 80061 LIPID PANEL: CPT | Performed by: FAMILY MEDICINE

## 2021-09-21 PROCEDURE — 3079F PR MOST RECENT DIASTOLIC BLOOD PRESSURE 80-89 MM HG: ICD-10-PCS | Mod: CPTII,S$GLB,, | Performed by: FAMILY MEDICINE

## 2021-09-21 PROCEDURE — 3079F DIAST BP 80-89 MM HG: CPT | Mod: CPTII,S$GLB,, | Performed by: FAMILY MEDICINE

## 2021-09-21 PROCEDURE — 99396 PR PREVENTIVE VISIT,EST,40-64: ICD-10-PCS | Mod: 25,S$GLB,, | Performed by: FAMILY MEDICINE

## 2021-09-21 PROCEDURE — 90750 ZOSTER RECOMBINANT VACCINE: ICD-10-PCS | Mod: S$GLB,,, | Performed by: FAMILY MEDICINE

## 2021-09-21 PROCEDURE — 3008F BODY MASS INDEX DOCD: CPT | Mod: CPTII,S$GLB,, | Performed by: FAMILY MEDICINE

## 2021-09-21 PROCEDURE — 36415 COLL VENOUS BLD VENIPUNCTURE: CPT | Mod: PO | Performed by: FAMILY MEDICINE

## 2021-09-21 PROCEDURE — 99999 PR PBB SHADOW E&M-EST. PATIENT-LVL IV: CPT | Mod: PBBFAC,,, | Performed by: FAMILY MEDICINE

## 2021-09-21 PROCEDURE — 1160F PR REVIEW ALL MEDS BY PRESCRIBER/CLIN PHARMACIST DOCUMENTED: ICD-10-PCS | Mod: CPTII,S$GLB,, | Performed by: FAMILY MEDICINE

## 2021-09-21 PROCEDURE — 90471 IMMUNIZATION ADMIN: CPT | Mod: S$GLB,,, | Performed by: FAMILY MEDICINE

## 2021-09-21 PROCEDURE — 99999 PR PBB SHADOW E&M-EST. PATIENT-LVL IV: ICD-10-PCS | Mod: PBBFAC,,, | Performed by: FAMILY MEDICINE

## 2021-09-21 PROCEDURE — 83036 HEMOGLOBIN GLYCOSYLATED A1C: CPT | Performed by: FAMILY MEDICINE

## 2021-09-21 PROCEDURE — 90750 HZV VACC RECOMBINANT IM: CPT | Mod: S$GLB,,, | Performed by: FAMILY MEDICINE

## 2021-09-21 PROCEDURE — 84443 ASSAY THYROID STIM HORMONE: CPT | Performed by: FAMILY MEDICINE

## 2021-09-21 RX ORDER — MELOXICAM 15 MG/1
TABLET ORAL
COMMUNITY
End: 2022-07-14

## 2021-09-21 RX ORDER — HYDROXYZINE HYDROCHLORIDE 25 MG/1
25 TABLET, FILM COATED ORAL 3 TIMES DAILY PRN
Qty: 60 TABLET | Refills: 0 | Status: SHIPPED | OUTPATIENT
Start: 2021-09-21 | End: 2022-07-14

## 2021-09-21 RX ORDER — CYCLOBENZAPRINE HCL 10 MG
TABLET ORAL
COMMUNITY
End: 2022-07-14

## 2021-09-23 ENCOUNTER — TELEPHONE (OUTPATIENT)
Dept: ENDOSCOPY | Facility: HOSPITAL | Age: 51
End: 2021-09-23

## 2021-09-24 ENCOUNTER — PATIENT MESSAGE (OUTPATIENT)
Dept: FAMILY MEDICINE | Facility: CLINIC | Age: 51
End: 2021-09-24

## 2021-09-29 ENCOUNTER — HOSPITAL ENCOUNTER (OUTPATIENT)
Dept: RADIOLOGY | Facility: HOSPITAL | Age: 51
Discharge: HOME | End: 2021-09-29
Attending: FAMILY MEDICINE
Payer: COMMERCIAL

## 2021-09-29 DIAGNOSIS — Z12.31 ENCOUNTER FOR SCREENING MAMMOGRAM FOR BREAST CANCER: ICD-10-CM

## 2021-09-29 PROCEDURE — 77067 SCR MAMMO BI INCL CAD: CPT | Mod: TC,PO

## 2021-09-29 PROCEDURE — 77067 SCR MAMMO BI INCL CAD: CPT | Mod: 26,,, | Performed by: RADIOLOGY

## 2021-09-29 PROCEDURE — 77063 MAMMO DIGITAL SCREENING BILAT WITH TOMO: ICD-10-PCS | Mod: 26,,, | Performed by: RADIOLOGY

## 2021-09-29 PROCEDURE — 77063 BREAST TOMOSYNTHESIS BI: CPT | Mod: 26,,, | Performed by: RADIOLOGY

## 2021-09-29 PROCEDURE — 77067 MAMMO DIGITAL SCREENING BILAT WITH TOMO: ICD-10-PCS | Mod: 26,,, | Performed by: RADIOLOGY

## 2021-09-30 ENCOUNTER — TELEPHONE (OUTPATIENT)
Dept: ENDOSCOPY | Facility: HOSPITAL | Age: 51
End: 2021-09-30

## 2021-09-30 ENCOUNTER — PATIENT MESSAGE (OUTPATIENT)
Dept: ENDOSCOPY | Facility: HOSPITAL | Age: 51
End: 2021-09-30

## 2021-09-30 DIAGNOSIS — Z01.812 PRE-PROCEDURE LAB EXAM: Primary | ICD-10-CM

## 2021-09-30 DIAGNOSIS — Z12.11 SPECIAL SCREENING FOR MALIGNANT NEOPLASMS, COLON: Primary | ICD-10-CM

## 2021-09-30 RX ORDER — SODIUM, POTASSIUM,MAG SULFATES 17.5-3.13G
1 SOLUTION, RECONSTITUTED, ORAL ORAL DAILY
Qty: 1 KIT | Refills: 0 | Status: SHIPPED | OUTPATIENT
Start: 2021-09-30 | End: 2021-10-02

## 2021-10-12 ENCOUNTER — LAB VISIT (OUTPATIENT)
Dept: FAMILY MEDICINE | Facility: CLINIC | Age: 51
End: 2021-10-12
Payer: COMMERCIAL

## 2021-10-12 DIAGNOSIS — Z01.812 PRE-PROCEDURE LAB EXAM: ICD-10-CM

## 2021-10-12 PROCEDURE — U0005 INFEC AGEN DETEC AMPLI PROBE: HCPCS | Performed by: FAMILY MEDICINE

## 2021-10-12 PROCEDURE — U0003 INFECTIOUS AGENT DETECTION BY NUCLEIC ACID (DNA OR RNA); SEVERE ACUTE RESPIRATORY SYNDROME CORONAVIRUS 2 (SARS-COV-2) (CORONAVIRUS DISEASE [COVID-19]), AMPLIFIED PROBE TECHNIQUE, MAKING USE OF HIGH THROUGHPUT TECHNOLOGIES AS DESCRIBED BY CMS-2020-01-R: HCPCS | Performed by: FAMILY MEDICINE

## 2021-10-13 LAB
SARS-COV-2 RNA RESP QL NAA+PROBE: NOT DETECTED
SARS-COV-2- CYCLE NUMBER: NORMAL

## 2021-10-14 ENCOUNTER — ANESTHESIA EVENT (OUTPATIENT)
Dept: ENDOSCOPY | Facility: HOSPITAL | Age: 51
End: 2021-10-14
Payer: COMMERCIAL

## 2021-10-15 ENCOUNTER — ANESTHESIA (OUTPATIENT)
Dept: ENDOSCOPY | Facility: HOSPITAL | Age: 51
End: 2021-10-15
Payer: COMMERCIAL

## 2021-10-15 ENCOUNTER — HOSPITAL ENCOUNTER (OUTPATIENT)
Facility: HOSPITAL | Age: 51
Discharge: HOME | End: 2021-10-15
Attending: STUDENT IN AN ORGANIZED HEALTH CARE EDUCATION/TRAINING PROGRAM | Admitting: STUDENT IN AN ORGANIZED HEALTH CARE EDUCATION/TRAINING PROGRAM
Payer: COMMERCIAL

## 2021-10-15 VITALS
SYSTOLIC BLOOD PRESSURE: 151 MMHG | OXYGEN SATURATION: 98 % | HEART RATE: 78 BPM | TEMPERATURE: 98 F | RESPIRATION RATE: 18 BRPM | DIASTOLIC BLOOD PRESSURE: 87 MMHG

## 2021-10-15 DIAGNOSIS — Z12.11 COLON CANCER SCREENING: ICD-10-CM

## 2021-10-15 PROCEDURE — 37000008 HC ANESTHESIA 1ST 15 MINUTES: Performed by: STUDENT IN AN ORGANIZED HEALTH CARE EDUCATION/TRAINING PROGRAM

## 2021-10-15 PROCEDURE — 37000009 HC ANESTHESIA EA ADD 15 MINS: Performed by: STUDENT IN AN ORGANIZED HEALTH CARE EDUCATION/TRAINING PROGRAM

## 2021-10-15 PROCEDURE — D9220A PRA ANESTHESIA: ICD-10-PCS | Mod: CRNA,,, | Performed by: NURSE ANESTHETIST, CERTIFIED REGISTERED

## 2021-10-15 PROCEDURE — G0121 COLON CA SCRN NOT HI RSK IND: HCPCS | Performed by: STUDENT IN AN ORGANIZED HEALTH CARE EDUCATION/TRAINING PROGRAM

## 2021-10-15 PROCEDURE — 25000003 PHARM REV CODE 250: Performed by: NURSE ANESTHETIST, CERTIFIED REGISTERED

## 2021-10-15 PROCEDURE — G0121 COLON CA SCRN NOT HI RSK IND: ICD-10-PCS | Mod: ,,, | Performed by: STUDENT IN AN ORGANIZED HEALTH CARE EDUCATION/TRAINING PROGRAM

## 2021-10-15 PROCEDURE — D9220A PRA ANESTHESIA: Mod: ANES,,, | Performed by: ANESTHESIOLOGY

## 2021-10-15 PROCEDURE — G0121 COLON CA SCRN NOT HI RSK IND: HCPCS | Mod: ,,, | Performed by: STUDENT IN AN ORGANIZED HEALTH CARE EDUCATION/TRAINING PROGRAM

## 2021-10-15 PROCEDURE — 63600175 PHARM REV CODE 636 W HCPCS: Performed by: NURSE ANESTHETIST, CERTIFIED REGISTERED

## 2021-10-15 PROCEDURE — D9220A PRA ANESTHESIA: ICD-10-PCS | Mod: ANES,,, | Performed by: ANESTHESIOLOGY

## 2021-10-15 PROCEDURE — D9220A PRA ANESTHESIA: Mod: CRNA,,, | Performed by: NURSE ANESTHETIST, CERTIFIED REGISTERED

## 2021-10-15 RX ORDER — LIDOCAINE HYDROCHLORIDE 20 MG/ML
INJECTION, SOLUTION EPIDURAL; INFILTRATION; INTRACAUDAL; PERINEURAL
Status: DISCONTINUED
Start: 2021-10-15 | End: 2021-10-15 | Stop reason: HOSPADM

## 2021-10-15 RX ORDER — LIDOCAINE HYDROCHLORIDE 10 MG/ML
1 INJECTION, SOLUTION EPIDURAL; INFILTRATION; INTRACAUDAL; PERINEURAL ONCE
Status: DISCONTINUED | OUTPATIENT
Start: 2021-10-16 | End: 2021-10-15 | Stop reason: HOSPADM

## 2021-10-15 RX ORDER — LIDOCAINE HYDROCHLORIDE 20 MG/ML
INJECTION INTRAVENOUS
Status: DISCONTINUED | OUTPATIENT
Start: 2021-10-15 | End: 2021-10-15

## 2021-10-15 RX ORDER — PROPOFOL 10 MG/ML
VIAL (ML) INTRAVENOUS
Status: DISCONTINUED | OUTPATIENT
Start: 2021-10-15 | End: 2021-10-15

## 2021-10-15 RX ORDER — SODIUM CHLORIDE 9 MG/ML
INJECTION, SOLUTION INTRAVENOUS CONTINUOUS
Status: DISCONTINUED | OUTPATIENT
Start: 2021-10-16 | End: 2021-10-15 | Stop reason: HOSPADM

## 2021-10-15 RX ORDER — PROPOFOL 10 MG/ML
INJECTION, EMULSION INTRAVENOUS
Status: DISCONTINUED
Start: 2021-10-15 | End: 2021-10-15 | Stop reason: HOSPADM

## 2021-10-15 RX ADMIN — SODIUM CHLORIDE: 0.9 INJECTION, SOLUTION INTRAVENOUS at 01:10

## 2021-10-15 RX ADMIN — PROPOFOL 50 MG: 10 INJECTION, EMULSION INTRAVENOUS at 01:10

## 2021-10-15 RX ADMIN — PROPOFOL 30 MG: 10 INJECTION, EMULSION INTRAVENOUS at 01:10

## 2021-10-15 RX ADMIN — LIDOCAINE HYDROCHLORIDE 100 MG: 20 INJECTION, SOLUTION INTRAVENOUS at 01:10

## 2021-10-15 RX ADMIN — PROPOFOL 70 MG: 10 INJECTION, EMULSION INTRAVENOUS at 01:10

## 2021-10-18 ENCOUNTER — OFFICE VISIT (OUTPATIENT)
Dept: OBSTETRICS AND GYNECOLOGY | Facility: CLINIC | Age: 51
End: 2021-10-18
Payer: COMMERCIAL

## 2021-10-18 VITALS
HEIGHT: 69 IN | SYSTOLIC BLOOD PRESSURE: 124 MMHG | DIASTOLIC BLOOD PRESSURE: 88 MMHG | WEIGHT: 250.88 LBS | BODY MASS INDEX: 37.16 KG/M2

## 2021-10-18 DIAGNOSIS — Z01.419 GYNECOLOGIC EXAM NORMAL: Primary | ICD-10-CM

## 2021-10-18 DIAGNOSIS — Z00.00 ANNUAL PHYSICAL EXAM: ICD-10-CM

## 2021-10-18 PROCEDURE — 3074F SYST BP LT 130 MM HG: CPT | Mod: CPTII,S$GLB,, | Performed by: OBSTETRICS & GYNECOLOGY

## 2021-10-18 PROCEDURE — 1159F MED LIST DOCD IN RCRD: CPT | Mod: CPTII,S$GLB,, | Performed by: OBSTETRICS & GYNECOLOGY

## 2021-10-18 PROCEDURE — 1159F PR MEDICATION LIST DOCUMENTED IN MEDICAL RECORD: ICD-10-PCS | Mod: CPTII,S$GLB,, | Performed by: OBSTETRICS & GYNECOLOGY

## 2021-10-18 PROCEDURE — 99999 PR PBB SHADOW E&M-EST. PATIENT-LVL III: ICD-10-PCS | Mod: PBBFAC,,, | Performed by: OBSTETRICS & GYNECOLOGY

## 2021-10-18 PROCEDURE — 99386 PR PREVENTIVE VISIT,NEW,40-64: ICD-10-PCS | Mod: S$GLB,,, | Performed by: OBSTETRICS & GYNECOLOGY

## 2021-10-18 PROCEDURE — 3044F PR MOST RECENT HEMOGLOBIN A1C LEVEL <7.0%: ICD-10-PCS | Mod: CPTII,S$GLB,, | Performed by: OBSTETRICS & GYNECOLOGY

## 2021-10-18 PROCEDURE — 3044F HG A1C LEVEL LT 7.0%: CPT | Mod: CPTII,S$GLB,, | Performed by: OBSTETRICS & GYNECOLOGY

## 2021-10-18 PROCEDURE — 3079F DIAST BP 80-89 MM HG: CPT | Mod: CPTII,S$GLB,, | Performed by: OBSTETRICS & GYNECOLOGY

## 2021-10-18 PROCEDURE — 3079F PR MOST RECENT DIASTOLIC BLOOD PRESSURE 80-89 MM HG: ICD-10-PCS | Mod: CPTII,S$GLB,, | Performed by: OBSTETRICS & GYNECOLOGY

## 2021-10-18 PROCEDURE — 99386 PREV VISIT NEW AGE 40-64: CPT | Mod: S$GLB,,, | Performed by: OBSTETRICS & GYNECOLOGY

## 2021-10-18 PROCEDURE — 3008F PR BODY MASS INDEX (BMI) DOCUMENTED: ICD-10-PCS | Mod: CPTII,S$GLB,, | Performed by: OBSTETRICS & GYNECOLOGY

## 2021-10-18 PROCEDURE — 99999 PR PBB SHADOW E&M-EST. PATIENT-LVL III: CPT | Mod: PBBFAC,,, | Performed by: OBSTETRICS & GYNECOLOGY

## 2021-10-18 PROCEDURE — 3008F BODY MASS INDEX DOCD: CPT | Mod: CPTII,S$GLB,, | Performed by: OBSTETRICS & GYNECOLOGY

## 2021-10-18 PROCEDURE — 3074F PR MOST RECENT SYSTOLIC BLOOD PRESSURE < 130 MM HG: ICD-10-PCS | Mod: CPTII,S$GLB,, | Performed by: OBSTETRICS & GYNECOLOGY

## 2022-01-09 ENCOUNTER — DOCUMENTATION ONLY (OUTPATIENT)
Dept: ADMINISTRATIVE | Facility: HOSPITAL | Age: 52
End: 2022-01-09
Payer: COMMERCIAL

## 2022-01-09 LAB
CTP QC/QA: YES
CTP QC/QA: YES
SARS-COV-2 AG RESP QL IA.RAPID: NEGATIVE
SARS-COV-2 AG RESP QL IA.RAPID: NEGATIVE

## 2022-05-20 ENCOUNTER — TELEPHONE (OUTPATIENT)
Dept: SPORTS MEDICINE | Facility: CLINIC | Age: 52
End: 2022-05-20
Payer: COMMERCIAL
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
## 2022-05-27 ENCOUNTER — OFFICE VISIT (OUTPATIENT)
Dept: SPORTS MEDICINE | Facility: CLINIC | Age: 52
End: 2022-05-27
Payer: COMMERCIAL

## 2022-05-27 ENCOUNTER — PATIENT MESSAGE (OUTPATIENT)
Dept: SPORTS MEDICINE | Facility: CLINIC | Age: 52
End: 2022-05-27

## 2022-05-27 ENCOUNTER — HOSPITAL ENCOUNTER (OUTPATIENT)
Dept: RADIOLOGY | Facility: HOSPITAL | Age: 52
Discharge: HOME | End: 2022-05-27
Attending: STUDENT IN AN ORGANIZED HEALTH CARE EDUCATION/TRAINING PROGRAM
Payer: COMMERCIAL

## 2022-05-27 VITALS
HEIGHT: 69 IN | DIASTOLIC BLOOD PRESSURE: 79 MMHG | BODY MASS INDEX: 35.99 KG/M2 | WEIGHT: 243 LBS | SYSTOLIC BLOOD PRESSURE: 122 MMHG | HEART RATE: 63 BPM

## 2022-05-27 DIAGNOSIS — G89.29 CHRONIC LEFT SHOULDER PAIN: Primary | ICD-10-CM

## 2022-05-27 DIAGNOSIS — G89.29 CHRONIC LEFT SHOULDER PAIN: ICD-10-CM

## 2022-05-27 DIAGNOSIS — S43.102A: ICD-10-CM

## 2022-05-27 DIAGNOSIS — M25.512 CHRONIC LEFT SHOULDER PAIN: Primary | ICD-10-CM

## 2022-05-27 DIAGNOSIS — M25.512 CHRONIC LEFT SHOULDER PAIN: ICD-10-CM

## 2022-05-27 DIAGNOSIS — M25.512 LEFT SHOULDER PAIN, UNSPECIFIED CHRONICITY: ICD-10-CM

## 2022-05-27 PROCEDURE — 3008F PR BODY MASS INDEX (BMI) DOCUMENTED: ICD-10-PCS | Mod: CPTII,S$GLB,, | Performed by: STUDENT IN AN ORGANIZED HEALTH CARE EDUCATION/TRAINING PROGRAM

## 2022-05-27 PROCEDURE — 1160F PR REVIEW ALL MEDS BY PRESCRIBER/CLIN PHARMACIST DOCUMENTED: ICD-10-PCS | Mod: CPTII,S$GLB,, | Performed by: STUDENT IN AN ORGANIZED HEALTH CARE EDUCATION/TRAINING PROGRAM

## 2022-05-27 PROCEDURE — 73010 XR SCAPULA BILATERAL: ICD-10-PCS | Mod: 26,50,, | Performed by: RADIOLOGY

## 2022-05-27 PROCEDURE — 3074F SYST BP LT 130 MM HG: CPT | Mod: CPTII,S$GLB,, | Performed by: STUDENT IN AN ORGANIZED HEALTH CARE EDUCATION/TRAINING PROGRAM

## 2022-05-27 PROCEDURE — 1159F PR MEDICATION LIST DOCUMENTED IN MEDICAL RECORD: ICD-10-PCS | Mod: CPTII,S$GLB,, | Performed by: STUDENT IN AN ORGANIZED HEALTH CARE EDUCATION/TRAINING PROGRAM

## 2022-05-27 PROCEDURE — 1125F PR PAIN SEVERITY QUANTIFIED, PAIN PRESENT: ICD-10-PCS | Mod: CPTII,S$GLB,, | Performed by: STUDENT IN AN ORGANIZED HEALTH CARE EDUCATION/TRAINING PROGRAM

## 2022-05-27 PROCEDURE — 73010 X-RAY EXAM OF SHOULDER BLADE: CPT | Mod: 26,50,, | Performed by: RADIOLOGY

## 2022-05-27 PROCEDURE — 73030 X-RAY EXAM OF SHOULDER: CPT | Mod: 26,LT,, | Performed by: RADIOLOGY

## 2022-05-27 PROCEDURE — 3078F DIAST BP <80 MM HG: CPT | Mod: CPTII,S$GLB,, | Performed by: STUDENT IN AN ORGANIZED HEALTH CARE EDUCATION/TRAINING PROGRAM

## 2022-05-27 PROCEDURE — 1160F RVW MEDS BY RX/DR IN RCRD: CPT | Mod: CPTII,S$GLB,, | Performed by: STUDENT IN AN ORGANIZED HEALTH CARE EDUCATION/TRAINING PROGRAM

## 2022-05-27 PROCEDURE — 73030 XR SHOULDER COMPLETE 2 OR MORE VIEWS LEFT: ICD-10-PCS | Mod: 26,LT,, | Performed by: RADIOLOGY

## 2022-05-27 PROCEDURE — 20606 DRAIN/INJ JOINT/BURSA W/US: CPT | Mod: LT,S$GLB,, | Performed by: STUDENT IN AN ORGANIZED HEALTH CARE EDUCATION/TRAINING PROGRAM

## 2022-05-27 PROCEDURE — 99204 OFFICE O/P NEW MOD 45 MIN: CPT | Mod: 25,S$GLB,, | Performed by: STUDENT IN AN ORGANIZED HEALTH CARE EDUCATION/TRAINING PROGRAM

## 2022-05-27 PROCEDURE — 3074F PR MOST RECENT SYSTOLIC BLOOD PRESSURE < 130 MM HG: ICD-10-PCS | Mod: CPTII,S$GLB,, | Performed by: STUDENT IN AN ORGANIZED HEALTH CARE EDUCATION/TRAINING PROGRAM

## 2022-05-27 PROCEDURE — 73030 X-RAY EXAM OF SHOULDER: CPT | Mod: TC,LT

## 2022-05-27 PROCEDURE — 20606 INTERMEDIATE JOINT ASPIRATION/INJECTION: L ACROMIOCLAVICULAR: ICD-10-PCS | Mod: LT,S$GLB,, | Performed by: STUDENT IN AN ORGANIZED HEALTH CARE EDUCATION/TRAINING PROGRAM

## 2022-05-27 PROCEDURE — 3078F PR MOST RECENT DIASTOLIC BLOOD PRESSURE < 80 MM HG: ICD-10-PCS | Mod: CPTII,S$GLB,, | Performed by: STUDENT IN AN ORGANIZED HEALTH CARE EDUCATION/TRAINING PROGRAM

## 2022-05-27 PROCEDURE — 99999 PR PBB SHADOW E&M-EST. PATIENT-LVL IV: CPT | Mod: PBBFAC,,, | Performed by: STUDENT IN AN ORGANIZED HEALTH CARE EDUCATION/TRAINING PROGRAM

## 2022-05-27 PROCEDURE — 99204 PR OFFICE/OUTPT VISIT, NEW, LEVL IV, 45-59 MIN: ICD-10-PCS | Mod: 25,S$GLB,, | Performed by: STUDENT IN AN ORGANIZED HEALTH CARE EDUCATION/TRAINING PROGRAM

## 2022-05-27 PROCEDURE — 3008F BODY MASS INDEX DOCD: CPT | Mod: CPTII,S$GLB,, | Performed by: STUDENT IN AN ORGANIZED HEALTH CARE EDUCATION/TRAINING PROGRAM

## 2022-05-27 PROCEDURE — 73010 X-RAY EXAM OF SHOULDER BLADE: CPT | Mod: TC,50

## 2022-05-27 PROCEDURE — 1159F MED LIST DOCD IN RCRD: CPT | Mod: CPTII,S$GLB,, | Performed by: STUDENT IN AN ORGANIZED HEALTH CARE EDUCATION/TRAINING PROGRAM

## 2022-05-27 PROCEDURE — 99999 PR PBB SHADOW E&M-EST. PATIENT-LVL IV: ICD-10-PCS | Mod: PBBFAC,,, | Performed by: STUDENT IN AN ORGANIZED HEALTH CARE EDUCATION/TRAINING PROGRAM

## 2022-05-27 PROCEDURE — 1125F AMNT PAIN NOTED PAIN PRSNT: CPT | Mod: CPTII,S$GLB,, | Performed by: STUDENT IN AN ORGANIZED HEALTH CARE EDUCATION/TRAINING PROGRAM

## 2022-05-27 RX ORDER — TRIAMCINOLONE ACETONIDE 40 MG/ML
40 INJECTION, SUSPENSION INTRA-ARTICULAR; INTRAMUSCULAR
Status: DISCONTINUED | OUTPATIENT
Start: 2022-05-27 | End: 2022-05-27 | Stop reason: HOSPADM

## 2022-05-27 RX ADMIN — TRIAMCINOLONE ACETONIDE 40 MG: 40 INJECTION, SUSPENSION INTRA-ARTICULAR; INTRAMUSCULAR at 03:05

## 2022-05-27 NOTE — PROCEDURES
Intermediate Joint Aspiration/Injection: L acromioclavicular    Date/Time: 5/27/2022 3:30 PM  Performed by: Darlene Szymanski MD  Authorized by: Darlene Szymanski MD     Consent Done?: Yes (Verbal)  Indications: Pain, diagnostic evaluation and arthritis  Site marked: The procedure site was marked    Timeout: Prior to procedure the correct patient, procedure, and site was verified      Location:  Shoulder  Site:  L acromioclavicular  Prep: Patient was prepped and draped in usual sterile fashion    Ultrasonic Guidance for needle placement: Yes (Ultrasound guidance used to avoid neurovascular injury and to increase acuracy.)  Images are saved and documented.    Needle size:  22 G  Approach: Superolateral   Medications:  40 mg triamcinolone acetonide 40 mg/mL (Ropivacaine 0.2% 0.5mL)  Patient tolerance:  Patient tolerated the procedure well with no immediate complications       Additional Comments: TECHNIQUE: Real time ultrasound examination of the left acromioclavicular joint(s) was performed with SonoSite Edge 2, 9-L MHz linear probe(s). Ultrasound guidance was used for needle localization. Images were saved and stored for documentation. Dynamic visualization of the needle was continuous throughout the procedures and maintained in good position.

## 2022-07-14 ENCOUNTER — OFFICE VISIT (OUTPATIENT)
Dept: URGENT CARE | Facility: CLINIC | Age: 52
End: 2022-07-14
Payer: COMMERCIAL

## 2022-07-14 VITALS
HEART RATE: 83 BPM | DIASTOLIC BLOOD PRESSURE: 83 MMHG | SYSTOLIC BLOOD PRESSURE: 123 MMHG | OXYGEN SATURATION: 98 % | WEIGHT: 243 LBS | HEIGHT: 69 IN | RESPIRATION RATE: 18 BRPM | BODY MASS INDEX: 35.99 KG/M2 | TEMPERATURE: 98 F

## 2022-07-14 DIAGNOSIS — U07.1 COVID: Primary | ICD-10-CM

## 2022-07-14 LAB
CTP QC/QA: YES
SARS-COV-2 RDRP RESP QL NAA+PROBE: POSITIVE

## 2022-07-14 PROCEDURE — 3074F SYST BP LT 130 MM HG: CPT | Mod: CPTII,S$GLB,, | Performed by: NURSE PRACTITIONER

## 2022-07-14 PROCEDURE — U0002: ICD-10-PCS | Mod: QW,S$GLB,, | Performed by: NURSE PRACTITIONER

## 2022-07-14 PROCEDURE — 1160F RVW MEDS BY RX/DR IN RCRD: CPT | Mod: CPTII,S$GLB,, | Performed by: NURSE PRACTITIONER

## 2022-07-14 PROCEDURE — 3079F DIAST BP 80-89 MM HG: CPT | Mod: CPTII,S$GLB,, | Performed by: NURSE PRACTITIONER

## 2022-07-14 PROCEDURE — 1160F PR REVIEW ALL MEDS BY PRESCRIBER/CLIN PHARMACIST DOCUMENTED: ICD-10-PCS | Mod: CPTII,S$GLB,, | Performed by: NURSE PRACTITIONER

## 2022-07-14 PROCEDURE — 99213 OFFICE O/P EST LOW 20 MIN: CPT | Mod: S$GLB,,, | Performed by: NURSE PRACTITIONER

## 2022-07-14 PROCEDURE — 1159F PR MEDICATION LIST DOCUMENTED IN MEDICAL RECORD: ICD-10-PCS | Mod: CPTII,S$GLB,, | Performed by: NURSE PRACTITIONER

## 2022-07-14 PROCEDURE — 3008F BODY MASS INDEX DOCD: CPT | Mod: CPTII,S$GLB,, | Performed by: NURSE PRACTITIONER

## 2022-07-14 PROCEDURE — 99213 PR OFFICE/OUTPT VISIT, EST, LEVL III, 20-29 MIN: ICD-10-PCS | Mod: S$GLB,,, | Performed by: NURSE PRACTITIONER

## 2022-07-14 PROCEDURE — 3074F PR MOST RECENT SYSTOLIC BLOOD PRESSURE < 130 MM HG: ICD-10-PCS | Mod: CPTII,S$GLB,, | Performed by: NURSE PRACTITIONER

## 2022-07-14 PROCEDURE — 3079F PR MOST RECENT DIASTOLIC BLOOD PRESSURE 80-89 MM HG: ICD-10-PCS | Mod: CPTII,S$GLB,, | Performed by: NURSE PRACTITIONER

## 2022-07-14 PROCEDURE — 1159F MED LIST DOCD IN RCRD: CPT | Mod: CPTII,S$GLB,, | Performed by: NURSE PRACTITIONER

## 2022-07-14 PROCEDURE — U0002 COVID-19 LAB TEST NON-CDC: HCPCS | Mod: QW,S$GLB,, | Performed by: NURSE PRACTITIONER

## 2022-07-14 PROCEDURE — 3008F PR BODY MASS INDEX (BMI) DOCUMENTED: ICD-10-PCS | Mod: CPTII,S$GLB,, | Performed by: NURSE PRACTITIONER

## 2022-07-14 NOTE — PROGRESS NOTES
"Subjective:       Patient ID: Vanessa Jerez is a 51 y.o. female.    Vitals:  height is 5' 9" (1.753 m) and weight is 110.2 kg (243 lb). Her tympanic temperature is 97.5 °F (36.4 °C). Her blood pressure is 123/83 and her pulse is 83. Her respiration is 18 and oxygen saturation is 98%.     Chief Complaint: Sinus Problem    52 y/o female presents to  today with c/o cough, nasal congestion, headache, and fatigue x2 days. Denies known fever. She has positive covid-exposure. She denies chest pain, SOB, and wheeze. Denies vomiting, and diarrhea. She is vaccinated with the covid and flu shots.     Sinus Problem  This is a new problem. The current episode started in the past 7 days. The problem is unchanged. The maximum temperature recorded prior to her arrival was 103 - 104 F (103.5). The fever has been present for 1 to 2 days. Her pain is at a severity of 6/10. The pain is moderate. Associated symptoms include chills, congestion, coughing and headaches. (Body aches ) Treatments tried: tylenol. The treatment provided mild relief.       Constitution: Positive for chills.   HENT: Positive for congestion.    Respiratory: Positive for cough.    Neurological: Positive for headaches.       Objective:      Physical Exam   Constitutional: She is oriented to person, place, and time. She appears well-developed. She is cooperative.  Non-toxic appearance. She does not appear ill. No distress.   HENT:   Head: Normocephalic.   Ears:   Right Ear: Hearing normal.   Left Ear: Hearing normal.   Nose: Congestion present. No mucosal edema, rhinorrhea or nasal deformity. No epistaxis. Right sinus exhibits no maxillary sinus tenderness and no frontal sinus tenderness. Left sinus exhibits no maxillary sinus tenderness and no frontal sinus tenderness.   Mouth/Throat: Uvula is midline, oropharynx is clear and moist and mucous membranes are normal. Mucous membranes are moist. No trismus in the jaw. Normal dentition. No uvula swelling. No " oropharyngeal exudate, posterior oropharyngeal edema or posterior oropharyngeal erythema. Oropharynx is clear.   Eyes: Lids are normal. Right eye exhibits no discharge. Left eye exhibits no discharge. No scleral icterus.   Neck: Trachea normal and phonation normal. Neck supple. No edema present. No erythema present. No neck rigidity present.   Cardiovascular: Normal rate.   Pulmonary/Chest: Effort normal. No respiratory distress. She has no decreased breath sounds. She has no rhonchi.   Abdominal: Normal appearance.   Musculoskeletal: Normal range of motion.         General: No deformity. Normal range of motion.   Neurological: She is alert and oriented to person, place, and time. She exhibits normal muscle tone. Coordination normal.   Skin: Skin is warm, dry, intact, not diaphoretic and not pale.   Psychiatric: Her speech is normal and behavior is normal. Judgment and thought content normal.   Nursing note and vitals reviewed.        Results for orders placed or performed in visit on 07/14/22   POCT COVID-19 Rapid Screening   Result Value Ref Range    POC Rapid COVID Positive (A) Negative     Acceptable Yes      Assessment:       1. COVID          Plan:         COVID  -     POCT COVID-19 Rapid Screening  -     nirmatrelvir-ritonavir 300 mg (150 mg x 2)-100 mg copackaged tablets (EUA); Take 3 tablets by mouth 2 (two) times daily for 5 days. Each dose contains 2 nirmatrelvir (pink tablets) and 1 ritonavir (white tablet). Take all 3 tablets together  Dispense: 30 tablet; Refill: 0    Oral fluids  Rest  Steam (hot showers, hot tea)  Blow nose often  Droplet and contact precautions  Self quarantine x 5 days-ok to come out of quarantine as long as symptoms are improving on day 6  Continue to wear mask for 10 days  Follow up with worsening symptoms  Seek ER care with symptoms such as wheeze, respiratory distress, lethargy, dehydration          1: covid risk score is 1

## 2022-07-15 ENCOUNTER — TELEPHONE (OUTPATIENT)
Dept: SPORTS MEDICINE | Facility: CLINIC | Age: 52
End: 2022-07-15
Payer: COMMERCIAL

## 2022-07-15 NOTE — TELEPHONE ENCOUNTER
Spoke with pt and let her know that we would need to reschedule her appt due to a positive covid test. Pt states understanding and appreciation.   Pt is going to call back to reschedule

## 2022-08-04 ENCOUNTER — TELEPHONE (OUTPATIENT)
Dept: SPORTS MEDICINE | Facility: CLINIC | Age: 52
End: 2022-08-04
Payer: COMMERCIAL

## 2022-08-04 NOTE — TELEPHONE ENCOUNTER
Called and left message for patient to contact the office in regards to her appointment on Friday 8/5/22 with Dr. Szymanski.

## 2022-09-13 ENCOUNTER — TELEPHONE (OUTPATIENT)
Dept: SPORTS MEDICINE | Facility: CLINIC | Age: 52
End: 2022-09-13
Payer: COMMERCIAL

## 2022-09-13 NOTE — TELEPHONE ENCOUNTER
Called and left message on voicemail for patient to contact Dr. Szymanski's office in regards to rescheduling her 9/23/22 3:30 pm appt to earlier that day or another day.

## 2022-09-14 ENCOUNTER — TELEPHONE (OUTPATIENT)
Dept: SPORTS MEDICINE | Facility: CLINIC | Age: 52
End: 2022-09-14
Payer: COMMERCIAL

## 2022-09-14 NOTE — TELEPHONE ENCOUNTER
----- Message from Tremontana Chevalier sent at 9/14/2022  4:31 PM CDT -----  Regarding: appt  Contact: pt @ [phone].   Pt ret cll to Ramila DEY in Darlene Szymanski's office. Pls call pt @ [phone].

## 2022-09-14 NOTE — TELEPHONE ENCOUNTER
----- Message from Jeni Espinosa sent at 9/14/2022 11:20 AM CDT -----  Regarding: req below regarding result appt, see below  Contact: PT  [phone]   Vanessa Jerez MRN [id number] works in [employer] has appt to , she has to work that day but wants to know if yall can get her in and out so she doesnt have to reschedule      Patient can be contacted @# [phone]

## 2022-09-14 NOTE — TELEPHONE ENCOUNTER
Returned call to patient.  Patient was offered to come in earlier date to complete xray so her appointment will run on time for 9/23/22.  Patient states she would like to know why she needs to have more xrays since she did them the last visit.  I will contact patient once I speak with Dr. Szymanski.

## 2022-09-15 ENCOUNTER — TELEPHONE (OUTPATIENT)
Dept: SPORTS MEDICINE | Facility: CLINIC | Age: 52
End: 2022-09-15
Payer: COMMERCIAL

## 2022-09-15 NOTE — TELEPHONE ENCOUNTER
----- Message from Adriana Butterfield sent at 9/15/2022  8:25 AM CDT -----  Regarding: RE: xray  No need for new XR. My mistake!    ----- Message -----  From: Ramila Middleton MA  Sent: 9/14/2022   4:47 PM CDT  To: Adriana Butterfield  Subject: xray                                             I spoke to patient to offer to schedule her xray before her appointment on 9/23/22 but patient wanted to know why she needs to complete more xrays when Dr. Szymanski has xrays from her last visit.    Thanks   Ramila

## 2022-09-15 NOTE — TELEPHONE ENCOUNTER
Called and spoke to patient to let her know that she will not need an xray for her appointment on 9/23/22 with Dr. Szymanski.

## 2022-09-19 ENCOUNTER — OFFICE VISIT (OUTPATIENT)
Dept: FAMILY MEDICINE | Facility: CLINIC | Age: 52
End: 2022-09-19
Payer: COMMERCIAL

## 2022-09-19 ENCOUNTER — LAB VISIT (OUTPATIENT)
Dept: LAB | Facility: HOSPITAL | Age: 52
End: 2022-09-19
Attending: FAMILY MEDICINE
Payer: COMMERCIAL

## 2022-09-19 VITALS
SYSTOLIC BLOOD PRESSURE: 100 MMHG | HEART RATE: 76 BPM | BODY MASS INDEX: 36.64 KG/M2 | WEIGHT: 247.38 LBS | OXYGEN SATURATION: 98 % | DIASTOLIC BLOOD PRESSURE: 80 MMHG | TEMPERATURE: 98 F | HEIGHT: 69 IN

## 2022-09-19 DIAGNOSIS — Z00.00 ANNUAL PHYSICAL EXAM: Primary | ICD-10-CM

## 2022-09-19 DIAGNOSIS — Z00.00 ANNUAL PHYSICAL EXAM: ICD-10-CM

## 2022-09-19 DIAGNOSIS — Z12.31 ENCOUNTER FOR SCREENING MAMMOGRAM FOR BREAST CANCER: ICD-10-CM

## 2022-09-19 DIAGNOSIS — Z23 FLU VACCINE NEED: ICD-10-CM

## 2022-09-19 LAB
ALBUMIN SERPL BCP-MCNC: 3.8 G/DL (ref 3.5–5.2)
ALP SERPL-CCNC: 93 U/L (ref 55–135)
ALT SERPL W/O P-5'-P-CCNC: 27 U/L (ref 10–44)
ANION GAP SERPL CALC-SCNC: 10 MMOL/L (ref 8–16)
AST SERPL-CCNC: 38 U/L (ref 10–40)
BASOPHILS # BLD AUTO: 0.03 K/UL (ref 0–0.2)
BASOPHILS NFR BLD: 0.5 % (ref 0–1.9)
BILIRUB SERPL-MCNC: 0.4 MG/DL (ref 0.1–1)
BUN SERPL-MCNC: 10 MG/DL (ref 6–20)
CALCIUM SERPL-MCNC: 10.3 MG/DL (ref 8.7–10.5)
CHLORIDE SERPL-SCNC: 106 MMOL/L (ref 95–110)
CHOLEST SERPL-MCNC: 182 MG/DL (ref 120–199)
CHOLEST/HDLC SERPL: 2.3 {RATIO} (ref 2–5)
CO2 SERPL-SCNC: 24 MMOL/L (ref 23–29)
CREAT SERPL-MCNC: 0.7 MG/DL (ref 0.5–1.4)
DIFFERENTIAL METHOD: ABNORMAL
EOSINOPHIL # BLD AUTO: 0.3 K/UL (ref 0–0.5)
EOSINOPHIL NFR BLD: 3.8 % (ref 0–8)
ERYTHROCYTE [DISTWIDTH] IN BLOOD BY AUTOMATED COUNT: 14.1 % (ref 11.5–14.5)
EST. GFR  (NO RACE VARIABLE): >60 ML/MIN/1.73 M^2
ESTIMATED AVG GLUCOSE: 111 MG/DL (ref 68–131)
GLUCOSE SERPL-MCNC: 88 MG/DL (ref 70–110)
HBA1C MFR BLD: 5.5 % (ref 4–5.6)
HCT VFR BLD AUTO: 37.7 % (ref 37–48.5)
HDLC SERPL-MCNC: 80 MG/DL (ref 40–75)
HDLC SERPL: 44 % (ref 20–50)
HGB BLD-MCNC: 12 G/DL (ref 12–16)
IMM GRANULOCYTES # BLD AUTO: 0.04 K/UL (ref 0–0.04)
IMM GRANULOCYTES NFR BLD AUTO: 0.6 % (ref 0–0.5)
LDLC SERPL CALC-MCNC: 89.2 MG/DL (ref 63–159)
LYMPHOCYTES # BLD AUTO: 2.9 K/UL (ref 1–4.8)
LYMPHOCYTES NFR BLD: 43.1 % (ref 18–48)
MCH RBC QN AUTO: 29.8 PG (ref 27–31)
MCHC RBC AUTO-ENTMCNC: 31.8 G/DL (ref 32–36)
MCV RBC AUTO: 94 FL (ref 82–98)
MONOCYTES # BLD AUTO: 0.6 K/UL (ref 0.3–1)
MONOCYTES NFR BLD: 8.9 % (ref 4–15)
NEUTROPHILS # BLD AUTO: 2.9 K/UL (ref 1.8–7.7)
NEUTROPHILS NFR BLD: 43.1 % (ref 38–73)
NONHDLC SERPL-MCNC: 102 MG/DL
NRBC BLD-RTO: 0 /100 WBC
PLATELET # BLD AUTO: 289 K/UL (ref 150–450)
PMV BLD AUTO: 11.1 FL (ref 9.2–12.9)
POTASSIUM SERPL-SCNC: 3.9 MMOL/L (ref 3.5–5.1)
PROT SERPL-MCNC: 7.5 G/DL (ref 6–8.4)
RBC # BLD AUTO: 4.03 M/UL (ref 4–5.4)
SODIUM SERPL-SCNC: 140 MMOL/L (ref 136–145)
T4 FREE SERPL-MCNC: 0.92 NG/DL (ref 0.71–1.51)
TRIGL SERPL-MCNC: 64 MG/DL (ref 30–150)
TSH SERPL DL<=0.005 MIU/L-ACNC: 0.36 UIU/ML (ref 0.4–4)
WBC # BLD AUTO: 6.61 K/UL (ref 3.9–12.7)

## 2022-09-19 PROCEDURE — 99999 PR PBB SHADOW E&M-EST. PATIENT-LVL IV: CPT | Mod: PBBFAC,,, | Performed by: FAMILY MEDICINE

## 2022-09-19 PROCEDURE — 1159F PR MEDICATION LIST DOCUMENTED IN MEDICAL RECORD: ICD-10-PCS | Mod: CPTII,S$GLB,, | Performed by: FAMILY MEDICINE

## 2022-09-19 PROCEDURE — 90471 FLU VACCINE (QUAD) GREATER THAN OR EQUAL TO 3YO PRESERVATIVE FREE IM: ICD-10-PCS | Mod: S$GLB,,, | Performed by: FAMILY MEDICINE

## 2022-09-19 PROCEDURE — 3074F PR MOST RECENT SYSTOLIC BLOOD PRESSURE < 130 MM HG: ICD-10-PCS | Mod: CPTII,S$GLB,, | Performed by: FAMILY MEDICINE

## 2022-09-19 PROCEDURE — 99999 PR PBB SHADOW E&M-EST. PATIENT-LVL IV: ICD-10-PCS | Mod: PBBFAC,,, | Performed by: FAMILY MEDICINE

## 2022-09-19 PROCEDURE — 83036 HEMOGLOBIN GLYCOSYLATED A1C: CPT | Performed by: FAMILY MEDICINE

## 2022-09-19 PROCEDURE — 99396 PR PREVENTIVE VISIT,EST,40-64: ICD-10-PCS | Mod: 25,S$GLB,, | Performed by: FAMILY MEDICINE

## 2022-09-19 PROCEDURE — 1160F RVW MEDS BY RX/DR IN RCRD: CPT | Mod: CPTII,S$GLB,, | Performed by: FAMILY MEDICINE

## 2022-09-19 PROCEDURE — 80053 COMPREHEN METABOLIC PANEL: CPT | Performed by: FAMILY MEDICINE

## 2022-09-19 PROCEDURE — 36415 COLL VENOUS BLD VENIPUNCTURE: CPT | Mod: PO | Performed by: FAMILY MEDICINE

## 2022-09-19 PROCEDURE — 3008F BODY MASS INDEX DOCD: CPT | Mod: CPTII,S$GLB,, | Performed by: FAMILY MEDICINE

## 2022-09-19 PROCEDURE — 1159F MED LIST DOCD IN RCRD: CPT | Mod: CPTII,S$GLB,, | Performed by: FAMILY MEDICINE

## 2022-09-19 PROCEDURE — 84443 ASSAY THYROID STIM HORMONE: CPT | Performed by: FAMILY MEDICINE

## 2022-09-19 PROCEDURE — 84439 ASSAY OF FREE THYROXINE: CPT | Performed by: FAMILY MEDICINE

## 2022-09-19 PROCEDURE — 80061 LIPID PANEL: CPT | Performed by: FAMILY MEDICINE

## 2022-09-19 PROCEDURE — 90686 FLU VACCINE (QUAD) GREATER THAN OR EQUAL TO 3YO PRESERVATIVE FREE IM: ICD-10-PCS | Mod: S$GLB,,, | Performed by: FAMILY MEDICINE

## 2022-09-19 PROCEDURE — 3074F SYST BP LT 130 MM HG: CPT | Mod: CPTII,S$GLB,, | Performed by: FAMILY MEDICINE

## 2022-09-19 PROCEDURE — 3079F DIAST BP 80-89 MM HG: CPT | Mod: CPTII,S$GLB,, | Performed by: FAMILY MEDICINE

## 2022-09-19 PROCEDURE — 90686 IIV4 VACC NO PRSV 0.5 ML IM: CPT | Mod: S$GLB,,, | Performed by: FAMILY MEDICINE

## 2022-09-19 PROCEDURE — 1160F PR REVIEW ALL MEDS BY PRESCRIBER/CLIN PHARMACIST DOCUMENTED: ICD-10-PCS | Mod: CPTII,S$GLB,, | Performed by: FAMILY MEDICINE

## 2022-09-19 PROCEDURE — 3008F PR BODY MASS INDEX (BMI) DOCUMENTED: ICD-10-PCS | Mod: CPTII,S$GLB,, | Performed by: FAMILY MEDICINE

## 2022-09-19 PROCEDURE — 90471 IMMUNIZATION ADMIN: CPT | Mod: S$GLB,,, | Performed by: FAMILY MEDICINE

## 2022-09-19 PROCEDURE — 3079F PR MOST RECENT DIASTOLIC BLOOD PRESSURE 80-89 MM HG: ICD-10-PCS | Mod: CPTII,S$GLB,, | Performed by: FAMILY MEDICINE

## 2022-09-19 PROCEDURE — 85025 COMPLETE CBC W/AUTO DIFF WBC: CPT | Performed by: FAMILY MEDICINE

## 2022-09-19 PROCEDURE — 99396 PREV VISIT EST AGE 40-64: CPT | Mod: 25,S$GLB,, | Performed by: FAMILY MEDICINE

## 2022-09-19 NOTE — PROGRESS NOTES
"Routine Office Visit    Vanessa Jerez  1970  [id number]      Subjective     Vanessa is a 51 y.o. female who presents today for:    Annual physical   Weight - Patient states she has been struggling with her weight. Would like to schedule appointment to discuss weight.     Objective     Review of Systems   Constitutional:  Negative for chills and fever.   HENT:  Negative for congestion.    Eyes:  Negative for blurred vision.   Respiratory:  Negative for cough.    Cardiovascular:  Negative for chest pain.   Gastrointestinal:  Negative for abdominal pain, constipation, diarrhea, heartburn, nausea and vomiting.   Genitourinary:  Negative for dysuria.   Musculoskeletal:  Negative for myalgias.   Skin:  Negative for itching and rash.   Neurological:  Negative for dizziness and headaches.   Psychiatric/Behavioral:  Negative for depression.      /80 (BP Location: Right arm, Patient Position: Sitting, BP Method: Large (Manual))   Pulse 76   Temp 98.3 °F (36.8 °C) (Oral)   Ht 5' 9" (1.753 m)   Wt 112.2 kg (247 lb 5.7 oz)   LMP 04/01/2017   SpO2 98%   BMI 36.53 kg/m²   Physical Exam  Constitutional:       Appearance: She is well-developed.   HENT:      Head: Normocephalic and atraumatic.   Eyes:      Conjunctiva/sclera: Conjunctivae normal.      Pupils: Pupils are equal, round, and reactive to light.   Cardiovascular:      Rate and Rhythm: Normal rate and regular rhythm.      Heart sounds: Normal heart sounds. No murmur heard.    No friction rub. No gallop.   Pulmonary:      Effort: No respiratory distress.      Breath sounds: Normal breath sounds.   Abdominal:      General: Bowel sounds are normal. There is no distension.      Palpations: Abdomen is soft.      Tenderness: There is no abdominal tenderness.   Musculoskeletal:         General: Normal range of motion.      Cervical back: Normal range of motion and neck supple.   Lymphadenopathy:      Cervical: No cervical adenopathy.   Skin:     General: Skin " is warm.   Neurological:      Mental Status: She is alert and oriented to person, place, and time.           Assessment     Health Maintenance         Date Due Completion Date    COVID-19 Vaccine (3 - Booster for Pfizer series) 10/27/2021 5/27/2021    Shingles Vaccine (2 of 2) 11/16/2021 9/21/2021    Influenza Vaccine (1) 09/01/2022 9/28/2021    Mammogram 09/29/2022 9/29/2021    Override on 10/25/2017: Done (reconciled from [facility] outside record with patient approval)    Lipid Panel 09/21/2026 9/21/2021    TETANUS VACCINE 04/17/2027 4/17/2017    Colorectal Cancer Screening 10/15/2031 10/15/2021              Problem List Items Addressed This Visit    None  Visit Diagnoses       Annual physical exam    -  Primary    Relevant Orders    CBC Auto Differential    Comprehensive Metabolic Panel    Lipid Panel    Hemoglobin A1C    TSH  I addressed all major concerns as it related to health maintenance.  All were ordered and scheduled based on the patients wishes.  Any additional health maintenance will be readdressed at the next physical if declined or deferred by the patient.      Flu vaccine need        Relevant Orders    Influenza - Quadrivalent (PF) (Completed)    Encounter for screening mammogram for breast cancer        Relevant Orders    Mammo Digital Screening Bilat w/ Kevin                  Follow up in about 6 months (around 3/19/2023), or if symptoms worsen or fail to improve.

## 2022-09-23 ENCOUNTER — OFFICE VISIT (OUTPATIENT)
Dept: SPORTS MEDICINE | Facility: CLINIC | Age: 52
End: 2022-09-23
Payer: COMMERCIAL

## 2022-09-23 VITALS
WEIGHT: 247 LBS | HEIGHT: 69 IN | BODY MASS INDEX: 36.58 KG/M2 | HEART RATE: 62 BPM | DIASTOLIC BLOOD PRESSURE: 82 MMHG | SYSTOLIC BLOOD PRESSURE: 126 MMHG

## 2022-09-23 DIAGNOSIS — S43.102D SEPARATION OF LEFT ACROMIOCLAVICULAR JOINT, SUBSEQUENT ENCOUNTER: Primary | ICD-10-CM

## 2022-09-23 PROCEDURE — 3074F SYST BP LT 130 MM HG: CPT | Mod: CPTII,S$GLB,, | Performed by: STUDENT IN AN ORGANIZED HEALTH CARE EDUCATION/TRAINING PROGRAM

## 2022-09-23 PROCEDURE — 99213 PR OFFICE/OUTPT VISIT, EST, LEVL III, 20-29 MIN: ICD-10-PCS | Mod: S$GLB,,, | Performed by: STUDENT IN AN ORGANIZED HEALTH CARE EDUCATION/TRAINING PROGRAM

## 2022-09-23 PROCEDURE — 99999 PR PBB SHADOW E&M-EST. PATIENT-LVL III: ICD-10-PCS | Mod: PBBFAC,,, | Performed by: STUDENT IN AN ORGANIZED HEALTH CARE EDUCATION/TRAINING PROGRAM

## 2022-09-23 PROCEDURE — 99213 OFFICE O/P EST LOW 20 MIN: CPT | Mod: S$GLB,,, | Performed by: STUDENT IN AN ORGANIZED HEALTH CARE EDUCATION/TRAINING PROGRAM

## 2022-09-23 PROCEDURE — 3074F PR MOST RECENT SYSTOLIC BLOOD PRESSURE < 130 MM HG: ICD-10-PCS | Mod: CPTII,S$GLB,, | Performed by: STUDENT IN AN ORGANIZED HEALTH CARE EDUCATION/TRAINING PROGRAM

## 2022-09-23 PROCEDURE — 1160F RVW MEDS BY RX/DR IN RCRD: CPT | Mod: CPTII,S$GLB,, | Performed by: STUDENT IN AN ORGANIZED HEALTH CARE EDUCATION/TRAINING PROGRAM

## 2022-09-23 PROCEDURE — 1160F PR REVIEW ALL MEDS BY PRESCRIBER/CLIN PHARMACIST DOCUMENTED: ICD-10-PCS | Mod: CPTII,S$GLB,, | Performed by: STUDENT IN AN ORGANIZED HEALTH CARE EDUCATION/TRAINING PROGRAM

## 2022-09-23 PROCEDURE — 3008F PR BODY MASS INDEX (BMI) DOCUMENTED: ICD-10-PCS | Mod: CPTII,S$GLB,, | Performed by: STUDENT IN AN ORGANIZED HEALTH CARE EDUCATION/TRAINING PROGRAM

## 2022-09-23 PROCEDURE — 99999 PR PBB SHADOW E&M-EST. PATIENT-LVL III: CPT | Mod: PBBFAC,,, | Performed by: STUDENT IN AN ORGANIZED HEALTH CARE EDUCATION/TRAINING PROGRAM

## 2022-09-23 PROCEDURE — 3008F BODY MASS INDEX DOCD: CPT | Mod: CPTII,S$GLB,, | Performed by: STUDENT IN AN ORGANIZED HEALTH CARE EDUCATION/TRAINING PROGRAM

## 2022-09-23 PROCEDURE — 1159F MED LIST DOCD IN RCRD: CPT | Mod: CPTII,S$GLB,, | Performed by: STUDENT IN AN ORGANIZED HEALTH CARE EDUCATION/TRAINING PROGRAM

## 2022-09-23 PROCEDURE — 3079F DIAST BP 80-89 MM HG: CPT | Mod: CPTII,S$GLB,, | Performed by: STUDENT IN AN ORGANIZED HEALTH CARE EDUCATION/TRAINING PROGRAM

## 2022-09-23 PROCEDURE — 3079F PR MOST RECENT DIASTOLIC BLOOD PRESSURE 80-89 MM HG: ICD-10-PCS | Mod: CPTII,S$GLB,, | Performed by: STUDENT IN AN ORGANIZED HEALTH CARE EDUCATION/TRAINING PROGRAM

## 2022-09-23 PROCEDURE — 3044F PR MOST RECENT HEMOGLOBIN A1C LEVEL <7.0%: ICD-10-PCS | Mod: CPTII,S$GLB,, | Performed by: STUDENT IN AN ORGANIZED HEALTH CARE EDUCATION/TRAINING PROGRAM

## 2022-09-23 PROCEDURE — 1159F PR MEDICATION LIST DOCUMENTED IN MEDICAL RECORD: ICD-10-PCS | Mod: CPTII,S$GLB,, | Performed by: STUDENT IN AN ORGANIZED HEALTH CARE EDUCATION/TRAINING PROGRAM

## 2022-09-23 PROCEDURE — 3044F HG A1C LEVEL LT 7.0%: CPT | Mod: CPTII,S$GLB,, | Performed by: STUDENT IN AN ORGANIZED HEALTH CARE EDUCATION/TRAINING PROGRAM

## 2022-09-23 NOTE — PROGRESS NOTES
"CC: Left shoulder pain    51 y.o. Female presents today for follow up evaluation of her left shoulder pain. PT reports that she got 85-90% relief with injection. Achy pain with movement overhead, holding groceries (over 10 lbs) and cold weather.     Attempted treatments: no  Pain score: 0/10 today, 8/10 bad day   History of trauma/injury: no   Affecting ADLs: Achy pain with movement overhead, holding groceries (over 10 lbs) and cold weather.      REVIEW OF SYSTEMS:   Constitution: Patient denies fever or chills.  Eyes: Patient denies eye pain or vision changes.  HEENT: Patient denies ear pain, sore throat, or nasal discharge.  CVS: Patient denies chest pain.  Lungs: Patient denies shortness of breath or cough.  Skin: Patient denies skin rash or itching. Had adverse reaction to CSI injection for ~ 1 week.    Musculoskeletal: Patient denies recent falls. See HPI.  Psych: Patient denies any current anxiety or nervousness.    PAST MEDICAL HISTORY:   Past Medical History:   Diagnosis Date    Anemia        MEDICATIONS:   No current outpatient medications on file.    ALLERGIES:   Review of patient's allergies indicates:   Allergen Reactions    Morphine      Slowed down heart rate        PHYSICAL EXAMINATION:  /82   Pulse 62   Ht 5' 9" (1.753 m)   Wt 112 kg (247 lb)   LMP 04/01/2017   BMI 36.48 kg/m²   Vitals signs and nursing note have been reviewed.    General: In no acute distress, well developed, well nourished, no diaphoresis  Eyes: EOM full and smooth, no eye redness or discharge  HENT: normocephalic and atraumatic, neck supple, trachea midline, no nasal discharge  Cardiovascular: no LE edema  Lungs: respirations non-labored, no conversational dyspnea   Neuro: AAOx3, CN2-12 grossly intact  Skin: No rashes, warm and dry  Psychiatric: cooperative, pleasant, mood and affect appropriate for age    Left Shoulder:  INSPECTION / PALPATION:  -Deformity   -Ecchymosis   -Atrophy   -Sulcus sign   -No TTP over anatomy " including clavicle, scapular spine, ACJ, acromion, supraspinatus, infraspinatus, bicipital groove      ROM:    Flex to 170° vs 170° contra   Abduct to 160° vs 160° contra   Int rot to T10 vs T7 contra   Ext rot to 60° vs 60° contra  -Scapular dyskinesis      STRENGTH:   Scaption 5/5   Flexion 5/5   Ext rot 5/5   Int rot 5/5     OTHER:   +Painful arc   -Drop arm   -Int lag  -Ext lag  +Neer's   +Moore-Suhail   +Vado active compression   +Empty Can  +Full Can  -Speed's   -Apprehension     NECK:   Grossly FROM & painless in neck flex, ext, B/L torsion, B/L lat flexion   -Spurling B/L    Hands NVI B/L    ASSESSMENT:      ICD-10-CM ICD-9-CM   1. Separation of left acromioclavicular joint, subsequent encounter  S43.102D V58.89     831.04         PLAN:  Plan is to repeat AC joint injection when pain returns.  We will try ketorolac.  If she does not have any long-lasting relief with ketorolac, we will repeat corticosteroid and have patient take Benadryl around the time of the injection to prevent rash.    Future planning includes - repeat injection when pain returns.     All questions were answered to the best of my ability and all concerns were addressed at this time.    Follow up PRN      This note is dictated using the M*Modal Fluency Direct word recognition program. There are word recognition mistakes that are occasionally missed on review.

## 2022-09-26 ENCOUNTER — PATIENT MESSAGE (OUTPATIENT)
Dept: FAMILY MEDICINE | Facility: CLINIC | Age: 52
End: 2022-09-26
Payer: COMMERCIAL

## 2022-10-03 ENCOUNTER — OFFICE VISIT (OUTPATIENT)
Dept: FAMILY MEDICINE | Facility: CLINIC | Age: 52
End: 2022-10-03
Payer: COMMERCIAL

## 2022-10-03 ENCOUNTER — HOSPITAL ENCOUNTER (OUTPATIENT)
Dept: RADIOLOGY | Facility: HOSPITAL | Age: 52
Discharge: HOME | End: 2022-10-03
Attending: FAMILY MEDICINE
Payer: COMMERCIAL

## 2022-10-03 VITALS
DIASTOLIC BLOOD PRESSURE: 78 MMHG | OXYGEN SATURATION: 97 % | HEART RATE: 88 BPM | TEMPERATURE: 98 F | BODY MASS INDEX: 36.67 KG/M2 | SYSTOLIC BLOOD PRESSURE: 110 MMHG | WEIGHT: 247.56 LBS | HEIGHT: 69 IN

## 2022-10-03 DIAGNOSIS — Z12.31 ENCOUNTER FOR SCREENING MAMMOGRAM FOR BREAST CANCER: ICD-10-CM

## 2022-10-03 DIAGNOSIS — E05.90 SUBCLINICAL HYPERTHYROIDISM: Primary | ICD-10-CM

## 2022-10-03 DIAGNOSIS — E66.9 OBESITY (BMI 30-39.9): ICD-10-CM

## 2022-10-03 PROCEDURE — 1159F MED LIST DOCD IN RCRD: CPT | Mod: CPTII,S$GLB,, | Performed by: FAMILY MEDICINE

## 2022-10-03 PROCEDURE — 99999 PR PBB SHADOW E&M-EST. PATIENT-LVL IV: CPT | Mod: PBBFAC,,, | Performed by: FAMILY MEDICINE

## 2022-10-03 PROCEDURE — 3008F BODY MASS INDEX DOCD: CPT | Mod: CPTII,S$GLB,, | Performed by: FAMILY MEDICINE

## 2022-10-03 PROCEDURE — 3078F DIAST BP <80 MM HG: CPT | Mod: CPTII,S$GLB,, | Performed by: FAMILY MEDICINE

## 2022-10-03 PROCEDURE — 77067 MAMMO DIGITAL SCREENING BILAT WITH TOMO: ICD-10-PCS | Mod: 26,,, | Performed by: RADIOLOGY

## 2022-10-03 PROCEDURE — 3074F PR MOST RECENT SYSTOLIC BLOOD PRESSURE < 130 MM HG: ICD-10-PCS | Mod: CPTII,S$GLB,, | Performed by: FAMILY MEDICINE

## 2022-10-03 PROCEDURE — 77063 BREAST TOMOSYNTHESIS BI: CPT | Mod: 26,,, | Performed by: RADIOLOGY

## 2022-10-03 PROCEDURE — 77067 SCR MAMMO BI INCL CAD: CPT | Mod: TC,PO

## 2022-10-03 PROCEDURE — 3008F PR BODY MASS INDEX (BMI) DOCUMENTED: ICD-10-PCS | Mod: CPTII,S$GLB,, | Performed by: FAMILY MEDICINE

## 2022-10-03 PROCEDURE — 3044F HG A1C LEVEL LT 7.0%: CPT | Mod: CPTII,S$GLB,, | Performed by: FAMILY MEDICINE

## 2022-10-03 PROCEDURE — 99214 PR OFFICE/OUTPT VISIT, EST, LEVL IV, 30-39 MIN: ICD-10-PCS | Mod: S$GLB,,, | Performed by: FAMILY MEDICINE

## 2022-10-03 PROCEDURE — 1159F PR MEDICATION LIST DOCUMENTED IN MEDICAL RECORD: ICD-10-PCS | Mod: CPTII,S$GLB,, | Performed by: FAMILY MEDICINE

## 2022-10-03 PROCEDURE — 77063 MAMMO DIGITAL SCREENING BILAT WITH TOMO: ICD-10-PCS | Mod: 26,,, | Performed by: RADIOLOGY

## 2022-10-03 PROCEDURE — 99999 PR PBB SHADOW E&M-EST. PATIENT-LVL IV: ICD-10-PCS | Mod: PBBFAC,,, | Performed by: FAMILY MEDICINE

## 2022-10-03 PROCEDURE — 99214 OFFICE O/P EST MOD 30 MIN: CPT | Mod: S$GLB,,, | Performed by: FAMILY MEDICINE

## 2022-10-03 PROCEDURE — 1160F RVW MEDS BY RX/DR IN RCRD: CPT | Mod: CPTII,S$GLB,, | Performed by: FAMILY MEDICINE

## 2022-10-03 PROCEDURE — 3078F PR MOST RECENT DIASTOLIC BLOOD PRESSURE < 80 MM HG: ICD-10-PCS | Mod: CPTII,S$GLB,, | Performed by: FAMILY MEDICINE

## 2022-10-03 PROCEDURE — 3074F SYST BP LT 130 MM HG: CPT | Mod: CPTII,S$GLB,, | Performed by: FAMILY MEDICINE

## 2022-10-03 PROCEDURE — 1160F PR REVIEW ALL MEDS BY PRESCRIBER/CLIN PHARMACIST DOCUMENTED: ICD-10-PCS | Mod: CPTII,S$GLB,, | Performed by: FAMILY MEDICINE

## 2022-10-03 PROCEDURE — 3044F PR MOST RECENT HEMOGLOBIN A1C LEVEL <7.0%: ICD-10-PCS | Mod: CPTII,S$GLB,, | Performed by: FAMILY MEDICINE

## 2022-10-03 PROCEDURE — 77067 SCR MAMMO BI INCL CAD: CPT | Mod: 26,,, | Performed by: RADIOLOGY

## 2022-10-03 RX ORDER — PHENTERMINE HYDROCHLORIDE 37.5 MG/1
37.5 TABLET ORAL
Qty: 30 TABLET | Refills: 2 | Status: SHIPPED | OUTPATIENT
Start: 2022-10-03 | End: 2022-11-02

## 2022-10-03 NOTE — PROGRESS NOTES
"Routine Office Visit    Vanessa Jerez  1970  [id number]      Subjective     Vanessa is a 51 y.o. female who presents today for:    Weight - Patient has been struggling x 3 years. She has not been able to lose weight despite trying. She has brought in food log    Diets tried - cutting back carbohydrates   Medications tried - none     9/19/2022 - 247  10/3/2022 - 247  Weight goal - <200     Sleep - 8 hours     Work - E-sitter - sedentary job     Exercises - walk on lunch break and walk for clock-out; Patient has bad back     Meals logged   Breakfast - veggie omelet   Lunch - chicken salad   Dinner - usually skips   Snacks - banana, gummy bears, chips       Objective     Review of Systems   Constitutional:  Negative for chills and fever.   HENT:  Negative for congestion.    Eyes:  Negative for blurred vision.   Respiratory:  Negative for cough.    Cardiovascular:  Negative for chest pain.   Gastrointestinal:  Negative for abdominal pain, constipation, diarrhea, heartburn, nausea and vomiting.   Genitourinary:  Negative for dysuria.   Musculoskeletal:  Negative for myalgias.   Skin:  Negative for itching and rash.   Neurological:  Negative for dizziness and headaches.   Psychiatric/Behavioral:  Negative for depression.      /78 (BP Location: Left arm, Patient Position: Sitting, BP Method: Large (Manual))   Pulse 88   Temp 98.4 °F (36.9 °C) (Oral)   Ht 5' 9" (1.753 m)   Wt 112.3 kg (247 lb 9.2 oz)   LMP 04/01/2017   SpO2 97%   BMI 36.56 kg/m²   Physical Exam  Constitutional:       Appearance: She is well-developed.   HENT:      Head: Normocephalic and atraumatic.   Eyes:      Conjunctiva/sclera: Conjunctivae normal.      Pupils: Pupils are equal, round, and reactive to light.   Cardiovascular:      Rate and Rhythm: Normal rate and regular rhythm.      Heart sounds: Normal heart sounds. No murmur heard.    No friction rub. No gallop.   Pulmonary:      Effort: No respiratory distress.      Breath " sounds: Normal breath sounds.   Abdominal:      General: Bowel sounds are normal. There is no distension.      Palpations: Abdomen is soft.      Tenderness: There is no abdominal tenderness.   Musculoskeletal:         General: Normal range of motion.      Cervical back: Normal range of motion and neck supple.   Lymphadenopathy:      Cervical: No cervical adenopathy.   Skin:     General: Skin is warm.   Neurological:      Mental Status: She is alert and oriented to person, place, and time.           Assessment     Health Maintenance         Date Due Completion Date    COVID-19 Vaccine (3 - Booster for Pfizer series) 07/22/2021 5/27/2021    Shingles Vaccine (2 of 2) 11/16/2021 9/21/2021    Mammogram 09/29/2022 9/29/2021    Override on 10/25/2017: Done (reconciled from [facility] outside record with patient approval)    TETANUS VACCINE 04/17/2027 4/17/2017    Lipid Panel 09/19/2027 9/19/2022    Colorectal Cancer Screening 10/15/2031 10/15/2021              Problem List Items Addressed This Visit    None  Visit Diagnoses       Subclinical hyperthyroidism    -  Primary  Discussed labs   Continue to monitor   No medication at this time   Recommend recheck in a few months - TSH ordered       Obesity (BMI 30-39.9)        Relevant Medications    phentermine (ADIPEX-P) 37.5 mg tablet  Common side effects of this medication were discussed with the patient. Questions regarding medications were discussed during this visit.    Recommend to eat more vegetables   Recommend to increase activity - team body project  Decrease processed carbohydrates                     Follow up in about 3 months (around 1/3/2023), or if symptoms worsen or fail to improve.

## 2022-11-08 NOTE — LETTER
[facility] [address], [place] [address]  Phone: [phone]  Fax: [phone]  [facility] [facility]: [email or web address]    Pt Name: Vanessa Jerez  Injury Date: 09/26/2018   Employee ID:  Date of First Treatment: 12/14/2018   Company: [employer]      Appointment Time: 08:45 AM Arrived: 845am   Provider: Mary Kay Yanez NP Time Out:902am     Office Treatment:   1. Work related injury    2. Acute myofascial strain of lumbar region, subsequent encounter    3. Acute bilateral low back pain without sciatica      Medications Ordered This Encounter   Medications    ibuprofen (ADVIL,MOTRIN) 200 MG tablet      Patient Instructions: Attention not to aggravate affected area, Daily home exercises/warm soaks, Apply ice 24-48 hours then apply heat/warm soaks, Continue Physical Therapy(Please take meds as directed for pain and discomfort)    Restrictions: Sit or stand as needed, Avoid frequent bending/lifting/twisting, Avoid climbing/kneeling/squatting, No lifting/pushing/pulling more than 25 lbs(light duty; 12/14/18)     Return Appointment: 12/21/2018 at 10am          Re-Assessment      Patient ID:   Mary Silvermank  Date:   2022      Client ID:  [id number]    [place] [place] [place] [phone] (home)     Family/House:    [] Partner  [] Children  [] Other -     Mental Health:   Hx of depression    Substance Abuse:   NA  Social History     Socioeconomic History    Marital status: Unknown     Spouse name: Not on file    Number of children: Not on file    Years of education: Not on file    Highest education level: Not on file   Occupational History    Not on file   Tobacco Use    Smoking status: Former     Types: Cigarettes     Quit date: 2021     Years since quittin.3    Smokeless tobacco: Never   Vaping Use    Vaping Use: Every day    Substances: Nicotine   Substance and Sexual Activity    Alcohol use: Yes     Comment: social    Drug use: Yes     Types: Marijuana Daril Jasen)    Sexual activity: Not on file   Other Topics Concern    Not on file   Social History Narrative    Not on file     Social Determinants of Health     Financial Resource Strain: Low Risk     Difficulty of Paying Living Expenses: Not hard at all   Food Insecurity: No Food Insecurity    Worried About Running Out of Food in the Last Year: Never true    Ran Out of Food in the Last Year: Never true   Transportation Needs: Not on file   Physical Activity: Not on file   Stress: Not on file   Social Connections: Not on file   Intimate Partner Violence: Not on file   Housing Stability: Not on file       Housing:   apartment    Health/Healthcare:  HIV Stable   Physician Visit:  Dr. Maria Alejandra Young    Dental Visit:  Dr. Philomena Bee- 2 months ago    CD4:   Lab Results   Component Value Date/Time    LABABSO 1031 2022 05:19 PM       Viral Load:  Lab Results   Component Value Date/Time    [id number] Not Detected 2022 05:19 PM    [id number] Not Detected 2022 05:22 PM       Medical Insurance:  Payor: Filiberto Armstrong / Plan: Filiberto Armstrong / Product Type: *No Product type* /    OHDAP/HIPSCA:  NA   Renewal Date: NA    Income:    Employment    Legal Issues:    NA    Emergency Contact:   Extended Emergency Contact Information  Primary Emergency Contact: [address] Phone: Lavell Gonzalez Phone: [phone]  Relation: Other  Preferred language: Michelle needed? No  CM called pt for scheduled appointment. CM completed Annual Review, updated RW Part A Eligibility for services. Pt works full-time, has insurance through Employer. CM reviewed Grievance Policy and Sliding Scale Fee with pt. CM completed PSA and SA/MH Assessments. Pt has hx of depression, no hx of SA. CM updated ISP, pt agreed with POC. Pt remains compliant with care, VL is undetectable. Pt is sexually active, is aware of U=U, undetectable equals un-transmittable. Partner is HIV-, aware of pt's status. CM provided socialization, pt discussed issues with current roommate. Cm provided active listening and support. Cm will follow up with pt as needed.

## 2022-12-08 ENCOUNTER — HOSPITAL ENCOUNTER (EMERGENCY)
Facility: HOSPITAL | Age: 52
Discharge: HOME | End: 2022-12-08
Attending: EMERGENCY MEDICINE
Payer: COMMERCIAL

## 2022-12-08 VITALS
BODY MASS INDEX: 35.55 KG/M2 | HEART RATE: 66 BPM | TEMPERATURE: 98 F | RESPIRATION RATE: 18 BRPM | OXYGEN SATURATION: 100 % | WEIGHT: 240 LBS | HEIGHT: 69 IN | SYSTOLIC BLOOD PRESSURE: 128 MMHG | DIASTOLIC BLOOD PRESSURE: 85 MMHG

## 2022-12-08 DIAGNOSIS — S80.01XA CONTUSION OF RIGHT KNEE, INITIAL ENCOUNTER: Primary | ICD-10-CM

## 2022-12-08 DIAGNOSIS — M25.561 RIGHT KNEE PAIN: ICD-10-CM

## 2022-12-08 PROCEDURE — 99284 EMERGENCY DEPT VISIT MOD MDM: CPT | Mod: ER

## 2022-12-08 RX ORDER — IBUPROFEN 800 MG/1
800 TABLET ORAL EVERY 6 HOURS PRN
Qty: 20 TABLET | Refills: 0 | Status: SHIPPED | OUTPATIENT
Start: 2022-12-08 | End: 2023-01-30 | Stop reason: ALTCHOICE

## 2022-12-08 RX ORDER — METHYLPREDNISOLONE 4 MG/1
TABLET ORAL
Qty: 1 EACH | Refills: 0 | Status: SHIPPED | OUTPATIENT
Start: 2022-12-08 | End: 2023-01-30 | Stop reason: ALTCHOICE

## 2022-12-08 RX ORDER — DICLOFENAC SODIUM 10 MG/G
GEL TOPICAL
Qty: 100 G | Refills: 0 | Status: SHIPPED | OUTPATIENT
Start: 2022-12-08 | End: 2023-01-30 | Stop reason: ALTCHOICE

## 2022-12-09 NOTE — ED PROVIDER NOTES
"Encounter Date: 12/8/2022    SCRIBE #1 NOTE: I, Dutch Jordan, am scribing for, and in the presence of,  Tianna Wilhelm MD. I have scribed the following portions of the note - Other sections scribed: HPI, ROS, PE.     History     Chief Complaint   Patient presents with    Knee Pain     PT REPORTS RIGHT KNEE PAIN SINCE 12/2/2022 AFTER FALL ONTO KNEE, PT REPORTS PATELLA PAIN PERSISTS     This is a 52 y.o. female, with history of Anemia, who presents to the ED with complaints of acute right knee swelling and pain s/p fall 6 days ago. Patient recounts slipping on water she did not see on the ground, causing her to fall onto knees. Though patient reports she does not recall specifics of the injury, she denies hitting head or losing consciousness during fall. Reports ability to "limp away" following incident. Patient says she proceeded to elevate and apply icy hot to right  for the next 2 days. Also reports application of ACE bandage with some relief of swelling. States patella pain persists though, worsened with movement and on palpation. Denies injury to affected knee prior to last week. Patient states she remains mostly sedentary for job and reports leg "starts aching if hanging for too long". No other modifying factors. Patient denies any other known medical problems or compliancy with any daily medications. Denies anticoagulant use. Further denies left leg pain, other extremity pain, neck pain, difficulty urinating, or numbness to groin.      The history is provided by the patient. No  was used.   Review of patient's allergies indicates:   Allergen Reactions    Morphine      Slowed down heart rate     Past Medical History:   Diagnosis Date    Anemia      Past Surgical History:   Procedure Laterality Date    COLONOSCOPY N/A 10/15/2021    Procedure: COLONOSCOPY;  Surgeon: Jez Henriquez MD;  Location: [facility];  Service: Endoscopy;  Laterality: N/A;  covid-10/12/48-[id number]-GT    HYSTERECTOMY  "     TUBAL LIGATION       Family History   Problem Relation Age of Onset    No Known Problems Mother     No Known Problems Father     Diabetes Maternal Grandmother     Diabetes Maternal Grandfather     Cancer Neg Hx     Heart disease Neg Hx     Hypertension Neg Hx     Stroke Neg Hx      Social History     Tobacco Use    Smoking status: Never    Smokeless tobacco: Never   Substance Use Topics    Alcohol use: Yes     Comment: RARELY    Drug use: No     Review of Systems   Respiratory:  Negative for shortness of breath.    Cardiovascular:  Negative for chest pain and leg swelling.   Gastrointestinal:  Negative for nausea and vomiting.   Musculoskeletal:  Positive for arthralgias (right knee) and joint swelling (right knee). Negative for gait problem and neck pain.   Skin:  Negative for rash.   Neurological:  Negative for weakness and numbness.   All other systems reviewed and are negative.    Physical Exam     Initial Vitals [12/08/22 1947]   BP Pulse Resp Temp SpO2   128/85 66 18 97.7 °F (36.5 °C) 100 %      MAP       --         Physical Exam    Nursing note and vitals reviewed.  Constitutional: She appears well-developed and well-nourished. She is not diaphoretic. No distress.   HENT:   Head: Normocephalic and atraumatic.   Mouth/Throat: Oropharynx is clear and moist. No oropharyngeal exudate.   Eyes: EOM are normal. Pupils are equal, round, and reactive to light.   Neck: Neck supple.   Normal range of motion.  Cardiovascular:  Normal rate, regular rhythm and intact distal pulses.           No murmur heard.  Pulses:       Dorsalis pedis pulses are 2+ on the right side and 2+ on the left side.   Pulmonary/Chest: Breath sounds normal. No stridor. No respiratory distress.   Abdominal: Abdomen is soft. Bowel sounds are normal. There is no abdominal tenderness.   Musculoskeletal:         General: No tenderness or edema. Normal range of motion.      Cervical back: Normal range of motion and neck supple.      Right knee:  Swelling present. No erythema or lacerations. Normal range of motion. No tenderness.     Neurological: She is alert and oriented to person, place, and time. She has normal strength. No cranial nerve deficit.   Skin: Skin is warm and dry. No erythema. No pallor.   Psychiatric: She has a normal mood and affect.       ED Course   Procedures  Labs Reviewed - No data to display       Imaging Results              X-Ray Knee 3 View Right (Final result)  Result time 12/08/22 20:28:01      Final result by Altaf Bowles MD (12/08/22 20:28:01)                   Impression:      1. No acute displaced fracture or dislocation of the knee.      Electronically signed by: Altaf Bowles MD  Date:    12/08/2022  Time:    20:28               Narrative:    EXAMINATION:  XR KNEE 3 VIEW RIGHT    CLINICAL HISTORY:  Pain in right knee    TECHNIQUE:  AP, lateral, and Merchant views of the right knee were performed.    COMPARISON:  None    FINDINGS:  Three views right knee.    There is medial compartmental narrowing.  No acute displaced fracture or dislocation of the knee.  No radiopaque foreign body.  No large knee joint effusion.                                       Medications - No data to display  Medical Decision Making:   History:   Old Medical Records: I decided to obtain old medical records.  Clinical Tests:   Radiological Study: Ordered and Reviewed  ED Management:  Patient given knee immobilizer and crutches.                 Labs Reviewed           Imaging Reviewed    Imaging Results              X-Ray Knee 3 View Right (Final result)  Result time 12/08/22 20:28:01      Final result by Altaf Bowles MD (12/08/22 20:28:01)                   Impression:      1. No acute displaced fracture or dislocation of the knee.      Electronically signed by: Altaf Bowles MD  Date:    12/08/2022  Time:    20:28               Narrative:    EXAMINATION:  XR KNEE 3 VIEW RIGHT    CLINICAL HISTORY:  Pain in right knee    TECHNIQUE:  AP,  lateral, and Merchant views of the right knee were performed.    COMPARISON:  None    FINDINGS:  Three views right knee.    There is medial compartmental narrowing.  No acute displaced fracture or dislocation of the knee.  No radiopaque foreign body.  No large knee joint effusion.                                      Medications given in ED    Medications - No data to display      Note was created using voice recognition software. Note may have occasional typographical errors that may not have been identified and edited despite good litzy initial review prior to signing.    I, Tianna Wilhelm MD, personally performed the services described in this documentation. All medical record entries made by the scribe were at my direction and in my presence.  I have reviewed the chart and agree that the record reflects my personal performance and is accurate and complete.          Clinical Impression:   Final diagnoses:  [M25.561] Right knee pain  [S80.01XA] Contusion of right knee, initial encounter (Primary)        ED Disposition Condition    Discharge Stable          ED Prescriptions       Medication Sig Dispense Start Date End Date Auth. Provider    ibuprofen (ADVIL,MOTRIN) 800 MG tablet Take 1 tablet (800 mg total) by mouth every 6 (six) hours as needed for Pain. 20 tablet 12/8/2022 -- Tianna Wilhelm MD    diclofenac sodium (VOLTAREN) 1 % Gel Apply 4g  to affected area every 6 hr as needed for pain. 100 g 12/8/2022 -- Tianna Wilhelm MD    methylPREDNISolone (MEDROL DOSEPACK) 4 mg tablet Take as directed on package 1 each 12/8/2022 -- Tianna Wilhelm MD          Follow-up Information       Follow up With Specialties Details Why Contact Info    The nearest emergency department.  Go to  As needed, If symptoms worsen     Bethanie Sandoval MD Family Medicine, Wound Care Call  As needed, for ongoing care [address] [place] [address]  [place] JES[place] [address]  [phone]      Darlene Szymanski MD Sports Medicine Call in 1 day to schedule an appointment,  for re-evaluation of today's complaint [address] [facility] [address]  [facility] [address]  [phone]               Tianna Wilhelm MD  12/09/22 1959

## 2022-12-09 NOTE — DISCHARGE INSTRUCTIONS
Use knee immobilizer at all times and keep right leg elevated as often as possible while pain persists. Follow up with your Orthopedic Surgeon within one week.

## 2023-01-30 ENCOUNTER — OFFICE VISIT (OUTPATIENT)
Dept: FAMILY MEDICINE | Facility: CLINIC | Age: 53
End: 2023-01-30
Payer: COMMERCIAL

## 2023-01-30 VITALS
SYSTOLIC BLOOD PRESSURE: 112 MMHG | OXYGEN SATURATION: 97 % | BODY MASS INDEX: 36.51 KG/M2 | HEART RATE: 80 BPM | WEIGHT: 246.5 LBS | HEIGHT: 69 IN | TEMPERATURE: 98 F | DIASTOLIC BLOOD PRESSURE: 70 MMHG

## 2023-01-30 DIAGNOSIS — E66.9 OBESITY (BMI 30-39.9): ICD-10-CM

## 2023-01-30 DIAGNOSIS — E05.90 SUBCLINICAL HYPERTHYROIDISM: Primary | ICD-10-CM

## 2023-01-30 PROCEDURE — 1159F MED LIST DOCD IN RCRD: CPT | Mod: CPTII,S$GLB,, | Performed by: FAMILY MEDICINE

## 2023-01-30 PROCEDURE — 1160F PR REVIEW ALL MEDS BY PRESCRIBER/CLIN PHARMACIST DOCUMENTED: ICD-10-PCS | Mod: CPTII,S$GLB,, | Performed by: FAMILY MEDICINE

## 2023-01-30 PROCEDURE — 99999 PR PBB SHADOW E&M-EST. PATIENT-LVL III: ICD-10-PCS | Mod: PBBFAC,,, | Performed by: FAMILY MEDICINE

## 2023-01-30 PROCEDURE — 3078F PR MOST RECENT DIASTOLIC BLOOD PRESSURE < 80 MM HG: ICD-10-PCS | Mod: CPTII,S$GLB,, | Performed by: FAMILY MEDICINE

## 2023-01-30 PROCEDURE — 3008F BODY MASS INDEX DOCD: CPT | Mod: CPTII,S$GLB,, | Performed by: FAMILY MEDICINE

## 2023-01-30 PROCEDURE — 1160F RVW MEDS BY RX/DR IN RCRD: CPT | Mod: CPTII,S$GLB,, | Performed by: FAMILY MEDICINE

## 2023-01-30 PROCEDURE — 1159F PR MEDICATION LIST DOCUMENTED IN MEDICAL RECORD: ICD-10-PCS | Mod: CPTII,S$GLB,, | Performed by: FAMILY MEDICINE

## 2023-01-30 PROCEDURE — 3074F PR MOST RECENT SYSTOLIC BLOOD PRESSURE < 130 MM HG: ICD-10-PCS | Mod: CPTII,S$GLB,, | Performed by: FAMILY MEDICINE

## 2023-01-30 PROCEDURE — 99999 PR PBB SHADOW E&M-EST. PATIENT-LVL III: CPT | Mod: PBBFAC,,, | Performed by: FAMILY MEDICINE

## 2023-01-30 PROCEDURE — 3074F SYST BP LT 130 MM HG: CPT | Mod: CPTII,S$GLB,, | Performed by: FAMILY MEDICINE

## 2023-01-30 PROCEDURE — 3078F DIAST BP <80 MM HG: CPT | Mod: CPTII,S$GLB,, | Performed by: FAMILY MEDICINE

## 2023-01-30 PROCEDURE — 3008F PR BODY MASS INDEX (BMI) DOCUMENTED: ICD-10-PCS | Mod: CPTII,S$GLB,, | Performed by: FAMILY MEDICINE

## 2023-01-30 PROCEDURE — 99214 OFFICE O/P EST MOD 30 MIN: CPT | Mod: S$GLB,,, | Performed by: FAMILY MEDICINE

## 2023-01-30 PROCEDURE — 99214 PR OFFICE/OUTPT VISIT, EST, LEVL IV, 30-39 MIN: ICD-10-PCS | Mod: S$GLB,,, | Performed by: FAMILY MEDICINE

## 2023-01-30 RX ORDER — TIZANIDINE 4 MG/1
4 TABLET ORAL NIGHTLY PRN
COMMUNITY
Start: 2022-10-13 | End: 2023-05-04 | Stop reason: SDUPTHER

## 2023-01-30 RX ORDER — SEMAGLUTIDE 0.5 MG/.5ML
0.5 INJECTION, SOLUTION SUBCUTANEOUS
Qty: 2 ML | Refills: 0 | Status: SHIPPED | OUTPATIENT
Start: 2023-01-30 | End: 2023-04-21 | Stop reason: ALTCHOICE

## 2023-01-30 RX ORDER — SEMAGLUTIDE 0.25 MG/.5ML
0.25 INJECTION, SOLUTION SUBCUTANEOUS
Qty: 2 ML | Refills: 0 | Status: SHIPPED | OUTPATIENT
Start: 2023-01-30 | End: 2023-04-21 | Stop reason: ALTCHOICE

## 2023-01-30 RX ORDER — SEMAGLUTIDE 1 MG/.5ML
1 INJECTION, SOLUTION SUBCUTANEOUS
Qty: 2 ML | Refills: 3 | Status: SHIPPED | OUTPATIENT
Start: 2023-01-30 | End: 2023-04-21 | Stop reason: ALTCHOICE

## 2023-01-30 NOTE — PROGRESS NOTES
"Routine Office Visit    Vanessa Jerez  1970  [id number]      Subjective     Vanessa is a 52 y.o. female who presents today for:    Weight - Patient has been struggling x 3 years. She has not been able to lose weight despite trying. She has been on adipex with no changes in weight. Patient did feel it help with reduction of appetite but weight change has not occurred from previous visit to this visit. She is interested in injectable weight loss medication      Diets tried - cutting back carbohydrates   Medications tried - none      9/19/2022 - 247  10/3/2022 - 247 1/30/2023 - 246  Weight goal - <200      Sleep - sleeps approximately 6 hours.      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      Exercises - she takes stairs at work and at home. She walks on her break when the weather permits.      Meals logged   Breakfast - none yet.   Dinner - none   Lunch - boiled carrots, boiled shrimp and mushrooms   Snacks - blueberry crackers.     Objective     Review of Systems   Constitutional:  Negative for chills and fever.   HENT:  Negative for congestion.    Eyes:  Negative for blurred vision.   Respiratory:  Negative for cough.    Cardiovascular:  Negative for chest pain.   Gastrointestinal:  Negative for abdominal pain, constipation, diarrhea, heartburn, nausea and vomiting.   Genitourinary:  Negative for dysuria.   Musculoskeletal:  Negative for myalgias.   Skin:  Negative for itching and rash.   Neurological:  Negative for dizziness and headaches.   Psychiatric/Behavioral:  Negative for depression.      /70   Pulse 80   Temp 98.2 °F (36.8 °C) (Oral)   Ht 5' 9" (1.753 m)   Wt 111.8 kg (246 lb 7.6 oz)   LMP 04/01/2017   SpO2 97%   BMI 36.40 kg/m²   Physical Exam  Constitutional:       Appearance: She is well-developed.   HENT:      Head: Normocephalic and atraumatic.   Eyes:      Conjunctiva/sclera: Conjunctivae normal.      Pupils: Pupils are equal, round, and reactive to light. "   Cardiovascular:      Rate and Rhythm: Normal rate and regular rhythm.      Heart sounds: Normal heart sounds. No murmur heard.    No friction rub. No gallop.   Pulmonary:      Effort: No respiratory distress.      Breath sounds: Normal breath sounds.   Abdominal:      General: Bowel sounds are normal. There is no distension.      Palpations: Abdomen is soft.      Tenderness: There is no abdominal tenderness.   Musculoskeletal:         General: Normal range of motion.      Cervical back: Normal range of motion and neck supple.   Lymphadenopathy:      Cervical: No cervical adenopathy.   Skin:     General: Skin is warm.   Neurological:      Mental Status: She is alert and oriented to person, place, and time.           Assessment     Health Maintenance         Date Due Completion Date    COVID-19 Vaccine (3 - Booster for Pfizer series) 07/22/2021 5/27/2021    Shingles Vaccine (2 of 2) 11/16/2021 9/21/2021    Mammogram 10/03/2023 10/3/2022    Override on 10/25/2017: Done (reconciled from [facility] outside record with patient approval)    Hemoglobin A1c (Diabetic Prevention Screening) 09/19/2025 9/19/2022    TETANUS VACCINE 04/17/2027 4/17/2017    Lipid Panel 09/19/2027 9/19/2022    Colorectal Cancer Screening 10/15/2031 10/15/2021              Problem List Items Addressed This Visit    None  Visit Diagnoses       Subclinical hyperthyroidism    -  Primary    Obesity (BMI 30-39.9)        Relevant Medications    semaglutide, weight loss, (WEGOVY) 0.25 mg/0.5 mL PnIj    semaglutide, weight loss, (WEGOVY) 0.5 mg/0.5 mL PnIj    semaglutide, weight loss, (WEGOVY) 1 mg/0.5 mL PnIj  Common side effects of this medication were discussed with the patient. Questions regarding medications were discussed during this visit.    Increase activity   Increase sleep   Increase whole foods. Decrease processed foods   Consider other GLP if wegovy not covered   Consider Qsymia if medications are not covered                     Follow up in about 4  months (around 5/30/2023), or if symptoms worsen or fail to improve.

## 2023-02-14 ENCOUNTER — TELEPHONE (OUTPATIENT)
Dept: FAMILY MEDICINE | Facility: CLINIC | Age: 53
End: 2023-02-14
Payer: COMMERCIAL

## 2023-02-14 DIAGNOSIS — M25.569 KNEE PAIN, UNSPECIFIED CHRONICITY, UNSPECIFIED LATERALITY: Primary | ICD-10-CM

## 2023-02-14 NOTE — TELEPHONE ENCOUNTER
Called patient and gave her the message for s referral for physical therapy. And she should follow up with Dr Harrell on her portal.

## 2023-02-14 NOTE — TELEPHONE ENCOUNTER
Pt requesting referral for PT, fell on knee 12/02/2022 and still has pain and pops when pt is walking, brace was received from ER, Please advise

## 2023-02-14 NOTE — TELEPHONE ENCOUNTER
----- Message from Sarah Justice sent at 2/14/2023  1:32 PM CST -----  Type: Patient Call Back        Who called:self         What is the request in detail: she is asking for a nurse to give her a call ... she has some questions and concerns         Can the clinic reply by [facility]? No         Would the patient rather a call back or a response via [facility]? Yes         Best call back number: [phone] (home)               Thank You

## 2023-02-14 NOTE — TELEPHONE ENCOUNTER
Referral to physical therapy placed   Patient may also need to follow-up with sports medicine   Looks like Patient has seen Dr. Szymanski in the past   Patient can schedule with Dr. Szymanski through the portal since Patient has seen Dr. Szymanski in the past

## 2023-02-15 NOTE — PROGRESS NOTES
"[facility]   Physical Therapy Initial Evaluation     Date: 2/16/2023   Name: Vanessa Jerez  Clinic Number: [id number]    Therapy Diagnosis:   Encounter Diagnosis   Name Primary?    Knee pain, unspecified chronicity, unspecified laterality      Physician: Bethanie Sandoval MD    Physician Orders: PT Eval and Treat   Medical Diagnosis from Referral: M25.569 (ICD-10-CM) - Knee pain, unspecified chronicity, unspecified laterality  Evaluation Date: 2/16/2023  Authorization Period Expiration: 2/14/2024  Plan of Care Expiration: 3/30/2023  Progress Note Due: 3/16/2023  Visit # / Visits authorized: 1/ 1   FOTO: 0/1    Precautions: Standard     Time In: 12:30 PM  Time Out: 1:27 PM   Total Billable Time: 57 minutes      SUBJECTIVE     Date of onset: 12/2/2022 fall onto R knee (slipped on wet spot) in the [facility].     History of current condition - Vanessa reports: she went ED on 12/2/2022. Ever since incident she has had issues with prolonged sitting position at work. Loud popping noise with walking that is painless. R knee gives out on her on occasion. Quick movements or unexpected force to R knee can cause to give out per reports. Uncertain of medication being taken for pain at this time. States "its the stuff given to me in the ED". Additionally states she has a knee brace from the ED that she doesn't enjoy using because it complicates driving.     Falls: none    Imaging, Radiology report: 12/8/2022    There is medial compartmental narrowing.  No acute displaced fracture or dislocation of the knee.  No radiopaque foreign body.  No large knee joint effusion.    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 Active riding her bike in the community.   Current Level of Function: Unable to " walk prior distances, difficulty with stairs, limitations in sitting position tolerance.     Pain:  Current 5/10, worst 8/10, best 5/10   Location: right knee    Description: Aching, Dull, Throbbing, Grabbing, Deep, and Sharp  Aggravating Factors: Sitting, Standing, Bending, Walking, Morning, Flexing, and Getting out of bed/chair, Difficulty standing within kitchen for long periods  Easing Factors: pain medication, ice, heating pad, and rest    Patients goals: Riding bicycle without pain, Cooking meals while standing without pain      Medical History:   Past Medical History:   Diagnosis Date    Anemia        Surgical History:   Vanessa Jerez  has a past surgical history that includes Tubal ligation; Hysterectomy; and Colonoscopy (N/A, 10/15/2021).    Medications:   Vanessa has a current medication list which includes the following prescription(s): wegovy, wegovy, wegovy, and tizanidine.    Allergies:   Review of patient's allergies indicates:   Allergen Reactions    Morphine      Slowed down heart rate          OBJECTIVE     Observation: Walks with mild limp of R knee and inconsistent foot/heel strike. Preference for knee flexion with lack of terminal knee extension impacting late stance and push off on R LE. Knee buckling intermittently reported with audible click present during deep STS from low mat surfaces. Inability to tolerate single limb stance on R leg.     Functional tests:   SL squat: not tolerated  SLS EO: 1 second P! (R), 8 seconds (L)  SLS EC: not tolerated    Range of Motion (Passive):   Knee Right  Left    Flexion 102 deg 115   Extension 0 0     Range of Motion (Active):   Knee Right  Left    Flexion 95 110   Extension -2 0       Lower Extremity Strength  Right LE  Left LE    Quadriceps: 4-/5 Quadriceps: 4/5   Hamstrings: 4-/5 Hamstrings: 4+/5   Hip flexion (seated): 4/5 Hip flexion (seated): 4/5   Hip extension:  4-/5 Hip extension: 4/5   PGM: 3+/5 PGM:  4-/5     Special Tests:   Right Left    Valgus Stress Test + -   Varus Stress test + -   Lachman's test - -   Posterior Lachman - -   Jose L's Test + -   Thessaly's Test + -   Patellar Grind Test + +     Joint Mobility: Stiffness within R knee with audible click during passive repeated flexion/extension     Palpation: TTP along R knee lateral infra/patellar margins and lateral joint line where isolated swelling pocket observed.     Sensation: intact to soft touch of R lower extremity     Flexibility: (impairments greater in R LE)   Hamstrings: R = ++ ; L = +        Edema: present     Girth Measurement Joint line     Right 44.5 cm     Left 43 cm        Functional testin x STS: 49.27 seconds (high momentum)   6 MWT: 3 minutes 42 seconds completed (602 feet completed)   Staircase: Step to pattern with HHA x 1 reliance (unable to control R knee eccentric descent/pain)     TREATMENT     Total Treatment time (time-based codes) separate from Evaluation: 28 minutes      Vanessa received the treatments listed below:      therapeutic exercises to develop strength, endurance, ROM, and flexibility for 23 minutes including:    R Quad set 3'' x 10   R SAQ 3'' x 10   R SLR x 10   STS 25-30 inch mat 3 x 5  Nustep 8 minutes (1.5 resistance)  Ambulation 602 feet w/ SBA by DPT    manual therapy techniques: Joint mobilizations and Soft tissue Mobilization were applied to the: R knee for 5 minutes, including:    PROM R knee  STM and lateral R knee joint line effleurage       PATIENT EDUCATION AND HOME EXERCISES     Education provided:   - HEP review   - Anatomy of knee and biomechanics     Written Home Exercises Provided: yes. Exercises were reviewed and Vanessa was able to demonstrate them prior to the end of the session.  Vanessa demonstrated fair  understanding of the education provided. See EMR under Patient Instructions for exercises provided during therapy sessions.    ASSESSMENT     Vanessa is a 52 y.o. female referred to outpatient Physical Therapy with a  "medical diagnosis of unspecified R knee pain. Patient presents with multiple special testing signs and objective measures indicative of lateral meniscal involvement of R knee. Conservative management with quadriceps strengthening, balance, gait mechanics and modalities for management of edema and pain to be provided within PT. Patient verbalizes her low pain threshold and inability to tolerate many tasks and duties pertaining to "exercise". Patient has strong pain response with joint loading and unable to tolerate single leg balance in addition to pain to point of tears descending staircase. Audible click and intermittent popping present in gait that is altered with inconsistent heel strike/propulsion. Range of motion impaired and strength deficits impacting activity of daily living and duties at work. Prolonged postures and long work schedule impacting strength maintenance in legs. Patient is a good candidate for rehab to address deficits with trail of PT. Additional imaging may be consulted if progress not seen within rehab.     Patient prognosis is Fair.   Patient will benefit from skilled outpatient Physical Therapy to address the deficits stated above and in the chart below, provide patient /family education, and to maximize patientt's level of independence.     Plan of care discussed with patient: Yes  Patient's spiritual, cultural and educational needs considered and patient is agreeable to the plan of care and goals as stated below:     Anticipated Barriers for therapy: Health literacy, lifestyle, scheduling     Medical Necessity is demonstrated by the following  History  Co-morbidities and personal factors that may impact the plan of care Co-morbidities:   Chronic low back pain     Personal Factors:   education level  coping style  social background  lifestyle     moderate   Examination  Body Structures and Functions, activity limitations and participation restrictions that may impact the plan of care Body " Regions:   lower extremities    Body Systems:    ROM  strength  balance  gait  transfers    Participation Restrictions:   Work duties  Leisure/recreational participation  Exercise (walking)     Activity limitations:   Learning and applying knowledge  no deficits    General Tasks and Commands  no deficits    Communication  no deficits    Mobility  lifting and carrying objects  walking    Self care  no deficits    Domestic Life  shopping  cooking  doing house work (cleaning house, washing dishes, laundry)    Interactions/Relationships  no deficits    Life Areas  employment    Community and Social Life  community life  recreation and leisure         low   Clinical Presentation stable and uncomplicated low   Decision Making/ Complexity Score: low     GOALS: Short Term Goals:  3 weeks  1.Report decreased R knee pain  < / =  4/10  to increase tolerance for work related duties.  2. Increase R knee flexion ROM to 115 degrees in order to be able to perform ADLs without difficulty.  3. Increase strength by 1/3 MMT grade in gross R lower extremity to increase tolerance for ADL and work activities.  4. Pt to tolerate HEP to improve ROM and independence with ADL's    Long Term Goals: 6 weeks  1.Report decreased R knee pain < / = 1/10  to increase tolerance for resuming exercise and walking program.   2.Patient goal: Be able to stand for greater than 1 hour and cook a meal without pain.   3.Increase strength to >/= 4+/5 in B gross lower extremities to increase tolerance for ADL and work activities.  4. Pt will complete 6 MWT duration and cover at least 1000 feet without necessary breaks.     PLAN   Plan of care Certification: 2/16/2023 to 3/30/2023.    Outpatient Physical Therapy 2 times weekly for 6 weeks to include the following interventions: Manual Therapy, Moist Heat/ Ice, Neuromuscular Re-ed, Patient Education, Therapeutic Activities, and Therapeutic Exercise.     Paul Bowens, PT      I CERTIFY THE NEED FOR THESE SERVICES  FURNISHED UNDER THIS PLAN OF TREATMENT AND WHILE UNDER MY CARE   Physician's comments:     Physician's Signature: ___________________________________________________

## 2023-02-16 ENCOUNTER — CLINICAL SUPPORT (OUTPATIENT)
Dept: REHABILITATION | Facility: HOSPITAL | Age: 53
End: 2023-02-16
Attending: FAMILY MEDICINE
Payer: COMMERCIAL

## 2023-02-16 DIAGNOSIS — M25.569 KNEE PAIN, UNSPECIFIED CHRONICITY, UNSPECIFIED LATERALITY: ICD-10-CM

## 2023-02-16 PROCEDURE — 97161 PT EVAL LOW COMPLEX 20 MIN: CPT | Mod: PN

## 2023-02-16 PROCEDURE — 97110 THERAPEUTIC EXERCISES: CPT | Mod: PN

## 2023-02-16 NOTE — Clinical Note
Evaluation completed today. Thank you for the referral. Please see plan of care for further details.   Thank you,  Paul HELMST

## 2023-02-16 NOTE — PLAN OF CARE
[facility]   Physical Therapy Initial Evaluation     Date: 2/16/2023   Name: Vanessa Jerez  Clinic Number: [id number]    Therapy Diagnosis:   Encounter Diagnosis   Name Primary?    Knee pain, unspecified chronicity, unspecified laterality      Physician: Bethanie Sandoval MD    Physician Orders: PT Eval and Treat   Medical Diagnosis from Referral: M25.569 (ICD-10-CM) - Knee pain, unspecified chronicity, unspecified laterality  Evaluation Date: 2/16/2023  Authorization Period Expiration: 2/14/2024  Plan of Care Expiration: 3/30/2023  Progress Note Due: 3/16/2023  Visit # / Visits authorized: 1/ 1   FOTO: 0/1    Precautions: Standard     Time In: 12:30 PM  Time Out: 1:27 PM   Total Billable Time: 57 minutes      SUBJECTIVE     Date of onset: 12/2/2022 fall onto R knee (slipped on wet spot) in the [facility].     History of current condition - Vanessa reports: she went ED on 12/2/2022. Ever since incident she has had issues with prolonged sitting position at work. Loud popping noise with walking that is painless. R knee gives out on her on occasion. Quick movements or unexpected force to R knee can cause to give out per reports. Uncertain of medication being taken for pain at this time. States "its the stuff given to me in the ED". Additionally states she has a knee brace from the ED that she doesn't enjoy using because it complicates driving.     Falls: none    Imaging, Radiology report: 12/8/2022    There is medial compartmental narrowing.  No acute displaced fracture or dislocation of the knee.  No radiopaque foreign body.  No large knee joint effusion.    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 Active riding her bike in the community.   Current Level of Function: Unable to " walk prior distances, difficulty with stairs, limitations in sitting position tolerance.     Pain:  Current 5/10, worst 8/10, best 5/10   Location: right knee    Description: Aching, Dull, Throbbing, Grabbing, Deep, and Sharp  Aggravating Factors: Sitting, Standing, Bending, Walking, Morning, Flexing, and Getting out of bed/chair, Difficulty standing within kitchen for long periods  Easing Factors: pain medication, ice, heating pad, and rest    Patients goals: Riding bicycle without pain, Cooking meals while standing without pain      Medical History:   Past Medical History:   Diagnosis Date    Anemia        Surgical History:   Vanessa Jerez  has a past surgical history that includes Tubal ligation; Hysterectomy; and Colonoscopy (N/A, 10/15/2021).    Medications:   Vanessa has a current medication list which includes the following prescription(s): wegovy, wegovy, wegovy, and tizanidine.    Allergies:   Review of patient's allergies indicates:   Allergen Reactions    Morphine      Slowed down heart rate          OBJECTIVE     Observation: Walks with mild limp of R knee and inconsistent foot/heel strike. Preference for knee flexion with lack of terminal knee extension impacting late stance and push off on R LE. Knee buckling intermittently reported with audible click present during deep STS from low mat surfaces. Inability to tolerate single limb stance on R leg.     Functional tests:   SL squat: not tolerated  SLS EO: 1 second P! (R), 8 seconds (L)  SLS EC: not tolerated    Range of Motion (Passive):   Knee Right  Left    Flexion 102 deg 115   Extension 0 0     Range of Motion (Active):   Knee Right  Left    Flexion 95 110   Extension -2 0       Lower Extremity Strength  Right LE  Left LE    Quadriceps: 4-/5 Quadriceps: 4/5   Hamstrings: 4-/5 Hamstrings: 4+/5   Hip flexion (seated): 4/5 Hip flexion (seated): 4/5   Hip extension:  4-/5 Hip extension: 4/5   PGM: 3+/5 PGM:  4-/5     Special Tests:   Right Left    Valgus Stress Test + -   Varus Stress test + -   Lachman's test - -   Posterior Lachman - -   Jose L's Test + -   Thessaly's Test + -   Patellar Grind Test + +     Joint Mobility: Stiffness within R knee with audible click during passive repeated flexion/extension     Palpation: TTP along R knee lateral infra/patellar margins and lateral joint line where isolated swelling pocket observed.     Sensation: intact to soft touch of R lower extremity     Flexibility: (impairments greater in R LE)   Hamstrings: R = ++ ; L = +        Edema: present     Girth Measurement Joint line     Right 44.5 cm     Left 43 cm        Functional testin x STS: 49.27 seconds (high momentum)   6 MWT: 3 minutes 42 seconds completed (602 feet completed)   Staircase: Step to pattern with HHA x 1 reliance (unable to control R knee eccentric descent/pain)     TREATMENT     Total Treatment time (time-based codes) separate from Evaluation: 28 minutes      Vanessa received the treatments listed below:      therapeutic exercises to develop strength, endurance, ROM, and flexibility for 23 minutes including:    R Quad set 3'' x 10   R SAQ 3'' x 10   R SLR x 10   STS 25-30 inch mat 3 x 5  Nustep 8 minutes (1.5 resistance)  Ambulation 602 feet w/ SBA by DPT    manual therapy techniques: Joint mobilizations and Soft tissue Mobilization were applied to the: R knee for 5 minutes, including:    PROM R knee  STM and lateral R knee joint line effleurage       PATIENT EDUCATION AND HOME EXERCISES     Education provided:   - HEP review   - Anatomy of knee and biomechanics     Written Home Exercises Provided: yes. Exercises were reviewed and Vanessa was able to demonstrate them prior to the end of the session.  Vanessa demonstrated fair  understanding of the education provided. See EMR under Patient Instructions for exercises provided during therapy sessions.    ASSESSMENT     Vanessa is a 52 y.o. female referred to outpatient Physical Therapy with a  "medical diagnosis of unspecified R knee pain. Patient presents with multiple special testing signs and objective measures indicative of lateral meniscal involvement of R knee. Conservative management with quadriceps strengthening, balance, gait mechanics and modalities for management of edema and pain to be provided within PT. Patient verbalizes her low pain threshold and inability to tolerate many tasks and duties pertaining to "exercise". Patient has strong pain response with joint loading and unable to tolerate single leg balance in addition to pain to point of tears descending staircase. Audible click and intermittent popping present in gait that is altered with inconsistent heel strike/propulsion. Range of motion impaired and strength deficits impacting activity of daily living and duties at work. Prolonged postures and long work schedule impacting strength maintenance in legs. Patient is a good candidate for rehab to address deficits with trail of PT. Additional imaging may be consulted if progress not seen within rehab.     Patient prognosis is Fair.   Patient will benefit from skilled outpatient Physical Therapy to address the deficits stated above and in the chart below, provide patient /family education, and to maximize patientt's level of independence.     Plan of care discussed with patient: Yes  Patient's spiritual, cultural and educational needs considered and patient is agreeable to the plan of care and goals as stated below:     Anticipated Barriers for therapy: Health literacy, lifestyle, scheduling     Medical Necessity is demonstrated by the following  History  Co-morbidities and personal factors that may impact the plan of care Co-morbidities:   Chronic low back pain     Personal Factors:   education level  coping style  social background  lifestyle     moderate   Examination  Body Structures and Functions, activity limitations and participation restrictions that may impact the plan of care Body " Regions:   lower extremities    Body Systems:    ROM  strength  balance  gait  transfers    Participation Restrictions:   Work duties  Leisure/recreational participation  Exercise (walking)     Activity limitations:   Learning and applying knowledge  no deficits    General Tasks and Commands  no deficits    Communication  no deficits    Mobility  lifting and carrying objects  walking    Self care  no deficits    Domestic Life  shopping  cooking  doing house work (cleaning house, washing dishes, laundry)    Interactions/Relationships  no deficits    Life Areas  employment    Community and Social Life  community life  recreation and leisure         low   Clinical Presentation stable and uncomplicated low   Decision Making/ Complexity Score: low     GOALS: Short Term Goals:  3 weeks  1.Report decreased R knee pain  < / =  4/10  to increase tolerance for work related duties.  2. Increase R knee flexion ROM to 115 degrees in order to be able to perform ADLs without difficulty.  3. Increase strength by 1/3 MMT grade in gross R lower extremity to increase tolerance for ADL and work activities.  4. Pt to tolerate HEP to improve ROM and independence with ADL's    Long Term Goals: 6 weeks  1.Report decreased R knee pain < / = 1/10  to increase tolerance for resuming exercise and walking program.   2.Patient goal: Be able to stand for greater than 1 hour and cook a meal without pain.   3.Increase strength to >/= 4+/5 in B gross lower extremities to increase tolerance for ADL and work activities.  4. Pt will complete 6 MWT duration and cover at least 1000 feet without necessary breaks.     PLAN   Plan of care Certification: 2/16/2023 to 3/30/2023.    Outpatient Physical Therapy 2 times weekly for 6 weeks to include the following interventions: Manual Therapy, Moist Heat/ Ice, Neuromuscular Re-ed, Patient Education, Therapeutic Activities, and Therapeutic Exercise.     Paul Bowens, PT      I CERTIFY THE NEED FOR THESE SERVICES  FURNISHED UNDER THIS PLAN OF TREATMENT AND WHILE UNDER MY CARE   Physician's comments:     Physician's Signature: ___________________________________________________

## 2023-02-23 ENCOUNTER — TELEPHONE (OUTPATIENT)
Dept: SPORTS MEDICINE | Facility: CLINIC | Age: 53
End: 2023-02-23
Payer: COMMERCIAL

## 2023-02-23 NOTE — TELEPHONE ENCOUNTER
Returned call, unable to provide same day appt. Pt elected to be scheduled next day at 2/24 with Dr. Szymanski.    Adriana Butterfield, MS, OTC  Clinical Assistant to Dr. Darlene Szymanski    ----- Message from Mikal Marin sent at 2/23/2023  1:08 PM CST -----  Regarding: SAME DAY APPOINTMENT  Contact: Self  Pt ask to be seen today for left shoulder pain. Pt has been schedule w/ Dr Szymanski and placed on the waiting list for a sooner appointment However pt stated she need to be seen today and is willing to see anyone today. Pt is an employee at [employer] and can come over now if possible Pt ask for a call      Contact info  [phone]

## 2023-02-24 ENCOUNTER — OFFICE VISIT (OUTPATIENT)
Dept: SPORTS MEDICINE | Facility: CLINIC | Age: 53
End: 2023-02-24
Payer: COMMERCIAL

## 2023-02-24 VITALS
SYSTOLIC BLOOD PRESSURE: 137 MMHG | BODY MASS INDEX: 35.84 KG/M2 | WEIGHT: 242 LBS | HEIGHT: 69 IN | HEART RATE: 103 BPM | DIASTOLIC BLOOD PRESSURE: 94 MMHG

## 2023-02-24 DIAGNOSIS — G89.29 CHRONIC LEFT SHOULDER PAIN: Primary | ICD-10-CM

## 2023-02-24 DIAGNOSIS — M25.512 CHRONIC LEFT SHOULDER PAIN: Primary | ICD-10-CM

## 2023-02-24 DIAGNOSIS — S43.102S SEPARATION OF LEFT ACROMIOCLAVICULAR JOINT, SEQUELA: ICD-10-CM

## 2023-02-24 PROCEDURE — 3075F PR MOST RECENT SYSTOLIC BLOOD PRESS GE 130-139MM HG: ICD-10-PCS | Mod: CPTII,S$GLB,, | Performed by: STUDENT IN AN ORGANIZED HEALTH CARE EDUCATION/TRAINING PROGRAM

## 2023-02-24 PROCEDURE — 1125F AMNT PAIN NOTED PAIN PRSNT: CPT | Mod: CPTII,S$GLB,, | Performed by: STUDENT IN AN ORGANIZED HEALTH CARE EDUCATION/TRAINING PROGRAM

## 2023-02-24 PROCEDURE — 99999 PR PBB SHADOW E&M-EST. PATIENT-LVL III: CPT | Mod: PBBFAC,,, | Performed by: STUDENT IN AN ORGANIZED HEALTH CARE EDUCATION/TRAINING PROGRAM

## 2023-02-24 PROCEDURE — 3008F BODY MASS INDEX DOCD: CPT | Mod: CPTII,S$GLB,, | Performed by: STUDENT IN AN ORGANIZED HEALTH CARE EDUCATION/TRAINING PROGRAM

## 2023-02-24 PROCEDURE — 1125F PR PAIN SEVERITY QUANTIFIED, PAIN PRESENT: ICD-10-PCS | Mod: CPTII,S$GLB,, | Performed by: STUDENT IN AN ORGANIZED HEALTH CARE EDUCATION/TRAINING PROGRAM

## 2023-02-24 PROCEDURE — 1159F PR MEDICATION LIST DOCUMENTED IN MEDICAL RECORD: ICD-10-PCS | Mod: CPTII,S$GLB,, | Performed by: STUDENT IN AN ORGANIZED HEALTH CARE EDUCATION/TRAINING PROGRAM

## 2023-02-24 PROCEDURE — 20606 INTERMEDIATE JOINT ASPIRATION/INJECTION: L ACROMIOCLAVICULAR: ICD-10-PCS | Mod: LT,S$GLB,, | Performed by: STUDENT IN AN ORGANIZED HEALTH CARE EDUCATION/TRAINING PROGRAM

## 2023-02-24 PROCEDURE — 1159F MED LIST DOCD IN RCRD: CPT | Mod: CPTII,S$GLB,, | Performed by: STUDENT IN AN ORGANIZED HEALTH CARE EDUCATION/TRAINING PROGRAM

## 2023-02-24 PROCEDURE — 3075F SYST BP GE 130 - 139MM HG: CPT | Mod: CPTII,S$GLB,, | Performed by: STUDENT IN AN ORGANIZED HEALTH CARE EDUCATION/TRAINING PROGRAM

## 2023-02-24 PROCEDURE — 3008F PR BODY MASS INDEX (BMI) DOCUMENTED: ICD-10-PCS | Mod: CPTII,S$GLB,, | Performed by: STUDENT IN AN ORGANIZED HEALTH CARE EDUCATION/TRAINING PROGRAM

## 2023-02-24 PROCEDURE — 99499 NO LOS: ICD-10-PCS | Mod: S$GLB,,, | Performed by: STUDENT IN AN ORGANIZED HEALTH CARE EDUCATION/TRAINING PROGRAM

## 2023-02-24 PROCEDURE — 20606 DRAIN/INJ JOINT/BURSA W/US: CPT | Mod: LT,S$GLB,, | Performed by: STUDENT IN AN ORGANIZED HEALTH CARE EDUCATION/TRAINING PROGRAM

## 2023-02-24 PROCEDURE — 99499 UNLISTED E&M SERVICE: CPT | Mod: S$GLB,,, | Performed by: STUDENT IN AN ORGANIZED HEALTH CARE EDUCATION/TRAINING PROGRAM

## 2023-02-24 PROCEDURE — 1160F RVW MEDS BY RX/DR IN RCRD: CPT | Mod: CPTII,S$GLB,, | Performed by: STUDENT IN AN ORGANIZED HEALTH CARE EDUCATION/TRAINING PROGRAM

## 2023-02-24 PROCEDURE — 3080F DIAST BP >= 90 MM HG: CPT | Mod: CPTII,S$GLB,, | Performed by: STUDENT IN AN ORGANIZED HEALTH CARE EDUCATION/TRAINING PROGRAM

## 2023-02-24 PROCEDURE — 1160F PR REVIEW ALL MEDS BY PRESCRIBER/CLIN PHARMACIST DOCUMENTED: ICD-10-PCS | Mod: CPTII,S$GLB,, | Performed by: STUDENT IN AN ORGANIZED HEALTH CARE EDUCATION/TRAINING PROGRAM

## 2023-02-24 PROCEDURE — 99999 PR PBB SHADOW E&M-EST. PATIENT-LVL III: ICD-10-PCS | Mod: PBBFAC,,, | Performed by: STUDENT IN AN ORGANIZED HEALTH CARE EDUCATION/TRAINING PROGRAM

## 2023-02-24 PROCEDURE — 3080F PR MOST RECENT DIASTOLIC BLOOD PRESSURE >= 90 MM HG: ICD-10-PCS | Mod: CPTII,S$GLB,, | Performed by: STUDENT IN AN ORGANIZED HEALTH CARE EDUCATION/TRAINING PROGRAM

## 2023-02-24 RX ORDER — KETOROLAC TROMETHAMINE 30 MG/ML
30 INJECTION, SOLUTION INTRAMUSCULAR; INTRAVENOUS
Status: DISCONTINUED | OUTPATIENT
Start: 2023-02-24 | End: 2023-02-24 | Stop reason: HOSPADM

## 2023-02-24 RX ADMIN — KETOROLAC TROMETHAMINE 30 MG: 30 INJECTION, SOLUTION INTRAMUSCULAR; INTRAVENOUS at 02:02

## 2023-02-24 NOTE — PROCEDURES
Intermediate Joint Aspiration/Injection: L acromioclavicular    Date/Time: 2/24/2023 2:15 PM  Performed by: Darlene Szymanski MD  Authorized by: Darlene Szymanski MD     Consent Done?:  Yes (Verbal)  Indications:  Pain, diagnostic evaluation and arthritis  Site marked: The procedure site was marked    Timeout: Prior to procedure the correct patient, procedure, and site was verified      Location:  Shoulder  Site:  L acromioclavicular  Prep: Patient was prepped and draped in usual sterile fashion    Ultrasonic Guidance for needle placement: Yes (Ultrasound guidance used to avoid neurovascular injury and to increase acuracy.)  Images are saved and documented.    Needle size:  22 G  Approach: Superolateral   Medications:  30 mg ketorolac 30 mg/mL (1 mL) (Ropivacaine 0.2% 0.5mL)  Patient tolerance:  Patient tolerated the procedure well with no immediate complications       Additional Comments: TECHNIQUE: Real time ultrasound examination of the left acromioclavicular joint(s) was performed with SonoSite Edge 2, 9-L MHz linear probe(s). Ultrasound guidance was used for needle localization. Images were saved and stored for documentation. Dynamic visualization of the needle was continuous throughout the procedures and maintained in good position.

## 2023-02-24 NOTE — PROGRESS NOTES
"CC: left shoulder pain    52 y.o. Female presents today for follow up evaluation of her left shoulder pain. Pt reports pain has been unbearable for the past 3 weeks. Pt reports her pain is 8/10 today, and states her pain was so high at work the other day that she had to leave work early. Pt reports global shoulder pain, with intermittent referred pain in lateral neck as well as upper arm. Pt denies mechanical symptoms. Pt denies numbness/tingling.     Attempted treatments: none since last visit  Pain score: 8/10  History of trauma/injury: none since last visit  Affecting ADLs: yes      REVIEW OF SYSTEMS:   Constitution: Patient denies fever or chills.  Eyes: Patient denies eye pain or vision changes.  HEENT: Patient denies ear pain, sore throat, or nasal discharge.  CVS: Patient denies chest pain.  Lungs: Patient denies shortness of breath or cough.  Skin: Patient denies skin rash or itching.    Musculoskeletal: Patient denies recent falls. See HPI.  Psych: Patient denies any current anxiety or nervousness.    PAST MEDICAL HISTORY:   Past Medical History:   Diagnosis Date    Anemia        MEDICATIONS:     Current Outpatient Medications:     semaglutide, weight loss, (WEGOVY) 0.25 mg/0.5 mL PnIj, Inject 0.25 mg into the skin every 7 days. For 4 weeks., Disp: 2 mL, Rfl: 0    semaglutide, weight loss, (WEGOVY) 0.5 mg/0.5 mL PnIj, Inject 0.5 mg into the skin every 7 days. Start after completion of wegovy 0.25mg. Use x 4 weeks, Disp: 2 mL, Rfl: 0    semaglutide, weight loss, (WEGOVY) 1 mg/0.5 mL PnIj, Inject 1 mg into the skin every 7 days., Disp: 2 mL, Rfl: 3    tiZANidine (ZANAFLEX) 4 MG tablet, Take 4 mg by mouth nightly as needed., Disp: , Rfl:     ALLERGIES:   Review of patient's allergies indicates:   Allergen Reactions    Morphine      Slowed down heart rate        PHYSICAL EXAMINATION:  BP (!) 137/94   Pulse 103   Ht 5' 9" (1.753 m)   Wt 109.8 kg (242 lb)   LMP 04/01/2017   BMI 35.74 kg/m²   Vitals signs and " nursing note have been reviewed.    General: In no acute distress, well developed, well nourished, no diaphoresis  Eyes: EOM full and smooth, no eye redness or discharge  HENT: normocephalic and atraumatic, neck supple, trachea midline, no nasal discharge  Cardiovascular: no LE edema  Lungs: respirations non-labored, no conversational dyspnea   Neuro: AAOx3, CN2-12 grossly intact  Skin: No rashes, warm and dry  Psychiatric: cooperative, pleasant, mood and affect appropriate for age        ASSESSMENT:      ICD-10-CM ICD-9-CM   1. Chronic left shoulder pain  M25.512 719.41    G89.29 338.29   2. Separation of left acromioclavicular joint, sequela  S43.102S 905.6         PLAN:  In keeping with previous plan, we will move for with left AC joint ketorolac injection.  Pending improvement, we will repeat steroid injection and have her take Benadryl around the same time to combat her reaction.    Risks and benefits were discussed with patient prior to receiving injection.  Depending on injection type, risks include the possibility of infection, pain, disruptions in blood pressure and blood sugar, and cosmetic deformity at site of injection.    Future planning includes - continue exercise program, consider corticosteroid injection    All questions were answered to the best of my ability and all concerns were addressed at this time.    Follow up in 2 week(s) for above, or sooner if needed.      This note is dictated using the M*Modal Fluency Direct word recognition program. There are word recognition mistakes that are occasionally missed on review.

## 2023-02-24 NOTE — PATIENT INSTRUCTIONS
You had a Ketorolac (NSAID) injection today. There are two medicines in this injection,  a numbing medicine (local anesthetic) and NSAID.     There are risks with this procedure.   Any time the skin is punctured with a needle, there is a small risk of infection. With these injections that risk is less than 1 and 14,000. 2-3% of people developed a dimple or pale spot at the injection site, which is strictly cosmetic.

## 2023-02-27 ENCOUNTER — CLINICAL SUPPORT (OUTPATIENT)
Dept: REHABILITATION | Facility: HOSPITAL | Age: 53
End: 2023-02-27
Attending: FAMILY MEDICINE
Payer: COMMERCIAL

## 2023-02-27 DIAGNOSIS — M54.50 ACUTE LOW BACK PAIN WITH DISC SYMPTOMS, DURATION LESS THAN 6 WEEKS: Primary | ICD-10-CM

## 2023-02-27 DIAGNOSIS — M51.9 ACUTE LOW BACK PAIN WITH DISC SYMPTOMS, DURATION LESS THAN 6 WEEKS: Primary | ICD-10-CM

## 2023-02-27 PROCEDURE — 97140 MANUAL THERAPY 1/> REGIONS: CPT | Mod: PN

## 2023-02-27 PROCEDURE — 97110 THERAPEUTIC EXERCISES: CPT | Mod: PN

## 2023-02-27 NOTE — PROGRESS NOTES
[facility] OUTPATIENT THERAPY AND WELLNESS   Physical Therapy Treatment Note     Name: Vanessa Jerez  Clinic Number: [id number]    Therapy Diagnosis:   Encounter Diagnosis   Name Primary?    Acute low back pain with disc symptoms, duration less than 6 weeks Yes     Physician: Bethanie Sandoval MD    Visit Date: 2/27/2023    Physician Orders: PT Eval and Treat   Medical Diagnosis from Referral: M25.569 (ICD-10-CM) - Knee pain, unspecified chronicity, unspecified laterality  Evaluation Date: 2/16/2023  Authorization Period Expiration: 12/31/2023  Plan of Care Expiration: 3/30/2023  Progress Note Due: 3/16/2023  Visit # / Visits authorized: 1/ 12  FOTO: 0/1     Precautions: Standard     Time In: 8:30 am  Time Out: 9:25  am  Total Billable Time: 55 minutes      SUBJECTIVE     Pt reports: that she has pain in the R knee. Pain is about in the joint line.  She was compliant with home exercise program.  Response to previous treatment: No change   Functional change: None    Pain: 5/10  Location: right knee      OBJECTIVE     Objective Measures updated at progress report unless specified.       TREATMENT     Vanessa received the treatments listed below:      received therapeutic exercises to develop strength and ROM for 45  minutes including:    Nustep 8  min   R Quad set 3'' x 10 hold 3 sec   R SAQ 3'' x 10 hold 3 sec   R SLR x 20x with strap   HL hip abd YTB 3x10  HL hip add ball squeeze 3x10 hold 3 sec   Standing TKE red cord 3x10 hold 3 sec  Shuttle DL squat 1 red cord      received the following manual therapy techniques: Soft tissue Mobilization were applied to the: R kne for 10 minutes, including:    IASTM R knee joint line w/ Biofreeze  STM and lateral R knee joint line effleurage     PATIENT EDUCATION AND HOME EXERCISES     Education provided:   - HEP review   - Anatomy of knee and biomechanics      Written Home Exercises Provided: yes. Exercises were reviewed and Vanessa was able to demonstrate them prior to the end of  the session.  Vanessa demonstrated fair  understanding of the education provided. See EMR under Patient Instructions for exercises provided during therapy sessions.    ASSESSMENT     Pt presented to therapy with mod R knee pain. Pt ambulates with antalgic gait pattern. Pt presented with pain at joint knee line. Pt demonstrated weakness of quad muscles due to pain. Pt  required mod v/c's to perform exericses with proper form and proper muscles activation. Manual therapy performed to R knee. Pain subsided int he end of PT session. Plan to cont focus in strengthening and manual therapy.     Vanessa Is progressing well towards her goals.   Pt prognosis is Fair.     Pt will continue to benefit from skilled outpatient physical therapy to address the deficits listed in the problem list box on initial evaluation, provide pt/family education and to maximize pt's level of independence in the home and community environment.     Pt's spiritual, cultural and educational needs considered and pt agreeable to plan of care and goals.     Anticipated barriers to physical therapy: Health literacy, lifestyle, scheduling     GOALS: Short Term Goals:  3 weeks  1.Report decreased R knee pain  < / =  4/10  to increase tolerance for work related duties.  2. Increase R knee flexion ROM to 115 degrees in order to be able to perform ADLs without difficulty.  3. Increase strength by 1/3 MMT grade in gross R lower extremity to increase tolerance for ADL and work activities.  4. Pt to tolerate HEP to improve ROM and independence with ADL's     Long Term Goals: 6 weeks  1.Report decreased R knee pain < / = 1/10  to increase tolerance for resuming exercise and walking program.   2.Patient goal: Be able to stand for greater than 1 hour and cook a meal without pain.   3.Increase strength to >/= 4+/5 in B gross lower extremities to increase tolerance for ADL and work activities.  4. Pt will complete 6 MWT duration and cover at least 1000 feet without  necessary breaks.     PLAN     Plan of care Certification: 2/16/2023 to 3/30/2023.    Julián Flores, PT

## 2023-03-08 ENCOUNTER — CLINICAL SUPPORT (OUTPATIENT)
Dept: REHABILITATION | Facility: HOSPITAL | Age: 53
End: 2023-03-08
Attending: FAMILY MEDICINE
Payer: COMMERCIAL

## 2023-03-08 DIAGNOSIS — M25.561 CHRONIC PAIN OF RIGHT KNEE: ICD-10-CM

## 2023-03-08 DIAGNOSIS — G89.29 CHRONIC PAIN OF RIGHT KNEE: ICD-10-CM

## 2023-03-08 PROCEDURE — 97110 THERAPEUTIC EXERCISES: CPT | Mod: PN

## 2023-03-08 PROCEDURE — 97140 MANUAL THERAPY 1/> REGIONS: CPT | Mod: PN

## 2023-03-08 NOTE — PROGRESS NOTES
[facility]   Physical Therapy Treatment Note     Name: Vanessa Jerez  Clinic Number: [id number]    Therapy Diagnosis:   No diagnosis found.    Physician: Bethanie Sandoval MD    Visit Date: 3/8/2023    Physician Orders: PT Eval and Treat   Medical Diagnosis from Referral: M25.569 (ICD-10-CM) - Knee pain, unspecified chronicity, unspecified laterality  Evaluation Date: 2/16/2023  Authorization Period Expiration: 12/31/2023  Plan of Care Expiration: 3/30/2023  Progress Note Due: 3/16/2023  Visit # / Visits authorized: 2/ 12  FOTO: 0/1     Precautions: Standard     Time In: 1330PM  Time Out:   Total Billable Time: 55 minutes      SUBJECTIVE     Pt reports: her pain is about the same.    She was compliant with home exercise program.  Response to previous treatment: wilbert  Functional change: None    Pain: 5/10  Location: right knee      OBJECTIVE     Objective Measures updated at progress report unless specified.       TREATMENT     Vanessa received the treatments listed below:      received therapeutic exercises to develop strength and ROM for 45  minutes including:    Nustep 8 min   R Quad set 3'' x 10 hold 3 sec   R SAQ 3'' x 10 hold 3 sec   R SLR x 20x with strap   HL hip abd YTB 3x10  HL hip add ball squeeze 3x10 hold 3 sec   Standing TKE red cord 3x10 hold 3 sec  Shuttle DL squat 1 red cord    received the following manual therapy techniques: Soft tissue Mobilization were applied to the: R kne for 10 minutes, including:    IASTM R knee joint line w/ Biofreeze  STM and lateral R knee joint line effleurage     PATIENT EDUCATION AND HOME EXERCISES     Education provided:   - HEP review   - Anatomy of knee and biomechanics      Written Home Exercises Provided: yes. Exercises were reviewed and Vanessa was able to demonstrate them prior to the end of the session.  Vanessa demonstrated fair  understanding of the education provided. See EMR under Patient Instructions for exercises provided during  therapy sessions.    ASSESSMENT     Pt presented to therapy with mod R knee pain. Pt ambulates with antalgic gait pattern. Pt presented with pain at joint knee line. Pt demonstrated weakness of quad muscles due to pain. Pt  required mod v/c's to perform exericses with proper form and proper muscles activation. Manual therapy performed to R knee. Pain subsided int he end of PT session. Plan to cont focus in strengthening and manual therapy.     Vanessa Is progressing well towards her goals.   Pt prognosis is Fair.     Pt will continue to benefit from skilled outpatient physical therapy to address the deficits listed in the problem list box on initial evaluation, provide pt/family education and to maximize pt's level of independence in the home and community environment.     Pt's spiritual, cultural and educational needs considered and pt agreeable to plan of care and goals.     Anticipated barriers to physical therapy: Health literacy, lifestyle, scheduling     GOALS: Short Term Goals:  3 weeks  1.Report decreased R knee pain  < / =  4/10  to increase tolerance for work related duties.  2. Increase R knee flexion ROM to 115 degrees in order to be able to perform ADLs without difficulty.  3. Increase strength by 1/3 MMT grade in gross R lower extremity to increase tolerance for ADL and work activities.  4. Pt to tolerate HEP to improve ROM and independence with ADL's     Long Term Goals: 6 weeks  1.Report decreased R knee pain < / = 1/10  to increase tolerance for resuming exercise and walking program.   2.Patient goal: Be able to stand for greater than 1 hour and cook a meal without pain.   3.Increase strength to >/= 4+/5 in B gross lower extremities to increase tolerance for ADL and work activities.  4. Pt will complete 6 MWT duration and cover at least 1000 feet without necessary breaks.     PLAN     Plan of care Certification: 2/16/2023 to 3/30/2023.    Vernell Correa, PTA

## 2023-03-08 NOTE — PROGRESS NOTES
[facility]   Physical Therapy Treatment Note     Name: Vanessa Jerez  Clinic Number: [id number]    Therapy Diagnosis:   Encounter Diagnosis   Name Primary?    Chronic pain of right knee        Physician: Bethanie Sandoval MD    Visit Date: 3/8/2023    Physician Orders: PT Eval and Treat   Medical Diagnosis from Referral: M25.569 (ICD-10-CM) - Knee pain, unspecified chronicity, unspecified laterality  Evaluation Date: 2/16/2023  Authorization Period Expiration: 12/31/2023  Plan of Care Expiration: 3/30/2023  Progress Note Due: 3/16/2023  Visit # / Visits authorized: 3/ 12  FOTO: 0/1     Precautions: Standard     Time In: 1:30 pm  Time Out: 2:25 pm  Total Billable Time: 55 minutes      SUBJECTIVE     Pt reports: that she cont with R knee pain, but she felt  better after last PT session.   She was compliant with home exercise program.  Response to previous treatment: No change   Functional change: None    Pain: 5/10  Location: right knee      OBJECTIVE     Objective Measures updated at progress report unless specified.       TREATMENT     Vanessa received the treatments listed below:      received therapeutic exercises to develop strength and ROM for 45  minutes including:    Nustep 8  min   R Quad set 3'' x 10 hold 3 sec   R SAQ 3'' x 10 hold 3 sec   R SLR x 20x with strap   HL hip abd YTB 3x10  HL hip add ball squeeze 3x10 hold 3 sec  Heel slides AAROM 3x10    LAQ 3x10  Standing TKE red cord 3x10 hold 3 sec  Shuttle DL squat 1 red cord 3x10 + YTB around knee       received the following manual therapy techniques: Soft tissue Mobilization were applied to the: R kne for 10 minutes, including:    IASTM R knee joint line w/ Biofreeze  STM and lateral R knee joint line effleurage     PATIENT EDUCATION AND HOME EXERCISES     Education provided:   - HEP review   - Anatomy of knee and biomechanics      Written Home Exercises Provided: yes. Exercises were reviewed and Vanessa was able to demonstrate  them prior to the end of the session.  Vanessa demonstrated fair  understanding of the education provided. See EMR under Patient Instructions for exercises provided during therapy sessions.    ASSESSMENT     Pt presented to therapy with mod/min R knee pain. Pt ambulates with antalgic gait pattern.  Pt demonstrated weakness of quad muscles due to pain. Addition of LAQ today to strength R quad.  Pt  required mod v/c's to perform exericses with proper form and proper muscles activation. Manual therapy performed to R knee. Pain subsided int he end of PT session. Plan to cont focus in strengthening and manual therapy.     Vanessa Is progressing well towards her goals.   Pt prognosis is Fair.     Pt will continue to benefit from skilled outpatient physical therapy to address the deficits listed in the problem list box on initial evaluation, provide pt/family education and to maximize pt's level of independence in the home and community environment.     Pt's spiritual, cultural and educational needs considered and pt agreeable to plan of care and goals.     Anticipated barriers to physical therapy: Health literacy, lifestyle, scheduling     GOALS: Short Term Goals:  3 weeks  1.Report decreased R knee pain  < / =  4/10  to increase tolerance for work related duties.  2. Increase R knee flexion ROM to 115 degrees in order to be able to perform ADLs without difficulty.  3. Increase strength by 1/3 MMT grade in gross R lower extremity to increase tolerance for ADL and work activities.  4. Pt to tolerate HEP to improve ROM and independence with ADL's     Long Term Goals: 6 weeks  1.Report decreased R knee pain < / = 1/10  to increase tolerance for resuming exercise and walking program.   2.Patient goal: Be able to stand for greater than 1 hour and cook a meal without pain.   3.Increase strength to >/= 4+/5 in B gross lower extremities to increase tolerance for ADL and work activities.  4. Pt will complete 6 MWT duration and cover  at least 1000 feet without necessary breaks.     PLAN     Plan of care Certification: 2/16/2023 to 3/30/2023.    Julián Flores, PT

## 2023-03-10 ENCOUNTER — CLINICAL SUPPORT (OUTPATIENT)
Dept: REHABILITATION | Facility: HOSPITAL | Age: 53
End: 2023-03-10
Attending: FAMILY MEDICINE
Payer: COMMERCIAL

## 2023-03-10 DIAGNOSIS — M25.561 CHRONIC PAIN OF RIGHT KNEE: Primary | ICD-10-CM

## 2023-03-10 DIAGNOSIS — G89.29 CHRONIC PAIN OF RIGHT KNEE: Primary | ICD-10-CM

## 2023-03-10 PROCEDURE — 97110 THERAPEUTIC EXERCISES: CPT | Mod: PN,CQ

## 2023-03-10 PROCEDURE — 97140 MANUAL THERAPY 1/> REGIONS: CPT | Mod: PN,CQ

## 2023-03-10 NOTE — PROGRESS NOTES
"[facility]   Physical Therapy Treatment Note     Name: Vanessa Jerez  Clinic Number: [id number]    Therapy Diagnosis:   Encounter Diagnosis   Name Primary?    Chronic pain of right knee Yes       Physician: Bethanie Sandoval MD    Visit Date: 3/10/2023    Physician Orders: PT Eval and Treat   Medical Diagnosis from Referral: M25.569 (ICD-10-CM) - Knee pain, unspecified chronicity, unspecified laterality  Evaluation Date: 2/16/2023  Authorization Period Expiration: 12/31/2023  Plan of Care Expiration: 3/30/2023  Progress Note Due: 3/16/2023  Visit # / Visits authorized: 3/ 12  FOTO: 0/1     Precautions: Standard     Time In: 1216PM  Time Out: 1315PM  Total Billable Time: 59 minutes      SUBJECTIVE     Pt reports: she's having knee pain inside the knee joint.     She was compliant with home exercise program.  Response to previous treatment: No change   Functional change: None    Pain: 5/10  Location: right knee      OBJECTIVE     Objective Measures updated at progress report unless specified.       TREATMENT     Vanessa received the treatments listed below:      received therapeutic exercises to develop strength and ROM for 45  minutes including:    Nustep 8  min   R Quad set 3'' x 10 hold 3 sec   R SAQ 3'' x 10 hold 3 sec   R SLR x 20x with strap   HL hip abd YTB 3x10  HL hip add ball squeeze 3x10 hold 3 sec  Heel slides AAROM 3x10    LAQ w/ ER 3x10, 3"    Resume NV:  Standing TKE red cord 3x10 hold 3 sec  Shuttle DL squat 1 red cord 3x10 + YTB around knee       received the following manual therapy techniques: Soft tissue Mobilization were applied to the: R kne for 15 minutes, including:  Patella Mobs  STM R knee joint line w/ Biofreeze  STM and lateral R knee joint line effleurage   Inferior fat pad mobs    PATIENT EDUCATION AND HOME EXERCISES     Education provided:   - HEP review   - Anatomy of knee and biomechanics      Written Home Exercises Provided: yes. Exercises were reviewed and " Vanessa was able to demonstrate them prior to the end of the session.  Vanessa demonstrated fair  understanding of the education provided. See EMR under Patient Instructions for exercises provided during therapy sessions.    ASSESSMENT     Pt continues to present to therapy with mod/min R knee pain. Pt ambulates with an antalgic gait pattern. Pt continues to demonstrate weakness of quad muscles 2/2 pain. Continued with additions from previous session, and pt required mod v/c's to perform exericses with proper form and proper muscles activation. Manual therapy performed to R knee with pain lessening at the end of PT session. Plan to cont focus in strengthening and manual therapy in future sessions.     Vanessa Is progressing well towards her goals.   Pt prognosis is Fair.     Pt will continue to benefit from skilled outpatient physical therapy to address the deficits listed in the problem list box on initial evaluation, provide pt/family education and to maximize pt's level of independence in the home and community environment.     Pt's spiritual, cultural and educational needs considered and pt agreeable to plan of care and goals.     Anticipated barriers to physical therapy: Health literacy, lifestyle, scheduling     GOALS: Short Term Goals:  3 weeks  1.Report decreased R knee pain  < / =  4/10  to increase tolerance for work related duties.  2. Increase R knee flexion ROM to 115 degrees in order to be able to perform ADLs without difficulty.  3. Increase strength by 1/3 MMT grade in gross R lower extremity to increase tolerance for ADL and work activities.  4. Pt to tolerate HEP to improve ROM and independence with ADL's     Long Term Goals: 6 weeks  1.Report decreased R knee pain < / = 1/10  to increase tolerance for resuming exercise and walking program.   2.Patient goal: Be able to stand for greater than 1 hour and cook a meal without pain.   3.Increase strength to >/= 4+/5 in B gross lower extremities to increase  tolerance for ADL and work activities.  4. Pt will complete 6 MWT duration and cover at least 1000 feet without necessary breaks.     PLAN     Plan of care Certification: 2/16/2023 to 3/30/2023.    Vernell Correa, PTA

## 2023-03-14 ENCOUNTER — CLINICAL SUPPORT (OUTPATIENT)
Dept: REHABILITATION | Facility: HOSPITAL | Age: 53
End: 2023-03-14
Attending: FAMILY MEDICINE
Payer: COMMERCIAL

## 2023-03-14 DIAGNOSIS — M25.561 CHRONIC PAIN OF RIGHT KNEE: Primary | ICD-10-CM

## 2023-03-14 DIAGNOSIS — G89.29 CHRONIC PAIN OF RIGHT KNEE: Primary | ICD-10-CM

## 2023-03-14 PROCEDURE — 97110 THERAPEUTIC EXERCISES: CPT | Mod: PN

## 2023-03-14 PROCEDURE — 97140 MANUAL THERAPY 1/> REGIONS: CPT | Mod: PN

## 2023-03-14 NOTE — PROGRESS NOTES
"[facility]   Physical Therapy Treatment Note     Name: Vanessa Jerez  Clinic Number: [id number]    Therapy Diagnosis:   Encounter Diagnosis   Name Primary?    Chronic pain of right knee Yes         Physician: Bethanie Sandoval MD    Visit Date: 3/14/2023    Physician Orders: PT Eval and Treat   Medical Diagnosis from Referral: M25.569 (ICD-10-CM) - Knee pain, unspecified chronicity, unspecified laterality  Evaluation Date: 2/16/2023  Authorization Period Expiration: 12/31/2023  Plan of Care Expiration: 3/30/2023  Progress Note Due: 3/16/2023  Visit # / Visits authorized: 4/ 12  FOTO: 0/1     Precautions: Standard     Time In: 3:00 PM  Time Out: 3:55 PM  Total Billable Time: 55 minutes      SUBJECTIVE     Pt reports: Her knee is feeling ok today     She was compliant with home exercise program.  Response to previous treatment: No change   Functional change: None    Pain: 5/10  Location: right knee      OBJECTIVE       Range of Motion (Active):   Knee Right    Flexion 105   Extension -2      Lower Extremity Strength  Right LE   Left LE     Quadriceps: 4/5 Quadriceps: 4/5   Hamstrings: 4/5 Hamstrings: 4+/5   Hip flexion (seated): 4/5 Hip flexion (seated): 4/5   Hip extension:  4-/5 Hip extension: 4/5      SLS EO: 2 second P! (R), 10 seconds (L)  5 x STS: 22.62 seconds (high momentum), unable straighten knees fully    TREATMENT     Vanessa received the treatments listed below:      received therapeutic exercises to develop strength and ROM for 40  minutes including:  reassessment  Recumbent bike 8  min   R Quad set 3'' x 10 hold 3 sec   R SAQ 3'' x 10 hold 3 sec   R SLR x 20x with strap- pt with minimal use of strap this visit  HL hip abd +RTB 2x10  HL hip add ball squeeze 3x10 hold 3 sec  Heel slides AAROM 3x10    LAQ w/ ER 3x10, 3"  Standing TKE red cord 3x10 hold 3 sec  Shuttle DL squat 1 red cord 3x10 + YTB around knee       received the following manual therapy techniques: Soft tissue " Mobilization were applied to the: R kne for 10 minutes, including:  Patella Mobs  STM R knee joint line w/ Biofreeze  STM and lateral R knee joint line effleurage   Inferior fat pad mobs    therapeutic activities to improve functional performance for 5  minutes, including:  +Step ups 2 x 5 B 4 in step    PATIENT EDUCATION AND HOME EXERCISES     Education provided:   - HEP review   - Anatomy of knee and biomechanics      Written Home Exercises Provided: yes. Exercises were reviewed and Vanessa was able to demonstrate them prior to the end of the session.  Vanessa demonstrated fair  understanding of the education provided. See EMR under Patient Instructions for exercises provided during therapy sessions.    ASSESSMENT     Pt arrived to therapy reporting her usual levels of pain today, ambulating with slightly antalgic gait pattern. A brief reassessment was performed today and she was noted to have improved R knee flexion AROM today, as well as slight improvements in balance and 5x STS time. She continues to have quad weakness, though did not rely on strap much today for SLR. She also continues to heavily rely on momentum to pull to standing and was unable to fully straighten knees. Progressed resistance on hip abduction exercises, and resumed TKEs and shuttle leg press for continued gains in quad strength. She required occasional cues for correct muscle activation and correct performance of exercises today. Will continue to progress as tolerated.    Vanessa Is progressing well towards her goals.   Pt prognosis is Fair.     Pt will continue to benefit from skilled outpatient physical therapy to address the deficits listed in the problem list box on initial evaluation, provide pt/family education and to maximize pt's level of independence in the home and community environment.     Pt's spiritual, cultural and educational needs considered and pt agreeable to plan of care and goals.     Anticipated barriers to physical  therapy: Health literacy, lifestyle, scheduling     GOALS: Short Term Goals:  3 weeks  1.Report decreased R knee pain  < / =  4/10  to increase tolerance for work related duties.  2. Increase R knee flexion ROM to 115 degrees in order to be able to perform ADLs without difficulty.  3. Increase strength by 1/3 MMT grade in gross R lower extremity to increase tolerance for ADL and work activities.  4. Pt to tolerate HEP to improve ROM and independence with ADL's     Long Term Goals: 6 weeks  1.Report decreased R knee pain < / = 1/10  to increase tolerance for resuming exercise and walking program.   2.Patient goal: Be able to stand for greater than 1 hour and cook a meal without pain.   3.Increase strength to >/= 4+/5 in B gross lower extremities to increase tolerance for ADL and work activities.  4. Pt will complete 6 MWT duration and cover at least 1000 feet without necessary breaks.     PLAN     Plan of care Certification: 2/16/2023 to 3/30/2023.    Marbin Kilgore, PT

## 2023-03-17 ENCOUNTER — CLINICAL SUPPORT (OUTPATIENT)
Dept: REHABILITATION | Facility: HOSPITAL | Age: 53
End: 2023-03-17
Attending: FAMILY MEDICINE
Payer: COMMERCIAL

## 2023-03-17 DIAGNOSIS — M25.561 CHRONIC PAIN OF RIGHT KNEE: Primary | ICD-10-CM

## 2023-03-17 DIAGNOSIS — G89.29 CHRONIC PAIN OF RIGHT KNEE: Primary | ICD-10-CM

## 2023-03-17 PROCEDURE — 97110 THERAPEUTIC EXERCISES: CPT | Mod: PN

## 2023-03-17 PROCEDURE — 97140 MANUAL THERAPY 1/> REGIONS: CPT | Mod: PN

## 2023-03-17 NOTE — PROGRESS NOTES
"[facility]   Physical Therapy Treatment Note     Name: Vanessa Jerez  Clinic Number: [id number]    Therapy Diagnosis:   Encounter Diagnosis   Name Primary?    Chronic pain of right knee Yes           Physician: Bethanie Sandoval MD    Visit Date: 3/17/2023    Physician Orders: PT Eval and Treat   Medical Diagnosis from Referral: M25.569 (ICD-10-CM) - Knee pain, unspecified chronicity, unspecified laterality  Evaluation Date: 2/16/2023  Authorization Period Expiration: 12/31/2023    Plan of Care Expiration: 3/30/2023  Progress Note Due: 3/16/2023  Visit # / Visits authorized: 4/ 12  FOTO: 0/1     Precautions: Standard     Time In: 1315  Time Out: 1410    Total Billable Time: 55 minutes      SUBJECTIVE     Pt reports: Her knee doing a little better with therapy.     She was compliant with home exercise program.  Response to previous treatment: No change   Functional change: None    Pain: 4/10  Location: right knee      OBJECTIVE       TREATMENT     Vanessa received the treatments listed below:      received therapeutic exercises to develop strength and ROM for 45  minutes including:    Recumbent bike 8  min   R Quad set 3'' x 10 hold 3 sec   R SAQ 3'' x 10 hold 3 sec   R SLR 2 x 10  HL hip abd +RTB 2x10  HL hip add ball squeeze 3x10 hold 3 sec  Heel slides AAROM 3x10    LAQ w/ ER 3x10, 3"  Standing TKE red cord 3x10 hold 3 sec  Shuttle DL squat 1 black cord 3x10 + RTB around knee   Gastroc stretch on wedge 30 sec x 4  Heel raises 3 x 10    received the following manual therapy techniques: Soft tissue Mobilization were applied to the: R kne for 10 minutes, including:  Patella Mobs  Tibiofemoral distraction grade 2  STM R knee joint line w/ Biofreeze  STM and lateral R knee joint line effleurage   Inferior fat pad mobs    therapeutic activities to improve functional performance for 5  minutes, including: NP  +Step ups 2 x 5 B 4 in step    PATIENT EDUCATION AND HOME EXERCISES     Education " provided:   - HEP review   - Anatomy of knee and biomechanics      Written Home Exercises Provided: yes. Exercises were reviewed and Vanessa was able to demonstrate them prior to the end of the session.  Vanessa demonstrated fair  understanding of the education provided. See EMR under Patient Instructions for exercises provided during therapy sessions.    ASSESSMENT     Pt presents ambulating with a slight antalgic gait, decreased stance on involved right LE.  Demonstrates fair VMO response.  Mild c/o increased discomfort with prescribed therex.  Fair response to exercise progression consisting of knee stabilization activities. Vanessa Is progressing fair towards her goals.   Pt prognosis is Fair.     Pt will continue to benefit from skilled outpatient physical therapy to address the deficits listed in the problem list box on initial evaluation, provide pt/family education and to maximize pt's level of independence in the home and community environment.     Pt's spiritual, cultural and educational needs considered and pt agreeable to plan of care and goals.     Anticipated barriers to physical therapy: Health literacy, lifestyle, scheduling     GOALS: Short Term Goals:  3 weeks  1.Report decreased R knee pain  < / =  4/10  to increase tolerance for work related duties.  2. Increase R knee flexion ROM to 115 degrees in order to be able to perform ADLs without difficulty.  3. Increase strength by 1/3 MMT grade in gross R lower extremity to increase tolerance for ADL and work activities.  4. Pt to tolerate HEP to improve ROM and independence with ADL's     Long Term Goals: 6 weeks  1.Report decreased R knee pain < / = 1/10  to increase tolerance for resuming exercise and walking program.   2.Patient goal: Be able to stand for greater than 1 hour and cook a meal without pain.   3.Increase strength to >/= 4+/5 in B gross lower extremities to increase tolerance for ADL and work activities.  4. Pt will complete 6 MWT duration  and cover at least 1000 feet without necessary breaks.     PLAN     Plan of care Certification: 2/16/2023 to 3/30/2023.    Chetan Wright, PT

## 2023-03-24 ENCOUNTER — OFFICE VISIT (OUTPATIENT)
Dept: OBSTETRICS AND GYNECOLOGY | Facility: CLINIC | Age: 53
End: 2023-03-24
Payer: COMMERCIAL

## 2023-03-24 VITALS — DIASTOLIC BLOOD PRESSURE: 82 MMHG | BODY MASS INDEX: 35.29 KG/M2 | SYSTOLIC BLOOD PRESSURE: 130 MMHG | WEIGHT: 239 LBS

## 2023-03-24 DIAGNOSIS — B37.31 YEAST INFECTION OF THE VAGINA: Primary | ICD-10-CM

## 2023-03-24 PROCEDURE — 1159F MED LIST DOCD IN RCRD: CPT | Mod: CPTII,S$GLB,, | Performed by: OBSTETRICS & GYNECOLOGY

## 2023-03-24 PROCEDURE — 3075F PR MOST RECENT SYSTOLIC BLOOD PRESS GE 130-139MM HG: ICD-10-PCS | Mod: CPTII,S$GLB,, | Performed by: OBSTETRICS & GYNECOLOGY

## 2023-03-24 PROCEDURE — 99999 PR PBB SHADOW E&M-EST. PATIENT-LVL III: CPT | Mod: PBBFAC,,, | Performed by: OBSTETRICS & GYNECOLOGY

## 2023-03-24 PROCEDURE — 99999 PR PBB SHADOW E&M-EST. PATIENT-LVL III: ICD-10-PCS | Mod: PBBFAC,,, | Performed by: OBSTETRICS & GYNECOLOGY

## 2023-03-24 PROCEDURE — 99213 OFFICE O/P EST LOW 20 MIN: CPT | Mod: S$GLB,,, | Performed by: OBSTETRICS & GYNECOLOGY

## 2023-03-24 PROCEDURE — 1159F PR MEDICATION LIST DOCUMENTED IN MEDICAL RECORD: ICD-10-PCS | Mod: CPTII,S$GLB,, | Performed by: OBSTETRICS & GYNECOLOGY

## 2023-03-24 PROCEDURE — 3075F SYST BP GE 130 - 139MM HG: CPT | Mod: CPTII,S$GLB,, | Performed by: OBSTETRICS & GYNECOLOGY

## 2023-03-24 PROCEDURE — 3079F PR MOST RECENT DIASTOLIC BLOOD PRESSURE 80-89 MM HG: ICD-10-PCS | Mod: CPTII,S$GLB,, | Performed by: OBSTETRICS & GYNECOLOGY

## 2023-03-24 PROCEDURE — 81514 NFCT DS BV&VAGINITIS DNA ALG: CPT | Performed by: OBSTETRICS & GYNECOLOGY

## 2023-03-24 PROCEDURE — 3008F BODY MASS INDEX DOCD: CPT | Mod: CPTII,S$GLB,, | Performed by: OBSTETRICS & GYNECOLOGY

## 2023-03-24 PROCEDURE — 99213 PR OFFICE/OUTPT VISIT, EST, LEVL III, 20-29 MIN: ICD-10-PCS | Mod: S$GLB,,, | Performed by: OBSTETRICS & GYNECOLOGY

## 2023-03-24 PROCEDURE — 3079F DIAST BP 80-89 MM HG: CPT | Mod: CPTII,S$GLB,, | Performed by: OBSTETRICS & GYNECOLOGY

## 2023-03-24 PROCEDURE — 3008F PR BODY MASS INDEX (BMI) DOCUMENTED: ICD-10-PCS | Mod: CPTII,S$GLB,, | Performed by: OBSTETRICS & GYNECOLOGY

## 2023-03-24 RX ORDER — FLUCONAZOLE 150 MG/1
150 TABLET ORAL DAILY
Qty: 1 TABLET | Refills: 0 | Status: SHIPPED | OUTPATIENT
Start: 2023-03-24 | End: 2023-03-25

## 2023-03-24 NOTE — PROGRESS NOTES
Cc:  vaginal discharge    HPI: 52 yr  presents with a 2 wk hx of vaginal discharge.  Pt states Dr. Michelle Sandoval does her routine annual exams.  Pt denies any burning, itching or odor. Pt denies any pain with intercourse.  Pt had total hysterectomy for benign indications.    Vitals:    23 1122   BP: 130/82   Weight: 108.4 kg (238 lb 15.7 oz)     Physical Exam:   Constitutional: She is oriented to person, place, and time. She appears well-developed and well-nourished. No distress.    HENT:   Head: Normocephalic and atraumatic.       Pulmonary/Chest: Effort normal. No respiratory distress.        Abdominal: Soft. She exhibits no distension. There is no abdominal tenderness.     Genitourinary:    Right adnexa and left adnexa normal.      Pelvic exam was performed with patient supine.   The external female genitalia was normal.   No external genitalia lesions identified,Genitalia hair distrobution normal .   Labial bartholins normal.There is no rash, tenderness, lesion or injury on the right labia. There is no rash, tenderness, lesion or injury on the left labia. Right adnexum displays no mass, no tenderness and no fullness. Left adnexum displays no mass, no tenderness and no fullness. Vagina exhibits no lesion. Vaginal cuff normal.  There is vaginal discharge (thick clumpy white/yellow d/c) in the vagina. No erythema, tenderness, bleeding, rectocele, cystocele or unspecified prolapse of vaginal walls in the vagina.    No foreign body in the vagina.      No signs of injury in the vagina.   Cervix is absent.Uterus is absent. Normal urethral meatus.Urethral Meatus exhibits: urethral lesion and prolapsedUrethra findings: no urethral mass, no tenderness and no urethral scarringBladder findings: no bladder distention and no bladder tenderness          Musculoskeletal: Normal range of motion and moves all extremeties. No tenderness.       Neurological: She is alert and oriented to person, place, and time. No cranial  nerve deficit. Coordination normal.    Skin: She is not diaphoretic.    Psychiatric: She has a normal mood and affect. Her behavior is normal. Judgment and thought content normal.     Assessment/Plan  1. Yeast infection of the vagina  Vaginosis Screen by DNA Probe    fluconazole (DIFLUCAN) 150 MG Tab

## 2023-03-28 ENCOUNTER — DOCUMENTATION ONLY (OUTPATIENT)
Dept: REHABILITATION | Facility: HOSPITAL | Age: 53
End: 2023-03-28

## 2023-03-28 ENCOUNTER — CLINICAL SUPPORT (OUTPATIENT)
Dept: REHABILITATION | Facility: HOSPITAL | Age: 53
End: 2023-03-28
Attending: FAMILY MEDICINE
Payer: COMMERCIAL

## 2023-03-28 DIAGNOSIS — G89.29 CHRONIC PAIN OF RIGHT KNEE: Primary | ICD-10-CM

## 2023-03-28 DIAGNOSIS — M25.561 CHRONIC PAIN OF RIGHT KNEE: Primary | ICD-10-CM

## 2023-03-28 LAB
BACTERIAL VAGINOSIS DNA: NEGATIVE
CANDIDA GLABRATA DNA: NEGATIVE
CANDIDA KRUSEI DNA: NEGATIVE
CANDIDA RRNA VAG QL PROBE: POSITIVE
T VAGINALIS RRNA GENITAL QL PROBE: NEGATIVE

## 2023-03-28 PROCEDURE — 97110 THERAPEUTIC EXERCISES: CPT | Mod: PN,CQ

## 2023-03-28 NOTE — PROGRESS NOTES
"[facility]   Physical Therapy Treatment Note     Name: Vanessa Jerez  Clinic Number: [id number]    Therapy Diagnosis:   Encounter Diagnosis   Name Primary?    Chronic pain of right knee Yes       Physician: Bethanie Sandoval MD    Visit Date: 3/28/2023    Physician Orders: PT Eval and Treat   Medical Diagnosis from Referral: M25.569 (ICD-10-CM) - Knee pain, unspecified chronicity, unspecified laterality  Evaluation Date: 2/16/2023  Authorization Period Expiration: 12/31/2023    Plan of Care Expiration: 3/30/2023  Progress Note Due: 3/16/2023  Visit # / Visits authorized: 6/ 12  FOTO: 0/1     Precautions: Standard     Time In: 1730PM  Time Out:1830PM    Total Billable Time: 60 minutes      SUBJECTIVE     Pt reports: Her knee doing a little better with therapy.     She was compliant with home exercise program.  Response to previous treatment: No change   Functional change: None    Pain: 4/10  Location: right knee      OBJECTIVE       TREATMENT     Vanessa received the treatments listed below:      received therapeutic exercises to develop strength and ROM for 60 minutes including:    Recumbent bike 8  min   Gastroc stretch on wedge 30 sec x 4  Standing TKE red cord 3x10 hold 3 sec  Shuttle DL squat 1 black cord 3x10 + RTB around knee   Heel raises 3 x 10  LAQ w/ ER 3x10, 3"  R SLR 2 x 10  R Quad set 3'' 2 x 10 hold 3 sec   Heel slides AAROM 3x10    R SAQ 3'' 3 x 10 hold 3 sec   HL hip add ball squeeze 3x10 hold 3 sec  HL hip abd +RTB 2x10    PATIENT EDUCATION AND HOME EXERCISES     Education provided:   - HEP review   - Anatomy of knee and biomechanics      Written Home Exercises Provided: yes. Exercises were reviewed and Vanessa was able to demonstrate them prior to the end of the session.  Vanessa demonstrated fair  understanding of the education provided. See EMR under Patient Instructions for exercises provided during therapy sessions.    ASSESSMENT     Vanessa tolerated the above tx " session with minimal c/o pain. Continued with therEx for improved BLE strength and R knee ROM. Mild c/o increased discomfort with prescribed therex. Pt continues to demonstrate fair VMO response, and TKE's present the greatest challenge with mild exacerbation of pain.     Vanessa Is progressing fair towards her goals.   Pt prognosis is Fair.     Pt will continue to benefit from skilled outpatient physical therapy to address the deficits listed in the problem list box on initial evaluation, provide pt/family education and to maximize pt's level of independence in the home and community environment.     Pt's spiritual, cultural and educational needs considered and pt agreeable to plan of care and goals.     Anticipated barriers to physical therapy: Health literacy, lifestyle, scheduling     GOALS: Short Term Goals:  3 weeks  1.Report decreased R knee pain  < / =  4/10  to increase tolerance for work related duties.  2. Increase R knee flexion ROM to 115 degrees in order to be able to perform ADLs without difficulty.  3. Increase strength by 1/3 MMT grade in gross R lower extremity to increase tolerance for ADL and work activities.  4. Pt to tolerate HEP to improve ROM and independence with ADL's     Long Term Goals: 6 weeks  1.Report decreased R knee pain < / = 1/10  to increase tolerance for resuming exercise and walking program.   2.Patient goal: Be able to stand for greater than 1 hour and cook a meal without pain.   3.Increase strength to >/= 4+/5 in B gross lower extremities to increase tolerance for ADL and work activities.  4. Pt will complete 6 MWT duration and cover at least 1000 feet without necessary breaks.     PLAN     Resume MT and Therapeutic Activities in future sessions if necessary.    Vernell Correa, PTA   03/29/2023

## 2023-03-28 NOTE — PROGRESS NOTES
PT/PTA face to face conference performed during today's treatment session. Patient's goals and POC updated today.

## 2023-04-05 ENCOUNTER — CLINICAL SUPPORT (OUTPATIENT)
Dept: REHABILITATION | Facility: HOSPITAL | Age: 53
End: 2023-04-05
Attending: FAMILY MEDICINE
Payer: COMMERCIAL

## 2023-04-05 DIAGNOSIS — M25.561 CHRONIC PAIN OF RIGHT KNEE: Primary | ICD-10-CM

## 2023-04-05 DIAGNOSIS — G89.29 CHRONIC PAIN OF RIGHT KNEE: Primary | ICD-10-CM

## 2023-04-05 PROCEDURE — 97110 THERAPEUTIC EXERCISES: CPT | Mod: PN,CQ

## 2023-04-05 PROCEDURE — 97140 MANUAL THERAPY 1/> REGIONS: CPT | Mod: PN,CQ

## 2023-04-05 NOTE — PROGRESS NOTES
"[facility]   Physical Therapy Treatment Note     Name: Vanessa Jerez  Clinic Number: [id number]    Therapy Diagnosis:   Encounter Diagnosis   Name Primary?    Chronic pain of right knee Yes       Physician: Bethanie Sandoval MD    Visit Date: 4/5/2023    Physician Orders: PT Eval and Treat   Medical Diagnosis from Referral: M25.569 (ICD-10-CM) - Knee pain, unspecified chronicity, unspecified laterality  Evaluation Date: 2/16/2023  Authorization Period Expiration: 12/31/2023    Plan of Care Expiration: 3/30/2023  Progress Note Due: 3/16/2023  Visit # / Visits authorized: 7/ 12  FOTO: 0/1     Precautions: Standard     Time In: 1330PM  Time Out: 1430PM    Total Billable Time: 60 minutes      SUBJECTIVE     Pt reports: the knee is still aching and it keeps popping.     She was compliant with home exercise program.  Response to previous treatment: No change   Functional change: None    Pain: 6/10 when she does have pain  Location: right knee      OBJECTIVE       TREATMENT     Vanessa received the treatments listed below:      received therapeutic exercises to develop strength and ROM for 60 minutes including:    NuStep 8  min   Shuttle DL squat 1 black cord 3x10 + RTB around knee   Gastroc stretch on wedge 30 sec x 4  Standing TKE red cord 3x10 hold 3 sec  STS 18in plyo 2x10  Standing Hip Abd w/ YTB Theraloop 2x10  Lateral Step Up 4 in 2x10  LAQ w/ ER 3x10, 3"  HL hip abd w/ Yellow Theraloop 2x10  SAQ 2# 2 x 10 hold 3 sec     Resume NV:  Heel raises 3 x 10  R SLR 2 x 10  R Quad set 3'' 2 x 10 hold 3 sec   Heel slides AAROM 3x10    HL hip add ball squeeze 3x10 hold 3 sec      PATIENT EDUCATION AND HOME EXERCISES     Education provided:   - HEP review   - Anatomy of knee and biomechanics      Written Home Exercises Provided: yes. Exercises were reviewed and Vanessa was able to demonstrate them prior to the end of the session.  Vanessa demonstrated fair  understanding of the education provided. See " EMR under Patient Instructions for exercises provided during therapy sessions.    ASSESSMENT     Vanessa tolerated the above tx session with minimal c/o pain. Emphasis on glute med strengthening this date. Mild c/o increased discomfort with prescribed therex. Pt continues to demonstrate fair VMO response, but with appropriate level of muscle fatigue.    Vanessa Is progressing fair towards her goals.   Pt prognosis is Fair.     Pt will continue to benefit from skilled outpatient physical therapy to address the deficits listed in the problem list box on initial evaluation, provide pt/family education and to maximize pt's level of independence in the home and community environment.     Pt's spiritual, cultural and educational needs considered and pt agreeable to plan of care and goals.     Anticipated barriers to physical therapy: Health literacy, lifestyle, scheduling     GOALS: Short Term Goals:  3 weeks  1.Report decreased R knee pain  < / =  4/10  to increase tolerance for work related duties.  2. Increase R knee flexion ROM to 115 degrees in order to be able to perform ADLs without difficulty.  3. Increase strength by 1/3 MMT grade in gross R lower extremity to increase tolerance for ADL and work activities.  4. Pt to tolerate HEP to improve ROM and independence with ADL's     Long Term Goals: 6 weeks  1.Report decreased R knee pain < / = 1/10  to increase tolerance for resuming exercise and walking program.   2.Patient goal: Be able to stand for greater than 1 hour and cook a meal without pain.   3.Increase strength to >/= 4+/5 in B gross lower extremities to increase tolerance for ADL and work activities.  4. Pt will complete 6 MWT duration and cover at least 1000 feet without necessary breaks.     PLAN     Resume MT and Therapeutic Activities in future sessions if necessary.    Vernell Correa, PTA   04/05/2023

## 2023-04-13 ENCOUNTER — CLINICAL SUPPORT (OUTPATIENT)
Dept: REHABILITATION | Facility: HOSPITAL | Age: 53
End: 2023-04-13
Attending: FAMILY MEDICINE
Payer: COMMERCIAL

## 2023-04-13 DIAGNOSIS — M25.561 CHRONIC PAIN OF RIGHT KNEE: Primary | ICD-10-CM

## 2023-04-13 DIAGNOSIS — G89.29 CHRONIC PAIN OF RIGHT KNEE: Primary | ICD-10-CM

## 2023-04-13 PROCEDURE — 97110 THERAPEUTIC EXERCISES: CPT | Mod: PN

## 2023-04-13 NOTE — PROGRESS NOTES
[facility]   Physical Therapy Treatment Note     Name: Vanessa Jerez  Clinic Number: [id number]    Therapy Diagnosis:   Encounter Diagnosis   Name Primary?    Chronic pain of right knee Yes       Physician: Bethanie Sandoval MD    Visit Date: 4/13/2023    Physician Orders: PT Eval and Treat   Medical Diagnosis from Referral: M25.569 (ICD-10-CM) - Knee pain, unspecified chronicity, unspecified laterality  Evaluation Date: 2/16/2023  Authorization Period Expiration: 12/31/2023    Plan of Care Expiration: 3/30/2023  Progress Note Due: 3/16/2023  Visit # / Visits authorized: 7/ 12  FOTO: 0/1     Precautions: Standard     Time In: 0705  Time Out:0810    Total Billable Time: 60 minutes      SUBJECTIVE     Pt reports: the knee is doing a little better. States therapy has been helping.     She was compliant with home exercise program.  Response to previous treatment: No change   Functional change: None    Pain: 6/10 when she does have pain  Location: right knee      OBJECTIVE       TREATMENT     Vanessa received the treatments listed below:      received therapeutic exercises to develop strength and ROM for 50 minutes including:    NuStep 8  min   Shuttle DL squat 1 black cord 3x10 + RTB around knee   Shuttle SL squat 1.5 straps 3 x 10   Gastroc stretch on wedge 30 sec x 4  Standing TKE red cord 3x10 hold 3 sec  STS 18in plyo 2x10  Standing Hip Abd w/ YTB Theraloop 2x10  Lateral Step Up 4 in 2x10  LAQ 3lbs 3 x 10  HL hip abd w/ Yellow Theraloop 2x10  SAQ 2# 2 x 10 hold 3 sec   Matrix hip abd 3 x 10 25lbs  Heel raises 3 x 10  Manual tibiofemoral distraction in short sitting x 5 min    CP post treatment x 8 min      PATIENT EDUCATION AND HOME EXERCISES     Education provided:   HEP complaince     Written Home Exercises Provided: yes. Exercises were reviewed and Vanessa was able to demonstrate them prior to the end of the session.  Vanessa demonstrated fair  understanding of the education provided. See  EMR under Patient Instructions for exercises provided during therapy sessions.    ASSESSMENT     Pt presents ambulating with a slight antalgic gait, decreased stance on involved right LE.  Improved VMO response.  No c/o increased discomfort with prescribed therex.  Fair response to exercise progression consisting of knee stabilization activities. Vanessa Is progressing fair towards her goals.     Pt prognosis is Fair.     Pt will continue to benefit from skilled outpatient physical therapy to address the deficits listed in the problem list box on initial evaluation, provide pt/family education and to maximize pt's level of independence in the home and community environment.     Pt's spiritual, cultural and educational needs considered and pt agreeable to plan of care and goals.     Anticipated barriers to physical therapy: Health literacy, lifestyle, scheduling     GOALS: Short Term Goals:  3 weeks  1.Report decreased R knee pain  < / =  4/10  to increase tolerance for work related duties.  2. Increase R knee flexion ROM to 115 degrees in order to be able to perform ADLs without difficulty.  3. Increase strength by 1/3 MMT grade in gross R lower extremity to increase tolerance for ADL and work activities.  4. Pt to tolerate HEP to improve ROM and independence with ADL's     Long Term Goals: 6 weeks  1.Report decreased R knee pain < / = 1/10  to increase tolerance for resuming exercise and walking program.   2.Patient goal: Be able to stand for greater than 1 hour and cook a meal without pain.   3.Increase strength to >/= 4+/5 in B gross lower extremities to increase tolerance for ADL and work activities.  4. Pt will complete 6 MWT duration and cover at least 1000 feet without necessary breaks.     PLAN     Progress knee stabilization therex next visit.     Chetan Wright, PT   04/13/2023

## 2023-04-18 ENCOUNTER — TELEPHONE (OUTPATIENT)
Dept: FAMILY MEDICINE | Facility: CLINIC | Age: 53
End: 2023-04-18
Payer: COMMERCIAL

## 2023-04-20 ENCOUNTER — CLINICAL SUPPORT (OUTPATIENT)
Dept: REHABILITATION | Facility: HOSPITAL | Age: 53
End: 2023-04-20
Attending: FAMILY MEDICINE
Payer: COMMERCIAL

## 2023-04-20 DIAGNOSIS — G89.29 CHRONIC PAIN OF RIGHT KNEE: Primary | ICD-10-CM

## 2023-04-20 DIAGNOSIS — M25.561 CHRONIC PAIN OF RIGHT KNEE: Primary | ICD-10-CM

## 2023-04-20 PROCEDURE — 97110 THERAPEUTIC EXERCISES: CPT | Mod: PN

## 2023-04-20 NOTE — PROGRESS NOTES
[facility]   Physical Therapy Treatment Note     Name: Vanessa Jerez  Clinic Number: [id number]    Therapy Diagnosis:   Encounter Diagnosis   Name Primary?    Chronic pain of right knee Yes       Physician: Bethanie Sandoval MD    Visit Date: 4/20/2023    Physician Orders: PT Eval and Treat   Medical Diagnosis from Referral: M25.569 (ICD-10-CM) - Knee pain, unspecified chronicity, unspecified laterality  Evaluation Date: 2/16/2023  Authorization Period Expiration: 12/31/2023    Plan of Care Expiration: 3/30/2023  Progress Note Due: 3/16/2023  Visit # / Visits authorized: 7/ 12  FOTO: 0/1     Precautions: Standard     Time In: 1345  Time Out: 1440    Total Billable Time: 40 minutes      SUBJECTIVE     Pt reports the knee is doing better overall. No significant changes of late.     She was compliant with home exercise program.  Response to previous treatment: No change   Functional change: None    Pain: 6/10 when she does have pain  Location: right knee      OBJECTIVE      Range of Motion (Active):   Knee Right  Left    Flexion 100 110   Extension -2 0         Lower Extremity Strength  Right LE   Left LE     Quadriceps: 4-/5 Quadriceps: 4/5   Hamstrings: 4-/5 Hamstrings: 4+/5   Hip flexion (seated): 4+/5 Hip flexion (seated): 4/5   Hip extension:  4-/5 Hip extension: 4/5   PGM: 4-/5 PGM:  4-/5        TREATMENT     Vanessa received the treatments listed below:      received therapeutic exercises to develop strength and ROM for 40 minutes including:    NuStep 8  min   Shuttle DL squat 1 black cord 3x10 + RTB around knee   Shuttle SL squat 1.5 straps 3 x 10   Gastroc stretch on wedge 30 sec x 4  Standing TKE red cord 3x10 hold 3 sec  STS 18in plyo 2x10  Standing Hip Abd w/ YTB Theraloop 2x10  Lateral Step Up 4 in 2x10  LAQ 3lbs 3 x 10  SAQ 2# 2 x 10 hold 3 sec   Matrix hip abd 3 x 10 25lbs  Heel raises 3 x 10    Manual tibiofemoral distraction in short sitting, patellar mobilization x 5  min      PATIENT EDUCATION AND HOME EXERCISES     Education provided:   HEP complaince     Written Home Exercises Provided: yes. Exercises were reviewed and Vanessa was able to demonstrate them prior to the end of the session.  Vanessa demonstrated fair understanding of the education provided. See EMR under Patient Instructions for exercises provided during therapy sessions.    ASSESSMENT     Pt presents today with an improved gait, no sign of antalgia.  Demonstrates improved functional ROM and strength of the involved right LE.  No c/o increased discomfort with prescribed therex.  Fair response to exercise progression consisting of knee stabilization activities. Vanessa Is progressing fair towards her goals.     Pt prognosis is Fair.     Pt will continue to benefit from skilled outpatient physical therapy to address the deficits listed in the problem list box on initial evaluation, provide pt/family education and to maximize pt's level of independence in the home and community environment.     Pt's spiritual, cultural and educational needs considered and pt agreeable to plan of care and goals.     Anticipated barriers to physical therapy: Health literacy, lifestyle, scheduling     GOALS:      Long Term Goals: 6 weeks  Updated 4/20/23  partially MET    1.Report decreased R knee pain < / = 1/10  to increase tolerance for resuming exercise and walking program.  Inconsistently MET  2.Patient goal: Be able to stand for greater than 1 hour and cook a meal without pain.  MET  3.Increase strength to >/= 4+/5 in B gross lower extremities to increase tolerance for ADL and work activities.  4. Pt will complete 6 MWT duration and cover at least 1000 feet without necessary breaks.  NT    PLAN     Progress knee stabilization therex next visit.     Chetan Wright, PT   04/20/2023

## 2023-04-21 ENCOUNTER — CLINICAL SUPPORT (OUTPATIENT)
Dept: REHABILITATION | Facility: HOSPITAL | Age: 53
End: 2023-04-21
Attending: FAMILY MEDICINE
Payer: COMMERCIAL

## 2023-04-21 DIAGNOSIS — G89.29 CHRONIC PAIN OF RIGHT KNEE: Primary | ICD-10-CM

## 2023-04-21 DIAGNOSIS — M25.561 CHRONIC PAIN OF RIGHT KNEE: Primary | ICD-10-CM

## 2023-04-21 PROCEDURE — 97110 THERAPEUTIC EXERCISES: CPT | Mod: PN

## 2023-04-21 RX ORDER — PHENTERMINE HYDROCHLORIDE 37.5 MG/1
37.5 TABLET ORAL
Qty: 30 TABLET | Refills: 0 | Status: SHIPPED | OUTPATIENT
Start: 2023-04-21 | End: 2023-05-21

## 2023-04-21 NOTE — PROGRESS NOTES
[facility]   Physical Therapy Treatment Note     Name: Vanessa Jerez  Clinic Number: [id number]    Therapy Diagnosis:   Encounter Diagnosis   Name Primary?    Chronic pain of right knee Yes         Physician: Bethanie Sandoval MD    Visit Date: 4/21/2023    Physician Orders: PT Eval and Treat   Medical Diagnosis from Referral: M25.569 (ICD-10-CM) - Knee pain, unspecified chronicity, unspecified laterality  Evaluation Date: 2/16/2023  Authorization Period Expiration: 12/31/2023    Plan of Care Expiration: 3/30/2023  Progress Note Due: 3/16/2023  Visit # / Visits authorized: 7/ 12  FOTO: 0/1     Precautions: Standard     Time In: 12:00 pm  Time Out: 12:53 pm     Total Billable Time: 53 minutes      SUBJECTIVE     Pt reports that she feels the pain is the same. She states her R knee pain is aggravated after the fall on 12-2-22. Ever since she has been experience R knee popping. She experience pain. Pt states she has not have any cortisone or steroird injection on the R knee. She states she did not have any MRI done.    She was compliant with home exercise program.  Response to previous treatment: No change   Functional change: None    Pain: 6/10 when she does have pain  Location: right knee      OBJECTIVE      Range of Motion (Active):   Knee Right  Left    Flexion 100 110   Extension -2 0         Lower Extremity Strength  Right LE   Left LE     Quadriceps: 4-/5 Quadriceps: 4/5   Hamstrings: 4-/5 Hamstrings: 4+/5   Hip flexion (seated): 4+/5 Hip flexion (seated): 4/5   Hip extension:  4-/5 Hip extension: 4/5   PGM: 4-/5 PGM:  4-/5        TREATMENT     Vanessa received the treatments listed below:      received therapeutic exercises to develop strength and ROM for 53 minutes including:    NuStep 8  min   Shuttle DL squat 1 black cord 3x10 + RTB around knee   Shuttle SL squat 1.5 straps 3 x 10   Gastroc stretch on wedge 30 sec x 4  Standing TKE red cord 3x10 hold 3 sec  STS 18in plyo  2x10  Standing Hip Abd w/ YTB Theraloop 2x10  Lateral Step Up 4 in 3x10  Forwards step up and down 4in 3x10  LAQ 3lbs 3 x 10  SAQ 2# 2 x 10 hold 3 sec   Matrix hip abd 3 x 10 25lbs  Heel raises 3 x 10    Manual tibiofemoral distraction in short sitting, patellar mobilization x 00 min      PATIENT EDUCATION AND HOME EXERCISES     Education provided:   HEP complaince     Written Home Exercises Provided: yes. Exercises were reviewed and Vanessa was able to demonstrate them prior to the end of the session.  Vanessa demonstrated fair understanding of the education provided. See EMR under Patient Instructions for exercises provided during therapy sessions.    ASSESSMENT     Pt presented today with same R knee pain since initial eval. She still have pain during sit to stand and ambulating. Pt and PT agreed to be d/c today. I believe pt will benefit from further investigation of the problem. Pt might benefit from MRI of R knee due to persistence of pain. Plan to return to M.D for further investigation.     Pt prognosis is Fair.     Pt will continue to benefit from skilled outpatient physical therapy to address the deficits listed in the problem list box on initial evaluation, provide pt/family education and to maximize pt's level of independence in the home and community environment.     Pt's spiritual, cultural and educational needs considered and pt agreeable to plan of care and goals.     Anticipated barriers to physical therapy: Health literacy, lifestyle, scheduling     GOALS:      Long Term Goals: 6 weeks  Updated 4/20/23  partially MET    1.Report decreased R knee pain < / = 1/10  to increase tolerance for resuming exercise and walking program.  Inconsistently MET  2.Patient goal: Be able to stand for greater than 1 hour and cook a meal without pain.  MET  3.Increase strength to >/= 4+/5 in B gross lower extremities to increase tolerance for ADL and work activities.  4. Pt will complete 6 MWT duration and cover at  least 1000 feet without necessary breaks.  NT    PLAN     Plan to be D/c Today.     Julián Flores, PT   04/21/2023

## 2023-04-26 ENCOUNTER — TELEPHONE (OUTPATIENT)
Dept: FAMILY MEDICINE | Facility: CLINIC | Age: 53
End: 2023-04-26
Payer: COMMERCIAL

## 2023-04-26 NOTE — TELEPHONE ENCOUNTER
----- Message from Shahab Adams sent at 4/26/2023  9:38 AM CDT -----  Regarding: Self [phone]  Type:  Patient Requesting Referral    Who Called: Self     Referral to What Specialty: Neurology, MRI on right knee    Reason for Referral: MRI on right knee, took a fall and have been having problems with it.     Does the patient want the referral with a specific physician?: any , [facility]     Is the specialist [facility]     Would the patient rather a call back or a response via [facility]?  Call back     Best Call Back Number: [phone]     Additional Information:

## 2023-04-27 ENCOUNTER — TELEPHONE (OUTPATIENT)
Dept: FAMILY MEDICINE | Facility: CLINIC | Age: 53
End: 2023-04-27
Payer: COMMERCIAL

## 2023-05-04 ENCOUNTER — OFFICE VISIT (OUTPATIENT)
Dept: FAMILY MEDICINE | Facility: CLINIC | Age: 53
End: 2023-05-04
Payer: COMMERCIAL

## 2023-05-04 VITALS
WEIGHT: 238.19 LBS | TEMPERATURE: 99 F | HEART RATE: 80 BPM | OXYGEN SATURATION: 99 % | SYSTOLIC BLOOD PRESSURE: 118 MMHG | BODY MASS INDEX: 35.28 KG/M2 | DIASTOLIC BLOOD PRESSURE: 72 MMHG | HEIGHT: 69 IN

## 2023-05-04 DIAGNOSIS — E66.9 OBESITY (BMI 30-39.9): ICD-10-CM

## 2023-05-04 DIAGNOSIS — M25.569 KNEE PAIN, UNSPECIFIED CHRONICITY, UNSPECIFIED LATERALITY: Primary | ICD-10-CM

## 2023-05-04 PROCEDURE — 99999 PR PBB SHADOW E&M-EST. PATIENT-LVL IV: ICD-10-PCS | Mod: PBBFAC,,,

## 2023-05-04 PROCEDURE — 3074F PR MOST RECENT SYSTOLIC BLOOD PRESSURE < 130 MM HG: ICD-10-PCS | Mod: CPTII,S$GLB,,

## 2023-05-04 PROCEDURE — 3078F PR MOST RECENT DIASTOLIC BLOOD PRESSURE < 80 MM HG: ICD-10-PCS | Mod: CPTII,S$GLB,,

## 2023-05-04 PROCEDURE — 99213 PR OFFICE/OUTPT VISIT, EST, LEVL III, 20-29 MIN: ICD-10-PCS | Mod: S$GLB,,,

## 2023-05-04 PROCEDURE — 1159F MED LIST DOCD IN RCRD: CPT | Mod: CPTII,S$GLB,,

## 2023-05-04 PROCEDURE — 1159F PR MEDICATION LIST DOCUMENTED IN MEDICAL RECORD: ICD-10-PCS | Mod: CPTII,S$GLB,,

## 2023-05-04 PROCEDURE — 3008F PR BODY MASS INDEX (BMI) DOCUMENTED: ICD-10-PCS | Mod: CPTII,S$GLB,,

## 2023-05-04 PROCEDURE — 99999 PR PBB SHADOW E&M-EST. PATIENT-LVL IV: CPT | Mod: PBBFAC,,,

## 2023-05-04 PROCEDURE — 3008F BODY MASS INDEX DOCD: CPT | Mod: CPTII,S$GLB,,

## 2023-05-04 PROCEDURE — 99213 OFFICE O/P EST LOW 20 MIN: CPT | Mod: S$GLB,,,

## 2023-05-04 PROCEDURE — 3078F DIAST BP <80 MM HG: CPT | Mod: CPTII,S$GLB,,

## 2023-05-04 PROCEDURE — 3074F SYST BP LT 130 MM HG: CPT | Mod: CPTII,S$GLB,,

## 2023-05-04 RX ORDER — TIZANIDINE 4 MG/1
4 TABLET ORAL NIGHTLY PRN
Qty: 30 TABLET | Refills: 1 | Status: SHIPPED | OUTPATIENT
Start: 2023-05-04

## 2023-05-04 NOTE — PATIENT INSTRUCTIONS
Medical Fitness--[phone]  Imaging, Xray, CT, MRI, Ultrasound---[phone]  Bariatrics---[phone]  Breast Surgery---[phone]  Case Management---[phone]  Colonoscopy---[phone]  DME---[phone]  Infectious Disease---[phone]  Interventional Radiology---[phone]  Medical Records---[phone]  [facility]---[phone]  Optometry/Ophthalmology---[phone]  [facility]---[phone]  Physical Therapy---[phone]  Psychiatry---[phone] or [phone]  Plastic Surgery---[phone]  Recovery--[phone] option 2, or [phone].  Sleep Study---[phone]  Smoking Cessation---[phone]  Wound Care---[phone]  Referral Desk---[phone]

## 2023-05-04 NOTE — PROGRESS NOTES
HPI     Chief Complaint:  Chief Complaint   Patient presents with    Referral     Right knee pain since December 2 wants to get an MRI        Vanessa Jerez is a 52 y.o. female with multiple medical diagnoses as listed in the medical history and problem list that presents for knee pain.  Pt is not known to me with  her last appointment 1/30/2023.      Knee Pain   The incident occurred more than 1 week ago (Right knee pain since December.). The injury mechanism was a fall. The pain is present in the right knee. The pain is at a severity of 7/10. The pain is moderate. The pain has been Intermittent since onset. Associated symptoms include an inability to bear weight and muscle weakness. The symptoms are aggravated by weight bearing and movement. Treatments tried: Tried physical therapy, finished 2 weeks ago. Had X-ray in December. Tried biofreeze. The treatment provided mild relief.           Assessment & Plan     Problem List Items Addressed This Visit    None  Visit Diagnoses       Knee pain, unspecified chronicity, unspecified laterality    -  Primary  Pt with right knee pain since falling in December. Previously had X-ray in December. Completed physical therapy 2 weeks ago. Pt requesting MRI of knee. Will order knee MRI. Will refill Zanaflex.    Relevant Orders    MRI Knee Without Contrast Right    Obesity (BMI 30-39.9)     Pt taking Adipex for weight loss. The current medical regimen is effective;  continue present plan and medications.               --------------------------------------------      Health Maintenance:  Health Maintenance         Date Due Completion Date    COVID-19 Vaccine (3 - Booster for Pfizer series) 07/22/2021 5/27/2021    Shingles Vaccine (2 of 2) 11/16/2021 9/21/2021    Mammogram 10/03/2023 10/3/2022    Override on 10/25/2017: Done (reconciled from [facility] outside record with patient approval)    Hemoglobin A1c (Diabetic Prevention Screening) 09/19/2025 9/19/2022    TETANUS VACCINE  "04/17/2027 4/17/2017    Lipid Panel 09/19/2027 9/19/2022    Colorectal Cancer Screening 10/15/2031 10/15/2021            Health maintenance reviewed    Follow Up:  Follow up in about 6 months (around 11/4/2023), or if symptoms worsen or fail to improve.    Exam     Review of Systems:  (as noted above)  Review of Systems   Musculoskeletal:  Positive for arthralgias and myalgias.        Right knee pain     Physical Exam:   Physical Exam  Constitutional:       General: She is not in acute distress.     Appearance: She is not ill-appearing or diaphoretic.   HENT:      Head: Normocephalic and atraumatic.   Cardiovascular:      Rate and Rhythm: Normal rate and regular rhythm.      Heart sounds: No murmur heard.    No friction rub. No gallop.   Pulmonary:      Effort: No respiratory distress.   Chest:      Chest wall: No tenderness.   Musculoskeletal:      Cervical back: No rigidity.      Right knee: Swelling present. Decreased range of motion. Tenderness present. Abnormal patellar mobility.      Comments: Tenderness to palpation right patella, no redness, no warmth  2+ edema   Neurological:      Mental Status: She is alert.     Vitals:    05/04/23 1538   BP: 118/72   Pulse: 80   Temp: 98.5 °F (36.9 °C)   SpO2: 99%   Weight: 108 kg (238 lb 3.3 oz)   Height: 5' 9" (1.753 m)      Body mass index is 35.18 kg/m².        History     Past Medical History:  Past Medical History:   Diagnosis Date    Anemia        Past Surgical History:  Past Surgical History:   Procedure Laterality Date    COLONOSCOPY N/A 10/15/2021    Procedure: COLONOSCOPY;  Surgeon: Jez Henriquez MD;  Location: [facility];  Service: Endoscopy;  Laterality: N/A;  covid-10/12/36-[place]    HYSTERECTOMY      TUBAL LIGATION         Social History:  Social History     Socioeconomic History    Marital status: Single   Occupational History    Occupation: e-sitter, [place] [employer]     Employer: [employer]   Tobacco Use    Smoking " status: Never    Smokeless tobacco: Never   Substance and Sexual Activity    Alcohol use: Yes     Comment: RARELY    Drug use: No    Sexual activity: Yes     Partners: Male       Family History:  Family History   Problem Relation Age of Onset    No Known Problems Mother     No Known Problems Father     Diabetes Maternal Grandmother     Diabetes Maternal Grandfather     Cancer Neg Hx     Heart disease Neg Hx     Hypertension Neg Hx     Stroke Neg Hx        Allergies and Medications: (updated and reviewed)  Review of patient's allergies indicates:   Allergen Reactions    Morphine      Slowed down heart rate     Current Outpatient Medications   Medication Sig Dispense Refill    phentermine (ADIPEX-P) 37.5 mg tablet Take 1 tablet (37.5 mg total) by mouth before breakfast. Patient will need an office visit for future refills 30 tablet 0    tiZANidine (ZANAFLEX) 4 MG tablet Take 1 tablet (4 mg total) by mouth nightly as needed. 30 tablet 1     No current facility-administered medications for this visit.       Patient Care Team:  Bethanie Sandoval MD as PCP - General (Family Medicine)  Tegan Pollock LPN as Licensed Practical Nurse         - The patient is given an After Visit Summary that lists all medications with directions, allergies, education, orders placed during this encounter and follow-up instructions.      - I have reviewed the patient's medical information including past medical, family, and social history sections including the medications and allergies.      - We discussed the patient's current medications.     This note was created by combination of typed  and MModal dictation.  Transcription errors may be present.  If there are any questions, please contact me.        Nini Carvalho NP

## 2023-05-10 ENCOUNTER — HOSPITAL ENCOUNTER (OUTPATIENT)
Dept: RADIOLOGY | Facility: HOSPITAL | Age: 53
Discharge: HOME | End: 2023-05-10
Payer: COMMERCIAL

## 2023-05-10 DIAGNOSIS — M25.569 KNEE PAIN, UNSPECIFIED CHRONICITY, UNSPECIFIED LATERALITY: ICD-10-CM

## 2023-05-10 PROCEDURE — 73721 MRI JNT OF LWR EXTRE W/O DYE: CPT | Mod: TC,RT

## 2023-05-15 ENCOUNTER — PATIENT MESSAGE (OUTPATIENT)
Dept: FAMILY MEDICINE | Facility: CLINIC | Age: 53
End: 2023-05-15
Payer: COMMERCIAL

## 2023-05-15 ENCOUNTER — TELEPHONE (OUTPATIENT)
Dept: FAMILY MEDICINE | Facility: CLINIC | Age: 53
End: 2023-05-15
Payer: COMMERCIAL

## 2023-05-23 ENCOUNTER — OFFICE VISIT (OUTPATIENT)
Dept: ORTHOPEDICS | Facility: CLINIC | Age: 53
End: 2023-05-23
Payer: COMMERCIAL

## 2023-05-23 VITALS — HEIGHT: 69 IN | BODY MASS INDEX: 35.25 KG/M2 | WEIGHT: 238 LBS

## 2023-05-23 DIAGNOSIS — S83.281A TEAR OF LATERAL MENISCUS OF RIGHT KNEE, CURRENT, UNSPECIFIED TEAR TYPE, INITIAL ENCOUNTER: ICD-10-CM

## 2023-05-23 DIAGNOSIS — M25.569 KNEE PAIN, UNSPECIFIED CHRONICITY, UNSPECIFIED LATERALITY: ICD-10-CM

## 2023-05-23 DIAGNOSIS — S83.511A SPRAIN OF ANTERIOR CRUCIATE LIGAMENT OF RIGHT KNEE, INITIAL ENCOUNTER: Primary | ICD-10-CM

## 2023-05-23 PROCEDURE — 3008F BODY MASS INDEX DOCD: CPT | Mod: CPTII,S$GLB,, | Performed by: ORTHOPAEDIC SURGERY

## 2023-05-23 PROCEDURE — 1159F PR MEDICATION LIST DOCUMENTED IN MEDICAL RECORD: ICD-10-PCS | Mod: CPTII,S$GLB,, | Performed by: ORTHOPAEDIC SURGERY

## 2023-05-23 PROCEDURE — 99203 PR OFFICE/OUTPT VISIT, NEW, LEVL III, 30-44 MIN: ICD-10-PCS | Mod: S$GLB,,, | Performed by: ORTHOPAEDIC SURGERY

## 2023-05-23 PROCEDURE — 99203 OFFICE O/P NEW LOW 30 MIN: CPT | Mod: S$GLB,,, | Performed by: ORTHOPAEDIC SURGERY

## 2023-05-23 PROCEDURE — 1159F MED LIST DOCD IN RCRD: CPT | Mod: CPTII,S$GLB,, | Performed by: ORTHOPAEDIC SURGERY

## 2023-05-23 PROCEDURE — 3008F PR BODY MASS INDEX (BMI) DOCUMENTED: ICD-10-PCS | Mod: CPTII,S$GLB,, | Performed by: ORTHOPAEDIC SURGERY

## 2023-05-23 PROCEDURE — 99999 PR PBB SHADOW E&M-EST. PATIENT-LVL III: CPT | Mod: PBBFAC,,, | Performed by: ORTHOPAEDIC SURGERY

## 2023-05-23 PROCEDURE — 99999 PR PBB SHADOW E&M-EST. PATIENT-LVL III: ICD-10-PCS | Mod: PBBFAC,,, | Performed by: ORTHOPAEDIC SURGERY

## 2023-05-23 RX ORDER — MELOXICAM 15 MG/1
15 TABLET ORAL DAILY
Qty: 30 TABLET | Refills: 1 | Status: SHIPPED | OUTPATIENT
Start: 2023-05-23 | End: 2023-07-21 | Stop reason: SDUPTHER

## 2023-05-23 NOTE — PROGRESS NOTES
NEW PATIENT ORTHOPAEDIC: Knee    PRIMARY CARE PHYSICIAN: Bethanie Sandoval MD   REFERRING PROVIDER: Nini Carvalho, GENA  [address] [facility]  [place] [id number]     ASSESSMENT & PLAN:    Impression:  Right Knee ACL Sprain  Right Knee Lateral meniscus tear    Follow Up Plan: PRN    Non operative care:    Vanessa Jerez has physical exam evidence of above and wishes to pursue an non-operative care. I am recommending the following: Mobic, Internal Ortho Referral     Patient comes in with a 5 month history of having acute onset right knee pain.  This is associated with [place] [employer]  She reports no antecedent pain prior to that fall.  She has complaints of instability, mechanical symptoms and pain in her knee.  She has been taking muscle relaxers and was sent to physical therapy.  She is been in physical therapy now for 4 months with no significant improvement in her symptoms.  I discussed options with her today which would consist possible arthroscopic intervention for the meniscus and ACL versus considering an intra-articular injection.  She would like to proceed with surgical consideration which I think is appropriate with her complaints of instability and mechanical symptoms in her knee.  I do not think injection would significantly benefit her.  She is tried again physical therapy and bracing already.  As a result I think surgery may be of her next best option.  I will place referral to our sports colleagues to have her evaluated for this. She is an employee at [employer].     The patient has been ordered:  Office Intraarticular injection  Physical Therapy    CONSULTS:   None    ACTIVE PROBLEM LIST  Patient Active Problem List   Diagnosis    Acute low back pain with disc symptoms, duration less than 6 weeks    Status post hysterectomy    Screening for colon cancer    Colon cancer screening    Chronic pain of right knee           SUBJECTIVE    CHIEF COMPLAINT: Knee Pain    HPI:   Vanessa Millan  Solitario is a 52 y.o. male here for evaluation and management of right knee pain. There is a specific incident that brought about this pain. she has had progressive problems with the knee(s) starting 5 months ago but is now progressing to interfere with activities which include: walking and enjoying hobbies    Currently the pain in the joint is rated at moderate with activity. The pain is intermittent and is located in the knee, at level of joint line, located medially, and located laterally. The pain is described as aching, sharp, and throbbing. Relieving factors include rest, prescription medication, over the counter medication , and repositioning.     There is associated Catching, Clicking, and Popping which is painful and causes feeling of instability    Vanessa Jerez has no additional complaints.     PROGRESSIVE SYMPTOMS:  Pain impacting work  Pain worsened by weight bearing  Pain effecting living situation    FUNCTIONAL STATUS:   Climb a flight of stairs or walk up a hill     PREVIOUS TREATMENTS:  Medical: OTC NSAIDS  Physical Therapy: Braces, Activities Modified , and Formal Physical Therapy for 6 weeks  Previous Orthopaedic Surgery: None    REVIEW OF SYSTEMS:  PAIN ASSESSMENT:  See HPI.  MUSCULOSKELETAL: See HPI.  OTHER 10 point review of systems is negative except as stated in HPI above    PAST MEDICAL HISTORY   has a past medical history of Anemia.     PAST SURGICAL HISTORY   has a past surgical history that includes Tubal ligation; Hysterectomy; and Colonoscopy (N/A, 10/15/2021).     FAMILY HISTORY  family history includes Diabetes in her maternal grandfather and maternal grandmother; No Known Problems in her father and mother.     SOCIAL HISTORY   reports that she has never smoked. She has never used smokeless tobacco. She reports current alcohol use. She reports that she does not use drugs.     ALLERGIES   Review of patient's allergies indicates:   Allergen Reactions    Morphine      Slowed down  "heart rate        MEDICATIONS  Current Outpatient Medications on File Prior to Visit   Medication Sig Dispense Refill    tiZANidine (ZANAFLEX) 4 MG tablet Take 1 tablet (4 mg total) by mouth nightly as needed. 30 tablet 1     No current facility-administered medications on file prior to visit.          PHYSICAL EXAM   height is 5' 9" (1.753 m) and weight is 108 kg (238 lb).   Body mass index is 35.15 kg/m².      All other systems deferred.  GENERAL:  No acute distress  HABITUS: Obese  GAIT: Antalgic  SKIN: Normal     KNEE EXAM:    right:   Effusion: Minimal joint effusion  TTP: Yes over Medial Joint Line and Lateral Joint Line   No crepitus with passive knee ROM  Passive ROM: Extension 0, Flexion 130  No pain with manipulation of patella  Stable to varus/valgus stress. No increased laxity to anterior/posterior drawer testing  positive Meri's test  No pain with IR/ER rotation of the hip  5/5 strength in knee flexion and extension, ankle plantarflexion and dorsiflexion  Neurovascular Status: Sensation intact to light touch in Sural, Saphenous, SPN, DPN, Tibial nerve distribution  2+ pulse DP/PT, normal capillary refill, foot has normal warmth    DATA:  Diagnostic tests reviewed for today's visit:     4v of the knee reveal Mild degenerative changes of the Lateral compartment. There is evidence of advanced osteoarthritis changes with Joint space narrowing. The limb is in netural alignment. The patella is tracking midline.    MRI report and images reviewed by myself and show  Mild sprain and/or low-grade partial thickness intrasubstance tear of the anterior cruciate ligament.   Oblique tear of the anterior horn the lateral meniscus, extending into the body, and touching the superior inferior articular surfaces.   Minimal chondromalacia of the lateral compartment of the knee          "

## 2023-05-25 ENCOUNTER — TELEPHONE (OUTPATIENT)
Dept: SPORTS MEDICINE | Facility: CLINIC | Age: 53
End: 2023-05-25
Payer: COMMERCIAL

## 2023-05-25 NOTE — TELEPHONE ENCOUNTER
LVM for patient to schedule appointment. Gave multiple times and dates for patient to reach out and confirm with us. Also gave number and other methods of communication to reach us at.        ----- Message from [facility] Elma sent at 5/25/2023 10:57 AM CDT -----  Regarding: apt  Dr. Lawson has placed a referral for this patient can you please call to schedule an apt. Let me know if you have any questions.      Thanks,  Vanessa

## 2023-05-29 ENCOUNTER — OFFICE VISIT (OUTPATIENT)
Dept: SPORTS MEDICINE | Facility: CLINIC | Age: 53
End: 2023-05-29
Payer: COMMERCIAL

## 2023-05-29 VITALS
DIASTOLIC BLOOD PRESSURE: 79 MMHG | WEIGHT: 243 LBS | SYSTOLIC BLOOD PRESSURE: 118 MMHG | HEIGHT: 69 IN | HEART RATE: 66 BPM | BODY MASS INDEX: 35.99 KG/M2

## 2023-05-29 DIAGNOSIS — S83.281A OTHER TEAR OF LATERAL MENISCUS OF RIGHT KNEE AS CURRENT INJURY, INITIAL ENCOUNTER: Primary | ICD-10-CM

## 2023-05-29 PROCEDURE — 99214 OFFICE O/P EST MOD 30 MIN: CPT | Mod: S$GLB,,, | Performed by: ORTHOPAEDIC SURGERY

## 2023-05-29 PROCEDURE — 3008F BODY MASS INDEX DOCD: CPT | Mod: CPTII,S$GLB,, | Performed by: ORTHOPAEDIC SURGERY

## 2023-05-29 PROCEDURE — 1159F PR MEDICATION LIST DOCUMENTED IN MEDICAL RECORD: ICD-10-PCS | Mod: CPTII,S$GLB,, | Performed by: ORTHOPAEDIC SURGERY

## 2023-05-29 PROCEDURE — 3074F SYST BP LT 130 MM HG: CPT | Mod: CPTII,S$GLB,, | Performed by: ORTHOPAEDIC SURGERY

## 2023-05-29 PROCEDURE — 3078F PR MOST RECENT DIASTOLIC BLOOD PRESSURE < 80 MM HG: ICD-10-PCS | Mod: CPTII,S$GLB,, | Performed by: ORTHOPAEDIC SURGERY

## 2023-05-29 PROCEDURE — 99999 PR PBB SHADOW E&M-EST. PATIENT-LVL III: CPT | Mod: PBBFAC,,, | Performed by: ORTHOPAEDIC SURGERY

## 2023-05-29 PROCEDURE — 3078F DIAST BP <80 MM HG: CPT | Mod: CPTII,S$GLB,, | Performed by: ORTHOPAEDIC SURGERY

## 2023-05-29 PROCEDURE — 1159F MED LIST DOCD IN RCRD: CPT | Mod: CPTII,S$GLB,, | Performed by: ORTHOPAEDIC SURGERY

## 2023-05-29 PROCEDURE — 3008F PR BODY MASS INDEX (BMI) DOCUMENTED: ICD-10-PCS | Mod: CPTII,S$GLB,, | Performed by: ORTHOPAEDIC SURGERY

## 2023-05-29 PROCEDURE — 3074F PR MOST RECENT SYSTOLIC BLOOD PRESSURE < 130 MM HG: ICD-10-PCS | Mod: CPTII,S$GLB,, | Performed by: ORTHOPAEDIC SURGERY

## 2023-05-29 PROCEDURE — 99999 PR PBB SHADOW E&M-EST. PATIENT-LVL III: ICD-10-PCS | Mod: PBBFAC,,, | Performed by: ORTHOPAEDIC SURGERY

## 2023-05-29 PROCEDURE — 99214 PR OFFICE/OUTPT VISIT, EST, LEVL IV, 30-39 MIN: ICD-10-PCS | Mod: S$GLB,,, | Performed by: ORTHOPAEDIC SURGERY

## 2023-05-29 NOTE — PROGRESS NOTES
NEW PATIENT    HISTORY OF PRESENT ILLNESS   52 y.o. 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 She states she had a fall in December at [facility] and she slipped on some water on the floor. She states she took a direct blow to the knee. She reports significant swelling. She states she has pain while walking and stair climbing. She does report popping in the knee. She does report subjective instability. She has been seen by Dr. Sam Lawson who referred her here today. She has tried physicla therapy.       - mechanical symptoms, + instability    Is affecting ADLs.  Pain is 7/10 at it's worst.        PAST MEDICAL HISTORY    Past Medical History:   Diagnosis Date    Anemia        PAST SURGICAL HISTORY     Past Surgical History:   Procedure Laterality Date    COLONOSCOPY N/A 10/15/2021    Procedure: COLONOSCOPY;  Surgeon: Jez Henriquez MD;  Location: [facility];  Service: Endoscopy;  Laterality: N/A;  covid-10/12/70-Pafdrfu-KQ    HYSTERECTOMY      TUBAL LIGATION         FAMILY HISTORY    Family History   Problem Relation Age of Onset    No Known Problems Mother     No Known Problems Father     Diabetes Maternal Grandmother     Diabetes Maternal Grandfather     Cancer Neg Hx     Heart disease Neg Hx     Hypertension Neg Hx     Stroke Neg Hx        SOCIAL HISTORY    Social History     Socioeconomic History    Marital status: Single   Occupational History    Occupation: e-sitter, [place]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 [employer]     Employer: [employer]   Tobacco Use    Smoking status: Never    Smokeless tobacco: Never   Substance and Sexual Activity    Alcohol use: Yes     Comment: RARELY    Drug use: No    Sexual activity: Yes     Partners: Male       MEDICATIONS      Current Outpatient Medications:     meloxicam (MOBIC) 15 MG tablet, Take 1 tablet (15 mg total) by mouth once daily., Disp: 30 tablet, Rfl: 1    tiZANidine (ZANAFLEX) 4 MG tablet, Take 1 tablet (4 mg total) by  "mouth nightly as needed., Disp: 30 tablet, Rfl: 1    ALLERGIES     Review of patient's allergies indicates:   Allergen Reactions    Morphine      Slowed down heart rate         REVIEW OF SYSTEMS   Constitution: Negative. Negative for chills, fever and night sweats.   HENT: Negative for congestion and headaches.    Eyes: Negative for blurred vision, left vision loss and right vision loss.   Cardiovascular: Negative for chest pain and syncope.   Respiratory: Negative for cough and shortness of breath.    Endocrine: Negative for polydipsia, polyphagia and polyuria.   Hematologic/Lymphatic: Negative for bleeding problem. Does not bruise/bleed easily.   Skin: Negative for dry skin, itching and rash.   Musculoskeletal: Negative for falls. Positive for right knee pain  Gastrointestinal: Negative for abdominal pain and bowel incontinence.   Genitourinary: Negative for bladder incontinence and nocturia.   Neurological: Negative for disturbances in coordination, loss of balance and seizures.   Psychiatric/Behavioral: Negative for depression. The patient does not have insomnia.    Allergic/Immunologic: Negative for hives and persistent infections.     PHYSICAL EXAMINATION    Vitals: /79   Pulse 66   Ht 5' 9" (1.753 m)   Wt 110.2 kg (243 lb)   LMP 04/01/2017   BMI 35.88 kg/m²     General: The patient appears active and healthy with no apparent physical problems.  No disturbance of mood or affect is demonstrated. Alert and Oriented.    HEENT: Eyes normal, pupils equally round, nose normal.    Resp: Equal and symmetrical chest rises. No wheezing    CV: Regular rate    Neck: Supple; nonpainful range of motion.    Extremities: no cyanosis, clubbing, edema, or diffuse swelling.  Palpable pulses, good capillary refill of the digits.  No coolness, discoloration, edema or obvious varicosities.    Skin: no lesions noted.    Lymphatic: no detected adenopathy in the upper or lower extremities.    Neurologic: normal mental " status, normal reflexes, normal gait and balance.  Patient is alert and oriented to person, place and time.  No flaccidity or spasticity is noted.  No motor or sensory deficits are noted.  Light touch is intact    Orthopaedic:     KNEE EXAM - RIGHT    Inspection:   Normal skin color and appearance with no scars, no ecchymosis and no effusion.      ROM:   0° - 135°. Pain terminal extension     Palpation:   There is no tenderness along medial joint line, medial plica, medial collateral ligament, pes bursa, lateral joint line, iliotibial band, lateral collateral ligament, popliteal fossa, patellar tendon, or quadriceps tendon. Tender anterior lateral joint line    Stability: + anterior drawer, + Lachman, + pivot shift and - posterior drawer.      No instability with varus or valgus stress at 0° or 30°. Negative dial  test at 30° and 90°.    Tests:   + Meris test.  - patellar compression - grind test, - crepitus.  There is no patellar apprehension.     - J Sign. - Billie's. - patellar tilt. - Norman. Lateral patella translation  2 quadrants. Medial patella translation 2 quadrants    Motor:   Quadriceps strength is 5/5 and hamstrings strength is 5/5. Hamstrings show no tightness.      Neuro:   Distal neurovascular status is normal    Vascular: Negative Homans and no palpable popliteal cords. 2+ pedal pulse with brisk cap refill    Gait Normal      IMAGING    MRI Knee Without Contrast Right  Narrative: EXAMINATION:  MR Right Lower Extremity Joint Without Contrast, Knee    CLINICAL HISTORY:  52 years old Pain; Knee; Right    TECHNIQUE:  Imaging protocol: Magnetic resonance imaging of the right lower extremity joint without contrast. Exam focused on the knee.    COMPARISON:  No relevant prior studies available.    FINDINGS:  Bones/joints: There is minimal chondromalacia of the lateral compartment of the knee. The articular cartilage of the medial compartment and patellofemoral joint are intact. No bone marrow edema,  fracture or dislocation. No significant joint effusion. Medial meniscus: Unremarkable. No tear. Lateral meniscus: Oblique tear of the anterior horn the lateral meniscus, extending into the body, and touching the superior inferior articular surfaces. Anterior cruciate ligament: Mild increased intrasubstance signal throughout the anterior cruciate ligament. Posterior cruciate ligament: Unremarkable. No tear. Medial capsule and supporting structures: Unremarkable. No tear. Lateral capsule and supporting structures: Unremarkable. No tear. Extensor mechanism of knee: Unremarkable. No tear. Soft tissues: Unremarkable.  Impression: 1. Mild sprain and/or low-grade partial thickness intrasubstance tear of the anterior cruciate ligament. 2. Oblique tear of the anterior horn the lateral meniscus, extending into the body, and touching the superior inferior articular surfaces. 3. Minimal chondromalacia of the lateral compartment of the knee    Electronically signed by: Virtual Radiologist  Date:    05/12/2023  Time:    08:04      IMPRESSION       ICD-10-CM ICD-9-CM   1. Other tear of lateral meniscus of right knee as current injury, initial encounter  S83.281A 836.1       MEDICATIONS PRESCRIBED      None    RECOMMENDATIONS     Surgical versus non-surgical options discussed today; right knee arthroscopy with lateral meniscectomy and chondroplasty  RTC for pre-op with Altaf Fernández PA-C      All questions were answered, pt will contact us for questions or concerns in the interim.

## 2023-05-31 DIAGNOSIS — S83.281A ACUTE LATERAL MENISCUS TEAR OF RIGHT KNEE, INITIAL ENCOUNTER: Primary | ICD-10-CM

## 2023-06-01 ENCOUNTER — PATIENT MESSAGE (OUTPATIENT)
Dept: PREADMISSION TESTING | Facility: HOSPITAL | Age: 53
End: 2023-06-01
Payer: COMMERCIAL

## 2023-06-01 NOTE — ANESTHESIA PAT ROS NOTE
06/01/2023  Vanessa Jerez is a 52 y.o., female.      Pre-op Assessment    I have reviewed the Patient Summary Reports.       I have reviewed the Medications.     Review of Systems  Anesthesia Hx:  No problems with previous Anesthesia   History of prior surgery of interest to airway management or planning:  Previous anesthesia: General, MAC 8/17/2017  Total Vag Hysterectomy with general anesthesia.       10/15/2021  colonoscopy with MAC.  Procedure performed at an [employer] Facility. Airway issues documented on chart review include mask, easy, laryngeal mask airway used       Denies Personal Hx of Anesthesia complications.                    Social:  No Alcohol Use, Social Alcohol Use       Hematology/Oncology:    Oncology Normal    -- Anemia:                                  EENT/Dental:  EENT/Dental Normal           Cardiovascular:  Cardiovascular Normal Exercise tolerance: good       Denies CAD.       Denies Angina.       Denies YEE.                            Pulmonary:  Pulmonary Normal    Denies Asthma.   Denies Shortness of breath.                  Renal/:  Renal/ Normal  Denies Chronic Renal Disease.                Hepatic/GI:  Hepatic/GI Normal     Denies GERD. Denies Liver Disease.            Musculoskeletal:     Acute lateral meniscus tear of right knee,             OB/GYN/PEDS:  Hysterectomy, tubal Ligation           Neurological:  Neurology Normal      Denies Headaches. Denies Seizures.                                Endocrine:   Denies Hypothyroidism.  HA1C = 5.7 on 9/19/2022      Obesity / BMI > 30  Dermatological:  Skin Normal    Psych:  Psychiatric Normal                   Past Medical History:   Diagnosis Date    Anemia      Past Surgical History:   Procedure Laterality Date    COLONOSCOPY N/A 10/15/2021    Procedure: COLONOSCOPY;  Surgeon: Jez Henriquez MD;  [facility]  ENDO;  Service: Endoscopy;  Laterality: N/A;  covid-10/12/85-Pfdsuzr-JZ    HYSTERECTOMY      TUBAL LIGATION         Anesthesia Assessment: Preoperative EQUATION    Planned Procedure: Procedure(s) (LRB):  ARTHROSCOPY, KNEE, WITH MENISCECTOMY (Right)  Requested Anesthesia Type:General  Surgeon: Carlotta Christensen MD  Service: Orthopedics  Known or anticipated Date of Surgery:6/8/2023    Surgeon notes: reviewed    Electronic QUestionnaire Assessment completed via nurse interview with patient.        Triage considerations:     The patient has no apparent active cardiac condition (No unstable coronary Syndrome such as severe unstable angina or recent [<1 month] myocardial infarction, decompensated CHF, severe valvular   disease or significant arrhythmia)    Previous anesthesia records:LMA General, MAC, Easy airway, and No problems    Last PCP note: within 1 month , within [employer]       Other important co-morbidities: Obesity                Instructions given. (See in Nurse's note)      Ht: 5'9  Wt: 243 lb  BMI: 35.88  Vaccinated

## 2023-06-02 ENCOUNTER — PATIENT MESSAGE (OUTPATIENT)
Dept: SPORTS MEDICINE | Facility: CLINIC | Age: 53
End: 2023-06-02

## 2023-06-02 ENCOUNTER — OFFICE VISIT (OUTPATIENT)
Dept: SPORTS MEDICINE | Facility: CLINIC | Age: 53
End: 2023-06-02
Payer: COMMERCIAL

## 2023-06-02 VITALS
HEART RATE: 75 BPM | BODY MASS INDEX: 35.7 KG/M2 | DIASTOLIC BLOOD PRESSURE: 79 MMHG | SYSTOLIC BLOOD PRESSURE: 127 MMHG | HEIGHT: 69 IN | WEIGHT: 241 LBS

## 2023-06-02 DIAGNOSIS — S83.281A ACUTE LATERAL MENISCUS TEAR OF RIGHT KNEE, INITIAL ENCOUNTER: Primary | ICD-10-CM

## 2023-06-02 PROCEDURE — 99999 PR PBB SHADOW E&M-EST. PATIENT-LVL III: CPT | Mod: PBBFAC,,, | Performed by: PHYSICIAN ASSISTANT

## 2023-06-02 PROCEDURE — 99499 NO LOS: ICD-10-PCS | Mod: S$GLB,,, | Performed by: PHYSICIAN ASSISTANT

## 2023-06-02 PROCEDURE — 99999 PR PBB SHADOW E&M-EST. PATIENT-LVL III: ICD-10-PCS | Mod: PBBFAC,,, | Performed by: PHYSICIAN ASSISTANT

## 2023-06-02 PROCEDURE — 99499 UNLISTED E&M SERVICE: CPT | Mod: S$GLB,,, | Performed by: PHYSICIAN ASSISTANT

## 2023-06-02 RX ORDER — HYDROCODONE BITARTRATE AND ACETAMINOPHEN 7.5; 325 MG/1; MG/1
1 TABLET ORAL EVERY 6 HOURS PRN
Qty: 20 TABLET | Refills: 0 | Status: SHIPPED | OUTPATIENT
Start: 2023-06-02 | End: 2023-07-07 | Stop reason: ALTCHOICE

## 2023-06-02 RX ORDER — MUPIROCIN 20 MG/G
OINTMENT TOPICAL
Status: CANCELLED | OUTPATIENT
Start: 2023-06-02

## 2023-06-02 RX ORDER — CEFAZOLIN SODIUM 2 G/50ML
2 SOLUTION INTRAVENOUS
Status: CANCELLED | OUTPATIENT
Start: 2023-06-02

## 2023-06-02 RX ORDER — ASPIRIN 81 MG/1
81 TABLET ORAL DAILY
Qty: 28 TABLET | Refills: 0 | Status: SHIPPED | OUTPATIENT
Start: 2023-06-09 | End: 2023-07-07

## 2023-06-02 NOTE — H&P (VIEW-ONLY)
H&P    History 6/2/2023:  Vanessa returns here today for follow-up evaluation of her right knee and to complete preoperative paperwork.  She continues to have pain and functional limitations despite conservative treatment.  Surgical intervention has been recommended and she is scheduled to undergo a right knee arthroscopy with lateral meniscectomy and chondroplasty on 6/8/2023:    History 5/29/2023:  52 y.o. 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 She states she took a direct blow to the knee. She reports significant swelling. She states she has pain while walking and stair climbing. She does report popping in the knee. She does report subjective instability. She has been seen by Dr. Sam Lawson who referred her here today. She has tried physicla therapy.       - mechanical symptoms, + instability    Is affecting ADLs.  Pain is 7/10 at it's worst.        PAST MEDICAL HISTORY    Past Medical History:   Diagnosis Date    Anemia        PAST SURGICAL HISTORY     Past Surgical History:   Procedure Laterality Date    COLONOSCOPY N/A 10/15/2021    Procedure: COLONOSCOPY;  Surgeon: Jez Henriquez MD;  Location: [facility];  Service: Endoscopy;  Laterality: N/A;  covid-10/12/88-Tyjqpep-RD    HYSTERECTOMY      TUBAL LIGATION         FAMILY HISTORY    Family History   Problem Relation Age of Onset    No Known Problems Mother     No Known Problems Father     Diabetes Maternal Grandmother     Diabetes Maternal Grandfather     Cancer Neg Hx     Heart disease Neg Hx     Hypertension Neg Hx     Stroke Neg Hx        SOCIAL HISTORY    Social History     Socioeconomic History    Marital status: Single   Occupational History    Occupation: [employer]     Employer: [employer]   Tobacco Use    Smoking status: Never    Smokeless tobacco: Never  "  Substance and Sexual Activity    Alcohol use: Yes     Comment: RARELY    Drug use: No    Sexual activity: Yes     Partners: Male       MEDICATIONS      Current Outpatient Medications:     [START ON 6/9/2023] aspirin (ECOTRIN) 81 MG EC tablet, Take 1 tablet (81 mg total) by mouth once daily., Disp: 28 tablet, Rfl: 0    HYDROcodone-acetaminophen (NORCO) 7.5-325 mg per tablet, Take 1 tablet by mouth every 6 (six) hours as needed for Pain., Disp: 20 tablet, Rfl: 0    meloxicam (MOBIC) 15 MG tablet, Take 1 tablet (15 mg total) by mouth once daily., Disp: 30 tablet, Rfl: 1    tiZANidine (ZANAFLEX) 4 MG tablet, Take 1 tablet (4 mg total) by mouth nightly as needed., Disp: 30 tablet, Rfl: 1    ALLERGIES     Review of patient's allergies indicates:   Allergen Reactions    Morphine      Slowed down heart rate         REVIEW OF SYSTEMS   Constitution: Negative. Negative for chills, fever and night sweats.   HENT: Negative for congestion and headaches.    Eyes: Negative for blurred vision, left vision loss and right vision loss.   Cardiovascular: Negative for chest pain and syncope.   Respiratory: Negative for cough and shortness of breath.    Endocrine: Negative for polydipsia, polyphagia and polyuria.   Hematologic/Lymphatic: Negative for bleeding problem. Does not bruise/bleed easily.   Skin: Negative for dry skin, itching and rash.   Musculoskeletal: Negative for falls. Positive for right knee pain  Gastrointestinal: Negative for abdominal pain and bowel incontinence.   Genitourinary: Negative for bladder incontinence and nocturia.   Neurological: Negative for disturbances in coordination, loss of balance and seizures.   Psychiatric/Behavioral: Negative for depression. The patient does not have insomnia.    Allergic/Immunologic: Negative for hives and persistent infections.     PHYSICAL EXAMINATION    Vitals: /79   Pulse 75   Ht 5' 9" (1.753 m)   Wt 109.3 kg (241 lb)   LMP 04/01/2017   BMI 35.59 kg/m² "     General: The patient appears active and healthy with no apparent physical problems.  No disturbance of mood or affect is demonstrated. Alert and Oriented.    HEENT: Eyes normal, pupils equally round, nose normal.    Resp: Equal and symmetrical chest rises. No wheezing    CV: Regular rate    Neck: Supple; nonpainful range of motion.    Extremities: no cyanosis, clubbing, edema, or diffuse swelling.  Palpable pulses, good capillary refill of the digits.  No coolness, discoloration, edema or obvious varicosities.    Skin: no lesions noted.    Lymphatic: no detected adenopathy in the upper or lower extremities.    Neurologic: normal mental status, normal reflexes, normal gait and balance.  Patient is alert and oriented to person, place and time.  No flaccidity or spasticity is noted.  No motor or sensory deficits are noted.  Light touch is intact    Orthopaedic:     KNEE EXAM - RIGHT    Inspection:   Normal skin color and appearance with no scars, no ecchymosis and no effusion.      ROM:   0° - 135°. Pain terminal extension     Palpation:   There is no tenderness along medial joint line, medial plica, medial collateral ligament, pes bursa, lateral joint line, iliotibial band, lateral collateral ligament, popliteal fossa, patellar tendon, or quadriceps tendon. Tender anterior lateral joint line    Stability: + anterior drawer, + Lachman, + pivot shift and - posterior drawer.      No instability with varus or valgus stress at 0° or 30°. Negative dial  test at 30° and 90°.    Tests:   + Meris test.  - patellar compression - grind test, - crepitus.  There is no patellar apprehension.     - J Sign. - Billie's. - patellar tilt. - Norman. Lateral patella translation  2 quadrants. Medial patella translation 2 quadrants    Motor:   Quadriceps strength is 5/5 and hamstrings strength is 5/5. Hamstrings show no tightness.      Neuro:   Distal neurovascular status is normal    Vascular: Negative Homans and no palpable  popliteal cords. 2+ pedal pulse with brisk cap refill    Gait Normal      IMAGING    MRI Knee Without Contrast Right  Narrative: EXAMINATION:  MR Right Lower Extremity Joint Without Contrast, Knee    CLINICAL HISTORY:  52 years old Pain; Knee; Right    TECHNIQUE:  Imaging protocol: Magnetic resonance imaging of the right lower extremity joint without contrast. Exam focused on the knee.    COMPARISON:  No relevant prior studies available.    FINDINGS:  Bones/joints: There is minimal chondromalacia of the lateral compartment of the knee. The articular cartilage of the medial compartment and patellofemoral joint are intact. No bone marrow edema, fracture or dislocation. No significant joint effusion. Medial meniscus: Unremarkable. No tear. Lateral meniscus: Oblique tear of the anterior horn the lateral meniscus, extending into the body, and touching the superior inferior articular surfaces. Anterior cruciate ligament: Mild increased intrasubstance signal throughout the anterior cruciate ligament. Posterior cruciate ligament: Unremarkable. No tear. Medial capsule and supporting structures: Unremarkable. No tear. Lateral capsule and supporting structures: Unremarkable. No tear. Extensor mechanism of knee: Unremarkable. No tear. Soft tissues: Unremarkable.  Impression: 1. Mild sprain and/or low-grade partial thickness intrasubstance tear of the anterior cruciate ligament. 2. Oblique tear of the anterior horn the lateral meniscus, extending into the body, and touching the superior inferior articular surfaces. 3. Minimal chondromalacia of the lateral compartment of the knee    Electronically signed by: Virtual Radiologist  Date:    05/12/2023  Time:    08:04      IMPRESSION       ICD-10-CM ICD-9-CM   1. Acute lateral meniscus tear of right knee, initial encounter  S83.281A 836.1         MEDICATIONS PRESCRIBED      Aspirin 81 mg  Hydrocodone 7.5/325 mg    RECOMMENDATIONS     Surgical versus non-surgical options discussed  today; right knee arthroscopy with lateral meniscectomy and chondroplasty  The patient elected to proceed with operative intervention after all risks, benefits, and alternatives were discussed with the patient.  The risks of surgery include bleeding, scar formation, injuries to nerves, arteries and veins, need for additional surgeries, blood clots, infections and the risk of anesthesia. I personally obtained informed consent from the patient and documented a full history and physical.  Physical therapy ordered - [facility]      All questions were answered, pt will contact us for questions or concerns in the interim.

## 2023-06-02 NOTE — H&P
H&P    History 6/2/2023:  Vanessa returns here today for follow-up evaluation of her right knee and to complete preoperative paperwork.  She continues to have pain and functional limitations despite conservative treatment.  Surgical intervention has been recommended and she is scheduled to undergo a right knee arthroscopy with lateral meniscectomy and chondroplasty on 6/8/2023:    History 5/29/2023:  52 y.o. 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 She states she had a fall in December at [facility] and she slipped on some water on the floor. She states she took a direct blow to the knee. She reports significant swelling. She states she has pain while walking and stair climbing. She does report popping in the knee. She does report subjective instability. She has been seen by Dr. Sam Lawson who referred her here today. She has tried physicla therapy.       - mechanical symptoms, + instability    Is affecting ADLs.  Pain is 7/10 at it's worst.        PAST MEDICAL HISTORY    Past Medical History:   Diagnosis Date    Anemia        PAST SURGICAL HISTORY     Past Surgical History:   Procedure Laterality Date    COLONOSCOPY N/A 10/15/2021    Procedure: COLONOSCOPY;  Surgeon: Jez Henriquez MD;  Location: [facility];  Service: Endoscopy;  Laterality: N/A;  covid-10/12/66-Rdaxegp-JY    HYSTERECTOMY      TUBAL LIGATION         FAMILY HISTORY    Family History   Problem Relation Age of Onset    No Known Problems Mother     No Known Problems Father     Diabetes Maternal Grandmother     Diabetes Maternal Grandfather     Cancer Neg Hx     Heart disease Neg Hx     Hypertension Neg Hx     Stroke Neg Hx        SOCIAL HISTORY    Social History     Socioeconomic History    Marital status: Single   Occupational History    Occupation: [employer]     Employer: [employer]   Tobacco Use    Smoking status: Never    Smokeless tobacco: Never  "  Substance and Sexual Activity    Alcohol use: Yes     Comment: RARELY    Drug use: No    Sexual activity: Yes     Partners: Male       MEDICATIONS      Current Outpatient Medications:     [START ON 6/9/2023] aspirin (ECOTRIN) 81 MG EC tablet, Take 1 tablet (81 mg total) by mouth once daily., Disp: 28 tablet, Rfl: 0    HYDROcodone-acetaminophen (NORCO) 7.5-325 mg per tablet, Take 1 tablet by mouth every 6 (six) hours as needed for Pain., Disp: 20 tablet, Rfl: 0    meloxicam (MOBIC) 15 MG tablet, Take 1 tablet (15 mg total) by mouth once daily., Disp: 30 tablet, Rfl: 1    tiZANidine (ZANAFLEX) 4 MG tablet, Take 1 tablet (4 mg total) by mouth nightly as needed., Disp: 30 tablet, Rfl: 1    ALLERGIES     Review of patient's allergies indicates:   Allergen Reactions    Morphine      Slowed down heart rate         REVIEW OF SYSTEMS   Constitution: Negative. Negative for chills, fever and night sweats.   HENT: Negative for congestion and headaches.    Eyes: Negative for blurred vision, left vision loss and right vision loss.   Cardiovascular: Negative for chest pain and syncope.   Respiratory: Negative for cough and shortness of breath.    Endocrine: Negative for polydipsia, polyphagia and polyuria.   Hematologic/Lymphatic: Negative for bleeding problem. Does not bruise/bleed easily.   Skin: Negative for dry skin, itching and rash.   Musculoskeletal: Negative for falls. Positive for right knee pain  Gastrointestinal: Negative for abdominal pain and bowel incontinence.   Genitourinary: Negative for bladder incontinence and nocturia.   Neurological: Negative for disturbances in coordination, loss of balance and seizures.   Psychiatric/Behavioral: Negative for depression. The patient does not have insomnia.    Allergic/Immunologic: Negative for hives and persistent infections.     PHYSICAL EXAMINATION    Vitals: /79   Pulse 75   Ht 5' 9" (1.753 m)   Wt 109.3 kg (241 lb)   LMP 04/01/2017   BMI 35.59 kg/m² "     General: The patient appears active and healthy with no apparent physical problems.  No disturbance of mood or affect is demonstrated. Alert and Oriented.    HEENT: Eyes normal, pupils equally round, nose normal.    Resp: Equal and symmetrical chest rises. No wheezing    CV: Regular rate    Neck: Supple; nonpainful range of motion.    Extremities: no cyanosis, clubbing, edema, or diffuse swelling.  Palpable pulses, good capillary refill of the digits.  No coolness, discoloration, edema or obvious varicosities.    Skin: no lesions noted.    Lymphatic: no detected adenopathy in the upper or lower extremities.    Neurologic: normal mental status, normal reflexes, normal gait and balance.  Patient is alert and oriented to person, place and time.  No flaccidity or spasticity is noted.  No motor or sensory deficits are noted.  Light touch is intact    Orthopaedic:     KNEE EXAM - RIGHT    Inspection:   Normal skin color and appearance with no scars, no ecchymosis and no effusion.      ROM:   0° - 135°. Pain terminal extension     Palpation:   There is no tenderness along medial joint line, medial plica, medial collateral ligament, pes bursa, lateral joint line, iliotibial band, lateral collateral ligament, popliteal fossa, patellar tendon, or quadriceps tendon. Tender anterior lateral joint line    Stability: + anterior drawer, + Lachman, + pivot shift and - posterior drawer.      No instability with varus or valgus stress at 0° or 30°. Negative dial  test at 30° and 90°.    Tests:   + Meris test.  - patellar compression - grind test, - crepitus.  There is no patellar apprehension.     - J Sign. - Billie's. - patellar tilt. - Norman. Lateral patella translation  2 quadrants. Medial patella translation 2 quadrants    Motor:   Quadriceps strength is 5/5 and hamstrings strength is 5/5. Hamstrings show no tightness.      Neuro:   Distal neurovascular status is normal    Vascular: Negative Homans and no palpable  popliteal cords. 2+ pedal pulse with brisk cap refill    Gait Normal      IMAGING    MRI Knee Without Contrast Right  Narrative: EXAMINATION:  MR Right Lower Extremity Joint Without Contrast, Knee    CLINICAL HISTORY:  52 years old Pain; Knee; Right    TECHNIQUE:  Imaging protocol: Magnetic resonance imaging of the right lower extremity joint without contrast. Exam focused on the knee.    COMPARISON:  No relevant prior studies available.    FINDINGS:  Bones/joints: There is minimal chondromalacia of the lateral compartment of the knee. The articular cartilage of the medial compartment and patellofemoral joint are intact. No bone marrow edema, fracture or dislocation. No significant joint effusion. Medial meniscus: Unremarkable. No tear. Lateral meniscus: Oblique tear of the anterior horn the lateral meniscus, extending into the body, and touching the superior inferior articular surfaces. Anterior cruciate ligament: Mild increased intrasubstance signal throughout the anterior cruciate ligament. Posterior cruciate ligament: Unremarkable. No tear. Medial capsule and supporting structures: Unremarkable. No tear. Lateral capsule and supporting structures: Unremarkable. No tear. Extensor mechanism of knee: Unremarkable. No tear. Soft tissues: Unremarkable.  Impression: 1. Mild sprain and/or low-grade partial thickness intrasubstance tear of the anterior cruciate ligament. 2. Oblique tear of the anterior horn the lateral meniscus, extending into the body, and touching the superior inferior articular surfaces. 3. Minimal chondromalacia of the lateral compartment of the knee    Electronically signed by: Virtual Radiologist  Date:    05/12/2023  Time:    08:04      IMPRESSION       ICD-10-CM ICD-9-CM   1. Acute lateral meniscus tear of right knee, initial encounter  S83.281A 836.1         MEDICATIONS PRESCRIBED      Aspirin 81 mg  Hydrocodone 7.5/325 mg    RECOMMENDATIONS     Surgical versus non-surgical options discussed  today; right knee arthroscopy with lateral meniscectomy and chondroplasty  The patient elected to proceed with operative intervention after all risks, benefits, and alternatives were discussed with the patient.  The risks of surgery include bleeding, scar formation, injuries to nerves, arteries and veins, need for additional surgeries, blood clots, infections and the risk of anesthesia. I personally obtained informed consent from the patient and documented a full history and physical.  Physical therapy ordered - [facility]      All questions were answered, pt will contact us for questions or concerns in the interim.

## 2023-06-02 NOTE — PROGRESS NOTES
PREOPERATIVE APPOINTMENT    History 6/2/2023:  Vanessa returns here today for follow-up evaluation of her right knee and to complete preoperative paperwork.  She continues to have pain and functional limitations despite conservative treatment.  Surgical intervention has been recommended and she is scheduled to undergo a right knee arthroscopy with lateral meniscectomy and chondroplasty on 6/8/2023:    History 5/29/2023:  52 y.o. 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 She states she took a direct blow to the knee. She reports significant swelling. She states she has pain while walking and stair climbing. She does report popping in the knee. She does report subjective instability. She has been seen by Dr. Sam Lawson who referred her here today. She has tried physicla therapy.       - mechanical symptoms, + instability    Is affecting ADLs.  Pain is 7/10 at it's worst.        PAST MEDICAL HISTORY    Past Medical History:   Diagnosis Date    Anemia        PAST SURGICAL HISTORY     Past Surgical History:   Procedure Laterality Date    COLONOSCOPY N/A 10/15/2021    Procedure: COLONOSCOPY;  Surgeon: Jez Henriquez MD;  Location: [facility];  Service: Endoscopy;  Laterality: N/A;  covid-10/12/89-[id number]-YL    HYSTERECTOMY      TUBAL LIGATION         FAMILY HISTORY    Family History   Problem Relation Age of Onset    No Known Problems Mother     No Known Problems Father     Diabetes Maternal Grandmother     Diabetes Maternal Grandfather     Cancer Neg Hx     Heart disease Neg Hx     Hypertension Neg Hx     Stroke Neg Hx        SOCIAL HISTORY    Social History     Socioeconomic History    Marital status: Single   Occupational History    Occupation: [employer]     Employer: [employer]   Tobacco Use    Smoking status: Never    Smokeless  "tobacco: Never   Substance and Sexual Activity    Alcohol use: Yes     Comment: RARELY    Drug use: No    Sexual activity: Yes     Partners: Male       MEDICATIONS      Current Outpatient Medications:     [START ON 6/9/2023] aspirin (ECOTRIN) 81 MG EC tablet, Take 1 tablet (81 mg total) by mouth once daily., Disp: 28 tablet, Rfl: 0    HYDROcodone-acetaminophen (NORCO) 7.5-325 mg per tablet, Take 1 tablet by mouth every 6 (six) hours as needed for Pain., Disp: 20 tablet, Rfl: 0    meloxicam (MOBIC) 15 MG tablet, Take 1 tablet (15 mg total) by mouth once daily., Disp: 30 tablet, Rfl: 1    tiZANidine (ZANAFLEX) 4 MG tablet, Take 1 tablet (4 mg total) by mouth nightly as needed., Disp: 30 tablet, Rfl: 1    ALLERGIES     Review of patient's allergies indicates:   Allergen Reactions    Morphine      Slowed down heart rate         REVIEW OF SYSTEMS   Constitution: Negative. Negative for chills, fever and night sweats.   HENT: Negative for congestion and headaches.    Eyes: Negative for blurred vision, left vision loss and right vision loss.   Cardiovascular: Negative for chest pain and syncope.   Respiratory: Negative for cough and shortness of breath.    Endocrine: Negative for polydipsia, polyphagia and polyuria.   Hematologic/Lymphatic: Negative for bleeding problem. Does not bruise/bleed easily.   Skin: Negative for dry skin, itching and rash.   Musculoskeletal: Negative for falls. Positive for right knee pain  Gastrointestinal: Negative for abdominal pain and bowel incontinence.   Genitourinary: Negative for bladder incontinence and nocturia.   Neurological: Negative for disturbances in coordination, loss of balance and seizures.   Psychiatric/Behavioral: Negative for depression. The patient does not have insomnia.    Allergic/Immunologic: Negative for hives and persistent infections.     PHYSICAL EXAMINATION    Vitals: /79   Pulse 75   Ht 5' 9" (1.753 m)   Wt 109.3 kg (241 lb)   LMP 04/01/2017   BMI 35.59 " kg/m²     General: The patient appears active and healthy with no apparent physical problems.  No disturbance of mood or affect is demonstrated. Alert and Oriented.    HEENT: Eyes normal, pupils equally round, nose normal.    Resp: Equal and symmetrical chest rises. No wheezing    CV: Regular rate    Neck: Supple; nonpainful range of motion.    Extremities: no cyanosis, clubbing, edema, or diffuse swelling.  Palpable pulses, good capillary refill of the digits.  No coolness, discoloration, edema or obvious varicosities.    Skin: no lesions noted.    Lymphatic: no detected adenopathy in the upper or lower extremities.    Neurologic: normal mental status, normal reflexes, normal gait and balance.  Patient is alert and oriented to person, place and time.  No flaccidity or spasticity is noted.  No motor or sensory deficits are noted.  Light touch is intact    Orthopaedic:     KNEE EXAM - RIGHT    Inspection:   Normal skin color and appearance with no scars, no ecchymosis and no effusion.      ROM:   0° - 135°. Pain terminal extension     Palpation:   There is no tenderness along medial joint line, medial plica, medial collateral ligament, pes bursa, lateral joint line, iliotibial band, lateral collateral ligament, popliteal fossa, patellar tendon, or quadriceps tendon. Tender anterior lateral joint line    Stability: + anterior drawer, + Lachman, + pivot shift and - posterior drawer.      No instability with varus or valgus stress at 0° or 30°. Negative dial  test at 30° and 90°.    Tests:   + Meris test.  - patellar compression - grind test, - crepitus.  There is no patellar apprehension.     - J Sign. - Billie's. - patellar tilt. - Norman. Lateral patella translation  2 quadrants. Medial patella translation 2 quadrants    Motor:   Quadriceps strength is 5/5 and hamstrings strength is 5/5. Hamstrings show no tightness.      Neuro:   Distal neurovascular status is normal    Vascular: Negative Homans and no palpable  popliteal cords. 2+ pedal pulse with brisk cap refill    Gait Normal      IMAGING    MRI Knee Without Contrast Right  Narrative: EXAMINATION:  MR Right Lower Extremity Joint Without Contrast, Knee    CLINICAL HISTORY:  52 years old Pain; Knee; Right    TECHNIQUE:  Imaging protocol: Magnetic resonance imaging of the right lower extremity joint without contrast. Exam focused on the knee.    COMPARISON:  No relevant prior studies available.    FINDINGS:  Bones/joints: There is minimal chondromalacia of the lateral compartment of the knee. The articular cartilage of the medial compartment and patellofemoral joint are intact. No bone marrow edema, fracture or dislocation. No significant joint effusion. Medial meniscus: Unremarkable. No tear. Lateral meniscus: Oblique tear of the anterior horn the lateral meniscus, extending into the body, and touching the superior inferior articular surfaces. Anterior cruciate ligament: Mild increased intrasubstance signal throughout the anterior cruciate ligament. Posterior cruciate ligament: Unremarkable. No tear. Medial capsule and supporting structures: Unremarkable. No tear. Lateral capsule and supporting structures: Unremarkable. No tear. Extensor mechanism of knee: Unremarkable. No tear. Soft tissues: Unremarkable.  Impression: 1. Mild sprain and/or low-grade partial thickness intrasubstance tear of the anterior cruciate ligament. 2. Oblique tear of the anterior horn the lateral meniscus, extending into the body, and touching the superior inferior articular surfaces. 3. Minimal chondromalacia of the lateral compartment of the knee    Electronically signed by: Virtual Radiologist  Date:    05/12/2023  Time:    08:04      IMPRESSION       ICD-10-CM ICD-9-CM   1. Acute lateral meniscus tear of right knee, initial encounter  S83.281A 836.1         MEDICATIONS PRESCRIBED      Aspirin 81 mg  Hydrocodone 7.5/325 mg    RECOMMENDATIONS     Surgical versus non-surgical options discussed  today; right knee arthroscopy with lateral meniscectomy and chondroplasty  The patient elected to proceed with operative intervention after all risks, benefits, and alternatives were discussed with the patient.  The risks of surgery include bleeding, scar formation, injuries to nerves, arteries and veins, need for additional surgeries, blood clots, infections and the risk of anesthesia. I personally obtained informed consent from the patient and documented a full history and physical.  Physical therapy ordered - [facility]      All questions were answered, pt will contact us for questions or concerns in the interim.

## 2023-06-07 ENCOUNTER — ANESTHESIA EVENT (OUTPATIENT)
Dept: SURGERY | Facility: HOSPITAL | Age: 53
End: 2023-06-07
Payer: COMMERCIAL

## 2023-06-07 ENCOUNTER — TELEPHONE (OUTPATIENT)
Dept: SPORTS MEDICINE | Facility: CLINIC | Age: 53
End: 2023-06-07
Payer: COMMERCIAL

## 2023-06-08 ENCOUNTER — ANESTHESIA (OUTPATIENT)
Dept: SURGERY | Facility: HOSPITAL | Age: 53
End: 2023-06-08
Payer: COMMERCIAL

## 2023-06-08 ENCOUNTER — HOSPITAL ENCOUNTER (OUTPATIENT)
Facility: HOSPITAL | Age: 53
Discharge: HOME | End: 2023-06-08
Attending: ORTHOPAEDIC SURGERY | Admitting: ORTHOPAEDIC SURGERY
Payer: COMMERCIAL

## 2023-06-08 VITALS
WEIGHT: 241 LBS | OXYGEN SATURATION: 98 % | TEMPERATURE: 98 F | HEIGHT: 69 IN | DIASTOLIC BLOOD PRESSURE: 82 MMHG | HEART RATE: 58 BPM | SYSTOLIC BLOOD PRESSURE: 125 MMHG | RESPIRATION RATE: 24 BRPM | BODY MASS INDEX: 35.7 KG/M2

## 2023-06-08 DIAGNOSIS — S83.281A ACUTE LATERAL MENISCUS TEAR OF RIGHT KNEE, INITIAL ENCOUNTER: Primary | ICD-10-CM

## 2023-06-08 DIAGNOSIS — M94.261 CHONDROMALACIA OF KNEE, RIGHT: ICD-10-CM

## 2023-06-08 PROCEDURE — 27201423 OPTIME MED/SURG SUP & DEVICES STERILE SUPPLY: Performed by: ORTHOPAEDIC SURGERY

## 2023-06-08 PROCEDURE — 25000003 PHARM REV CODE 250: Performed by: NURSE ANESTHETIST, CERTIFIED REGISTERED

## 2023-06-08 PROCEDURE — 29881 ARTHRS KNE SRG MNISECTMY M/L: CPT | Mod: AS,RT,, | Performed by: PHYSICIAN ASSISTANT

## 2023-06-08 PROCEDURE — 29881 PR KNEE SCOPE SINGLE MENISECECTOMY: ICD-10-PCS | Mod: RT,,, | Performed by: ORTHOPAEDIC SURGERY

## 2023-06-08 PROCEDURE — 71000033 HC RECOVERY, INTIAL HOUR: Performed by: ORTHOPAEDIC SURGERY

## 2023-06-08 PROCEDURE — 63600175 PHARM REV CODE 636 W HCPCS: Performed by: ORTHOPAEDIC SURGERY

## 2023-06-08 PROCEDURE — 71000015 HC POSTOP RECOV 1ST HR: Performed by: ORTHOPAEDIC SURGERY

## 2023-06-08 PROCEDURE — 25000003 PHARM REV CODE 250: Performed by: ORTHOPAEDIC SURGERY

## 2023-06-08 PROCEDURE — 29881 PR KNEE SCOPE SINGLE MENISECECTOMY: ICD-10-PCS | Mod: AS,RT,, | Performed by: PHYSICIAN ASSISTANT

## 2023-06-08 PROCEDURE — D9220A PRA ANESTHESIA: Mod: ANES,,, | Performed by: ANESTHESIOLOGY

## 2023-06-08 PROCEDURE — 36000711: Performed by: ORTHOPAEDIC SURGERY

## 2023-06-08 PROCEDURE — 25000003 PHARM REV CODE 250: Performed by: PHYSICIAN ASSISTANT

## 2023-06-08 PROCEDURE — 36000710: Performed by: ORTHOPAEDIC SURGERY

## 2023-06-08 PROCEDURE — 37000009 HC ANESTHESIA EA ADD 15 MINS: Performed by: ORTHOPAEDIC SURGERY

## 2023-06-08 PROCEDURE — 25000003 PHARM REV CODE 250: Performed by: ANESTHESIOLOGY

## 2023-06-08 PROCEDURE — 94761 N-INVAS EAR/PLS OXIMETRY MLT: CPT

## 2023-06-08 PROCEDURE — 29881 ARTHRS KNE SRG MNISECTMY M/L: CPT | Mod: RT,,, | Performed by: ORTHOPAEDIC SURGERY

## 2023-06-08 PROCEDURE — 63600175 PHARM REV CODE 636 W HCPCS: Performed by: ANESTHESIOLOGY

## 2023-06-08 PROCEDURE — 63600175 PHARM REV CODE 636 W HCPCS: Performed by: NURSE ANESTHETIST, CERTIFIED REGISTERED

## 2023-06-08 PROCEDURE — D9220A PRA ANESTHESIA: ICD-10-PCS | Mod: ANES,,, | Performed by: ANESTHESIOLOGY

## 2023-06-08 PROCEDURE — 25000003 PHARM REV CODE 250: Performed by: STUDENT IN AN ORGANIZED HEALTH CARE EDUCATION/TRAINING PROGRAM

## 2023-06-08 PROCEDURE — 99900035 HC TECH TIME PER 15 MIN (STAT)

## 2023-06-08 PROCEDURE — D9220A PRA ANESTHESIA: Mod: CRNA,,, | Performed by: NURSE ANESTHETIST, CERTIFIED REGISTERED

## 2023-06-08 PROCEDURE — D9220A PRA ANESTHESIA: ICD-10-PCS | Mod: CRNA,,, | Performed by: NURSE ANESTHETIST, CERTIFIED REGISTERED

## 2023-06-08 PROCEDURE — 37000008 HC ANESTHESIA 1ST 15 MINUTES: Performed by: ORTHOPAEDIC SURGERY

## 2023-06-08 RX ORDER — MIDAZOLAM HYDROCHLORIDE 1 MG/ML
INJECTION, SOLUTION INTRAMUSCULAR; INTRAVENOUS
Status: DISCONTINUED | OUTPATIENT
Start: 2023-06-08 | End: 2023-06-08

## 2023-06-08 RX ORDER — DEXMEDETOMIDINE HYDROCHLORIDE 100 UG/ML
INJECTION, SOLUTION INTRAVENOUS
Status: DISCONTINUED | OUTPATIENT
Start: 2023-06-08 | End: 2023-06-08

## 2023-06-08 RX ORDER — BUPIVACAINE HYDROCHLORIDE AND EPINEPHRINE 2.5; 5 MG/ML; UG/ML
INJECTION, SOLUTION EPIDURAL; INFILTRATION; INTRACAUDAL; PERINEURAL
Status: DISCONTINUED | OUTPATIENT
Start: 2023-06-08 | End: 2023-06-08 | Stop reason: HOSPADM

## 2023-06-08 RX ORDER — MUPIROCIN 20 MG/G
OINTMENT TOPICAL
Status: DISCONTINUED | OUTPATIENT
Start: 2023-06-08 | End: 2023-06-08 | Stop reason: HOSPADM

## 2023-06-08 RX ORDER — ACETAMINOPHEN 500 MG
1000 TABLET ORAL
Status: COMPLETED | OUTPATIENT
Start: 2023-06-08 | End: 2023-06-08

## 2023-06-08 RX ORDER — LIDOCAINE HYDROCHLORIDE 20 MG/ML
INJECTION INTRAVENOUS
Status: DISCONTINUED | OUTPATIENT
Start: 2023-06-08 | End: 2023-06-08

## 2023-06-08 RX ORDER — KETAMINE HCL IN 0.9 % NACL 50 MG/5 ML
SYRINGE (ML) INTRAVENOUS
Status: DISCONTINUED | OUTPATIENT
Start: 2023-06-08 | End: 2023-06-08

## 2023-06-08 RX ORDER — ONDANSETRON 2 MG/ML
INJECTION INTRAMUSCULAR; INTRAVENOUS
Status: DISCONTINUED | OUTPATIENT
Start: 2023-06-08 | End: 2023-06-08

## 2023-06-08 RX ORDER — METHOCARBAMOL 500 MG/1
1000 TABLET, FILM COATED ORAL ONCE
Status: COMPLETED | OUTPATIENT
Start: 2023-06-08 | End: 2023-06-08

## 2023-06-08 RX ORDER — HYDROMORPHONE HYDROCHLORIDE 1 MG/ML
0.2 INJECTION, SOLUTION INTRAMUSCULAR; INTRAVENOUS; SUBCUTANEOUS EVERY 5 MIN PRN
Status: DISCONTINUED | OUTPATIENT
Start: 2023-06-08 | End: 2023-06-08 | Stop reason: HOSPADM

## 2023-06-08 RX ORDER — EPINEPHRINE 1 MG/ML
INJECTION, SOLUTION, CONCENTRATE INTRAVENOUS
Status: DISCONTINUED | OUTPATIENT
Start: 2023-06-08 | End: 2023-06-08 | Stop reason: HOSPADM

## 2023-06-08 RX ORDER — FENTANYL CITRATE 50 UG/ML
INJECTION, SOLUTION INTRAMUSCULAR; INTRAVENOUS
Status: DISCONTINUED | OUTPATIENT
Start: 2023-06-08 | End: 2023-06-08

## 2023-06-08 RX ORDER — PROPOFOL 10 MG/ML
VIAL (ML) INTRAVENOUS
Status: DISCONTINUED | OUTPATIENT
Start: 2023-06-08 | End: 2023-06-08

## 2023-06-08 RX ORDER — BACITRACIN ZINC 500 UNIT/G
OINTMENT (GRAM) TOPICAL
Status: DISCONTINUED | OUTPATIENT
Start: 2023-06-08 | End: 2023-06-08 | Stop reason: HOSPADM

## 2023-06-08 RX ORDER — HALOPERIDOL 5 MG/ML
0.5 INJECTION INTRAMUSCULAR EVERY 10 MIN PRN
Status: DISCONTINUED | OUTPATIENT
Start: 2023-06-08 | End: 2023-06-08 | Stop reason: HOSPADM

## 2023-06-08 RX ORDER — DEXAMETHASONE SODIUM PHOSPHATE 4 MG/ML
INJECTION, SOLUTION INTRA-ARTICULAR; INTRALESIONAL; INTRAMUSCULAR; INTRAVENOUS; SOFT TISSUE
Status: DISCONTINUED | OUTPATIENT
Start: 2023-06-08 | End: 2023-06-08

## 2023-06-08 RX ORDER — OXYCODONE HYDROCHLORIDE 5 MG/1
5 TABLET ORAL
Status: DISCONTINUED | OUTPATIENT
Start: 2023-06-08 | End: 2023-06-08 | Stop reason: HOSPADM

## 2023-06-08 RX ORDER — FENTANYL CITRATE 50 UG/ML
25 INJECTION, SOLUTION INTRAMUSCULAR; INTRAVENOUS EVERY 5 MIN PRN
Status: COMPLETED | OUTPATIENT
Start: 2023-06-08 | End: 2023-06-08

## 2023-06-08 RX ADMIN — MUPIROCIN: 20 OINTMENT TOPICAL at 06:06

## 2023-06-08 RX ADMIN — LIDOCAINE HYDROCHLORIDE 100 MG: 20 INJECTION INTRAVENOUS at 09:06

## 2023-06-08 RX ADMIN — FENTANYL CITRATE 25 MCG: 50 INJECTION, SOLUTION INTRAMUSCULAR; INTRAVENOUS at 11:06

## 2023-06-08 RX ADMIN — SODIUM CHLORIDE: 0.9 INJECTION, SOLUTION INTRAVENOUS at 09:06

## 2023-06-08 RX ADMIN — GLYCOPYRROLATE 0.2 MG: 0.2 INJECTION, SOLUTION INTRAMUSCULAR; INTRAVENOUS at 09:06

## 2023-06-08 RX ADMIN — FENTANYL CITRATE 25 MCG: 50 INJECTION, SOLUTION INTRAMUSCULAR; INTRAVENOUS at 10:06

## 2023-06-08 RX ADMIN — FENTANYL CITRATE 100 MCG: 50 INJECTION, SOLUTION INTRAMUSCULAR; INTRAVENOUS at 09:06

## 2023-06-08 RX ADMIN — MIDAZOLAM HYDROCHLORIDE 2 MG: 1 INJECTION, SOLUTION INTRAMUSCULAR; INTRAVENOUS at 09:06

## 2023-06-08 RX ADMIN — OXYCODONE HYDROCHLORIDE 5 MG: 5 TABLET ORAL at 10:06

## 2023-06-08 RX ADMIN — METHOCARBAMOL 1000 MG: 500 TABLET ORAL at 10:06

## 2023-06-08 RX ADMIN — PROPOFOL 250 MG: 10 INJECTION, EMULSION INTRAVENOUS at 09:06

## 2023-06-08 RX ADMIN — DEXMEDETOMIDINE HYDROCHLORIDE 12 MCG: 100 INJECTION, SOLUTION INTRAVENOUS at 09:06

## 2023-06-08 RX ADMIN — ONDANSETRON 4 MG: 2 INJECTION, SOLUTION INTRAMUSCULAR; INTRAVENOUS at 09:06

## 2023-06-08 RX ADMIN — ACETAMINOPHEN 1000 MG: 500 TABLET ORAL at 06:06

## 2023-06-08 RX ADMIN — Medication 20 MG: at 09:06

## 2023-06-08 RX ADMIN — DEXAMETHASONE SODIUM PHOSPHATE 4 MG: 4 INJECTION, SOLUTION INTRAMUSCULAR; INTRAVENOUS at 09:06

## 2023-06-08 RX ADMIN — DEXMEDETOMIDINE HYDROCHLORIDE 8 MCG: 100 INJECTION, SOLUTION INTRAVENOUS at 09:06

## 2023-06-08 NOTE — PATIENT INSTRUCTIONS
POST-OPERATIVE DISCHARGE INSTRUCTIONS    Diet: Ice chips, clear liquids, and then diet as tolerated.  Drink plenty of liquids after surgery.  Ice the area at least three times a day (20 minutes per session) if possible.    Elevate the extremity above the level of the heart to help reduce swelling.  Progress weight-bearing and physical activities as tolerated.  Pain medication can be taken every four to six hours as needed.  It is helpful to take pain medication prior to physical therapy. If pain is not controlled, please take 800 mg ibuprofen every 8 hours as needed unless contraindicated (NSAID allergy, kidney disease, heart disease, currently taking blood thinners, etc.).  Any activity that requires precise thinking or accuracy should be avoided for a minimum of 72 hours after surgery and while on narcotic pain medication.  This includes operating machinery and/or driving a vehicle.  All sutures will be removed approximately 10-14 days from the time of surgery. Leave steri-strips (skin tapes) in place until sutures are removed.  If skin glue is used instead of stitches, do not apply any ointments or solutions to the incision.  Keep the incision dry.  The skin glue will peel off in 3-4 weeks.  Dressing change directions, unless otherwise instructed:     ?  Knee scope Change dressing on the first post-op day.  Use gauze for the first 3 days, then start using waterproof Band-Aids over the incision sites.     All casts, splints, braces, slings, crutches, abduction pillows, etc. are to be worn as instructed.  Ammon Hose or Sequential Compression Devices are often used following surgery to help with swelling and prevent blood clots.  They can be removed for 2-3 hours daily as needed.  If the hose becomes uncomfortable after a few days, switch to an Ace Wrap if desired.  Keep the incision dry for 10-14 days.  A waterproof dressing ([facility] or [facility]) can be used to shower.  No bath, pool, or hot tub until instructed.  You  should notify our office if you notice any of the following:  A temperature greater than 101 F  Severe or increasing pain  Excessive drainage or redness of the incision  Calf swelling and pain  Chest pain or shortness of breath

## 2023-06-08 NOTE — ANESTHESIA PREPROCEDURE EVALUATION
06/08/2023  Vanessa Jerez is a 52 y.o., female.      Pre-op Assessment    I have reviewed the Patient Summary Reports.     I have reviewed the Nursing Notes. I have reviewed the NPO Status.   I have reviewed the Medications.     Review of Systems  Anesthesia Hx:  No problems with previous Anesthesia  History of prior surgery of interest to airway management or planning: Previous anesthesia: General, MAC 8/17/2017  Total Vag Hysterectomy with general anesthesia.   10/15/2021  colonoscopy with MAC.  Procedure performed at an [facility]. Airway issues documented on chart review include mask, easy, laryngeal mask airway used   Denies Personal Hx of Anesthesia complications.   Social:  No Alcohol Use, Social Alcohol Use    Hematology/Oncology:     Oncology Normal    -- Anemia:   EENT/Dental:EENT/Dental Normal   Cardiovascular:  Cardiovascular Normal Exercise tolerance: good  Denies CAD.     Denies Angina.  Denies YEE.    Pulmonary:  Pulmonary Normal  Denies Asthma.  Denies Shortness of breath.    Renal/:  Renal/ Normal  Denies Chronic Renal Disease.     Hepatic/GI:  Hepatic/GI Normal  Denies GERD. Denies Liver Disease.    Musculoskeletal:   Acute lateral meniscus tear of right knee,    OB/GYN/PEDS:  Hysterectomy, tubal Ligation   Neurological:  Neurology Normal  Denies Headaches. Denies Seizures.    Endocrine:   Denies Hypothyroidism. HA1C = 5.7 on 9/19/2022 Obesity / BMI > 30  Dermatological:  Skin Normal    Psych:  Psychiatric Normal           Physical Exam  General: Well nourished and Cooperative    Airway:  Mallampati: II   Mouth Opening: Normal  Neck ROM: Normal ROM        Anesthesia Plan  Type of Anesthesia, risks & benefits discussed:    Anesthesia Type: Gen Supraglottic Airway, Gen ETT  Intra-op Monitoring Plan: Standard ASA Monitors  Post Op Pain Control Plan: multimodal analgesia and  IV/PO Opioids PRN  Induction:  IV  Informed Consent: Informed consent signed with the Patient and all parties understand the risks and agree with anesthesia plan.  All questions answered.   ASA Score: 1    Ready For Surgery From Anesthesia Perspective.     .

## 2023-06-08 NOTE — ANESTHESIA PROCEDURE NOTES
Intubation    Date/Time: 6/8/2023 9:18 AM  Performed by: Petty Farley CRNA  Authorized by: Pardeep Arreaga MD     Intubation:     Induction:  Intravenous    Intubated:  Postinduction    Mask Ventilation:  Easy mask    Difficult Airway Encountered?: No      Complications:  None    Airway Device:  Supraglottic airway/LMA    Airway Device Size:  3.5    Secured at:  The lips    Placement Verified By:  Capnometry    Complicating Factors:  None    Findings Post-Intubation:  BS equal bilateral and atraumatic/condition of teeth unchanged

## 2023-06-08 NOTE — OP NOTE
[facility])  Surgery Department  Operative Note    SUMMARY     Date of Procedure: 6/8/2023     Pre-Operative Diagnosis:   Right Knee Tear, Lateral meniscus, acute S83.289A  Right Knee Chondromalacia, (excludes patella) M94.29  Right Knee Synovitis M65.9    Post-Operative Diagnosis:   Right Knee Tear, Lateral meniscus, acute S83.289A  Right Knee Chondromalacia, (excludes patella) M94.29  Right Knee Synovitis M65.9    Procedure:  1. Right Knee Arthroscopy, with meniscectomy (medial OR lateral) 94726  2.  Right Knee Arthroscopy, knee, synovectomy, limited 64258      Surgeon(s) and Role:     * Carlotta Christensen MD - Primary    Assistant:   Altaf Fernández PA-C    No resident or fellow was available throughout the entire procedure as a result it was medically necessary for Altaf Fernández PA-C to perform first assist duties.      Anesthesia: General    Complications: None    Tourniquet: None    Implants: * No implants in log *    Arthroscopic Findings:   Patellofemoral Compartment  Medial Patella Cartilage: Intact  Central Patella Cartilage:  Areas of grade 1 and very minimal grade 2 changes  Lateral Patella Cartilage: Intact  Trochlea Cartilage:  Central sulcus grade 2 changes with a superior to inferior witness malina type tracking  Plica:  Small  Medial Gutter: Clear of loose bodies or debris  Lateral Gutter:Clear of loose bodies or debris  Suprapatellar pouch adhesions  Lateral tilt of the patella    Ligaments  ACL:Intact  PCL:Intact    Medial Compartment:  Femoral Cartilage: Intact  Tibial Cartilage:Intact  Meniscus:Intact    Lateral Compartment:  Femoral Cartilage:  Grade 2 - 1 x 1 cm lateral weight-bearing zone  Tibial Cartilage:Intact  Meniscus:  Midbody white-white zone and anterior horn white-white zone tear    Indication for Procedure: 52 y.o. 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 She states she had a fall in December at [facility] and she  slipped on some water on the floor. She states she took a direct blow to the knee. She reports significant swelling. She states she has pain while walking and stair climbing. She does report popping in the knee. She does report subjective instability. She has been seen by Dr. Sam Lawson . She has tried physicla therapy.   Her history, physical and imaging were consistent with a lateral meniscus tear with a small chondral lesion on the lateral femoral condyle.  We discussed the risks, benefits and alternatives of surgery.  She elected to proceed with surgical intervention.    Surgery in Detail:  The patient was marked identified in the holding room.  She was brought back to the operating room and placed in the supine position.  After general endotracheal anesthesia was administered the right lower extremity was prepped and draped in usual sterile fashion for a knee arthroscopy. The contralateral leg was secured and well padded. Preoperative antibiotics were given within 1 hour the procedure.  A time-out was taken prior starting. We used 0.25% Marcaine and epinephrine for local periportal anesthetic.  We created an anterior lateral portal and under direct visualization an anterior medial portal was created.    After diagnostic arthroscopy was completed with the above-stated findings, Rich were introduced into the lateral compartment and debridement was done of the white-white zone tear of the lateral meniscus both of the midbody and anterior horn.  Arthroscope care Wand was brought in to stabilize the edges of the lateral meniscal tear at the midbody extending all way anteriorly.  We then moved to the suprapatellar pouch and a synovectomy was done of adhesions noted of will was likely a remnant plica in the suprapatellar pouch which was likely tethering and potentially leading to some lateral maltracking of the patella.  These suprapatellar plica bands were removed with the shaver.  We then completed a  synovectomy in the area of the notch where there was a very thickened ligamentum mucosa draping over the area of her anterior horn lateral meniscal tear.    The arthroscopes were removed from the joint. The patient was dressed with Adaptic, bacitracin, 4x4s, Webril and an Ace wrap from the toes up to the proximal thigh.  The patient was extubated and taken recovery in satisfactory condition.      Post-Op Plan:  Knee arthroscopy protocol    Attestation: I was present and scrubbed for the entire procedure.

## 2023-06-08 NOTE — TRANSFER OF CARE
"Anesthesia Transfer of Care Note    Patient: Vanessa Jerez    Procedure(s) Performed: Procedure(s) (LRB):  ARTHROSCOPY, KNEE, WITH MENISCECTOMY (Right)  ARTHROSCOPY, KNEE, WITH CHONDROPLASTY (Right)    Patient location: PACU    Anesthesia Type: general    Transport from OR: Transported from OR on 6-10 L/min O2 by face mask with adequate spontaneous ventilation    Post pain: adequate analgesia    Post assessment: no apparent anesthetic complications and tolerated procedure well    Post vital signs: stable    Level of consciousness: awake    Nausea/Vomiting: no nausea/vomiting    Complications: none    Transfer of care protocol was followed      Last vitals:   Visit Vitals  /76 (BP Location: Left arm, Patient Position: Sitting)   Pulse 68   Temp 36.5 °C (97.7 °F) (Temporal)   Resp 16   Ht 5' 9" (1.753 m)   Wt 109.3 kg (241 lb)   LMP 04/01/2017   SpO2 98%   Breastfeeding No   BMI 35.59 kg/m²     "

## 2023-06-08 NOTE — BRIEF OP NOTE
[facility]  Brief Operative Note    Surgery Date: 6/8/2023     Surgeon(s) and Role:     * Carlotta Christensen MD - Primary    Assisting Provider:     * Altaf Fernández PA-C - First Assist    No resident or fellow was available throughout the entire procedure as a result it was medically necessary for Altaf Fernández PA-C to perform first assistant duties.    Pre-Operative Diagnosis: Acute lateral meniscus tear of right knee, initial encounter [S83.281A]    Post-Operative Diagnosis: Post-Op Diagnosis Codes:     * Acute lateral meniscus tear of right knee, initial encounter [S83.281A]    Procedure(s) (LRB):  ARTHROSCOPY, KNEE, WITH MENISCECTOMY (Right)  ARTHROSCOPY, KNEE, WITH CHONDROPLASTY (Right)    Anesthesia: General    Operative Findings: See Op note.    Estimated Blood Loss: * No values recorded between 6/8/2023  9:39 AM and 6/8/2023 10:09 AM *         Specimens:   Specimen (24h ago, onward)      None              Discharge Note    OUTCOME: Patient tolerated treatment/procedure well without complication and is now ready for discharge.    DISPOSITION: Home or Self Care    FINAL DIAGNOSIS:  Post-Op Diagnosis Codes:     * Acute lateral meniscus tear of right knee, initial encounter [S83.281A]    FOLLOWUP: In clinic    DISCHARGE INSTRUCTIONS: See attached.

## 2023-06-08 NOTE — PLAN OF CARE
VSS.  Patient tolerating oral liquids without difficulty.   No apparent s&s of distress noted at this time, no complaints voiced at this time.   Discharge instructions reviewed with patient and spouse with good verbal feedback received.   Post op medications delivered to bedside.  Crutches delivered to bedside.   Patient ready for discharge.

## 2023-06-08 NOTE — PLAN OF CARE
Pre-op complete. Waiting on anesthesia consent.  at bedside. Bed locked in lowest position with call bell within reach.   Patient needs crutches.

## 2023-06-08 NOTE — OPERATIVE NOTE ADDENDUM
Certification of Assistant at Surgery       Surgery Date: 6/8/2023     Participating Surgeons:  Surgeon(s) and Role:     * Carlotta Christensen MD - Primary    Assistant:   Altaf Fernández PA-C    No resident or fellow was available throughout the entire procedure as a result it was medically necessary for Altaf Fernández PA-C to perform first assist duties.      Procedures:  Procedure(s) (LRB):  ARTHROSCOPY, KNEE, WITH MENISCECTOMY (Right)  ARTHROSCOPY, KNEE, WITH CHONDROPLASTY (Right)    Assistant Surgeon's Certification of Necessity:  I understand that section 1842 (b) (6) (d) of the Social Security Act generally prohibits Medicare Part B reasonable charge payment for the services of assistants at surgery in teaching hospitals when qualified residents are available to furnish such services. I certify that the services for which payment is claimed were medically necessary, and that no qualified resident was available to perform the services. I further understand that these services are subject to post-payment review by the Medicare carrier.      Altaf Fernández PA-C    06/08/2023  10:27 AM

## 2023-06-08 NOTE — ANESTHESIA POSTPROCEDURE EVALUATION
Anesthesia Post Evaluation    Patient: Vanessa Jerez    Procedure(s) Performed: Procedure(s) (LRB):  ARTHROSCOPY, KNEE, WITH MENISCECTOMY (Right)  ARTHROSCOPY, KNEE, WITH CHONDROPLASTY (Right)    Final Anesthesia Type: general      Patient location during evaluation: PACU  Patient participation: Yes- Able to Participate  Level of consciousness: awake and alert  Post-procedure vital signs: reviewed and stable  Pain management: adequate  Airway patency: patent    PONV status at discharge: No PONV  Anesthetic complications: no      Cardiovascular status: blood pressure returned to baseline  Respiratory status: unassisted  Hydration status: euvolemic  Follow-up not needed.          Vitals Value Taken Time   /82 06/08/23 1200   Temp 36.7 °C (98 °F) 06/08/23 1200   Pulse 58 06/08/23 1200   Resp 24 06/08/23 1200   SpO2 98 % 06/08/23 1200         Event Time   Out of Recovery 11:13:00         Pain/Thuy Score: Pain Rating Prior to Med Admin: 8 (6/8/2023 11:10 AM)  Pain Rating Post Med Admin: 4 (6/8/2023 11:37 AM)  Thuy Score: 10 (6/8/2023 12:00 PM)

## 2023-06-12 ENCOUNTER — CLINICAL SUPPORT (OUTPATIENT)
Dept: REHABILITATION | Facility: HOSPITAL | Age: 53
End: 2023-06-12
Attending: FAMILY MEDICINE
Payer: COMMERCIAL

## 2023-06-12 DIAGNOSIS — M25.661 DECREASED RANGE OF MOTION OF RIGHT KNEE: ICD-10-CM

## 2023-06-12 DIAGNOSIS — R29.898 WEAKNESS OF BOTH LOWER EXTREMITIES: ICD-10-CM

## 2023-06-12 DIAGNOSIS — S83.281A ACUTE LATERAL MENISCUS TEAR OF RIGHT KNEE, INITIAL ENCOUNTER: ICD-10-CM

## 2023-06-12 DIAGNOSIS — Z74.09 IMPAIRED FUNCTIONAL MOBILITY, BALANCE, GAIT, AND ENDURANCE: ICD-10-CM

## 2023-06-12 DIAGNOSIS — M25.561 ACUTE PAIN OF RIGHT KNEE: ICD-10-CM

## 2023-06-12 PROBLEM — G89.29 CHRONIC PAIN OF RIGHT KNEE: Status: RESOLVED | Noted: 2023-03-08 | Resolved: 2023-06-12

## 2023-06-12 PROBLEM — M51.9 ACUTE LOW BACK PAIN WITH DISC SYMPTOMS, DURATION LESS THAN 6 WEEKS: Status: RESOLVED | Noted: 2018-10-12 | Resolved: 2023-06-12

## 2023-06-12 PROBLEM — M54.50 ACUTE LOW BACK PAIN WITH DISC SYMPTOMS, DURATION LESS THAN 6 WEEKS: Status: RESOLVED | Noted: 2018-10-12 | Resolved: 2023-06-12

## 2023-06-12 PROCEDURE — 97116 GAIT TRAINING THERAPY: CPT | Mod: PN

## 2023-06-12 PROCEDURE — 97110 THERAPEUTIC EXERCISES: CPT | Mod: PN

## 2023-06-12 PROCEDURE — 97161 PT EVAL LOW COMPLEX 20 MIN: CPT | Mod: PN

## 2023-06-12 NOTE — PATIENT INSTRUCTIONS
Hamstring Stretch (Seated)    Sit on a raised flat surface where you can prop your affected leg up on it such as a treatment table, couch or bed.       While keeping your knee straight to slightly bent, slowly lean forward and reach your hands towards your foot until a gentle stretch is felt along the back of your knee/thigh. Hold for 15 seconds and then return to starting position and repeat 3 times on each leg, twice daily.     Calf Stretch    While in a seated position, place a towel around the ball of your foot and pull your ankle back until a stretch is felt on your calf area.     Keep your knee in a straightened position during the stretch. Hold 15 seconds, 3 times, twice daily.     Heel Prop    While seated, prop your foot up on another chair and allow gravity to stretch your knee towards a more straightened position. Hold for 3 minutes at a time x 2. Slowly building your way up to holding for 10 minutes at a time. Perform 5-6 times daily.      Strengthening: Quadriceps Set    Tighten muscles on top of thighs by pushing knees down into surface. Roll a small towel roll and place it under your knee. Hold 5-10 seconds.  Repeat 10 times right leg per set. Do 3 sets per session for a total of 30 repetitions. Do 4-5 sessions per day.    Heel Slides     Start in a seated position wearing socks or placing a small hand towel under your heel.      Next, loop a belt, towel or bed sheet around your heel and pull your knee into a bend position as your foot slides towards your buttock. Hold a gentle stretch and then return leg to original position. Perform for 3 minutes at a time, working your way up to 5 minutes at a time. Perform 4-5 times daily.      Lateral Weight Shifts    While in a standing position and knees partially bent, slowly shift your body weight side-to-side.    Forward Weight Shifts    Start with one foot planted on the floor and the other on the balls of your foot with your toes bent (terminal stance) and  back behind you as seen in top image.     Next, shift your weight over the planted foot and then swing forward through with your other leg.  Gently strike your heel on the ground. Try and strike your heel at a specific location and do not drag your foot on the floor. Then, transfer your weight to that foot and allow the heel strike to roll to a flatten out position on the floor. Your other foot should end in terminal stance (bent toes at the ball of your foot).     Return to starting position and repeat.      You may need something to hold on to for balance support. Also you can widen your stance if needed for additional support.

## 2023-06-12 NOTE — PLAN OF CARE
[facility]  Physical Therapy Initial Evaluation    Name: Vanessa Jerez  Clinic Number: [id number]    Therapy Diagnosis:   Encounter Diagnoses   Name Primary?    Acute lateral meniscus tear of right knee, initial encounter     Acute pain of right knee     Decreased range of motion of right knee     Impaired functional mobility, balance, gait, and endurance     Weakness of both lower extremities      Physician: Altaf Fernández, MARLEEN    Physician Orders: PT Eval and Treat  Medical Diagnosis from Referral: S83.281A (ICD-10-CM) - Acute lateral meniscus tear of right knee, initial encounter  Evaluation Date: 6/12/2023  Authorization Period Expiration: 6/1/2024  Plan of Care Expiration: 6/12/2023 to 8/31/2023  Visit # / Visits authorized: 1/1    FOTO: 1/5    Time In: 8:15 AM  Time Out: 9:15 AM  Total Billable Time: 60 minutes (Low Complexity Evaluation)    Precautions:     Procedure:  1. Right Knee Arthroscopy, with meniscectomy (medial OR lateral) 25680  2.  Right Knee Arthroscopy, knee, synovectomy, limited 66017    Post-Op Plan:  Knee arthroscopy protocol    Subjective     Date of onset: 12/2/2022, DOS: 6/8/2023    History of current condition - Vanessa reports: in December she slipped and fell in some water unexpectedly. Went to the ER a few days later where she received a knee brace. Participated in physical therapy for awhile but the pain was not improving. Eventually received an MRI where it showed a meniscus tear. She had the above procedure on 6/8/2023. Since surgery she's had intermittent pain in her anterior knee. Describes the pain as throbbing. Denies numbness and tingling. Has not put much weight through her leg as she was instructed not to.     Medical History:   Past Medical History:   Diagnosis Date    Anemia        Surgical History:   Vanessa Jerez  has a past surgical history that includes Tubal ligation; Hysterectomy; Colonoscopy (N/A, 10/15/2021); Knee arthroscopy  w/ meniscectomy (Right, 6/8/2023); and Arthroscopic chondroplasty of knee joint (Right, 6/8/2023).    Medications:   Vanessa has a current medication list which includes the following prescription(s): aspirin, hydrocodone-acetaminophen, meloxicam, and tizanidine.    Allergies:   Review of patient's allergies indicates:   Allergen Reactions    Morphine      Slowed down heart rate        Imaging:   MRI Right Knee (5/10/2023):  Impression:     1. Mild sprain and/or low-grade partial thickness intrasubstance tear of the anterior cruciate ligament. 2. Oblique tear of the anterior horn the lateral meniscus, extending into the body, and touching the superior inferior articular surfaces. 3. 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 Gait trained with patient displaying decreased heel strike, decreased stance time, and decreased push-off with crutches.   SLS Stance:   R: Unable to perform   L: Unable to perform    Posture: Forward hunched over crutches  Palpation: tender to moderate palpation near surgical incisions   Sensation: Decreased as expected due to post-op status    Range of Motion/Strength:     Knee Right Left Pain/Dysfunction with Movement   AROM/PROM      Flexion  75/NT   "125/130    Extension  0/+3  +3/+5      Knee Girth Right Left   Joint Line 46 cm 45 cm       All below testing performed in seated position. Gait belt used for quadriceps testing.    Lower extremity strength with EachpalET handheld dynamometer Right  (Kgf) Left  (Kgf) Pain/dysfunction with movement   (approx 4 sec hold w/ max contraction)   Hip flexion 3.0  7.0     Quadriceps 4.8 19.8    Hamstrings 3.0 9.0         Joint Mobility:   - Knee: swelling present during patellar mobilizations      Special Tests: Not indicated      CMS Impairment/Limitation/Restriction for FOTO Knee Survey    Therapist reviewed FOTO scores for Vanessa Jerez on 6/12/2023.   FOTO documents entered into [employer] - see Media section.    Current Limitation Score: 74%  Predicted Limitation Score: 42%  Category: Mobility         TREATMENT     Treatment Time In: 8:38 AM  Treatment Time Out: 9:10 AM  Total Treatment time separate from Evaluation: 32 minutes    Vanessa received therapeutic exercises to develop strength, endurance, and ROM for 17 minutes including:  Surgical bandage removal and replaced with band-aids  Heel Prop: 3 minutes, 1/2 bolster  Quad Sets: 10x, 5" holds, towel under ankle  Heel Slides: 3 minutes, 5" holds in flexion  Hamstring stretch: Reviewed  Gastroc Stretch: reviewed   Heavy patient education    Vanessa received the following manual therapy techniques: Joint mobilizations were applied to the: R knee for 5 minutes, including:  Grade III patellar mobilizations in all directions  Infrapatellar fat pad mobilizations in extension  Instruction on self-mobilizations    Gait training to improve functional mobility and safety for 10 minutes, including:  Lateral Weight Shifts: 3 minutes  Forward Weight Shifts: 3 minutes  Crutch training with step-to pattern x 5      Home Exercises and Patient Education Provided    Education provided:   - Importance and role of physical therapy  - Proper body mechanics when performing HEP    Written " Home Exercises Provided: yes.  Exercises were reviewed and Vanessa was able to demonstrate them prior to the end of the session.  Vanessa demonstrated good  understanding of the education provided.     See EMR under Patient Instructions for exercises provided 6/12/2023.    Assessment     Vanessa is a 52 y.o. female referred to outpatient Physical Therapy with a medical diagnosis of Acute lateral meniscus tear of right knee. Pt presents to PT with pain, decreased right knee ROM, decreased strength and flexibility of bilateral lower extremities, poor posture, impaired balance and gait, and functional deficits with performing ADL's and performing IADL's. Patient had specific difficulty with ambulation as she displayed fear avoidance with weight bearing. Gait training performed this session with fair performance. Patient would benefit from participating in skilled physical therapy to address deficits listed above.      Pt prognosis is Fair.   Pt will benefit from skilled outpatient Physical Therapy to address the deficits stated above and in the chart below, provide pt/family education, and to maximize pt's level of independence.     Plan of care discussed with patient: Yes  Pt's spiritual, cultural and educational needs considered and pt agreeable to plan of care and goals as stated below:     Anticipated Barriers for therapy: COVID-19      Medical Necessity is demonstrated by the following  History  Co-morbidities and personal factors that may impact the plan of care [x] LOW: no personal factors / co-morbidities  [] MODERATE: 1-2 personal factors / co-morbidities  [] HIGH: 3+ personal factors / co-morbidities    Moderate / High Support Documentation:   Co-morbidities affecting plan of care: weakness of both lower extremities, impaired gait    Personal Factors:   no deficits     Examination  Body Structures and Functions, activity limitations and participation restrictions that may impact the plan of care [x] LOW:  addressing 1-2 elements  [] MODERATE: 3+ elements  [] HIGH: 4+ elements (please support below)    Moderate / High Support Documentation: N/A     Clinical Presentation [x] LOW: stable  [] MODERATE: Evolving  [] HIGH: Unstable     Decision Making/ Complexity Score: low         Goals:  Short Term Goals (6 weeks)   1. Patient will be independent with HEP to supplement therapy sessions.  2. Pt will improve RLE impaired LSI scores to greater than or equal to 70% to demonstrate balance in function between LEs.  3. Pt will improve R knee flexion ROM to greater than or equal to 100 degrees to improve mobility for functional tasks.  4. Pt will improve R knee extension/hyperextension equal to opposite knee to demonstrate balance in function between LE's.    Long Term Goals (12 weeks )   1. Pt will be independent with updated HEP to supplement therapy sessions.   2. Pt will improve RLE impaired LSI scores to greater than or equal to 90% to demonstrate balance in function between LEs.  3. Pt will improve R knee flexion ROM to greater than or equal to opposite knee flexion to improve mobility for functional tasks.  4. Pt will walk on unlevel surfaces without AD or gait deficits to promote ability to ambulate within the home and community.   5. Pt will return to PLOF with no pain.     Plan     Plan of care Certification: 6/12/2023 to 8/31/2023.    Outpatient Physical Therapy 2 times weekly for 12 weeks to include the following interventions: Gait Training, Manual Therapy, Moist Heat/ Ice, Neuromuscular Re-ed, Patient Education, Therapeutic Activities, Therapeutic Exercise, and , FDN prn, Kinesiotape prn.     Katie Savage, PT, DPT

## 2023-06-14 ENCOUNTER — CLINICAL SUPPORT (OUTPATIENT)
Dept: REHABILITATION | Facility: HOSPITAL | Age: 53
End: 2023-06-14
Attending: FAMILY MEDICINE
Payer: COMMERCIAL

## 2023-06-14 DIAGNOSIS — M25.561 ACUTE PAIN OF RIGHT KNEE: Primary | ICD-10-CM

## 2023-06-14 DIAGNOSIS — Z74.09 IMPAIRED FUNCTIONAL MOBILITY, BALANCE, GAIT, AND ENDURANCE: ICD-10-CM

## 2023-06-14 DIAGNOSIS — R29.898 WEAKNESS OF BOTH LOWER EXTREMITIES: ICD-10-CM

## 2023-06-14 DIAGNOSIS — M25.661 DECREASED RANGE OF MOTION OF RIGHT KNEE: ICD-10-CM

## 2023-06-14 PROCEDURE — 97110 THERAPEUTIC EXERCISES: CPT | Mod: PN

## 2023-06-14 NOTE — PROGRESS NOTES
"[facility]   Physical Therapy Treatment Note     Name: Vanessa Jerez  Clinic Number: [id number]    Therapy Diagnosis:   Encounter Diagnoses   Name Primary?    Acute pain of right knee Yes    Decreased range of motion of right knee     Impaired functional mobility, balance, gait, and endurance     Weakness of both lower extremities      Physician: Altaf Fernández PA-C    Visit Date: 6/14/2023    Physician Orders: PT Eval and Treat  Medical Diagnosis from Referral: S83.281A (ICD-10-CM) - Acute lateral meniscus tear of right knee, initial encounter  Evaluation Date: 6/12/2023  Authorization Period Expiration: 6/1/2024  Plan of Care Expiration: 6/12/2023 to 8/31/2023  Visit # / Visits authorized: 1/20 (+Evaluation)     FOTO: 1/5    PTA Visit #: 0/5     Time In: 9:30 AM  Time Out: 10:30 AM  Total Billable Time: 60 minutes    Precautions:   Procedure:  1. Right Knee Arthroscopy, with meniscectomy (medial OR lateral) 91927  2.  Right Knee Arthroscopy, knee, synovectomy, limited 65241     Post-Op Plan:  Knee arthroscopy protocol       SUBJECTIVE     Pt reports: She's been working on walking more and putting more weight through her leg.  She was compliant with home exercise program.  Response to previous treatment: Evaluation last session  Functional change: Evaluation last session    Pain: 2/10  Location: right anterior knee      OBJECTIVE     Objective Measures updated at progress report unless specified.     Treatment     Vanessa received the treatments listed below:      Manual therapy techniques: Joint mobilizations were applied to the: R knee for 5 minutes, including:  Grade III patellar mobilizations in all directions  Infrapatellar fat pad mobilizations in extension    Therapeutic exercises to develop strength, endurance, and ROM for 50 minutes including:  Hamstring stretch: 15"x3  Gastroc Stretch: 15"x3   Quad Sets: 3x10, 5" holds, towel under ankle  +SAQ's: 3x10, 3" holds, 1/2 " "bolster  +Supine SLR's: 3x10, 3" holds  Heel Slides: 5 minutes, 5" holds in flexion  +DL Gluteal Bridges: 3x10, 3" holds  +Sidelying Clamshells: 2x10, GTB  +LAQ's: 3x10, 1#  Sit to Stands: 3x10, grey chair with foam pad  Heavy patient education      Gait training to improve functional mobility and safety for 5 minutes, including:  Ambulation with one axillary crutch        Patient Education and Home Exercises     Home Exercises Provided and Patient Education Provided     Education provided:   - Importance of continuing HEP to supplement therapy sessions.    Written Home Exercises Provided: Patient instructed to cont prior HEP. Exercises were reviewed and Vanessa was able to demonstrate them prior to the end of the session.  Vanessa demonstrated good  understanding of the education provided. See EMR under Patient Instructions for exercises provided during therapy sessions    ASSESSMENT     Vanessa presented to therapy with reports of improved tolerance to ambulation within the home and community. Tolerated session well with focus on improving flexion ROM and quadriceps strength and activation. Good extension noted both passively and actively, therefore did not require heel prop, but educated to continue to perform at home. Most difficulty noted with supine SLR's and sit to stands, but able to perform with decreased repetitions and cueing for proper body mechanics. Weaned to one crutch this session with ambulation in order to promote more independence. Will continue to progress as tolerated.     Vanessa Is progressing well towards her goals.   Pt prognosis is Fair.     Pt will continue to benefit from skilled outpatient physical therapy to address the deficits listed in the problem list box on initial evaluation, provide pt/family education and to maximize pt's level of independence in the home and community environment.     Pt's spiritual, cultural and educational needs considered and pt agreeable to plan of care and " goals.     Anticipated barriers to physical therapy: COVID-19    Goals: Short Term Goals (6 weeks)   1. Patient will be independent with HEP to supplement therapy sessions. Progressing, Not Met  2. Pt will improve RLE impaired LSI scores to greater than or equal to 70% to demonstrate balance in function between LEs. Progressing, Not Met  3. Pt will improve R knee flexion ROM to greater than or equal to 100 degrees to improve mobility for functional tasks. Progressing, Not Met  4. Pt will improve R knee extension/hyperextension equal to opposite knee to demonstrate balance in function between LE's. Progressing, Not Met     Long Term Goals (12 weeks )   1. Pt will be independent with updated HEP to supplement therapy sessions. Progressing, Not Met  2. Pt will improve RLE impaired LSI scores to greater than or equal to 90% to demonstrate balance in function between LEs. Progressing, Not Met  3. Pt will improve R knee flexion ROM to greater than or equal to opposite knee flexion to improve mobility for functional tasks. Progressing, Not Met  4. Pt will walk on unlevel surfaces without AD or gait deficits to promote ability to ambulate within the home and community. Progressing, Not Met  5. Pt will return to PLOF with no pain. Progressing, Not Met    PLAN     Continue to progress strength and ROM as tolerated.     Katie Savage, PT

## 2023-06-19 ENCOUNTER — CLINICAL SUPPORT (OUTPATIENT)
Dept: REHABILITATION | Facility: HOSPITAL | Age: 53
End: 2023-06-19
Attending: FAMILY MEDICINE
Payer: COMMERCIAL

## 2023-06-19 DIAGNOSIS — M25.561 ACUTE PAIN OF RIGHT KNEE: Primary | ICD-10-CM

## 2023-06-19 DIAGNOSIS — Z74.09 IMPAIRED FUNCTIONAL MOBILITY, BALANCE, GAIT, AND ENDURANCE: ICD-10-CM

## 2023-06-19 DIAGNOSIS — M25.661 DECREASED RANGE OF MOTION OF RIGHT KNEE: ICD-10-CM

## 2023-06-19 DIAGNOSIS — R29.898 WEAKNESS OF BOTH LOWER EXTREMITIES: ICD-10-CM

## 2023-06-19 PROCEDURE — 97112 NEUROMUSCULAR REEDUCATION: CPT | Mod: PN

## 2023-06-19 PROCEDURE — 97110 THERAPEUTIC EXERCISES: CPT | Mod: PN

## 2023-06-19 NOTE — PROGRESS NOTES
" [facility]   Physical Therapy Treatment Note     Name: Vanessa Jerez  Clinic Number: [id number]    Therapy Diagnosis:   Encounter Diagnoses   Name Primary?    Acute pain of right knee Yes    Decreased range of motion of right knee     Impaired functional mobility, balance, gait, and endurance     Weakness of both lower extremities      Physician: Altaf Fernández PA-C    Visit Date: 6/19/2023    Physician Orders: PT Eval and Treat  Medical Diagnosis from Referral: S83.281A (ICD-10-CM) - Acute lateral meniscus tear of right knee, initial encounter  Evaluation Date: 6/12/2023  Authorization Period Expiration: 6/1/2024  Plan of Care Expiration: 6/12/2023 to 8/31/2023  Visit # / Visits authorized: 2/20 (+Evaluation)     FOTO: 2/5    PTA Visit #: 0/5     Time In: 9:30 AM  Time Out: 10:40 AM  Total Billable Time: 70 minutes    Precautions:   Procedure:  1. Right Knee Arthroscopy, with meniscectomy (medial OR lateral) 41626  2.  Right Knee Arthroscopy, knee, synovectomy, limited 43164     Post-Op Plan:  Knee arthroscopy protocol       SUBJECTIVE     Pt reports: She's been walking with one axillary crutch but has not tried to walk without it.   She was compliant with home exercise program.  Response to previous treatment: Evaluation last session  Functional change: Evaluation last session    Pain: 2/10  Location: right anterior knee      OBJECTIVE     6/19/2023:     R knee PROM: 3-0-110    Treatment     Vanessa received the treatments listed below:      Manual therapy techniques: Joint mobilizations were applied to the: R knee for 5 minutes, including:  Grade III patellar mobilizations in all directions  Infrapatellar fat pad mobilizations in extension      Therapeutic exercises to develop strength, endurance, and ROM for 35 minutes including:  Nu-Step: 8 minutes, Level 4.0  Heel Prop: 5 minutes, large bolster  Supine SLR's: 3x10, 3" holds  Heel Slides: 5 minutes, 5" holds in " "flexion  LAQ's: 3x10, 5#  Heavy patient education      neuromuscular re-education activities to improve: Coordination and Proprioception for 25 minutes. The following activities were included:  Quad Sets: 3x10, 5" holds, towel under ankle  SAQ's: 3x10, 3" holds, 1/2 bolster  +TKE: 3x10, 5" Holds, pink sport cord  +SL Shuttle Press with TKE: 3x10, 12#        Gait training to improve functional mobility and safety for 5 minutes, including:  Ambulation without axillary crutch        Patient Education and Home Exercises     Home Exercises Provided and Patient Education Provided     Education provided:   - Importance of continuing HEP to supplement therapy sessions.    Written Home Exercises Provided: Patient instructed to cont prior HEP. Exercises were reviewed and Vanessa was able to demonstrate them prior to the end of the session.  Vanessa demonstrated good  understanding of the education provided. See EMR under Patient Instructions for exercises provided during therapy sessions    ASSESSMENT     Vanessa presented to therapy with continued use of axillary crutch for ambulation. Patient with difficulty with pushing into terminal knee extension during ambulation despite displaying full terminal knee extension on mat. Gait trained without crutches at end of session with specific focus on heel strike and TKE; fair performance, but continues to avoid TKE.. Tolerated session well with focus on improving flexion ROM and quadriceps strength and activation. Progressed to 110 degrees of knee flexion this session. Continued with CKC strengthening with SL shuttle press and good tolerance. Educated to continue walking short distances without crutches. Will continue to progress as tolerated.     Vanessa Is progressing well towards her goals.   Pt prognosis is Fair.     Pt will continue to benefit from skilled outpatient physical therapy to address the deficits listed in the problem list box on initial evaluation, provide pt/family " "education and to maximize pt's level of independence in the home and community environment.     Pt's spiritual, cultural and educational needs considered and pt agreeable to plan of care and goals.     Anticipated barriers to physical therapy: COVID-19    Goals: Short Term Goals (6 weeks)   1. Patient will be independent with HEP to supplement therapy sessions. Progressing, Not Met  2. Pt will improve RLE impaired LSI scores to greater than or equal to 70% to demonstrate balance in function between LEs. Progressing, Not Met  3. Pt will improve R knee flexion ROM to greater than or equal to 100 degrees to improve mobility for functional tasks. Progressing, Not Met  4. Pt will improve R knee extension/hyperextension equal to opposite knee to demonstrate balance in function between LE's. Progressing, Not Met     Long Term Goals (12 weeks )   1. Pt will be independent with updated HEP to supplement therapy sessions. Progressing, Not Met  2. Pt will improve RLE impaired LSI scores to greater than or equal to 90% to demonstrate balance in function between LEs. Progressing, Not Met  3. Pt will improve R knee flexion ROM to greater than or equal to opposite knee flexion to improve mobility for functional tasks. Progressing, Not Met  4. Pt will walk on unlevel surfaces without AD or gait deficits to promote ability to ambulate within the home and community. Progressing, Not Met  5. Pt will return to PLOF with no pain. Progressing, Not Met    PLAN     Continue to progress strength and ROM as tolerated.     Not performed this session:   +DL Gluteal Bridges: 3x10, 3" holds  +Sidelying Clamshells: 2x10, GTB  Sit to Stands: 3x10, grey chair with foam pad    Katie Savage, PT     "

## 2023-06-21 ENCOUNTER — CLINICAL SUPPORT (OUTPATIENT)
Dept: REHABILITATION | Facility: HOSPITAL | Age: 53
End: 2023-06-21
Attending: FAMILY MEDICINE
Payer: COMMERCIAL

## 2023-06-21 DIAGNOSIS — Z74.09 IMPAIRED FUNCTIONAL MOBILITY, BALANCE, GAIT, AND ENDURANCE: ICD-10-CM

## 2023-06-21 DIAGNOSIS — M25.561 ACUTE PAIN OF RIGHT KNEE: Primary | ICD-10-CM

## 2023-06-21 DIAGNOSIS — M25.661 DECREASED RANGE OF MOTION OF RIGHT KNEE: ICD-10-CM

## 2023-06-21 DIAGNOSIS — R29.898 WEAKNESS OF BOTH LOWER EXTREMITIES: ICD-10-CM

## 2023-06-21 PROCEDURE — 97110 THERAPEUTIC EXERCISES: CPT | Mod: PN

## 2023-06-21 PROCEDURE — 97112 NEUROMUSCULAR REEDUCATION: CPT | Mod: PN

## 2023-06-21 NOTE — PROGRESS NOTES
"  [facility]   Physical Therapy Treatment Note     Name: Vanessa Jerez  Clinic Number: [id number]    Therapy Diagnosis:   Encounter Diagnoses   Name Primary?    Acute pain of right knee Yes    Decreased range of motion of right knee     Impaired functional mobility, balance, gait, and endurance     Weakness of both lower extremities      Physician: Altaf Fernández PA-C    Visit Date: 6/21/2023    Physician Orders: PT Eval and Treat  Medical Diagnosis from Referral: S83.281A (ICD-10-CM) - Acute lateral meniscus tear of right knee, initial encounter  Evaluation Date: 6/12/2023  Authorization Period Expiration: 6/1/2024  Plan of Care Expiration: 6/12/2023 to 8/31/2023  Visit # / Visits authorized: 3/20 (+Evaluation)     FOTO: 3/5    PTA Visit #: 0/5     Time In: 7:55 AM  Time Out: 9:00 AM  Total Billable Time: 65 minutes    Precautions:   Procedure:  1. Right Knee Arthroscopy, with meniscectomy (medial OR lateral) 97513  2.  Right Knee Arthroscopy, knee, synovectomy, limited 06753     Post-Op Plan:  Knee arthroscopy protocol       SUBJECTIVE     Pt reports: She still has not tried walking without her crutch.    She was compliant with home exercise program.  Response to previous treatment: No adverse reactions  Functional change: Short distance ambulation without AD    Pain: 2/10  Location: right anterior knee      OBJECTIVE     6/19/2023:     R knee PROM: 3-0-110    Treatment     Vanessa received the treatments listed below:      Manual therapy techniques: Joint mobilizations were applied to the: R knee for 5 minutes, including:  Grade III patellar mobilizations in all directions  Infrapatellar fat pad mobilizations in extension      Therapeutic exercises to develop strength, endurance, and ROM for 30 minutes including:  Upright Bike: 8 minutes, Level 3.0  Supine SLR's: 3x10, 3" holds  DL Gluteal Bridges: 3x10, 5" holds  LAQ's: 3x10, 5#  +SL Matrix HS Curls: 3x10, 25#  Heavy patient " "education      neuromuscular re-education activities to improve: Coordination and Proprioception for 25 minutes. The following activities were included:  Quad Sets: 3x10, 10" holds, towel under ankle  SAQ's: 3x10, 3" holds, 1/2 bolster, 2#  TKE: 3x10, 5" Holds, pink sport cord  +Lateral Monster Walks: 25'x2 laps RTB at hips      Gait training to improve functional mobility and safety for 5 minutes, including:  Ambulation without axillary crutch        Patient Education and Home Exercises     Home Exercises Provided and Patient Education Provided     Education provided:   - Importance of continuing HEP to supplement therapy sessions.    Written Home Exercises Provided: Patient instructed to cont prior HEP. Exercises were reviewed and Vanessa was able to demonstrate them prior to the end of the session.  Vanessa demonstrated good  understanding of the education provided. See EMR under Patient Instructions for exercises provided during therapy sessions    ASSESSMENT     Vanessa presented to therapy with continued use of one axillary crutch for ambulation, however able to ambulate without AD within session. Tolerated session fairly with continued focus on improving quadriceps, hamstrings, and gluteal strength. Continues with difficulty with full TKE during ambulation, but displays full active an passive TKE on mat. Introduced CKC glute med strengthening with lateral monster walks with fair tolerance. Will continue to progress as tolerated.     Vanessa Is progressing well towards her goals.   Pt prognosis is Fair.     Pt will continue to benefit from skilled outpatient physical therapy to address the deficits listed in the problem list box on initial evaluation, provide pt/family education and to maximize pt's level of independence in the home and community environment.     Pt's spiritual, cultural and educational needs considered and pt agreeable to plan of care and goals.     Anticipated barriers to physical therapy: " "COVID-19    Goals: Short Term Goals (6 weeks)   1. Patient will be independent with HEP to supplement therapy sessions. Progressing, Not Met  2. Pt will improve RLE impaired LSI scores to greater than or equal to 70% to demonstrate balance in function between LEs. Progressing, Not Met  3. Pt will improve R knee flexion ROM to greater than or equal to 100 degrees to improve mobility for functional tasks. Progressing, Not Met  4. Pt will improve R knee extension/hyperextension equal to opposite knee to demonstrate balance in function between LE's. Progressing, Not Met     Long Term Goals (12 weeks )   1. Pt will be independent with updated HEP to supplement therapy sessions. Progressing, Not Met  2. Pt will improve RLE impaired LSI scores to greater than or equal to 90% to demonstrate balance in function between LEs. Progressing, Not Met  3. Pt will improve R knee flexion ROM to greater than or equal to opposite knee flexion to improve mobility for functional tasks. Progressing, Not Met  4. Pt will walk on unlevel surfaces without AD or gait deficits to promote ability to ambulate within the home and community. Progressing, Not Met  5. Pt will return to PLOF with no pain. Progressing, Not Met    PLAN     Continue to progress strength and ROM as tolerated.     Not performed this session:   Sit to Stands: 3x10, grey chair with foam pad  +SL Shuttle Press with TKE: 3x10, 12#  Heel Slides: 5 minutes, 5" holds in flexion    Katie Savage PT       "

## 2023-06-23 ENCOUNTER — OFFICE VISIT (OUTPATIENT)
Dept: SPORTS MEDICINE | Facility: CLINIC | Age: 53
End: 2023-06-23
Payer: COMMERCIAL

## 2023-06-23 VITALS
HEIGHT: 69 IN | HEART RATE: 64 BPM | BODY MASS INDEX: 35.7 KG/M2 | SYSTOLIC BLOOD PRESSURE: 120 MMHG | DIASTOLIC BLOOD PRESSURE: 81 MMHG | WEIGHT: 241 LBS

## 2023-06-23 DIAGNOSIS — Z98.890 S/P RIGHT KNEE ARTHROSCOPY: Primary | ICD-10-CM

## 2023-06-23 DIAGNOSIS — Z98.890 S/P LATERAL MENISCECTOMY OF RIGHT KNEE: ICD-10-CM

## 2023-06-23 PROCEDURE — 3008F BODY MASS INDEX DOCD: CPT | Mod: CPTII,S$GLB,, | Performed by: ORTHOPAEDIC SURGERY

## 2023-06-23 PROCEDURE — 3079F DIAST BP 80-89 MM HG: CPT | Mod: CPTII,S$GLB,, | Performed by: ORTHOPAEDIC SURGERY

## 2023-06-23 PROCEDURE — 3079F PR MOST RECENT DIASTOLIC BLOOD PRESSURE 80-89 MM HG: ICD-10-PCS | Mod: CPTII,S$GLB,, | Performed by: ORTHOPAEDIC SURGERY

## 2023-06-23 PROCEDURE — 99999 PR PBB SHADOW E&M-EST. PATIENT-LVL III: ICD-10-PCS | Mod: PBBFAC,,, | Performed by: ORTHOPAEDIC SURGERY

## 2023-06-23 PROCEDURE — 99024 PR POST-OP FOLLOW-UP VISIT: ICD-10-PCS | Mod: S$GLB,,, | Performed by: ORTHOPAEDIC SURGERY

## 2023-06-23 PROCEDURE — 99024 POSTOP FOLLOW-UP VISIT: CPT | Mod: S$GLB,,, | Performed by: ORTHOPAEDIC SURGERY

## 2023-06-23 PROCEDURE — 1159F MED LIST DOCD IN RCRD: CPT | Mod: CPTII,S$GLB,, | Performed by: ORTHOPAEDIC SURGERY

## 2023-06-23 PROCEDURE — 3008F PR BODY MASS INDEX (BMI) DOCUMENTED: ICD-10-PCS | Mod: CPTII,S$GLB,, | Performed by: ORTHOPAEDIC SURGERY

## 2023-06-23 PROCEDURE — 99999 PR PBB SHADOW E&M-EST. PATIENT-LVL III: CPT | Mod: PBBFAC,,, | Performed by: ORTHOPAEDIC SURGERY

## 2023-06-23 PROCEDURE — 3074F PR MOST RECENT SYSTOLIC BLOOD PRESSURE < 130 MM HG: ICD-10-PCS | Mod: CPTII,S$GLB,, | Performed by: ORTHOPAEDIC SURGERY

## 2023-06-23 PROCEDURE — 1159F PR MEDICATION LIST DOCUMENTED IN MEDICAL RECORD: ICD-10-PCS | Mod: CPTII,S$GLB,, | Performed by: ORTHOPAEDIC SURGERY

## 2023-06-23 PROCEDURE — 3074F SYST BP LT 130 MM HG: CPT | Mod: CPTII,S$GLB,, | Performed by: ORTHOPAEDIC SURGERY

## 2023-06-23 NOTE — PROGRESS NOTES
"POST-OPERATIVE EXAMINATION    52 y.o. Female who returns for follow after surgery. She is 2 weeks s/p    Procedure(s) (LRB):  ARTHROSCOPY, KNEE, WITH MENISCECTOMY (Right)  ARTHROSCOPY, KNEE, WITH CHONDROPLASTY (Right)     She is doing well without any issues.       PHYSICAL EXAMINATION:  /81   Pulse 64   Ht 5' 9" (1.753 m)   Wt 109.3 kg (241 lb)   LMP 04/01/2017   BMI 35.59 kg/m²   General: Well-developed well-nourished 52 y.o. femalein no acute distress   Cardiovascular: Regular rhythm   Lungs: No labored breathing or wheezing appreciated   Neuro: Alert and oriented ×3   Psychiatric: well oriented to person, place and time, demonstrates normal mood and affect   Skin: No rashes, lesions or ulcers, normal temperature, turgor, and texture on involved extremity    ORTHOPEDIC EXAM:  Normal post-operative swelling  Normal post-operative scarring  Strength: WNL  ROM: WNL  Tests: None today    ASSESSMENT:      ICD-10-CM ICD-9-CM   1. S/P right knee arthroscopy  Z98.890 V45.89   2. S/P lateral meniscectomy of right knee  Z98.890 V45.89       PLAN:       Sutures removed today  Continue physical therapy  RTC in 1 month with Altaf Fernández PA-C            "

## 2023-06-26 ENCOUNTER — CLINICAL SUPPORT (OUTPATIENT)
Dept: REHABILITATION | Facility: HOSPITAL | Age: 53
End: 2023-06-26
Attending: FAMILY MEDICINE
Payer: COMMERCIAL

## 2023-06-26 DIAGNOSIS — Z74.09 IMPAIRED FUNCTIONAL MOBILITY, BALANCE, GAIT, AND ENDURANCE: ICD-10-CM

## 2023-06-26 DIAGNOSIS — M25.561 ACUTE PAIN OF RIGHT KNEE: Primary | ICD-10-CM

## 2023-06-26 DIAGNOSIS — R29.898 WEAKNESS OF BOTH LOWER EXTREMITIES: ICD-10-CM

## 2023-06-26 DIAGNOSIS — M25.661 DECREASED RANGE OF MOTION OF RIGHT KNEE: ICD-10-CM

## 2023-06-26 PROCEDURE — 97110 THERAPEUTIC EXERCISES: CPT | Mod: PN

## 2023-06-26 PROCEDURE — 97116 GAIT TRAINING THERAPY: CPT | Mod: PN

## 2023-06-26 NOTE — PROGRESS NOTES
[facility]   Physical Therapy Treatment Note     Name: Vanessa Jerez  Clinic Number: [id number]    Therapy Diagnosis:   Encounter Diagnoses   Name Primary?    Acute pain of right knee Yes    Decreased range of motion of right knee     Impaired functional mobility, balance, gait, and endurance     Weakness of both lower extremities      Physician: Altaf Fernández PA-C    Visit Date: 6/26/2023    Physician Orders: PT Eval and Treat  Medical Diagnosis from Referral: S83.281A (ICD-10-CM) - Acute lateral meniscus tear of right knee, initial encounter  Evaluation Date: 6/12/2023  Authorization Period Expiration: 12/29/2023  Plan of Care Expiration: 6/12/2023 to 8/31/2023  Visit # / Visits authorized: 4/20 (+Evaluation)     FOTO: 3/5    PTA Visit #: 0/5     Time In: 9:30 AM  Time Out: 10:30 AM  Total Billable Time: 60 minutes    Precautions:   Procedure:  1. Right Knee Arthroscopy, with meniscectomy (medial OR lateral) 71818  2.  Right Knee Arthroscopy, knee, synovectomy, limited 50154     Post-Op Plan:  Knee arthroscopy protocol       SUBJECTIVE     Pt reports: She did a lot of walking in [facility] over the weekend and it caused her knee to swell up a lot. It has since gone down, but still slightly swollen.    She was compliant with home exercise program.  Response to previous treatment: No adverse reactions  Functional change: Short distance ambulation without AD    Pain: 2/10  Location: right anterior knee      OBJECTIVE     6/19/2023:     R knee PROM: 3-0-110    Treatment     Vanessa received the treatments listed below:      Manual therapy techniques: Joint mobilizations were applied to the: R knee for 5 minutes, including:  Grade III patellar mobilizations in all directions  Infrapatellar fat pad mobilizations in extension      Therapeutic exercises to develop strength, endurance, and ROM for 40 minutes including:  Upright Bike: 8 minutes, Level 3.0  Heel Slides: 3 minutes, Level  "3.0  Supine SLR's: 3x10, 3" holds  DL Gluteal Bridges: 3x10, 5" holds  Sit to Stands: 3x10, 24" box  Forward Step-ups: 3x10, RLE leading  SL Shuttle Press: 37#, 3x10  Heavy patient education      neuromuscular re-education activities to improve: Coordination and Proprioception for 5 minutes. The following activities were included:  TKE: 3x10, 5" Holds, pink sport cord      Gait training to improve functional mobility and safety for 10 minutes, including:  Retrowalking x 5 minutes  Forward Walking x 5 minutes      Patient Education and Home Exercises     Home Exercises Provided and Patient Education Provided     Education provided:   - Importance of continuing HEP to supplement therapy sessions.    Written Home Exercises Provided: Patient instructed to cont prior HEP. Exercises were reviewed and Vanessa was able to demonstrate them prior to the end of the session.  Vanessa demonstrated good  understanding of the education provided. See EMR under Patient Instructions for exercises provided during therapy sessions    ASSESSMENT     Vanessa presented to therapy ambulating without AD but continued altered gait mechanics including decreased heel strike and decreased TKE, despite displaying full terminal knee extension both passively and actively. Session heavily focused on improving gait mechanics and ability to actively push into TKE during ambulation. Improved mechanics noted by end of session. Will continue to progress as tolerated.     Vanessa Is progressing well towards her goals.   Pt prognosis is Fair.     Pt will continue to benefit from skilled outpatient physical therapy to address the deficits listed in the problem list box on initial evaluation, provide pt/family education and to maximize pt's level of independence in the home and community environment.     Pt's spiritual, cultural and educational needs considered and pt agreeable to plan of care and goals.     Anticipated barriers to physical therapy: " "COVID-19    Goals: Short Term Goals (6 weeks)   1. Patient will be independent with HEP to supplement therapy sessions. Progressing, Not Met  2. Pt will improve RLE impaired LSI scores to greater than or equal to 70% to demonstrate balance in function between LEs. Progressing, Not Met  3. Pt will improve R knee flexion ROM to greater than or equal to 100 degrees to improve mobility for functional tasks. Progressing, Not Met  4. Pt will improve R knee extension/hyperextension equal to opposite knee to demonstrate balance in function between LE's. Progressing, Not Met     Long Term Goals (12 weeks )   1. Pt will be independent with updated HEP to supplement therapy sessions. Progressing, Not Met  2. Pt will improve RLE impaired LSI scores to greater than or equal to 90% to demonstrate balance in function between LEs. Progressing, Not Met  3. Pt will improve R knee flexion ROM to greater than or equal to opposite knee flexion to improve mobility for functional tasks. Progressing, Not Met  4. Pt will walk on unlevel surfaces without AD or gait deficits to promote ability to ambulate within the home and community. Progressing, Not Met  5. Pt will return to PLOF with no pain. Progressing, Not Met    PLAN     Continue to progress strength and ROM as tolerated.     Not performed this session:   Sit to Stands: 3x10, grey chair with foam pad  +SL Shuttle Press with TKE: 3x10, 12#  Heel Slides: 5 minutes, 5" holds in flexion  +Lateral Monster Walks: 25'x2 laps RTB at hips  LAQ's: 3x10, 5#  +SL Matrix HS Curls: 3x10, 25#    Katie Savage, PT         "

## 2023-06-28 ENCOUNTER — CLINICAL SUPPORT (OUTPATIENT)
Dept: REHABILITATION | Facility: HOSPITAL | Age: 53
End: 2023-06-28
Attending: FAMILY MEDICINE
Payer: COMMERCIAL

## 2023-06-28 DIAGNOSIS — M25.561 ACUTE PAIN OF RIGHT KNEE: Primary | ICD-10-CM

## 2023-06-28 DIAGNOSIS — R29.898 WEAKNESS OF BOTH LOWER EXTREMITIES: ICD-10-CM

## 2023-06-28 DIAGNOSIS — Z74.09 IMPAIRED FUNCTIONAL MOBILITY, BALANCE, GAIT, AND ENDURANCE: ICD-10-CM

## 2023-06-28 DIAGNOSIS — M25.661 DECREASED RANGE OF MOTION OF RIGHT KNEE: ICD-10-CM

## 2023-06-28 PROCEDURE — 97110 THERAPEUTIC EXERCISES: CPT | Mod: PN

## 2023-06-28 NOTE — PROGRESS NOTES
"[facility]   Physical Therapy Treatment Note     Name: Vanessa Jerez  Clinic Number: [id number]    Therapy Diagnosis:   Encounter Diagnoses   Name Primary?    Acute pain of right knee Yes    Decreased range of motion of right knee     Impaired functional mobility, balance, gait, and endurance     Weakness of both lower extremities      Physician: Altaf Fenrández PA-C    Visit Date: 6/28/2023    Physician Orders: PT Eval and Treat  Medical Diagnosis from Referral: S83.281A (ICD-10-CM) - Acute lateral meniscus tear of right knee, initial encounter  Evaluation Date: 6/12/2023  Authorization Period Expiration: 12/29/2023  Plan of Care Expiration: 6/12/2023 to 8/31/2023  Visit # / Visits authorized: 5/20 (+Evaluation)     FOTO: 4/5    PTA Visit #: 0/5     Time In: 8:15 AM  Time Out: 9:15 AM  Total Billable Time: 60 minutes    Precautions:   Procedure:  1. Right Knee Arthroscopy, with meniscectomy (medial OR lateral) 93056  2.  Right Knee Arthroscopy, knee, synovectomy, limited 92727     Post-Op Plan:  Knee arthroscopy protocol       SUBJECTIVE     Pt reports: She feels as though her ambulation is improving.     She was compliant with home exercise program.  Response to previous treatment: No adverse reactions  Functional change: Short distance ambulation without AD    Pain: 2/10  Location: right anterior knee      OBJECTIVE     6/19/2023:     R knee PROM: 3-0-110    Treatment     Vanessa received the treatments listed below:      Manual therapy techniques: Joint mobilizations were applied to the: R knee for 5 minutes, including:  Grade III patellar mobilizations in all directions  Infrapatellar fat pad mobilizations in extension      Therapeutic exercises to develop strength, endurance, and ROM for 50 minutes including:  Upright Bike: 8 minutes, Level 3.0  Heel Slides: 3 minutes, Level 3.0  Supine SLR's: 3x10, 3" holds  DL Gluteal Bridges: 3x10, 5" holds  SL Matrix HS Curls: 3x10, " "35#  Sit to Stands: 3x10, 24" box  SL Shuttle Press: 50#, 3x10  Forward Step-ups: 3x10, RLE leading, 6" step  Heavy patient education      neuromuscular re-education activities to improve: Coordination and Proprioception for 5 minutes. The following activities were included:  TKE: 3x10, 5" Holds, pink sport cord        Patient Education and Home Exercises     Home Exercises Provided and Patient Education Provided     Education provided:   - Importance of continuing HEP to supplement therapy sessions.    Written Home Exercises Provided: Patient instructed to cont prior HEP. Exercises were reviewed and Vanessa was able to demonstrate them prior to the end of the session.  Vanessa demonstrated good  understanding of the education provided. See EMR under Patient Instructions for exercises provided during therapy sessions    ASSESSMENT     Vanessa presented to therapy ambulating without AD and improved gait mechanics from previous session. Tolerated session well with continued focus on improving gait mechanics, quad, hamstring, and gluteal strength. Continuing to focus on improving TKE during ambulation. Continued improvements in mechanics noted by end of session. Will continue to progress as tolerated.     Vanessa Is progressing well towards her goals.   Pt prognosis is Fair.     Pt will continue to benefit from skilled outpatient physical therapy to address the deficits listed in the problem list box on initial evaluation, provide pt/family education and to maximize pt's level of independence in the home and community environment.     Pt's spiritual, cultural and educational needs considered and pt agreeable to plan of care and goals.     Anticipated barriers to physical therapy: COVID-19    Goals: Short Term Goals (6 weeks)   1. Patient will be independent with HEP to supplement therapy sessions. Progressing, Not Met  2. Pt will improve RLE impaired LSI scores to greater than or equal to 70% to demonstrate balance in " "function between LEs. Progressing, Not Met  3. Pt will improve R knee flexion ROM to greater than or equal to 100 degrees to improve mobility for functional tasks. Progressing, Not Met  4. Pt will improve R knee extension/hyperextension equal to opposite knee to demonstrate balance in function between LE's. Progressing, Not Met     Long Term Goals (12 weeks )   1. Pt will be independent with updated HEP to supplement therapy sessions. Progressing, Not Met  2. Pt will improve RLE impaired LSI scores to greater than or equal to 90% to demonstrate balance in function between LEs. Progressing, Not Met  3. Pt will improve R knee flexion ROM to greater than or equal to opposite knee flexion to improve mobility for functional tasks. Progressing, Not Met  4. Pt will walk on unlevel surfaces without AD or gait deficits to promote ability to ambulate within the home and community. Progressing, Not Met  5. Pt will return to PLOF with no pain. Progressing, Not Met    PLAN     Continue to progress strength and ROM as tolerated.     Not performed this session:   Sit to Stands: 3x10, grey chair with foam pad  +SL Shuttle Press with TKE: 3x10, 12#  Heel Slides: 5 minutes, 5" holds in flexion  +Lateral Monster Walks: 25'x2 laps RTB at hips  LAQ's: 3x10, 5#  +SL Matrix HS Curls: 3x10, 25#    Katie Savage, PT       "

## 2023-07-03 ENCOUNTER — CLINICAL SUPPORT (OUTPATIENT)
Dept: REHABILITATION | Facility: HOSPITAL | Age: 53
End: 2023-07-03
Attending: FAMILY MEDICINE
Payer: COMMERCIAL

## 2023-07-03 DIAGNOSIS — Z74.09 IMPAIRED FUNCTIONAL MOBILITY, BALANCE, GAIT, AND ENDURANCE: ICD-10-CM

## 2023-07-03 DIAGNOSIS — M25.561 ACUTE PAIN OF RIGHT KNEE: Primary | ICD-10-CM

## 2023-07-03 DIAGNOSIS — M25.661 DECREASED RANGE OF MOTION OF RIGHT KNEE: ICD-10-CM

## 2023-07-03 DIAGNOSIS — R29.898 WEAKNESS OF BOTH LOWER EXTREMITIES: ICD-10-CM

## 2023-07-03 PROCEDURE — 97110 THERAPEUTIC EXERCISES: CPT | Mod: PN

## 2023-07-03 PROCEDURE — 97112 NEUROMUSCULAR REEDUCATION: CPT | Mod: PN

## 2023-07-03 NOTE — PROGRESS NOTES
"[facility]   Physical Therapy Treatment Note     Name: Vanessa Jerez  Clinic Number: [id number]    Therapy Diagnosis:   Encounter Diagnoses   Name Primary?    Acute pain of right knee Yes    Decreased range of motion of right knee     Impaired functional mobility, balance, gait, and endurance     Weakness of both lower extremities      Physician: Altaf Fernández PA-C    Visit Date: 7/3/2023    Physician Orders: PT Eval and Treat  Medical Diagnosis from Referral: S83.281A (ICD-10-CM) - Acute lateral meniscus tear of right knee, initial encounter  Evaluation Date: 6/12/2023  Authorization Period Expiration: 12/29/2023  Plan of Care Expiration: 6/12/2023 to 8/31/2023  Visit # / Visits authorized: 6/20 (+Evaluation)     FOTO: 5/5    PTA Visit #: 0/5     Time In: 8:15 AM  Time Out: 9:15 AM  Total Billable Time: 60 minutes    Precautions:   Procedure:  1. Right Knee Arthroscopy, with meniscectomy (medial OR lateral) 09799  2.  Right Knee Arthroscopy, knee, synovectomy, limited 93577     Post-Op Plan:  Knee arthroscopy protocol       SUBJECTIVE     Pt reports: She's been working on her walking and continues to feel it normalizing.    She was compliant with home exercise program.  Response to previous treatment: No adverse reactions  Functional change: Short distance ambulation without AD    Pain: 2/10  Location: right anterior knee      OBJECTIVE     6/19/2023:     R knee PROM: 3-0-110    Treatment     Vanessa received the treatments listed below:      Therapeutic exercises to develop strength, endurance, and ROM for 52 minutes including:  Upright Bike: 8 minutes, Level 3.0  Supine SLR's: 3x10, 3" holds, 2#  DL Gluteal Bridges: 3x10, 5" holds  Sidelying Clamshells: 3x10, BlueTB  DL Matrix Knee Extension: 3x10, 20#  DL Matrix HS Curls: 3x10, 45#  SL Shuttle Press: 3x10, 37#  Heavy patient education      neuromuscular re-education activities to improve: Coordination and Proprioception for 8 " "minutes. The following activities were included:  TKE: 3x10, 5" Holds, pink sport cord  Forward Heel Taps: 3x10, RLE leading, 6" step      Patient Education and Home Exercises     Home Exercises Provided and Patient Education Provided     Education provided:   - Importance of continuing HEP to supplement therapy sessions.    Written Home Exercises Provided: Patient instructed to cont prior HEP. Exercises were reviewed and Vanessa was able to demonstrate them prior to the end of the session.  Vanessa demonstrated good  understanding of the education provided. See EMR under Patient Instructions for exercises provided during therapy sessions    ASSESSMENT     Vanessa presented to therapy with an overall improvement in gait mechanics, although continues to be slowed and cautious, she is pushing through into terminal knee extension better. Tolerated session well with continued focus on improving quad, hamstring, and gluteal strength. Challenged with increased resistance this session with knee extension, hamstring curls, and shuttle press. Introduced heel taps with fair performance and patient in fatigued state at that point. Will continue to progress as tolerated.     Vanessa Is progressing well towards her goals.   Pt prognosis is Fair.     Pt will continue to benefit from skilled outpatient physical therapy to address the deficits listed in the problem list box on initial evaluation, provide pt/family education and to maximize pt's level of independence in the home and community environment.     Pt's spiritual, cultural and educational needs considered and pt agreeable to plan of care and goals.     Anticipated barriers to physical therapy: COVID-19    Goals: Short Term Goals (6 weeks)   1. Patient will be independent with HEP to supplement therapy sessions. Progressing, Not Met  2. Pt will improve RLE impaired LSI scores to greater than or equal to 70% to demonstrate balance in function between LEs. Progressing, Not " "Met  3. Pt will improve R knee flexion ROM to greater than or equal to 100 degrees to improve mobility for functional tasks. Progressing, Not Met  4. Pt will improve R knee extension/hyperextension equal to opposite knee to demonstrate balance in function between LE's. Progressing, Not Met     Long Term Goals (12 weeks )   1. Pt will be independent with updated HEP to supplement therapy sessions. Progressing, Not Met  2. Pt will improve RLE impaired LSI scores to greater than or equal to 90% to demonstrate balance in function between LEs. Progressing, Not Met  3. Pt will improve R knee flexion ROM to greater than or equal to opposite knee flexion to improve mobility for functional tasks. Progressing, Not Met  4. Pt will walk on unlevel surfaces without AD or gait deficits to promote ability to ambulate within the home and community. Progressing, Not Met  5. Pt will return to PLOF with no pain. Progressing, Not Met    PLAN     Continue to progress strength and ROM as tolerated.     Not performed this session:   Heel Slides: 5 minutes, 5" holds in flexion  +Lateral Monster Walks: 25'x2 laps RTB at hips  Heel Slides: 3 minutes, Level 3.0  Sit to Stands: 3x10, 24" tiny Savage, PT         "

## 2023-07-05 ENCOUNTER — CLINICAL SUPPORT (OUTPATIENT)
Dept: REHABILITATION | Facility: HOSPITAL | Age: 53
End: 2023-07-05
Attending: FAMILY MEDICINE
Payer: COMMERCIAL

## 2023-07-05 DIAGNOSIS — M25.661 DECREASED RANGE OF MOTION OF RIGHT KNEE: ICD-10-CM

## 2023-07-05 DIAGNOSIS — Z74.09 IMPAIRED FUNCTIONAL MOBILITY, BALANCE, GAIT, AND ENDURANCE: ICD-10-CM

## 2023-07-05 DIAGNOSIS — R29.898 WEAKNESS OF BOTH LOWER EXTREMITIES: ICD-10-CM

## 2023-07-05 DIAGNOSIS — M25.561 ACUTE PAIN OF RIGHT KNEE: Primary | ICD-10-CM

## 2023-07-05 PROCEDURE — 97110 THERAPEUTIC EXERCISES: CPT | Mod: PN

## 2023-07-05 NOTE — PROGRESS NOTES
"[facility]   Physical Therapy Treatment Note     Name: Vanessa Jerez  Clinic Number: [id number]    Therapy Diagnosis:   Encounter Diagnoses   Name Primary?    Acute pain of right knee Yes    Decreased range of motion of right knee     Impaired functional mobility, balance, gait, and endurance     Weakness of both lower extremities      Physician: Altaf Fernández PA-C    Visit Date: 7/5/2023    Physician Orders: PT Eval and Treat  Medical Diagnosis from Referral: S83.281A (ICD-10-CM) - Acute lateral meniscus tear of right knee, initial encounter  Evaluation Date: 6/12/2023  Authorization Period Expiration: 12/29/2023  Plan of Care Expiration: 6/12/2023 to 8/31/2023  Visit # / Visits authorized: 7/20 (+Evaluation)     FOTO: 5/5 NEXT SESSION    PTA Visit #: 0/5     Time In: 8:15 AM  Time Out: 9:15 AM  Total Billable Time: 60 minutes    Precautions:   Procedure:  1. Right Knee Arthroscopy, with meniscectomy (medial OR lateral) 49151  2.  Right Knee Arthroscopy, knee, synovectomy, limited 73773     Post-Op Plan:  Knee arthroscopy protocol       SUBJECTIVE     Pt reports: She woke up with her knee and ankle both swollen. Attributes increased swelling to walking a lot in the grass yesterday.    She was compliant with home exercise program.  Response to previous treatment: No adverse reactions  Functional change: Short distance ambulation without AD    Pain: 2/10  Location: right anterior knee      OBJECTIVE     6/19/2023:     R knee PROM: 3-0-110    Treatment     Vanessa received the treatments listed below:      Therapeutic exercises to develop strength, endurance, and ROM for 60 minutes including:  Upright Bike: 8 minutes, Level 3.0  Supine SLR's: 3x10, 3" holds, 2#  DL Gluteal Bridges: 3x10, 5" holds  Sidelying Clamshells: 3x10, BlueTB  SL Matrix Knee Extension: 3x10, 10#  SL Matrix HS Curls: 3x10, 40#  SL Shuttle Press: 3x10, 50#  Sit to Stands: 3x10, 24" box, 10#  Heavy patient " education        Patient Education and Home Exercises     Home Exercises Provided and Patient Education Provided     Education provided:   - Importance of continuing HEP to supplement therapy sessions.    Written Home Exercises Provided: Patient instructed to cont prior HEP. Exercises were reviewed and Vanessa was able to demonstrate them prior to the end of the session.  Vanessa demonstrated good  understanding of the education provided. See EMR under Patient Instructions for exercises provided during therapy sessions    ASSESSMENT     Vanessa presented to therapy with continued improvements in gait mechanics. Tolerated session well with continued focus on improving quad, hamstring, and gluteal strength. Progressed in weight with shuttle press and progressed to single leg strengthening with knee extension and hamstring curls. Slightly more aggravated and swollen today due to walking in the grass yesterday, but able to tolerate today's session. Will continue to progress as tolerated.     Vanessa Is progressing well towards her goals.   Pt prognosis is Fair.     Pt will continue to benefit from skilled outpatient physical therapy to address the deficits listed in the problem list box on initial evaluation, provide pt/family education and to maximize pt's level of independence in the home and community environment.     Pt's spiritual, cultural and educational needs considered and pt agreeable to plan of care and goals.     Anticipated barriers to physical therapy: COVID-19    Goals: Short Term Goals (6 weeks)   1. Patient will be independent with HEP to supplement therapy sessions. Progressing, Not Met  2. Pt will improve RLE impaired LSI scores to greater than or equal to 70% to demonstrate balance in function between LEs. Progressing, Not Met  3. Pt will improve R knee flexion ROM to greater than or equal to 100 degrees to improve mobility for functional tasks. Progressing, Not Met  4. Pt will improve R knee  "extension/hyperextension equal to opposite knee to demonstrate balance in function between LE's. Progressing, Not Met     Long Term Goals (12 weeks )   1. Pt will be independent with updated HEP to supplement therapy sessions. Progressing, Not Met  2. Pt will improve RLE impaired LSI scores to greater than or equal to 90% to demonstrate balance in function between LEs. Progressing, Not Met  3. Pt will improve R knee flexion ROM to greater than or equal to opposite knee flexion to improve mobility for functional tasks. Progressing, Not Met  4. Pt will walk on unlevel surfaces without AD or gait deficits to promote ability to ambulate within the home and community. Progressing, Not Met  5. Pt will return to PLOF with no pain. Progressing, Not Met    PLAN     Continue to progress strength and ROM as tolerated.     Not performed this session:   Heel Slides: 5 minutes, 5" holds in flexion  +Lateral Monster Walks: 25'x2 laps RTB at hips  Heel Slides: 3 minutes, Level 3.0  TKE: 3x10, 5" Holds, pink sport cord  Forward Heel Taps: 3x10, RLE leading, 6" step        neuromuscular re-education activities to improve: Coordination and Proprioception for 8 minutes. The following activities were included:    Katie Savage, PT           "

## 2023-07-07 DIAGNOSIS — Z98.890 S/P RIGHT KNEE ARTHROSCOPY: Primary | ICD-10-CM

## 2023-07-07 RX ORDER — TRAMADOL HYDROCHLORIDE 50 MG/1
50 TABLET ORAL EVERY 6 HOURS PRN
Qty: 10 TABLET | Refills: 0 | Status: SHIPPED | OUTPATIENT
Start: 2023-07-07

## 2023-07-09 NOTE — PROGRESS NOTES
"[facility]   Physical Therapy Treatment Note     Name: Vanessa Jerez  Clinic Number: [id number]    Therapy Diagnosis:   No diagnosis found.    Physician: Altaf Fernández PA-C    Visit Date: 7/10/2023    Physician Orders: PT Eval and Treat  Medical Diagnosis from Referral: S83.281A (ICD-10-CM) - Acute lateral meniscus tear of right knee, initial encounter  Evaluation Date: 6/12/2023  Authorization Period Expiration: 12/29/2023  Plan of Care Expiration: 6/12/2023 to 8/31/2023  Visit # / Visits authorized: 9/20 (+Evaluation)     FOTO: 5/5 NEXT SESSION    PTA Visit #: 1/5     Time In: 8:15 AM  Time Out: 9:15 AM  Total Billable Time: 58 minutes (4TE)    Precautions:   Procedure:  1. Right Knee Arthroscopy, with meniscectomy (medial OR lateral) 93996  2.  Right Knee Arthroscopy, knee, synovectomy, limited 98404     Post-Op Plan:  Knee arthroscopy protocol       SUBJECTIVE     Pt reports: She's feeling much better than she was last session.  She still wakes up with the right knee feeling pretty stiff, but it loosens up as she moves around.    She was compliant with home exercise program.  Response to previous treatment: No adverse reactions  Functional change: NA    Pain: 2/10  Location: right anterior knee      OBJECTIVE     6/19/2023:     R knee PROM: 3-0-110    Treatment     Vanessa received the treatments listed below:      Therapeutic exercises to develop strength, endurance, and ROM for 58 minutes including:  Upright Bike: 8 minutes, Level 3.5  Supine SLR's: 3x10, 3" holds, +3#  DL Gluteal Bridges: 3x10, 5" holds  Sidelying Clamshells: 3x10, BlueTB  SL Matrix Knee Extension: 3x10, +15#  SL Matrix HS Curls: 3x10, 40#  SL Shuttle Press: 3x10, 50#  Sit to Stands: 3x10, 24" box, 10#  Heavy patient education        Patient Education and Home Exercises     Home Exercises Provided and Patient Education Provided     Education provided:   - Importance of continuing HEP to supplement therapy " sessions.    Written Home Exercises Provided: Patient instructed to cont prior HEP. Exercises were reviewed and Vanessa was able to demonstrate them prior to the end of the session.  Vanessa demonstrated good  understanding of the education provided. See EMR under Patient Instructions for exercises provided during therapy sessions    ASSESSMENT     Vanessa presented to PT with reports of decreased right knee pain but continued stiffness. Increased resistance to most of therex for improved quad, hamstring and glute strength. Vanessa required rest breaks between exercises due to fatigue. However, she was able to complete today's session without reports of increased right knee pain or discomfort. Will continue to progress as tolerated.     Vanessa Is progressing well towards her goals.   Pt prognosis is Fair.     Pt will continue to benefit from skilled outpatient physical therapy to address the deficits listed in the problem list box on initial evaluation, provide pt/family education and to maximize pt's level of independence in the home and community environment.     Pt's spiritual, cultural and educational needs considered and pt agreeable to plan of care and goals.     Anticipated barriers to physical therapy: COVID-19    Goals: Short Term Goals (6 weeks)   1. Patient will be independent with HEP to supplement therapy sessions. Progressing, Not Met  2. Pt will improve RLE impaired LSI scores to greater than or equal to 70% to demonstrate balance in function between LEs. Progressing, Not Met  3. Pt will improve R knee flexion ROM to greater than or equal to 100 degrees to improve mobility for functional tasks. Progressing, Not Met  4. Pt will improve R knee extension/hyperextension equal to opposite knee to demonstrate balance in function between LE's. Progressing, Not Met     Long Term Goals (12 weeks )   1. Pt will be independent with updated HEP to supplement therapy sessions. Progressing, Not Met  2. Pt will improve  "RLE impaired LSI scores to greater than or equal to 90% to demonstrate balance in function between LEs. Progressing, Not Met  3. Pt will improve R knee flexion ROM to greater than or equal to opposite knee flexion to improve mobility for functional tasks. Progressing, Not Met  4. Pt will walk on unlevel surfaces without AD or gait deficits to promote ability to ambulate within the home and community. Progressing, Not Met  5. Pt will return to PLOF with no pain. Progressing, Not Met    PLAN     Continue to progress strength and ROM as tolerated.     Not performed this session:   Heel Slides: 5 minutes, 5" holds in flexion  +Lateral Monster Walks: 25'x2 laps RTB at hips  Heel Slides: 3 minutes, Level 3.0  TKE: 3x10, 5" Holds, pink sport cord  Forward Heel Taps: 3x10, RLE leading, 6" step        neuromuscular re-education activities to improve: Coordination and Proprioception for 8 minutes. The following activities were included:    Elizabeth Maxwell PTA             "

## 2023-07-10 ENCOUNTER — CLINICAL SUPPORT (OUTPATIENT)
Dept: REHABILITATION | Facility: HOSPITAL | Age: 53
End: 2023-07-10
Attending: FAMILY MEDICINE
Payer: COMMERCIAL

## 2023-07-10 DIAGNOSIS — R29.898 WEAKNESS OF BOTH LOWER EXTREMITIES: ICD-10-CM

## 2023-07-10 DIAGNOSIS — M25.561 ACUTE PAIN OF RIGHT KNEE: Primary | ICD-10-CM

## 2023-07-10 DIAGNOSIS — Z74.09 IMPAIRED FUNCTIONAL MOBILITY, BALANCE, GAIT, AND ENDURANCE: ICD-10-CM

## 2023-07-10 DIAGNOSIS — M25.661 DECREASED RANGE OF MOTION OF RIGHT KNEE: ICD-10-CM

## 2023-07-10 PROCEDURE — 97110 THERAPEUTIC EXERCISES: CPT | Mod: PN,CQ

## 2023-07-12 ENCOUNTER — CLINICAL SUPPORT (OUTPATIENT)
Dept: REHABILITATION | Facility: HOSPITAL | Age: 53
End: 2023-07-12
Attending: FAMILY MEDICINE
Payer: COMMERCIAL

## 2023-07-12 DIAGNOSIS — Z74.09 IMPAIRED FUNCTIONAL MOBILITY, BALANCE, GAIT, AND ENDURANCE: ICD-10-CM

## 2023-07-12 DIAGNOSIS — R29.898 WEAKNESS OF BOTH LOWER EXTREMITIES: ICD-10-CM

## 2023-07-12 DIAGNOSIS — M25.661 DECREASED RANGE OF MOTION OF RIGHT KNEE: ICD-10-CM

## 2023-07-12 DIAGNOSIS — M25.561 ACUTE PAIN OF RIGHT KNEE: Primary | ICD-10-CM

## 2023-07-12 PROCEDURE — 97110 THERAPEUTIC EXERCISES: CPT | Mod: PN

## 2023-07-12 PROCEDURE — 97112 NEUROMUSCULAR REEDUCATION: CPT | Mod: PN

## 2023-07-12 NOTE — PROGRESS NOTES
[facility]   Physical Therapy Treatment Note     Name: Vanessa Jerez  Clinic Number: [id number]    Therapy Diagnosis:   Encounter Diagnoses   Name Primary?    Acute pain of right knee Yes    Decreased range of motion of right knee     Impaired functional mobility, balance, gait, and endurance     Weakness of both lower extremities        Physician: Altaf Fernández PA-C    Visit Date: 2023    Physician Orders: PT Eval and Treat  Medical Diagnosis from Referral: S83.281A (ICD-10-CM) - Acute lateral meniscus tear of right knee, initial encounter  Evaluation Date: 2023  Authorization Period Expiration: 2023  Plan of Care Expiration: 2023 to 2023  Visit # / Visits authorized:  (+Evaluation)     FOTO:  NEXT SESSION    PTA Visit #: 0/5     Time In: 8:12 AM  Time Out: 9:15 AM  Total Billable Time: 63 minutes (4TE)    Precautions:   Procedure:  1. Right Knee Arthroscopy, with meniscectomy (medial OR lateral) 82484  2.  Right Knee Arthroscopy, knee, synovectomy, limited 09318     Post-Op Plan:  Knee arthroscopy protocol       SUBJECTIVE     Pt reports: She is trying to return to work in the next 2 weeks. Has been feeling like her walking is better and that she's getting stronger.    She was compliant with home exercise program.  Response to previous treatment: No adverse reactions  Functional change: NA    Pain: 2/10  Location: right anterior knee      OBJECTIVE     DOS: 2023. Patient is currently 4 weeks and 6 day(s) post-op.     2023:     R knee PROM: 3-0-110    All below testing performed in seated position. Gait belt used for quadriceps testing.    Lower extremity strength with Sport[employer] handheld dynamometer Right  (Kgf) Left  (Kgf) Pain/dysfunction with movement   (approx 4 sec hold w/ max contraction)   Hip abduction 4.6   4.8  5.2    AV.9 8.6   7.3  6.5    AV.5 65%   Quadriceps 21.4  21.2  20.6    AV.1 22.8  28.6  28.3    AV.6  "79%   Hamstrings 10.9  11.0  9.5    AVG: 10.5 13.7   15.8  18.9    AV.1 65%         Treatment     Vanessa received the treatments listed below:      Therapeutic exercises to develop strength, endurance, and ROM for 51 minutes including:  Upright Bike: 8 minutes, Level 3.5  Objective measurements (strength LSI) - see above  Sidelying Clamshells: 3x10, BlueTB, B  SL Matrix Knee Extension: 3x10, 15#  SL Matrix HS Curls: 3x10, 40#  SL Matrix Leg Press: 3x10, 25#  Patient education    Neuromuscular re-education activities to improve: Coordination and Proprioception for 12 minutes. The following activities were included:  Lateral Monster Walks: 25'x3 laps, BlueTB at knees  Forward Heel Taps: 3x10, 4" step      Patient Education and Home Exercises     Home Exercises Provided and Patient Education Provided     Education provided:   - Importance of continuing HEP to supplement therapy sessions.    Written Home Exercises Provided: Patient instructed to cont prior HEP. Exercises were reviewed and Vanessa was able to demonstrate them prior to the end of the session.  Vanessa demonstrated good  understanding of the education provided. See EMR under Patient Instructions for exercises provided during therapy sessions    ASSESSMENT     Vanessa presented to PT with reports of no new complaints. Assessed strength LSI this session with patient displaying 65% with hip abduction and hamstrings and 79% with quadriceps. Educated on the results and exercises to continue performing at home to improve LSI. Tolerated session well with continued focus on improving the above mentioned mm groups. Improved tolerance and body mechanics with performance of forward heel taps this session with more control noted. Overall progressing fairly and will continue to work on functional strength.    Vanessa Is progressing well towards her goals.   Pt prognosis is Fair.     Pt will continue to benefit from skilled outpatient physical therapy to address the " "deficits listed in the problem list box on initial evaluation, provide pt/family education and to maximize pt's level of independence in the home and community environment.     Pt's spiritual, cultural and educational needs considered and pt agreeable to plan of care and goals.     Anticipated barriers to physical therapy: COVID-19    Goals: Short Term Goals (6 weeks)   1. Patient will be independent with HEP to supplement therapy sessions. Progressing, Not Met  2. Pt will improve RLE impaired LSI scores to greater than or equal to 70% to demonstrate balance in function between LEs. Progressing, Not Met  3. Pt will improve R knee flexion ROM to greater than or equal to 100 degrees to improve mobility for functional tasks. Progressing, Not Met  4. Pt will improve R knee extension/hyperextension equal to opposite knee to demonstrate balance in function between LE's. Progressing, Not Met     Long Term Goals (12 weeks )   1. Pt will be independent with updated HEP to supplement therapy sessions. Progressing, Not Met  2. Pt will improve RLE impaired LSI scores to greater than or equal to 90% to demonstrate balance in function between LEs. Progressing, Not Met  3. Pt will improve R knee flexion ROM to greater than or equal to opposite knee flexion to improve mobility for functional tasks. Progressing, Not Met  4. Pt will walk on unlevel surfaces without AD or gait deficits to promote ability to ambulate within the home and community. Progressing, Not Met  5. Pt will return to PLOF with no pain. Progressing, Not Met    PLAN     Continue to progress strength and ROM as tolerated.     Not performed this session:   Heel Slides: 5 minutes, 5" holds in flexion  +Lateral Monster Walks: 25'x2 laps RTB at hips  Heel Slides: 3 minutes, Level 3.0  TKE: 3x10, 5" Holds, pink sport cord  Forward Heel Taps: 3x10, RLE leading, 6" step    Supine SLR's: 3x10, 3" holds, +3#  DL Gluteal Bridges: 3x10, 5" holds  SL Shuttle Press: 3x10, " "50#  Sit to Stands: 3x10, 24" box, 10#      Katie Savage, PT               "

## 2023-07-21 ENCOUNTER — OFFICE VISIT (OUTPATIENT)
Dept: SPORTS MEDICINE | Facility: CLINIC | Age: 53
End: 2023-07-21
Payer: COMMERCIAL

## 2023-07-21 VITALS
SYSTOLIC BLOOD PRESSURE: 115 MMHG | BODY MASS INDEX: 35.27 KG/M2 | HEIGHT: 69 IN | DIASTOLIC BLOOD PRESSURE: 73 MMHG | HEART RATE: 67 BPM | WEIGHT: 238.13 LBS

## 2023-07-21 DIAGNOSIS — Z98.890 S/P RIGHT KNEE ARTHROSCOPY: Primary | ICD-10-CM

## 2023-07-21 DIAGNOSIS — S83.281A TEAR OF LATERAL MENISCUS OF RIGHT KNEE, CURRENT, UNSPECIFIED TEAR TYPE, INITIAL ENCOUNTER: ICD-10-CM

## 2023-07-21 DIAGNOSIS — Z98.890 S/P LATERAL MENISCECTOMY OF RIGHT KNEE: ICD-10-CM

## 2023-07-21 DIAGNOSIS — S83.511A SPRAIN OF ANTERIOR CRUCIATE LIGAMENT OF RIGHT KNEE, INITIAL ENCOUNTER: ICD-10-CM

## 2023-07-21 PROCEDURE — 99999 PR PBB SHADOW E&M-EST. PATIENT-LVL III: ICD-10-PCS | Mod: PBBFAC,,, | Performed by: PHYSICIAN ASSISTANT

## 2023-07-21 PROCEDURE — 1159F PR MEDICATION LIST DOCUMENTED IN MEDICAL RECORD: ICD-10-PCS | Mod: CPTII,S$GLB,, | Performed by: PHYSICIAN ASSISTANT

## 2023-07-21 PROCEDURE — 1160F RVW MEDS BY RX/DR IN RCRD: CPT | Mod: CPTII,S$GLB,, | Performed by: PHYSICIAN ASSISTANT

## 2023-07-21 PROCEDURE — 3074F SYST BP LT 130 MM HG: CPT | Mod: CPTII,S$GLB,, | Performed by: PHYSICIAN ASSISTANT

## 2023-07-21 PROCEDURE — 99024 POSTOP FOLLOW-UP VISIT: CPT | Mod: S$GLB,,, | Performed by: PHYSICIAN ASSISTANT

## 2023-07-21 PROCEDURE — 1160F PR REVIEW ALL MEDS BY PRESCRIBER/CLIN PHARMACIST DOCUMENTED: ICD-10-PCS | Mod: CPTII,S$GLB,, | Performed by: PHYSICIAN ASSISTANT

## 2023-07-21 PROCEDURE — 3008F BODY MASS INDEX DOCD: CPT | Mod: CPTII,S$GLB,, | Performed by: PHYSICIAN ASSISTANT

## 2023-07-21 PROCEDURE — 99999 PR PBB SHADOW E&M-EST. PATIENT-LVL III: CPT | Mod: PBBFAC,,, | Performed by: PHYSICIAN ASSISTANT

## 2023-07-21 PROCEDURE — 3078F DIAST BP <80 MM HG: CPT | Mod: CPTII,S$GLB,, | Performed by: PHYSICIAN ASSISTANT

## 2023-07-21 PROCEDURE — 3078F PR MOST RECENT DIASTOLIC BLOOD PRESSURE < 80 MM HG: ICD-10-PCS | Mod: CPTII,S$GLB,, | Performed by: PHYSICIAN ASSISTANT

## 2023-07-21 PROCEDURE — 1159F MED LIST DOCD IN RCRD: CPT | Mod: CPTII,S$GLB,, | Performed by: PHYSICIAN ASSISTANT

## 2023-07-21 PROCEDURE — 99024 PR POST-OP FOLLOW-UP VISIT: ICD-10-PCS | Mod: S$GLB,,, | Performed by: PHYSICIAN ASSISTANT

## 2023-07-21 PROCEDURE — 3008F PR BODY MASS INDEX (BMI) DOCUMENTED: ICD-10-PCS | Mod: CPTII,S$GLB,, | Performed by: PHYSICIAN ASSISTANT

## 2023-07-21 PROCEDURE — 3074F PR MOST RECENT SYSTOLIC BLOOD PRESSURE < 130 MM HG: ICD-10-PCS | Mod: CPTII,S$GLB,, | Performed by: PHYSICIAN ASSISTANT

## 2023-07-21 RX ORDER — MELOXICAM 15 MG/1
15 TABLET ORAL DAILY PRN
Qty: 30 TABLET | Refills: 1 | Status: SHIPPED | OUTPATIENT
Start: 2023-07-21 | End: 2023-09-22 | Stop reason: SDUPTHER

## 2023-07-21 NOTE — PROGRESS NOTES
"POST-OPERATIVE EXAMINATION    52 y.o. Female who returns for follow after surgery. She is 6 weeks s/p    Procedure:  1.  Right Knee Arthroscopy, with meniscectomy (medial OR lateral) 85920  2.  Right Knee Arthroscopy, knee, synovectomy, limited 72807     She is doing well without any issues.  She has noticed improvement in her preoperative symptoms but continues to have intermittent swelling and weakness.        PHYSICAL EXAMINATION:  /73   Pulse 67   Ht 5' 9" (1.753 m)   Wt 108 kg (238 lb 1.6 oz)   LMP 04/01/2017   BMI 35.16 kg/m²   General: Well-developed well-nourished 52 y.o. female in no acute distress   Cardiovascular: Regular rhythm   Lungs: No labored breathing or wheezing appreciated   Neuro: Alert and oriented ×3   Psychiatric: well oriented to person, place and time, demonstrates normal mood and affect   Skin: No rashes, lesions or ulcers, normal temperature, turgor, and texture on involved extremity    ORTHOPEDIC EXAM:  Normal post-operative swelling  Normal post-operative scarring  Strength: WNL  ROM: WNL  Tests: None today    ASSESSMENT:      ICD-10-CM ICD-9-CM   1. S/P right knee arthroscopy  Z98.890 V45.89   2. S/P lateral meniscectomy of right knee  Z98.890 V45.89         PLAN:       Continue physical therapy  Continue activity modification, ice and elevation, and meloxicam as needed for pain and swelling  Ace wrap or Neoprene knee sleeve during physical activities  RTC in 2 months with Carlotta Christensen MD                "

## 2023-07-24 ENCOUNTER — CLINICAL SUPPORT (OUTPATIENT)
Dept: REHABILITATION | Facility: HOSPITAL | Age: 53
End: 2023-07-24
Attending: FAMILY MEDICINE
Payer: COMMERCIAL

## 2023-07-24 DIAGNOSIS — M25.661 DECREASED RANGE OF MOTION OF RIGHT KNEE: ICD-10-CM

## 2023-07-24 DIAGNOSIS — Z74.09 IMPAIRED FUNCTIONAL MOBILITY, BALANCE, GAIT, AND ENDURANCE: ICD-10-CM

## 2023-07-24 DIAGNOSIS — R29.898 WEAKNESS OF BOTH LOWER EXTREMITIES: ICD-10-CM

## 2023-07-24 DIAGNOSIS — M25.561 ACUTE PAIN OF RIGHT KNEE: Primary | ICD-10-CM

## 2023-07-24 PROCEDURE — 97110 THERAPEUTIC EXERCISES: CPT | Mod: PN

## 2023-07-24 PROCEDURE — 97112 NEUROMUSCULAR REEDUCATION: CPT | Mod: PN

## 2023-07-24 NOTE — PROGRESS NOTES
[facility]   Physical Therapy Treatment Note     Name: Vanessa Jerez  Clinic Number: [id number]    Therapy Diagnosis:   Encounter Diagnoses   Name Primary?    Acute pain of right knee Yes    Decreased range of motion of right knee     Impaired functional mobility, balance, gait, and endurance     Weakness of both lower extremities      Physician: Altaf Fernández PA-C    Visit Date: 7/24/2023    Physician Orders: PT Eval and Treat  Medical Diagnosis from Referral: S83.281A (ICD-10-CM) - Acute lateral meniscus tear of right knee, initial encounter  Evaluation Date: 6/12/2023  Authorization Period Expiration: 12/29/2023  Plan of Care Expiration: 6/12/2023 to 8/31/2023  Visit # / Visits authorized: 10/20 (+Evaluation)     FOTO: 5/5 NEXT SESSION    PTA Visit #: 0/5     Time In: 8:15 AM  Time Out: 9:15 AM  Total Billable Time: 60 minutes (4TE)    Precautions:   Procedure:  1. Right Knee Arthroscopy, with meniscectomy (medial OR lateral) 23396  2.  Right Knee Arthroscopy, knee, synovectomy, limited 57551     Post-Op Plan:  Knee arthroscopy protocol       SUBJECTIVE     Pt reports: Pt states she is feeling better and she can tell she is getting stronger. Pt does state her knee has been aching the last few nights. She says it feels better throughout the day and gets worse at night. Pt saw Dr. Fernández for follow up visit on 7/21/23. She says that he suggested a knee brace to help with the swelling and she has been using it for last couple days.    She was compliant with home exercise program.  Response to previous treatment: No adverse reactions  Functional change: NA    Pain: 1/10  Location: right anterior knee      OBJECTIVE     DOS: 6/8/2023. Patient is currently 6 weeks and 4 day(s) post-op.     6/19/2023:     R knee PROM: 3-0-110    Isometric strength testing was last performed on 7/12/23      Range of Motion:     R Knee AROM: 118 degrees  R Knee PROM: 121 degrees    Treatment     Vanessa  "received the treatments listed below:      Therapeutic exercises to develop strength, endurance, and ROM for 48 minutes including:  Upright Bike: 8 minutes, Level 3.5 (To improve range of motion and reduce edema)  Sidelying Clamshells: 3x10, BlueTB, B  SL Matrix Knee Extension: 3x10, 15# (progress weight next session)  SL Matrix HS Curls: 3x10, 50#  Single Leg Shuttle Press 3x12, 62.5#  Patient education      Neuromuscular re-education activities to improve: Coordination and Proprioception for 12 minutes. The following activities were included:  Lateral Monster Walks: 25'x4 laps, BlueTB at knee  Forward Heel Taps: 3x10, 4" step (Verbal Cueing required to prevent knee valgus)  Forward Step Ups: 3x10, 4" step (emphasis on reducing valgus movement at knee, used finger tips to help with balance)      Patient Education and Home Exercises     Home Exercises Provided and Patient Education Provided     Education provided:   - Importance of continuing HEP to supplement therapy sessions.  - Pt was educated on how to properly wear knee brace    Written Home Exercises Provided: Patient instructed to cont prior HEP. Exercises were reviewed and Vanessa was able to demonstrate them prior to the end of the session.  Vanessa demonstrated good  understanding of the education provided. See EMR under Patient Instructions for exercises provided during therapy sessions    ASSESSMENT     Vanessa presented to PT with reports of aching pain at night in her R knee. She was educated on proper usage of the knee brace that was recommended to her by her doctor to help with the aching pain and swelling. Pt strength and endurance continues to improve. She tolerated the session well with moderate fatigue upon completion. As she got more fatigued more verbal cueing was required throughout the session to prevent her from compensations and knee valgus. Overall the pt is progressing fairly well and will continue to work on functional strength and " "endurance.    Vanessa Is progressing well towards her goals.   Pt prognosis is Fair.     Pt will continue to benefit from skilled outpatient physical therapy to address the deficits listed in the problem list box on initial evaluation, provide pt/family education and to maximize pt's level of independence in the home and community environment.     Pt's spiritual, cultural and educational needs considered and pt agreeable to plan of care and goals.     Anticipated barriers to physical therapy: COVID-19    Goals: Short Term Goals (6 weeks)   1. Patient will be independent with HEP to supplement therapy sessions. Progressing, Not Met  2. Pt will improve RLE impaired LSI scores to greater than or equal to 70% to demonstrate balance in function between LEs. Progressing, Not Met  3. Pt will improve R knee flexion ROM to greater than or equal to 100 degrees to improve mobility for functional tasks. Progressing, Not Met  4. Pt will improve R knee extension/hyperextension equal to opposite knee to demonstrate balance in function between LE's. Progressing, Not Met     Long Term Goals (12 weeks )   1. Pt will be independent with updated HEP to supplement therapy sessions. Progressing, Not Met  2. Pt will improve RLE impaired LSI scores to greater than or equal to 90% to demonstrate balance in function between LEs. Progressing, Not Met  3. Pt will improve R knee flexion ROM to greater than or equal to opposite knee flexion to improve mobility for functional tasks. Progressing, Not Met  4. Pt will walk on unlevel surfaces without AD or gait deficits to promote ability to ambulate within the home and community. Progressing, Not Met  5. Pt will return to PLOF with no pain. Progressing, Not Met    PLAN     Continue to progress strength, endurance, and ROM as tolerated. Incorporate an emphasis on proprioception training in future sessions.    Not performed this session:   Heel Slides: 5 minutes, 5" holds in flexion  +Lateral Monster " "Walks: 25'x2 laps RTB at hips  Heel Slides: 3 minutes, Level 3.0  TKE: 3x10, 5" Holds, pink sport cord  Forward Heel Taps: 3x10, RLE leading, 6" step    Supine SLR's: 3x10, 3" holds, +3#  DL Gluteal Bridges: 3x10, 5" holds  SL Shuttle Press: 3x10, 50#  Sit to Stands: 3x10, 24" box, 10#      Katie Savage, PT                 "

## 2023-07-26 ENCOUNTER — CLINICAL SUPPORT (OUTPATIENT)
Dept: REHABILITATION | Facility: HOSPITAL | Age: 53
End: 2023-07-26
Attending: FAMILY MEDICINE
Payer: COMMERCIAL

## 2023-07-26 DIAGNOSIS — Z74.09 IMPAIRED FUNCTIONAL MOBILITY, BALANCE, GAIT, AND ENDURANCE: ICD-10-CM

## 2023-07-26 DIAGNOSIS — M25.561 ACUTE PAIN OF RIGHT KNEE: Primary | ICD-10-CM

## 2023-07-26 DIAGNOSIS — R29.898 WEAKNESS OF BOTH LOWER EXTREMITIES: ICD-10-CM

## 2023-07-26 DIAGNOSIS — M25.661 DECREASED RANGE OF MOTION OF RIGHT KNEE: ICD-10-CM

## 2023-07-26 PROCEDURE — 97110 THERAPEUTIC EXERCISES: CPT | Mod: PN

## 2023-07-31 ENCOUNTER — CLINICAL SUPPORT (OUTPATIENT)
Dept: REHABILITATION | Facility: HOSPITAL | Age: 53
End: 2023-07-31
Attending: FAMILY MEDICINE
Payer: COMMERCIAL

## 2023-07-31 DIAGNOSIS — R29.898 WEAKNESS OF BOTH LOWER EXTREMITIES: ICD-10-CM

## 2023-07-31 DIAGNOSIS — M25.561 ACUTE PAIN OF RIGHT KNEE: Primary | ICD-10-CM

## 2023-07-31 DIAGNOSIS — M25.661 DECREASED RANGE OF MOTION OF RIGHT KNEE: ICD-10-CM

## 2023-07-31 DIAGNOSIS — Z74.09 IMPAIRED FUNCTIONAL MOBILITY, BALANCE, GAIT, AND ENDURANCE: ICD-10-CM

## 2023-07-31 PROCEDURE — 97110 THERAPEUTIC EXERCISES: CPT | Mod: PN

## 2023-07-31 PROCEDURE — 97112 NEUROMUSCULAR REEDUCATION: CPT | Mod: PN

## 2023-07-31 NOTE — PROGRESS NOTES
[facility]   Physical Therapy Treatment Note     Name: Vanessa Jerez  Clinic Number: [id number]    Therapy Diagnosis:   Encounter Diagnoses   Name Primary?    Acute pain of right knee Yes    Decreased range of motion of right knee     Impaired functional mobility, balance, gait, and endurance     Weakness of both lower extremities        Physician: Altaf Fernández PA-C    Visit Date: 7/31/2023    Physician Orders: PT Eval and Treat  Medical Diagnosis from Referral: S83.281A (ICD-10-CM) - Acute lateral meniscus tear of right knee, initial encounter  Evaluation Date: 6/12/2023  Authorization Period Expiration: 12/29/2023  Plan of Care Expiration: 6/12/2023 to 8/31/2023  Visit # / Visits authorized: 12/20 (+Evaluation)     FOTO: 5/5 NEXT SESSION    PTA Visit #: 0/5     Time In: 8:10 AM  Time Out: 9:10 AM  Total Billable Time: 60 minutes (4 TE)    Precautions:   Procedure:  1. Right Knee Arthroscopy, with meniscectomy (medial OR lateral) 99583  2.  Right Knee Arthroscopy, knee, synovectomy, limited 81353     Post-Op Plan:  Knee arthroscopy protocol       SUBJECTIVE     Pt reports: Pt reports knee has been feeling better overall. She states last night while she was sleeping she woke up from her knee aching so bad. States she has been wearing the brace to help with swelling most days, however, forgot it today because she was in a rush.     She was compliant with home exercise program.  Response to previous treatment: No adverse reactions  Functional change: NA    Pain: 1/10  Location: right anterior knee      OBJECTIVE     DOS: 6/8/2023. Patient is currently 7 weeks and 5 day(s) post-op.     7/26/23:     R knee PROM: 3-0-121    Isometric strength testing was last performed on 7/12/23.     Treatment     Vanessa received the treatments listed below:      Therapeutic exercises to develop strength, endurance, and ROM for 52 minutes including:  Upright Bike: 8 minutes, Level 4.5 (to improve  "range of motion and reduce edema)  DL Gluteal Bridges: 3x12, 10# dumbbell at hips  Sidelying Clamshells: 3x10, BlueTB, B  SL Matrix Knee Extension: 3x10, with 3 sec hold, 15#  SL Matrix HS Curls: 3x10, 25#  Single Leg Shuttle Press 3x12, 50#  Squat Taps: 18" box with foam pad, 3x10  Step ups: 8" box, 3x12  Patient education      Neuromuscular re-education activities to improve: Coordination and Proprioception for 8 minutes. The following activities were included:  Lateral Monster Walks: 25'x4 laps, BlueTB at ankle  Single leg balance: railing for assistance when necessary, 3x30"  Forward Heel Taps: 3x10, 4" step (Verbal Cueing required to prevent knee valgus) - NOT TODAY  Forward Step Ups: 3x10, 4" step (emphasis on reducing valgus movement at knee, used finger tips to help with balance) - NOT TODAY    - progress to single leg stance on foam  - progress to single leg stance with rebounder ball toss      Patient Education and Home Exercises     Home Exercises Provided and Patient Education Provided     Education provided:   - Importance of continuing HEP to supplement therapy sessions.  - Pt was educated on how to properly wear knee brace    Written Home Exercises Provided: Patient instructed to cont prior HEP. Exercises were reviewed and Vanessa was able to demonstrate them prior to the end of the session.  Vanessa demonstrated good  understanding of the education provided. See EMR under Patient Instructions for exercises provided during therapy sessions    ASSESSMENT     Vanessa presented to PT with improvements in pain and a reduction of swelling in her right knee. She continues to see improvements in functional strength with ascending stairs and sit to stands. PT continued gross LE strengthening with improvement in muscular endurance. Pt showed great improvement in single leg proprioception training with extended single leg stance time and reduced sway. Overall the pt is progressing well and will continue to benefit " from functional strength and endurance training as well as proprioception training.     Vanessa Is progressing well towards her goals.   Pt prognosis is Fair.     Pt will continue to benefit from skilled outpatient physical therapy to address the deficits listed in the problem list box on initial evaluation, provide pt/family education and to maximize pt's level of independence in the home and community environment.     Pt's spiritual, cultural and educational needs considered and pt agreeable to plan of care and goals.     Anticipated barriers to physical therapy: COVID-19    Goals: Short Term Goals (6 weeks)   1. Patient will be independent with HEP to supplement therapy sessions. Progressing, Not Met  2. Pt will improve RLE impaired LSI scores to greater than or equal to 70% to demonstrate balance in function between LEs. Progressing, Not Met  3. Pt will improve R knee flexion ROM to greater than or equal to 100 degrees to improve mobility for functional tasks. Progressing, Not Met  4. Pt will improve R knee extension/hyperextension equal to opposite knee to demonstrate balance in function between LE's. Progressing, Not Met     Long Term Goals (12 weeks )   1. Pt will be independent with updated HEP to supplement therapy sessions. Progressing, Not Met  2. Pt will improve RLE impaired LSI scores to greater than or equal to 90% to demonstrate balance in function between LEs. Progressing, Not Met  3. Pt will improve R knee flexion ROM to greater than or equal to opposite knee flexion to improve mobility for functional tasks. Progressing, Not Met  4. Pt will walk on unlevel surfaces without AD or gait deficits to promote ability to ambulate within the home and community. Progressing, Not Met  5. Pt will return to PLOF with no pain. Progressing, Not Met    PLAN     Continue to progress strength, endurance, and ROM as tolerated. Continue to incorporate proprioception training in future sessions.    Not performed this  "session:   Heel Slides: 5 minutes, 5" holds in flexion  +Lateral Monster Walks: 25'x2 laps RTB at hips  Heel Slides: 3 minutes, Level 3.0  TKE: 3x10, 5" Holds, pink sport cord  Forward Heel Taps: 3x10, RLE leading, 6" step    Supine SLR's: 3x10, 3" holds, +3#  DL Gluteal Bridges: 3x10, 5" holds  SL Shuttle Press: 3x10, 50#  Sit to Stands: 3x10, 24" box, 10#  Supine SLR's: 3x10, 2#      Ktaie Savage, PT     "

## 2023-08-07 ENCOUNTER — CLINICAL SUPPORT (OUTPATIENT)
Dept: REHABILITATION | Facility: HOSPITAL | Age: 53
End: 2023-08-07
Attending: FAMILY MEDICINE
Payer: COMMERCIAL

## 2023-08-07 DIAGNOSIS — M25.561 ACUTE PAIN OF RIGHT KNEE: Primary | ICD-10-CM

## 2023-08-07 DIAGNOSIS — R29.898 WEAKNESS OF BOTH LOWER EXTREMITIES: ICD-10-CM

## 2023-08-07 DIAGNOSIS — M25.661 DECREASED RANGE OF MOTION OF RIGHT KNEE: ICD-10-CM

## 2023-08-07 DIAGNOSIS — Z74.09 IMPAIRED FUNCTIONAL MOBILITY, BALANCE, GAIT, AND ENDURANCE: ICD-10-CM

## 2023-08-07 PROCEDURE — 97110 THERAPEUTIC EXERCISES: CPT | Mod: PN

## 2023-08-07 PROCEDURE — 97112 NEUROMUSCULAR REEDUCATION: CPT | Mod: PN

## 2023-08-07 NOTE — PROGRESS NOTES
[facility]   Physical Therapy Treatment Note     Name: Vanessa Jerez  Clinic Number: [id number]    Therapy Diagnosis:   Encounter Diagnoses   Name Primary?    Acute pain of right knee Yes    Decreased range of motion of right knee     Impaired functional mobility, balance, gait, and endurance     Weakness of both lower extremities        Physician: Altaf Fernández PA-C    Visit Date: 8/7/2023    Physician Orders: PT Eval and Treat  Medical Diagnosis from Referral: S83.281A (ICD-10-CM) - Acute lateral meniscus tear of right knee, initial encounter  Evaluation Date: 6/12/2023  Authorization Period Expiration: 12/29/2023  Plan of Care Expiration: 6/12/2023 to 8/31/2023  Visit # / Visits authorized: 13/20 (+Evaluation)     FOTO: 5/5 NEXT SESSION    PTA Visit #: 0/5     Time In: 8:15 AM  Time Out: 9:15 AM  Total Billable Time: 60 minutes (4 TE)    Precautions:   Procedure:  1. Right Knee Arthroscopy, with meniscectomy (medial OR lateral) 09224  2.  Right Knee Arthroscopy, knee, synovectomy, limited 28523     Post-Op Plan:  Knee arthroscopy protocol       SUBJECTIVE     Pt reports: No new complaints.     She was compliant with home exercise program.  Response to previous treatment: No adverse reactions  Functional change: NA    Pain: 1/10  Location: right anterior knee      OBJECTIVE     DOS: 6/8/2023. Patient is currently 8 weeks and 4 day(s) post-op.     7/26/23:     R knee PROM: 3-0-121    Isometric strength testing was last performed on 7/12/23. Re-test next session.     Treatment     Vanessa received the treatments listed below:      Therapeutic exercises to develop strength, endurance, and ROM for 50 minutes including:  Upright Bike: 8 minutes, Level 5.0 (to improve range of motion and reduce edema)  DL Gluteal Bridges: 3x12, 15# dumbbell at hips  Sidelying Clamshells: 3x10, BlueTB, B  SL Matrix Knee Extension: 3x10, with 3 sec hold, 15#  SL Matrix HS Curls: 3x10, 35#  Single Leg  "Shuttle Press 3x12, 75#  Patient education      Neuromuscular re-education activities to improve: Coordination and Proprioception for 10 minutes. The following activities were included:  Lateral Monster Walks: 25'x4 laps, BlueTB at ankle  Forward Heel Taps: 3x10, 4" step      Patient Education and Home Exercises     Home Exercises Provided and Patient Education Provided     Education provided:   - Importance of continuing HEP to supplement therapy sessions.  - Pt was educated on how to properly wear knee brace    Written Home Exercises Provided: Patient instructed to cont prior HEP. Exercises were reviewed and Vanessa was able to demonstrate them prior to the end of the session.  Vanessa demonstrated good  understanding of the education provided. See EMR under Patient Instructions for exercises provided during therapy sessions    ASSESSMENT     Vanessa presented to PT with no new complaints. Slight discomfort noted with some functional movements, but unable to state particular activities/motions. Tolerated session well with continued focus on functional and gross LE strength. Increased resistance with SL HS curls and SL shuttle press with no increases in pain. Improved tolerance to forward heel taps with less compensation noted. Overall progressing well and will reassess strength LSI within the next few sessions. Will continue to benefit from functional strength, endurance training, and proprioceptive training.     Vanessa Is progressing well towards her goals.   Pt prognosis is Fair.     Pt will continue to benefit from skilled outpatient physical therapy to address the deficits listed in the problem list box on initial evaluation, provide pt/family education and to maximize pt's level of independence in the home and community environment.     Pt's spiritual, cultural and educational needs considered and pt agreeable to plan of care and goals.     Anticipated barriers to physical therapy: COVID-19    Goals: Short Term " "Goals (6 weeks)   1. Patient will be independent with HEP to supplement therapy sessions. Progressing, Not Met  2. Pt will improve RLE impaired LSI scores to greater than or equal to 70% to demonstrate balance in function between LEs. Progressing, Not Met  3. Pt will improve R knee flexion ROM to greater than or equal to 100 degrees to improve mobility for functional tasks. Progressing, Not Met  4. Pt will improve R knee extension/hyperextension equal to opposite knee to demonstrate balance in function between LE's. Progressing, Not Met     Long Term Goals (12 weeks )   1. Pt will be independent with updated HEP to supplement therapy sessions. Progressing, Not Met  2. Pt will improve RLE impaired LSI scores to greater than or equal to 90% to demonstrate balance in function between LEs. Progressing, Not Met  3. Pt will improve R knee flexion ROM to greater than or equal to opposite knee flexion to improve mobility for functional tasks. Progressing, Not Met  4. Pt will walk on unlevel surfaces without AD or gait deficits to promote ability to ambulate within the home and community. Progressing, Not Met  5. Pt will return to PLOF with no pain. Progressing, Not Met    PLAN     Continue to progress strength, endurance, and ROM as tolerated. Continue to incorporate proprioception training in future sessions.    Not performed this session:   Squat Taps: 18" box with foam pad, 3x10  Step ups: 8" box, 3x12  Single leg balance: railing for assistance when necessary, 3x30"  Forward Step Ups: 3x10, 4" step (emphasis on reducing valgus movement at knee, used finger tips to help with balance) - NOT TODAY    - progress to single leg stance on foam  - progress to single leg stance with rebounder ball karen Savage, PT     "

## 2023-08-09 ENCOUNTER — CLINICAL SUPPORT (OUTPATIENT)
Dept: REHABILITATION | Facility: HOSPITAL | Age: 53
End: 2023-08-09
Payer: COMMERCIAL

## 2023-08-09 DIAGNOSIS — R29.898 WEAKNESS OF BOTH LOWER EXTREMITIES: ICD-10-CM

## 2023-08-09 DIAGNOSIS — M25.661 DECREASED RANGE OF MOTION OF RIGHT KNEE: ICD-10-CM

## 2023-08-09 DIAGNOSIS — M25.561 ACUTE PAIN OF RIGHT KNEE: Primary | ICD-10-CM

## 2023-08-09 DIAGNOSIS — Z74.09 IMPAIRED FUNCTIONAL MOBILITY, BALANCE, GAIT, AND ENDURANCE: ICD-10-CM

## 2023-08-09 PROCEDURE — 97112 NEUROMUSCULAR REEDUCATION: CPT | Mod: PN

## 2023-08-09 PROCEDURE — 97110 THERAPEUTIC EXERCISES: CPT | Mod: PN

## 2023-08-09 NOTE — PROGRESS NOTES
[facility]   Physical Therapy Treatment Note     Name: Vanessa Jerez  Clinic Number: [id number]    Therapy Diagnosis:   Encounter Diagnoses   Name Primary?    Acute pain of right knee Yes    Decreased range of motion of right knee     Impaired functional mobility, balance, gait, and endurance     Weakness of both lower extremities          Physician: Altaf Fernández PA-C    Visit Date: 8/9/2023    Physician Orders: PT Eval and Treat  Medical Diagnosis from Referral: S83.281A (ICD-10-CM) - Acute lateral meniscus tear of right knee, initial encounter  Evaluation Date: 6/12/2023  Authorization Period Expiration: 12/29/2023  Plan of Care Expiration: 6/12/2023 to 8/31/2023  Visit # / Visits authorized: 14/20 (+Evaluation)     FOTO: 5/5 NEXT SESSION    PTA Visit #: 0/5     Time In: 8:15 AM  Time Out: 9:20 AM  Total Billable Time: 60 minutes (3 TE, 1 NME)    Precautions:   Procedure:  1. Right Knee Arthroscopy, with meniscectomy (medial OR lateral) 41964  2.  Right Knee Arthroscopy, knee, synovectomy, limited 78389     Post-Op Plan:  Knee arthroscopy protocol       SUBJECTIVE     Pt reports: Pt reports that they have no new complaints, and just a little bit of aching pain still that hasn't gone away yet. She states she continues to try different knee braces and sleeves and none of them are working for her.     She was compliant with home exercise program.  Response to previous treatment: No adverse reactions  Functional change: NA    Pain: 1/10  Location: right anterior knee      OBJECTIVE     DOS: 6/8/2023. Patient is currently 8 weeks and 6 day(s) post-op.     7/26/23:     R knee PROM: 3-0-121    Isometric strength testing was last performed on 8/9/23.     All below testing performed in seated position. Gait belt used for quadriceps testing.    Lower extremity strength with [employer] [place] handheld dynamometer Right  (Kgf) Left  (Kgf) Pain/dysfunction with movement   (approx 4 sec hold w/ max  "contraction)   Hip abduction 7.8   8.7  9.6  AV.7 10.8   10.1  10.5  AVG: 10.5 82%   Quadriceps 23.1  23.8  21.6  AV.8 24.8  27.6  28.9  AV.1 84%   Hamstrings 14.8  15.7  13.4  AV.6 18.4   18.0  17.4  AV.9 81%        Treatment     Vanessa received the treatments listed below:      Therapeutic exercises to develop strength, endurance, and ROM for 50 minutes including:  Upright Bike: 8 minutes, Level 5.0 (to improve range of motion and reduce edema)  DL Gluteal Bridges: 3x12, 15# dumbbell at hips  Sidelying Clamshells: 3x10, BlueTB, B  SL Matrix Knee Extension: 3x10, with 3 sec hold, 15#  SL Matrix HS Curls: 3x10, 30#  Single Leg Shuttle Press 3x12, 75#  Dynamometer Strength Testing: see above  Patient education      Neuromuscular re-education activities to improve: Coordination and Proprioception for 15 minutes. The following activities were included:  Lateral Monster Walks: 25'x4 laps, BlueTB at ankle  Forward Heel Taps: 3x10, 4" step (still has discomfort posterior knee)  SL Stance: 3x30"      Patient Education and Home Exercises     Home Exercises Provided and Patient Education Provided     Education provided:   - Importance of continuing HEP to supplement therapy sessions.  - Pt was educated on how to properly wear knee brace    Written Home Exercises Provided: Patient instructed to cont prior HEP. Exercises were reviewed and Vanessa was able to demonstrate them prior to the end of the session.  Vanessa demonstrated good  understanding of the education provided. See EMR under Patient Instructions for exercises provided during therapy sessions    ASSESSMENT     Vanessa presented to PT today with no new complaints and says certain activities are getting easier. Strength testing was performed today where pt performed better in all tested areas compared to 2023. Her biggest increase was in her hamstring strength which improved from 65% to 81% within the month span. All tested values are noted " above. Pt still had mild discomfort with functional movements such as the forward heel taps in the posterior aspect of her knee. She tolerated the heel taps better than last session, however, it still caused her some discomfort. Overall pt continues to progress well and will continue to improve on muscular strength and endurance. Pt will continue to benefit from functional strength, endurance training, and proprioception training.      Vanessa Is progressing well towards her goals.   Pt prognosis is Fair.     Pt will continue to benefit from skilled outpatient physical therapy to address the deficits listed in the problem list box on initial evaluation, provide pt/family education and to maximize pt's level of independence in the home and community environment.     Pt's spiritual, cultural and educational needs considered and pt agreeable to plan of care and goals.     Anticipated barriers to physical therapy: COVID-19    Goals: Short Term Goals (6 weeks)   1. Patient will be independent with HEP to supplement therapy sessions. Met  2. Pt will improve RLE impaired LSI scores to greater than or equal to 70% to demonstrate balance in function between LEs. Met  3. Pt will improve R knee flexion ROM to greater than or equal to 100 degrees to improve mobility for functional tasks.  Met  4. Pt will improve R knee extension/hyperextension equal to opposite knee to demonstrate balance in function between LE's. Progressing, Not Met     Long Term Goals (12 weeks )   1. Pt will be independent with updated HEP to supplement therapy sessions. Progressing, Not Met  2. Pt will improve RLE impaired LSI scores to greater than or equal to 90% to demonstrate balance in function between LEs. Progressing, Not Met  3. Pt will improve R knee flexion ROM to greater than or equal to opposite knee flexion to improve mobility for functional tasks. Progressing, Not Met  4. Pt will walk on unlevel surfaces without AD or gait deficits to promote  "ability to ambulate within the home and community. Progressing, Not Met  5. Pt will return to PLOF with no pain. Progressing, Not Met    PLAN     Continue to progress strength, endurance, and ROM as tolerated. Continue to incorporate proprioception training in future sessions.    Not performed this session:   Squat Taps: 18" box with foam pad, 3x10  Step ups: 8" box, 3x12  Single leg balance: railing for assistance when necessary, 3x30"  Forward Step Ups: 3x10, 4" step (emphasis on reducing valgus movement at knee, used finger tips to help with balance) - NOT TODAY    - progress to single leg stance on foam  - progress to single leg stance with rebounder ball karen Arenas, PT     "

## 2023-08-14 ENCOUNTER — PATIENT MESSAGE (OUTPATIENT)
Dept: ADMINISTRATIVE | Facility: HOSPITAL | Age: 53
End: 2023-08-14
Payer: COMMERCIAL

## 2023-08-16 ENCOUNTER — CLINICAL SUPPORT (OUTPATIENT)
Dept: REHABILITATION | Facility: HOSPITAL | Age: 53
End: 2023-08-16
Attending: FAMILY MEDICINE
Payer: COMMERCIAL

## 2023-08-16 DIAGNOSIS — Z74.09 IMPAIRED FUNCTIONAL MOBILITY, BALANCE, GAIT, AND ENDURANCE: ICD-10-CM

## 2023-08-16 DIAGNOSIS — M25.661 DECREASED RANGE OF MOTION OF RIGHT KNEE: ICD-10-CM

## 2023-08-16 DIAGNOSIS — M25.561 ACUTE PAIN OF RIGHT KNEE: Primary | ICD-10-CM

## 2023-08-16 DIAGNOSIS — R29.898 WEAKNESS OF BOTH LOWER EXTREMITIES: ICD-10-CM

## 2023-08-16 PROCEDURE — 97112 NEUROMUSCULAR REEDUCATION: CPT | Mod: PN

## 2023-08-16 PROCEDURE — 97110 THERAPEUTIC EXERCISES: CPT | Mod: PN

## 2023-08-16 NOTE — PROGRESS NOTES
[facility]   Physical Therapy Treatment Note     Name: Vanessa Jerez  Clinic Number: [id number]    Therapy Diagnosis:   Encounter Diagnoses   Name Primary?    Acute pain of right knee Yes    Decreased range of motion of right knee     Impaired functional mobility, balance, gait, and endurance     Weakness of both lower extremities            Physician: Altaf Fernández PA-C    Visit Date: 8/16/2023    Physician Orders: PT Eval and Treat  Medical Diagnosis from Referral: S83.281A (ICD-10-CM) - Acute lateral meniscus tear of right knee, initial encounter  Evaluation Date: 6/12/2023  Authorization Period Expiration: 12/29/2023  Plan of Care Expiration: 6/12/2023 to 8/31/2023  Visit # / Visits authorized: 15/20 (+Evaluation)     FOTO: 5/5 NEXT SESSION    PTA Visit #: 0/5     Time In: 8:05 AM  Time Out: 9:10 AM  Total Billable Time: 65 minutes (3 TE, 1 NME)    Precautions:   Procedure:  1. Right Knee Arthroscopy, with meniscectomy (medial OR lateral) 61007  2.  Right Knee Arthroscopy, knee, synovectomy, limited 22689     Post-Op Plan:  Knee arthroscopy protocol       SUBJECTIVE     Pt reports: Pt reports no new complaints today. She states she continues to have that aching pain in her knee that won't go away. She does note that she got a new knee brace that is helping with the aching pain.    She was compliant with home exercise program.  Response to previous treatment: No adverse reactions  Functional change: NA    Pain: 1/10  Location: right anterior knee      OBJECTIVE     DOS: 6/8/2023. Patient is currently 9 weeks and 6 day(s) post-op.     ROM testing was last performed on 7/26/23.    R knee PROM: 3-0-121    Isometric strength testing was last performed on 8/9/23.     Treatment     Vanessa received the treatments listed below:      Therapeutic exercises to develop strength, endurance, and ROM for 50 minutes including:  Upright Bike: 8 minutes, Level 5.0 (to improve range of motion and  "reduce edema)  DL Gluteal Bridges: 3x12, 15# dumbbell at hips  Sidelying Clamshells: 3x12, BlueTB, B  SL Matrix Knee Extension: 3x10, with 3 sec hold, 20#  SL Matrix HS Curls: 3x10, 35#  Single Leg Shuttle Press 4x10, 75#  Patient education      Neuromuscular re-education activities to improve: Coordination and Proprioception for 15 minutes. The following activities were included:  Lateral Monster Walks: 25'x4 laps, BlueTB at ankle  Forward Heel Taps: 3x10, 6" step (no discomfort, needed to be cued to not hip drop)  SL Stance on FOAM pad: 3x30"       Patient Education and Home Exercises     Home Exercises Provided and Patient Education Provided     Education provided:   - Importance of continuing HEP to supplement therapy sessions.  - Pt was educated on how to properly wear knee brace    Written Home Exercises Provided: Patient instructed to cont prior HEP. Exercises were reviewed and Vanessa was able to demonstrate them prior to the end of the session.  Vanessa demonstrated good  understanding of the education provided. See EMR under Patient Instructions for exercises provided during therapy sessions    ASSESSMENT     Vaenssa presented to PT today with no new complaints and states she is still having a little bit of achy pain. Today resistance and repetitions were progressed for all exercises. Pt tolerated the new resistance and repetitions well today and finished the session with moderate fatigue. Proprioception training was continued today where the pt progressed to SL balance on a foam pad. The pt struggled to maintain balance, however, improved as more sets were performed. Pt continues to progress well and her muscular strength and endurance is improving every visit. Pt will continue to benefit from skilled PT to help reduce LSI and help the pt return to her PLOF.      Vanessa Is progressing well towards her goals.   Pt prognosis is Fair.     Pt will continue to benefit from skilled outpatient physical therapy to " "address the deficits listed in the problem list box on initial evaluation, provide pt/family education and to maximize pt's level of independence in the home and community environment.     Pt's spiritual, cultural and educational needs considered and pt agreeable to plan of care and goals.     Anticipated barriers to physical therapy: COVID-19    Goals: Short Term Goals (6 weeks)   1. Patient will be independent with HEP to supplement therapy sessions. Met  2. Pt will improve RLE impaired LSI scores to greater than or equal to 70% to demonstrate balance in function between LEs. Met  3. Pt will improve R knee flexion ROM to greater than or equal to 100 degrees to improve mobility for functional tasks.  Met  4. Pt will improve R knee extension/hyperextension equal to opposite knee to demonstrate balance in function between LE's. Progressing, Not Met     Long Term Goals (12 weeks )   1. Pt will be independent with updated HEP to supplement therapy sessions. Progressing, Not Met  2. Pt will improve RLE impaired LSI scores to greater than or equal to 90% to demonstrate balance in function between LEs. Progressing, Not Met  3. Pt will improve R knee flexion ROM to greater than or equal to opposite knee flexion to improve mobility for functional tasks. Progressing, Not Met  4. Pt will walk on unlevel surfaces without AD or gait deficits to promote ability to ambulate within the home and community. Progressing, Not Met  5. Pt will return to PLOF with no pain. Progressing, Not Met    PLAN     Continue to progress strength, endurance, and ROM as tolerated. Continue to incorporate proprioception training in future sessions.    Not performed this session:   Squat Taps: 18" box with foam pad, 3x10  Step ups: 8" box, 3x12  Single leg balance: railing for assistance when necessary, 3x30"  Forward Step Ups: 3x10, 4" step (emphasis on reducing valgus movement at knee, used finger tips to help with balance) - NOT TODAY    - progress " to single leg stance on foam  - progress to single leg stance with rebounder ball karen Arenas, PT

## 2023-08-21 ENCOUNTER — CLINICAL SUPPORT (OUTPATIENT)
Dept: REHABILITATION | Facility: HOSPITAL | Age: 53
End: 2023-08-21
Attending: FAMILY MEDICINE
Payer: COMMERCIAL

## 2023-08-21 DIAGNOSIS — R29.898 WEAKNESS OF BOTH LOWER EXTREMITIES: ICD-10-CM

## 2023-08-21 DIAGNOSIS — M25.661 DECREASED RANGE OF MOTION OF RIGHT KNEE: ICD-10-CM

## 2023-08-21 DIAGNOSIS — M25.561 ACUTE PAIN OF RIGHT KNEE: Primary | ICD-10-CM

## 2023-08-21 DIAGNOSIS — Z74.09 IMPAIRED FUNCTIONAL MOBILITY, BALANCE, GAIT, AND ENDURANCE: ICD-10-CM

## 2023-08-21 PROCEDURE — 97110 THERAPEUTIC EXERCISES: CPT | Mod: PN

## 2023-08-21 PROCEDURE — 97112 NEUROMUSCULAR REEDUCATION: CPT | Mod: PN

## 2023-08-21 NOTE — PROGRESS NOTES
[facility]   Physical Therapy Treatment Note     Name: Vanessa Jerez  Clinic Number: [id number]    Therapy Diagnosis:   Encounter Diagnoses   Name Primary?    Acute pain of right knee Yes    Decreased range of motion of right knee     Impaired functional mobility, balance, gait, and endurance     Weakness of both lower extremities      Physician: Altaf Fernández PA-C    Visit Date: 8/21/2023    Physician Orders: PT Eval and Treat  Medical Diagnosis from Referral: S83.281A (ICD-10-CM) - Acute lateral meniscus tear of right knee, initial encounter  Evaluation Date: 6/12/2023  Authorization Period Expiration: 12/29/2023  Plan of Care Expiration: 6/12/2023 to 8/31/2023  Visit # / Visits authorized: 16/20 (+Evaluation)     FOTO: 5/5 NEXT SESSION    PTA Visit #: 0/5     Time In: 8:15 AM  Time Out: 9:10 AM  Total Billable Time: 55 minutes    Precautions:   Procedure:  1. Right Knee Arthroscopy, with meniscectomy (medial OR lateral) 48654  2.  Right Knee Arthroscopy, knee, synovectomy, limited 11378     Post-Op Plan:  Knee arthroscopy protocol       SUBJECTIVE     Pt reports: She's continuing to have some buckling when she walks or stands. Feels as though the swelling/fluid is not improving and would really like to get it drained.     She was compliant with home exercise program.  Response to previous treatment: No adverse reactions  Functional change: NA    Pain: 1/10  Location: right anterior knee      OBJECTIVE     DOS: 6/8/2023. Patient is currently 10 weeks and 4 day(s) post-op.     R knee PROM: 3-0-121    Isometric strength testing was last performed on 8/9/23.     Treatment     Vanessa received the treatments listed below:      Therapeutic exercises to develop strength, endurance, and ROM for 45 minutes including:  Upright Bike: 8 minutes, Level 5.0 (to improve range of motion and reduce edema)  SL Matrix Knee Extension: 3x10, with 3 sec hold, 25#  SL Matrix HS Curls: 4x10,  "35#  Single Leg Shuttle Press 4x10, 75#  Squat Taps: 4x10, 15# kettlebell  Supine SLR's: 4x10, 3#  DL Gluteal Bridges: 4x10, 15# dumbbell at hips  Patient education      Neuromuscular re-education activities to improve: Coordination and Proprioception for 10 minutes. The following activities were included:  Lateral Monster Walks: 25'x4 laps, BlueTB at ankle  SL Stance on FOAM pad: 3x30"       Patient Education and Home Exercises     Home Exercises Provided and Patient Education Provided     Education provided:   - Importance of continuing HEP to supplement therapy sessions.  - Pt was educated on how to properly wear knee brace    Written Home Exercises Provided: Patient instructed to cont prior HEP. Exercises were reviewed and Vanessa was able to demonstrate them prior to the end of the session.  Vanessa demonstrated good  understanding of the education provided. See EMR under Patient Instructions for exercises provided during therapy sessions    ASSESSMENT     Vanessa presented to PT today with continued reports of pain and buckling, but it improves with wearing her compression sleeve. Tolerated today's session well with continued focus on improving gross LE strength with focus on quads, hamstrings, and glutes. Able to tolerate increases in resistance with knee extension and hamstring curls. Continues to get fatigued easily and requires increased rest breaks. Will continue with strengthening to improve strength LSI prior to follow-up appointment on 9/22. Of note, patient returns to work on 8/30.       Vanessa Is progressing well towards her goals.   Pt prognosis is Fair.     Pt will continue to benefit from skilled outpatient physical therapy to address the deficits listed in the problem list box on initial evaluation, provide pt/family education and to maximize pt's level of independence in the home and community environment.     Pt's spiritual, cultural and educational needs considered and pt agreeable to plan of " "care and goals.     Anticipated barriers to physical therapy: COVID-19    Goals: Short Term Goals (6 weeks)   1. Patient will be independent with HEP to supplement therapy sessions. Met  2. Pt will improve RLE impaired LSI scores to greater than or equal to 70% to demonstrate balance in function between LEs. Met  3. Pt will improve R knee flexion ROM to greater than or equal to 100 degrees to improve mobility for functional tasks.  Met  4. Pt will improve R knee extension/hyperextension equal to opposite knee to demonstrate balance in function between LE's. Progressing, Not Met     Long Term Goals (12 weeks )   1. Pt will be independent with updated HEP to supplement therapy sessions. Progressing, Not Met  2. Pt will improve RLE impaired LSI scores to greater than or equal to 90% to demonstrate balance in function between LEs. Progressing, Not Met  3. Pt will improve R knee flexion ROM to greater than or equal to opposite knee flexion to improve mobility for functional tasks. Progressing, Not Met  4. Pt will walk on unlevel surfaces without AD or gait deficits to promote ability to ambulate within the home and community. Progressing, Not Met  5. Pt will return to PLOF with no pain. Progressing, Not Met    PLAN     Continue to progress strength, endurance, and ROM as tolerated. Continue to incorporate proprioception training in future sessions.    Not performed this session:   Forward Heel Taps: 3x10, 6" step (no discomfort, needed to be cued to not hip drop)  - progress to single leg stance on foam  - progress to single leg stance with rebounder ball karen Savage, PT     "

## 2023-08-23 ENCOUNTER — CLINICAL SUPPORT (OUTPATIENT)
Dept: REHABILITATION | Facility: HOSPITAL | Age: 53
End: 2023-08-23
Attending: FAMILY MEDICINE
Payer: COMMERCIAL

## 2023-08-23 DIAGNOSIS — M25.661 DECREASED RANGE OF MOTION OF RIGHT KNEE: ICD-10-CM

## 2023-08-23 DIAGNOSIS — Z74.09 IMPAIRED FUNCTIONAL MOBILITY, BALANCE, GAIT, AND ENDURANCE: ICD-10-CM

## 2023-08-23 DIAGNOSIS — R29.898 WEAKNESS OF BOTH LOWER EXTREMITIES: ICD-10-CM

## 2023-08-23 DIAGNOSIS — M25.561 ACUTE PAIN OF RIGHT KNEE: Primary | ICD-10-CM

## 2023-08-23 PROCEDURE — 97110 THERAPEUTIC EXERCISES: CPT | Mod: PN

## 2023-08-23 PROCEDURE — 97112 NEUROMUSCULAR REEDUCATION: CPT | Mod: PN

## 2023-08-23 NOTE — PROGRESS NOTES
[facility]   Physical Therapy Treatment Note     Name: Vanessa Jerez  Clinic Number: [id number]    Therapy Diagnosis:   Encounter Diagnoses   Name Primary?    Acute pain of right knee Yes    Decreased range of motion of right knee     Impaired functional mobility, balance, gait, and endurance     Weakness of both lower extremities      Physician: Altaf Fernández PA-C    Visit Date: 8/23/2023    Physician Orders: PT Eval and Treat  Medical Diagnosis from Referral: S83.281A (ICD-10-CM) - Acute lateral meniscus tear of right knee, initial encounter  Evaluation Date: 6/12/2023  Authorization Period Expiration: 12/29/2023  Plan of Care Expiration: 6/12/2023 to 8/31/2023  Visit # / Visits authorized: 17/20 (+Evaluation)     FOTO: 5/5 NEXT SESSION    PTA Visit #: 0/5     Time In: 8:15 AM  Time Out: 9:15 AM  Total Billable Time: 60 minutes    Precautions:   Procedure:  1. Right Knee Arthroscopy, with meniscectomy (medial OR lateral) 66355  2.  Right Knee Arthroscopy, knee, synovectomy, limited 04231     Post-Op Plan:  Knee arthroscopy protocol       SUBJECTIVE     Pt reports: No new complaints.     She was compliant with home exercise program.  Response to previous treatment: No adverse reactions  Functional change: NA    Pain: 1/10  Location: right anterior knee      OBJECTIVE     DOS: 6/8/2023. Patient is currently 10 weeks and 6 day(s) post-op.     R knee PROM: 3-0-121    Isometric strength testing was last performed on 8/9/23.     Treatment     Vanessa received the treatments listed below:      Therapeutic exercises to develop strength, endurance, and ROM for 50 minutes including:  Upright Bike: 8 minutes, Level 5.0 (to improve range of motion and reduce edema)  Supine SLR's: 4x10, 3#  DL Gluteal Bridges: 4x10, 15# dumbbell at hips  Sidelying Clamshells: 4x10, B, BlackTB  SL Matrix Knee Extension: 3x10, with 3 sec hold, 20#  SL Matrix HS Curls: 4x10, 35#  Single Leg Shuttle Press 4x10,  "75#  Squat Taps: 4x10, 15# kettlebell  Patient education      Neuromuscular re-education activities to improve: Coordination and Proprioception for 10 minutes. The following activities were included:  Lateral Monster Walks: 25'x4 laps, BlackTB at knees  SL Stance on FOAM pad: 3x30"       Patient Education and Home Exercises     Home Exercises Provided and Patient Education Provided     Education provided:   - Importance of continuing HEP to supplement therapy sessions.  - Pt was educated on how to properly wear knee brace    Written Home Exercises Provided: Patient instructed to cont prior HEP. Exercises were reviewed and Vanessa was able to demonstrate them prior to the end of the session.  Vanessa demonstrated good  understanding of the education provided. See EMR under Patient Instructions for exercises provided during therapy sessions    ASSESSMENT     Vanessa presented to PT today with no new complaints. Tolerated today's session well with continued focus on improving gross LE strength with focus on quads, hamstrings, and glutes. Added forward heel taps with some compensation of UE's noted, but able to correct with cueing. Continues to fatigue easily, but improved tolerance during this session. Will continue with strengthening to improve strength LSI prior to follow-up appointment on 9/22.     Vanessa Is progressing well towards her goals.   Pt prognosis is Fair.     Pt will continue to benefit from skilled outpatient physical therapy to address the deficits listed in the problem list box on initial evaluation, provide pt/family education and to maximize pt's level of independence in the home and community environment.     Pt's spiritual, cultural and educational needs considered and pt agreeable to plan of care and goals.     Anticipated barriers to physical therapy: COVID-19    Goals: Short Term Goals (6 weeks)   1. Patient will be independent with HEP to supplement therapy sessions. Met  2. Pt will improve RLE " "impaired LSI scores to greater than or equal to 70% to demonstrate balance in function between LEs. Met  3. Pt will improve R knee flexion ROM to greater than or equal to 100 degrees to improve mobility for functional tasks.  Met  4. Pt will improve R knee extension/hyperextension equal to opposite knee to demonstrate balance in function between LE's. Progressing, Not Met     Long Term Goals (12 weeks )   1. Pt will be independent with updated HEP to supplement therapy sessions. Progressing, Not Met  2. Pt will improve RLE impaired LSI scores to greater than or equal to 90% to demonstrate balance in function between LEs. Progressing, Not Met  3. Pt will improve R knee flexion ROM to greater than or equal to opposite knee flexion to improve mobility for functional tasks. Progressing, Not Met  4. Pt will walk on unlevel surfaces without AD or gait deficits to promote ability to ambulate within the home and community. Progressing, Not Met  5. Pt will return to PLOF with no pain. Progressing, Not Met    PLAN     Continue to progress strength, endurance, and ROM as tolerated. Continue to incorporate proprioception training in future sessions.    Not performed this session:   Forward Heel Taps: 3x10, 6" step (no discomfort, needed to be cued to not hip drop)  - progress to single leg stance on foam  - progress to single leg stance with rebounder ball karen Savage PT     "

## 2023-09-06 ENCOUNTER — CLINICAL SUPPORT (OUTPATIENT)
Dept: REHABILITATION | Facility: HOSPITAL | Age: 53
End: 2023-09-06
Payer: COMMERCIAL

## 2023-09-06 DIAGNOSIS — M25.661 DECREASED RANGE OF MOTION OF RIGHT KNEE: ICD-10-CM

## 2023-09-06 DIAGNOSIS — M25.561 ACUTE PAIN OF RIGHT KNEE: Primary | ICD-10-CM

## 2023-09-06 DIAGNOSIS — R29.898 WEAKNESS OF BOTH LOWER EXTREMITIES: ICD-10-CM

## 2023-09-06 DIAGNOSIS — Z74.09 IMPAIRED FUNCTIONAL MOBILITY, BALANCE, GAIT, AND ENDURANCE: ICD-10-CM

## 2023-09-06 PROCEDURE — 97110 THERAPEUTIC EXERCISES: CPT | Mod: PN

## 2023-09-06 PROCEDURE — 97112 NEUROMUSCULAR REEDUCATION: CPT | Mod: PN

## 2023-09-06 NOTE — PROGRESS NOTES
[facility]   Physical Therapy Treatment Note     Name: Vanessa Jerez  Clinic Number: [id number]    Therapy Diagnosis:   Encounter Diagnoses   Name Primary?    Acute pain of right knee Yes    Decreased range of motion of right knee     Impaired functional mobility, balance, gait, and endurance     Weakness of both lower extremities      Physician: Altaf Fernández PA-C    Visit Date: 2023    Physician Orders: PT Eval and Treat  Medical Diagnosis from Referral: S83.281A (ICD-10-CM) - Acute lateral meniscus tear of right knee, initial encounter  Evaluation Date: 2023  Authorization Period Expiration: 2023  Plan of Care Expiration: 2023 to 2023 - see updated POC  Visit # / Visits authorized:  (+Evaluation)     FOTO:  NEXT SESSION    PTA Visit #: 0/5     Time In: 8:15 AM  Time Out: 9:15 AM  Total Billable Time: 60 minutes    Precautions:   Procedure:  1. Right Knee Arthroscopy, with meniscectomy (medial OR lateral) 74892  2.  Right Knee Arthroscopy, knee, synovectomy, limited 90962     Post-Op Plan:  Knee arthroscopy protocol       SUBJECTIVE     Pt reports: Has returned back to work and experienced some increased swelling 1 out of the 3 days and was able to go home and rest her knee. Otherwise, she was able to return to work with modifications as needed.     She was compliant with home exercise program.  Response to previous treatment: No adverse reactions  Functional change: NA    Pain: 1/10  Location: right anterior knee      OBJECTIVE     DOS: 2023. Patient is currently 12 weeks and 6 day(s) post-op.     R knee PROM: 3-0-125    All below testing performed in seated position. Gait belt used for quadriceps testing.     Lower extremity strength with Scurri handheld dynamometer Right  (Kgf) Left  (Kgf) Pain/dysfunction with movement   (approx 4 sec hold w/ max contraction)   Hip abduction 10.3  9.9  10.1    AV.7 10.8   10.1  10.5    AVG: 10.5 82%  "- 2023   Quadriceps 28.1  26.3  28.9    AV.8 33.1  34.3  25.3    AV.9 89% - 2023   Hamstrings 11.4  12.4  12.7    AV.2 17.3  14.6  16.5    AV.1 76% - 2023        Treatment     Vanessa received the treatments listed below:      Therapeutic exercises to develop strength, endurance, and ROM for 50 minutes including:  Upright Bike: 8 minutes, Level 5.0 (to improve range of motion and reduce edema)  Reassessment (see above)  SL Matrix Knee Extension: 5x8, with 3 sec hold, 20#  SL Matrix HS Curls: 4x10, 35#  Single Leg Shuttle Press 4x10, 75#  Patient education      Neuromuscular re-education activities to improve: Coordination and Proprioception for 10 minutes. The following activities were included:  Lateral Monster Walks: 25'x4 laps, BlackTB at knees  Forward Heel Taps: 3x10, 6" step  SL Stance on FOAM pad: 3x30" - NOT TODAY      Patient Education and Home Exercises     Home Exercises Provided and Patient Education Provided     Education provided:   - Importance of continuing HEP to supplement therapy sessions.  - Pt was educated on how to properly wear knee brace    Written Home Exercises Provided: Patient instructed to cont prior HEP. Exercises were reviewed and Vanessa was able to demonstrate them prior to the end of the session.  Vanessa demonstrated good  understanding of the education provided. See EMR under Patient Instructions for exercises provided during therapy sessions    ASSESSMENT     See updated POC.     Vanessa Is progressing well towards her goals.   Pt prognosis is Fair.     Pt will continue to benefit from skilled outpatient physical therapy to address the deficits listed in the problem list box on initial evaluation, provide pt/family education and to maximize pt's level of independence in the home and community environment.     Pt's spiritual, cultural and educational needs considered and pt agreeable to plan of care and goals.     Anticipated barriers to physical " therapy: COVID-19    Goals:   See updated POC.       PLAN     Continue to progress strength, endurance, and ROM as tolerated. Continue to incorporate proprioception training in future sessions.    Not performed this session:   Supine SLR's: 4x10, 3#  DL Gluteal Bridges: 4x10, 15# dumbbell at hips  Sidelying Clamshells: 4x10, B, BlackTB  Squat Taps: 4x10, 15# zee Savage, PT

## 2023-09-10 NOTE — PLAN OF CARE
[facility]  Physical Therapy Plan of Care Note     Name: Vanessa Jerez  Clinic Number: [id number]    Therapy Diagnosis:   Encounter Diagnoses   Name Primary?    Acute pain of right knee Yes    Decreased range of motion of right knee     Impaired functional mobility, balance, gait, and endurance     Weakness of both lower extremities      Physician: Altaf Fernández PA-C    Visit Date: 2023    Physician Orders: PT Eval and Treat  Medical Diagnosis from Referral: S83.281A (ICD-10-CM) - Acute lateral meniscus tear of right knee, initial encounter  Evaluation Date: 2023  Authorization Period Expiration: 2023  Plan of Care Expiration: 2023  Progress Note Due: 2023   Visit # / Visits authorized:  (+Evaluation)  FOTO: NEXT SESSION    Precautions:   Procedure:  1. Right Knee Arthroscopy, with meniscectomy (medial OR lateral) 68820  2.  Right Knee Arthroscopy, knee, synovectomy, limited 30284     Post-Op Plan:  Knee arthroscopy protocol    Functional Level Prior to Evaluation:  Independent     Subjective     Update: Has returned back to work and experienced some increased swelling 1 out of the 3 days and was able to go home and rest her knee. Otherwise, she was able to return to work with modifications as needed.     Objective      Update:   DOS: 2023. Patient is currently 12 weeks and 6 day(s) post-op.      R knee PROM: 3-0-125     All below testing performed in seated position. Gait belt used for quadriceps testing.     Lower extremity strength with [employer] handheld dynamometer Right  (Kgf) Left  (Kgf) Pain/dysfunction with movement   (approx 4 sec hold w/ max contraction)   Hip abduction 10.3  9.9  10.1     AV.7 10.8   10.1  10.5     AVG: 10.5 82% - 2023   Quadriceps 28.1  26.3  28.9     AV.8 33.1  34.3  25.3     AV.9 89% - 2023   Hamstrings 11.4  12.4  12.7     AV.2 17.3  14.6  16.5     AV.1 76% - 2023         Assessment     Update: Vanessa presented to PT today with compression sleeved donned. Reassessed strength LSI this session with patient displaying continued deficits with quadriceps, hamstrings, and hip abductors, all ranging between 76% and 89%. Tolerated session well with focus on gross LE with specific focus on the above mentioned mm groups. Patient did experience some swelling in her knee when returning to work, but it decreases with time. At this point patient has meet all STG's and is steadily working towards LTG's. Overall patient continues to be a good candidate for skilled physical therapy in order to address continued strength deficits.    Previous Short Term Goals Status:     1. Patient will be independent with HEP to supplement therapy sessions. Met  2. Pt will improve RLE impaired LSI scores to greater than or equal to 70% to demonstrate balance in function between LEs. Met  3. Pt will improve R knee flexion ROM to greater than or equal to 100 degrees to improve mobility for functional tasks.  Met  4. Pt will improve R knee extension/hyperextension equal to opposite knee to demonstrate balance in function between LE's. Met    New Short Term Goals Status:   d/c all STG's    Previous Long Term Goals Status:  1. Pt will be independent with updated HEP to supplement therapy sessions. Met  2. Pt will improve RLE impaired LSI scores to greater than or equal to 90% to demonstrate balance in function between LEs. Progressing, Not Met  3. Pt will improve R knee flexion ROM to greater than or equal to opposite knee flexion to improve mobility for functional tasks. Progressing, Not Met  4. Pt will walk on unlevel surfaces without AD or gait deficits to promote ability to ambulate within the home and community. Progressing, Not Met  5. Pt will return to PLOF with no pain. Progressing, Not Met    Long Term Goal Status: modified: d/c goal 1, continue remaining goals    Reasons for Recertification of Therapy:    Expiration of current POC      Plan     Updated Certification Period: 8/31/2023 to 12/31/2023   Recommended Treatment Plan: 1 times per week for 8 weeks:  Electrical Stimulation , FDN prn, Gait Training, Manual Therapy, Moist Heat/ Ice, Neuromuscular Re-ed, Patient Education, Therapeutic Activities, Therapeutic Exercise, and Kinesiotape prn  Other Recommendations: None    Katie Saavge, PT

## 2023-09-13 ENCOUNTER — CLINICAL SUPPORT (OUTPATIENT)
Dept: REHABILITATION | Facility: HOSPITAL | Age: 53
End: 2023-09-13
Payer: COMMERCIAL

## 2023-09-13 DIAGNOSIS — M25.561 ACUTE PAIN OF RIGHT KNEE: Primary | ICD-10-CM

## 2023-09-13 DIAGNOSIS — Z74.09 IMPAIRED FUNCTIONAL MOBILITY, BALANCE, GAIT, AND ENDURANCE: ICD-10-CM

## 2023-09-13 DIAGNOSIS — M25.661 DECREASED RANGE OF MOTION OF RIGHT KNEE: ICD-10-CM

## 2023-09-13 DIAGNOSIS — R29.898 WEAKNESS OF BOTH LOWER EXTREMITIES: ICD-10-CM

## 2023-09-13 PROCEDURE — 97110 THERAPEUTIC EXERCISES: CPT | Mod: PN

## 2023-09-13 PROCEDURE — 97112 NEUROMUSCULAR REEDUCATION: CPT | Mod: PN

## 2023-09-13 NOTE — PROGRESS NOTES
[facility]   Physical Therapy Treatment Note     Name: Vanessa Jerez  Clinic Number: [id number]    Therapy Diagnosis:   Encounter Diagnoses   Name Primary?    Acute pain of right knee Yes    Decreased range of motion of right knee     Impaired functional mobility, balance, gait, and endurance     Weakness of both lower extremities      Physician: Altaf Fernández PA-C    Visit Date: 9/13/2023    Physician Orders: PT Eval and Treat  Medical Diagnosis from Referral: S83.281A (ICD-10-CM) - Acute lateral meniscus tear of right knee, initial encounter  Evaluation Date: 6/12/2023  Authorization Period Expiration: 12/29/2023  Plan of Care Expiration: 6/12/2023 to 12/31/2023  Visit # / Visits authorized: 19/30 (+Evaluation)     FOTO: 5/5 NEXT SESSION    PTA Visit #: 0/5     Time In: 8:15 AM  Time Out: 9:15 AM  Total Billable Time: 60 minutes    Precautions:   Procedure:  1. Right Knee Arthroscopy, with meniscectomy (medial OR lateral) 32341  2.  Right Knee Arthroscopy, knee, synovectomy, limited 75033     Post-Op Plan:  Knee arthroscopy protocol       SUBJECTIVE     Pt reports: She's continuing to experience swelling in her knee that she feels is hindering her from her mobility.     She was compliant with home exercise program.  Response to previous treatment: No adverse reactions  Functional change: NA    Pain: 1/10  Location: right anterior knee      OBJECTIVE     DOS: 6/8/2023. Patient is currently 13 weeks and 6 day(s) post-op.     R knee PROM: 3-0-125    Strength last tested on 9/6/2023.      Treatment     Vanessa received the treatments listed below:      Therapeutic exercises to develop strength, endurance, and ROM for 40 minutes including:  Upright Bike: 8 minutes, Level 5.0 (to improve range of motion and reduce edema)  SL Matrix Knee Extension: 5x8, with 3 sec hold, 20#  SL Matrix HS Curls: 4x10, 35#  Single Leg Shuttle Press 4x10, 100#  Patient education      Neuromuscular  "re-education activities to improve: Coordination and Proprioception for 20 minutes. The following activities were included:  Lateral Monster Walks: 25'x4 laps, BlackTB at ankles  Forward Heel Taps: 4x10, 6" step  Squat Taps: 4x10,       Patient Education and Home Exercises     Home Exercises Provided and Patient Education Provided     Education provided:   - Importance of continuing HEP to supplement therapy sessions.  - Pt was educated on how to properly wear knee brace    Written Home Exercises Provided: Patient instructed to cont prior HEP. Exercises were reviewed and Vanessa was able to demonstrate them prior to the end of the session.  Vanessa demonstrated good  understanding of the education provided. See EMR under Patient Instructions for exercises provided during therapy sessions    ASSESSMENT     Vanessa continues to present with increased swelling of R knee as compared to L. Will assess joint line girth next session. Tolerated today's session well with focus on improving functional strength as tolerated. Progressed resistance as able with SL shuttle press and squat taps. Will continue to progress as tolerated in order to progress towards functional goals.     Vanessa Is progressing well towards her goals.   Pt prognosis is Fair.     Pt will continue to benefit from skilled outpatient physical therapy to address the deficits listed in the problem list box on initial evaluation, provide pt/family education and to maximize pt's level of independence in the home and community environment.     Pt's spiritual, cultural and educational needs considered and pt agreeable to plan of care and goals.     Anticipated barriers to physical therapy: COVID-19    Goals:   Long Term Goals Status:  1. Pt will be independent with updated HEP to supplement therapy sessions. Met  2. Pt will improve RLE impaired LSI scores to greater than or equal to 90% to demonstrate balance in function between LEs. Progressing, Not Met  3. Pt will " improve R knee flexion ROM to greater than or equal to opposite knee flexion to improve mobility for functional tasks. Progressing, Not Met  4. Pt will walk on unlevel surfaces without AD or gait deficits to promote ability to ambulate within the home and community. Progressing, Not Met  5. Pt will return to PLOF with no pain. Progressing, Not Met          PLAN     Continue to progress strength, endurance, and ROM as tolerated. Continue to incorporate proprioception training in future sessions.    Not performed this session:   Supine SLR's: 4x10, 3#  DL Gluteal Bridges: 4x10, 15# dumbbell at hips  Sidelying Clamshells: 4x10, B, BlackTB  Squat Taps: 4x10, 15# zee Savage, PT

## 2023-09-20 ENCOUNTER — CLINICAL SUPPORT (OUTPATIENT)
Dept: REHABILITATION | Facility: HOSPITAL | Age: 53
End: 2023-09-20
Payer: COMMERCIAL

## 2023-09-20 DIAGNOSIS — Z74.09 IMPAIRED FUNCTIONAL MOBILITY, BALANCE, GAIT, AND ENDURANCE: ICD-10-CM

## 2023-09-20 DIAGNOSIS — M25.561 ACUTE PAIN OF RIGHT KNEE: Primary | ICD-10-CM

## 2023-09-20 DIAGNOSIS — M25.661 DECREASED RANGE OF MOTION OF RIGHT KNEE: ICD-10-CM

## 2023-09-20 DIAGNOSIS — R29.898 WEAKNESS OF BOTH LOWER EXTREMITIES: ICD-10-CM

## 2023-09-20 PROCEDURE — 97110 THERAPEUTIC EXERCISES: CPT | Mod: PN

## 2023-09-20 NOTE — PROGRESS NOTES
[facility]   Physical Therapy Treatment Note     Name: Vanessa Jerez  Clinic Number: [id number]    Therapy Diagnosis:   Encounter Diagnoses   Name Primary?    Acute pain of right knee Yes    Decreased range of motion of right knee     Impaired functional mobility, balance, gait, and endurance     Weakness of both lower extremities      Physician: Altaf Fernández PA-C    Visit Date: 9/20/2023    Physician Orders: PT Eval and Treat  Medical Diagnosis from Referral: S83.281A (ICD-10-CM) - Acute lateral meniscus tear of right knee, initial encounter  Evaluation Date: 6/12/2023  Authorization Period Expiration: 12/29/2023  Plan of Care Expiration: 6/12/2023 to 12/31/2023  Visit # / Visits authorized: 20/30 (+Evaluation)     FOTO: 1st visit, 21st visit    PTA Visit #: 0/5     Time In: 8:10 AM  Time Out: 9:10 AM  Total Billable Time: 60 minutes    Precautions:   Procedure:  1. Right Knee Arthroscopy, with meniscectomy (medial OR lateral) 84628  2.  Right Knee Arthroscopy, knee, synovectomy, limited 25412     Post-Op Plan:  Knee arthroscopy protocol       SUBJECTIVE     Pt reports: She has her follow-up with her doctor on Friday. Still feels as though she has a lot of swelling in her superior knee and that the compression sleeve isn't helping. Still feels some pain/discomfort with everyday activities.      She was compliant with home exercise program.  Response to previous treatment: No adverse reactions  Functional change: NA    Pain: 1/10  Location: right anterior knee      OBJECTIVE     DOS: 6/8/2023. Patient is currently 14 weeks and 6 day(s) post-op.     R knee PROM: 3-0-125    Knee Girth Joint Line:   R: 46.5 cm   L: 47 cm    Knee 3cm above Joint Line:   R: 54 cm   L: 51.5 cm    All below testing performed in seated position. Gait belt used for quadriceps testing.     Lower extremity strength with [employer] handheld dynamometer Right  (Kgf) Left  (Kgf) Pain/dysfunction with movement  "  (approx 4 sec hold w/ max contraction)   Hip abduction 11.9  11.0  10.9     AV.3 10.8   10.1  10.5     AVG: 10.5   108% - 2023   Quadriceps 28.1  26.3  28.9     AV.8 33.1  34.3  25.3     AV.9 89% - 2023 - no change   Hamstrings 17.5  16.7  17.9     AV.4 17.3  14.6  16.5     AV.1   108% - 2023        Treatment     Vanessa received the treatments listed below:      Therapeutic exercises to develop strength, endurance, and ROM for 60 minutes including:  Upright Bike: 8 minutes, Level 5.0 (to improve range of motion and reduce edema)  Reassessment (see above)  SL Matrix Knee Extension: 5x8, with 3" hold, 20#  SL Sit to Stand from 24" box: 3x10, B  Single Leg Shuttle Press 4x10, 112#  Patient education      Patient Education and Home Exercises     Home Exercises Provided and Patient Education Provided     Education provided:   - Importance of continuing HEP to supplement therapy sessions.  - Pt was educated on how to properly wear knee brace    Written Home Exercises Provided: Patient instructed to cont prior HEP. Exercises were reviewed and Vanessa was able to demonstrate them prior to the end of the session.  Vanessa demonstrated good  understanding of the education provided. See EMR under Patient Instructions for exercises provided during therapy sessions    ASSESSMENT     Vanessa continues to present with increased swelling of R knee as compared to L. Assessed girth at joint line with minimal difference, however 3cm above the joint line showed a 3 cm difference in girth. Assessed strength LSI this session as well with no change in quadriceps, however improved to 108% with both hip abductors and hamstrings. Session today heavily focused on quadriceps strengthening with progression towards more CK[employer] SL strengthening with single leg sit to stands. Difficulty noted at first, but with repetitions and cueing, patient able to perform with better mechanics. Overall progressing fairly, but " "is limited by continued swelling and quadriceps weakness. FOTO limitation score has improved from 26% on evaluation to 65% today. Will continue to progress as tolerated in order to progress towards functional goals.     Vanessa Is progressing well towards her goals.   Pt prognosis is Fair.     Pt will continue to benefit from skilled outpatient physical therapy to address the deficits listed in the problem list box on initial evaluation, provide pt/family education and to maximize pt's level of independence in the home and community environment.     Pt's spiritual, cultural and educational needs considered and pt agreeable to plan of care and goals.     Anticipated barriers to physical therapy: COVID-19    Goals:   Long Term Goals Status:  1. Pt will be independent with updated HEP to supplement therapy sessions. Met  2. Pt will improve RLE impaired LSI scores to greater than or equal to 90% to demonstrate balance in function between LEs. Progressing, Not Met  3. Pt will improve R knee flexion ROM to greater than or equal to opposite knee flexion to improve mobility for functional tasks. Progressing, Not Met  4. Pt will walk on unlevel surfaces without AD or gait deficits to promote ability to ambulate within the home and community. Progressing, Not Met  5. Pt will return to PLOF with no pain. Progressing, Not Met          PLAN     Continue to progress strength, endurance, and ROM as tolerated. Continue to incorporate proprioception training in future sessions.    Not performed this session:   Supine SLR's: 4x10, 3#  DL Gluteal Bridges: 4x10, 15# dumbbell at hips  Sidelying Clamshells: 4x10, B, BlackTB  Squat Taps: 4x10, 15# kettlebell    Neuromuscular re-education activities to improve: Coordination and Proprioception for 20 minutes. The following activities were included:  Lateral Monster Walks: 25'x4 laps, BlackTB at ankles  Forward Heel Taps: 4x10, 6" step      Katie Savage PT     "

## 2023-09-22 ENCOUNTER — OFFICE VISIT (OUTPATIENT)
Dept: SPORTS MEDICINE | Facility: CLINIC | Age: 53
End: 2023-09-22
Payer: COMMERCIAL

## 2023-09-22 VITALS
HEIGHT: 69 IN | HEART RATE: 63 BPM | BODY MASS INDEX: 35.25 KG/M2 | SYSTOLIC BLOOD PRESSURE: 116 MMHG | WEIGHT: 238 LBS | DIASTOLIC BLOOD PRESSURE: 77 MMHG

## 2023-09-22 DIAGNOSIS — Z98.890 S/P RIGHT KNEE ARTHROSCOPY: ICD-10-CM

## 2023-09-22 DIAGNOSIS — Z98.890 S/P LATERAL MENISCECTOMY OF RIGHT KNEE: Primary | ICD-10-CM

## 2023-09-22 PROCEDURE — 3008F PR BODY MASS INDEX (BMI) DOCUMENTED: ICD-10-PCS | Mod: CPTII,S$GLB,, | Performed by: ORTHOPAEDIC SURGERY

## 2023-09-22 PROCEDURE — 99213 OFFICE O/P EST LOW 20 MIN: CPT | Mod: S$GLB,,, | Performed by: ORTHOPAEDIC SURGERY

## 2023-09-22 PROCEDURE — 1159F PR MEDICATION LIST DOCUMENTED IN MEDICAL RECORD: ICD-10-PCS | Mod: CPTII,S$GLB,, | Performed by: ORTHOPAEDIC SURGERY

## 2023-09-22 PROCEDURE — 3074F PR MOST RECENT SYSTOLIC BLOOD PRESSURE < 130 MM HG: ICD-10-PCS | Mod: CPTII,S$GLB,, | Performed by: ORTHOPAEDIC SURGERY

## 2023-09-22 PROCEDURE — 3074F SYST BP LT 130 MM HG: CPT | Mod: CPTII,S$GLB,, | Performed by: ORTHOPAEDIC SURGERY

## 2023-09-22 PROCEDURE — 3008F BODY MASS INDEX DOCD: CPT | Mod: CPTII,S$GLB,, | Performed by: ORTHOPAEDIC SURGERY

## 2023-09-22 PROCEDURE — 1159F MED LIST DOCD IN RCRD: CPT | Mod: CPTII,S$GLB,, | Performed by: ORTHOPAEDIC SURGERY

## 2023-09-22 PROCEDURE — 99213 PR OFFICE/OUTPT VISIT, EST, LEVL III, 20-29 MIN: ICD-10-PCS | Mod: S$GLB,,, | Performed by: ORTHOPAEDIC SURGERY

## 2023-09-22 PROCEDURE — 3078F DIAST BP <80 MM HG: CPT | Mod: CPTII,S$GLB,, | Performed by: ORTHOPAEDIC SURGERY

## 2023-09-22 PROCEDURE — 3078F PR MOST RECENT DIASTOLIC BLOOD PRESSURE < 80 MM HG: ICD-10-PCS | Mod: CPTII,S$GLB,, | Performed by: ORTHOPAEDIC SURGERY

## 2023-09-22 PROCEDURE — 99999 PR PBB SHADOW E&M-EST. PATIENT-LVL III: CPT | Mod: PBBFAC,,, | Performed by: ORTHOPAEDIC SURGERY

## 2023-09-22 PROCEDURE — 99999 PR PBB SHADOW E&M-EST. PATIENT-LVL III: ICD-10-PCS | Mod: PBBFAC,,, | Performed by: ORTHOPAEDIC SURGERY

## 2023-09-22 RX ORDER — MELOXICAM 15 MG/1
15 TABLET ORAL DAILY PRN
Qty: 30 TABLET | Refills: 2 | Status: SHIPPED | OUTPATIENT
Start: 2023-09-22

## 2023-09-22 NOTE — PROGRESS NOTES
"POST-OPERATIVE EXAMINATION    52 y.o. Female who returns for follow after surgery. She is 3.5 months s/p    Procedure:  1.  Right Knee Arthroscopy, with meniscectomy (medial OR lateral) 98464  2.  Right Knee Arthroscopy, knee, synovectomy, limited 15495     She is doing well without any issues.          PHYSICAL EXAMINATION:  /77   Pulse 63   Ht 5' 9" (1.753 m)   Wt 108 kg (238 lb)   LMP 04/01/2017   BMI 35.15 kg/m²   General: Well-developed well-nourished 52 y.o. female in no acute distress   Cardiovascular: Regular rhythm   Lungs: No labored breathing or wheezing appreciated   Neuro: Alert and oriented ×3   Psychiatric: well oriented to person, place and time, demonstrates normal mood and affect   Skin: No rashes, lesions or ulcers, normal temperature, turgor, and texture on involved extremity    ORTHOPEDIC EXAM:  Normal post-operative swelling  Normal post-operative scarring  Strength: WNL  ROM: WNL  Tests: None today    Kellgren-Elmer Grade II-III changes noted of her right knee    ASSESSMENT:      ICD-10-CM ICD-9-CM   1. S/P lateral meniscectomy of right knee  Z98.890 V45.89   2. S/P right knee arthroscopy  Z98.890 V45.89         PLAN:       Prior authorization for Euflexxa placed today  Mobic 15mg prescribed  RTC with Altaf Fernández PA-C for Euflexxa series                  "

## 2023-09-25 ENCOUNTER — LAB VISIT (OUTPATIENT)
Dept: LAB | Facility: HOSPITAL | Age: 53
End: 2023-09-25
Attending: FAMILY MEDICINE
Payer: COMMERCIAL

## 2023-09-25 ENCOUNTER — OFFICE VISIT (OUTPATIENT)
Dept: FAMILY MEDICINE | Facility: CLINIC | Age: 53
End: 2023-09-25
Payer: COMMERCIAL

## 2023-09-25 VITALS
BODY MASS INDEX: 35.79 KG/M2 | HEART RATE: 61 BPM | WEIGHT: 241.63 LBS | SYSTOLIC BLOOD PRESSURE: 120 MMHG | OXYGEN SATURATION: 98 % | DIASTOLIC BLOOD PRESSURE: 80 MMHG | HEIGHT: 69 IN | TEMPERATURE: 98 F

## 2023-09-25 DIAGNOSIS — Z12.31 BREAST CANCER SCREENING BY MAMMOGRAM: ICD-10-CM

## 2023-09-25 DIAGNOSIS — M94.261 CHONDROMALACIA OF KNEE, RIGHT: ICD-10-CM

## 2023-09-25 DIAGNOSIS — E66.9 OBESITY (BMI 30-39.9): ICD-10-CM

## 2023-09-25 DIAGNOSIS — Z00.00 ANNUAL PHYSICAL EXAM: Primary | ICD-10-CM

## 2023-09-25 DIAGNOSIS — Z23 NEED FOR PROPHYLACTIC VACCINATION AND INOCULATION AGAINST INFLUENZA: ICD-10-CM

## 2023-09-25 DIAGNOSIS — Z00.00 ANNUAL PHYSICAL EXAM: ICD-10-CM

## 2023-09-25 LAB
ALBUMIN SERPL BCP-MCNC: 3.8 G/DL (ref 3.5–5.2)
ALP SERPL-CCNC: 98 U/L (ref 55–135)
ALT SERPL W/O P-5'-P-CCNC: 24 U/L (ref 10–44)
ANION GAP SERPL CALC-SCNC: 6 MMOL/L (ref 8–16)
AST SERPL-CCNC: 25 U/L (ref 10–40)
BASOPHILS # BLD AUTO: 0.03 K/UL (ref 0–0.2)
BASOPHILS NFR BLD: 0.5 % (ref 0–1.9)
BILIRUB SERPL-MCNC: 0.5 MG/DL (ref 0.1–1)
BUN SERPL-MCNC: 8 MG/DL (ref 6–20)
CALCIUM SERPL-MCNC: 10.2 MG/DL (ref 8.7–10.5)
CHLORIDE SERPL-SCNC: 110 MMOL/L (ref 95–110)
CHOLEST SERPL-MCNC: 176 MG/DL (ref 120–199)
CHOLEST/HDLC SERPL: 2.5 {RATIO} (ref 2–5)
CO2 SERPL-SCNC: 27 MMOL/L (ref 23–29)
CREAT SERPL-MCNC: 0.7 MG/DL (ref 0.5–1.4)
DIFFERENTIAL METHOD: ABNORMAL
EOSINOPHIL # BLD AUTO: 0.3 K/UL (ref 0–0.5)
EOSINOPHIL NFR BLD: 5.3 % (ref 0–8)
ERYTHROCYTE [DISTWIDTH] IN BLOOD BY AUTOMATED COUNT: 13.8 % (ref 11.5–14.5)
EST. GFR  (NO RACE VARIABLE): >60 ML/MIN/1.73 M^2
ESTIMATED AVG GLUCOSE: 111 MG/DL (ref 68–131)
GLUCOSE SERPL-MCNC: 78 MG/DL (ref 70–110)
HBA1C MFR BLD: 5.5 % (ref 4–5.6)
HCT VFR BLD AUTO: 35.8 % (ref 37–48.5)
HDLC SERPL-MCNC: 70 MG/DL (ref 40–75)
HDLC SERPL: 39.8 % (ref 20–50)
HGB BLD-MCNC: 11.9 G/DL (ref 12–16)
IMM GRANULOCYTES # BLD AUTO: 0.01 K/UL (ref 0–0.04)
IMM GRANULOCYTES NFR BLD AUTO: 0.2 % (ref 0–0.5)
LDLC SERPL CALC-MCNC: 93.2 MG/DL (ref 63–159)
LYMPHOCYTES # BLD AUTO: 2.6 K/UL (ref 1–4.8)
LYMPHOCYTES NFR BLD: 46.7 % (ref 18–48)
MCH RBC QN AUTO: 29.6 PG (ref 27–31)
MCHC RBC AUTO-ENTMCNC: 33.2 G/DL (ref 32–36)
MCV RBC AUTO: 89 FL (ref 82–98)
MONOCYTES # BLD AUTO: 0.4 K/UL (ref 0.3–1)
MONOCYTES NFR BLD: 7.5 % (ref 4–15)
NEUTROPHILS # BLD AUTO: 2.2 K/UL (ref 1.8–7.7)
NEUTROPHILS NFR BLD: 39.8 % (ref 38–73)
NONHDLC SERPL-MCNC: 106 MG/DL
NRBC BLD-RTO: 0 /100 WBC
PLATELET # BLD AUTO: 257 K/UL (ref 150–450)
PMV BLD AUTO: 10.9 FL (ref 9.2–12.9)
POTASSIUM SERPL-SCNC: 4 MMOL/L (ref 3.5–5.1)
PROT SERPL-MCNC: 7.3 G/DL (ref 6–8.4)
RBC # BLD AUTO: 4.02 M/UL (ref 4–5.4)
SODIUM SERPL-SCNC: 143 MMOL/L (ref 136–145)
TRIGL SERPL-MCNC: 64 MG/DL (ref 30–150)
TSH SERPL DL<=0.005 MIU/L-ACNC: 1.02 UIU/ML (ref 0.4–4)
WBC # BLD AUTO: 5.61 K/UL (ref 3.9–12.7)

## 2023-09-25 PROCEDURE — 99396 PR PREVENTIVE VISIT,EST,40-64: ICD-10-PCS | Mod: 25,S$GLB,, | Performed by: FAMILY MEDICINE

## 2023-09-25 PROCEDURE — 99999 PR PBB SHADOW E&M-EST. PATIENT-LVL IV: ICD-10-PCS | Mod: PBBFAC,,, | Performed by: FAMILY MEDICINE

## 2023-09-25 PROCEDURE — 3074F SYST BP LT 130 MM HG: CPT | Mod: CPTII,S$GLB,, | Performed by: FAMILY MEDICINE

## 2023-09-25 PROCEDURE — 80061 LIPID PANEL: CPT | Performed by: FAMILY MEDICINE

## 2023-09-25 PROCEDURE — 90471 FLU VACCINE (QUAD) GREATER THAN OR EQUAL TO 3YO PRESERVATIVE FREE IM: ICD-10-PCS | Mod: S$GLB,,, | Performed by: FAMILY MEDICINE

## 2023-09-25 PROCEDURE — 84443 ASSAY THYROID STIM HORMONE: CPT | Performed by: FAMILY MEDICINE

## 2023-09-25 PROCEDURE — 1160F RVW MEDS BY RX/DR IN RCRD: CPT | Mod: CPTII,S$GLB,, | Performed by: FAMILY MEDICINE

## 2023-09-25 PROCEDURE — 90686 FLU VACCINE (QUAD) GREATER THAN OR EQUAL TO 3YO PRESERVATIVE FREE IM: ICD-10-PCS | Mod: S$GLB,,, | Performed by: FAMILY MEDICINE

## 2023-09-25 PROCEDURE — 3074F PR MOST RECENT SYSTOLIC BLOOD PRESSURE < 130 MM HG: ICD-10-PCS | Mod: CPTII,S$GLB,, | Performed by: FAMILY MEDICINE

## 2023-09-25 PROCEDURE — 3008F PR BODY MASS INDEX (BMI) DOCUMENTED: ICD-10-PCS | Mod: CPTII,S$GLB,, | Performed by: FAMILY MEDICINE

## 2023-09-25 PROCEDURE — 1160F PR REVIEW ALL MEDS BY PRESCRIBER/CLIN PHARMACIST DOCUMENTED: ICD-10-PCS | Mod: CPTII,S$GLB,, | Performed by: FAMILY MEDICINE

## 2023-09-25 PROCEDURE — 1159F MED LIST DOCD IN RCRD: CPT | Mod: CPTII,S$GLB,, | Performed by: FAMILY MEDICINE

## 2023-09-25 PROCEDURE — 80053 COMPREHEN METABOLIC PANEL: CPT | Performed by: FAMILY MEDICINE

## 2023-09-25 PROCEDURE — 83036 HEMOGLOBIN GLYCOSYLATED A1C: CPT | Performed by: FAMILY MEDICINE

## 2023-09-25 PROCEDURE — 99396 PREV VISIT EST AGE 40-64: CPT | Mod: 25,S$GLB,, | Performed by: FAMILY MEDICINE

## 2023-09-25 PROCEDURE — 99999 PR PBB SHADOW E&M-EST. PATIENT-LVL IV: CPT | Mod: PBBFAC,,, | Performed by: FAMILY MEDICINE

## 2023-09-25 PROCEDURE — 3079F PR MOST RECENT DIASTOLIC BLOOD PRESSURE 80-89 MM HG: ICD-10-PCS | Mod: CPTII,S$GLB,, | Performed by: FAMILY MEDICINE

## 2023-09-25 PROCEDURE — 3008F BODY MASS INDEX DOCD: CPT | Mod: CPTII,S$GLB,, | Performed by: FAMILY MEDICINE

## 2023-09-25 PROCEDURE — 36415 COLL VENOUS BLD VENIPUNCTURE: CPT | Mod: PO | Performed by: FAMILY MEDICINE

## 2023-09-25 PROCEDURE — 90686 IIV4 VACC NO PRSV 0.5 ML IM: CPT | Mod: S$GLB,,, | Performed by: FAMILY MEDICINE

## 2023-09-25 PROCEDURE — 85025 COMPLETE CBC W/AUTO DIFF WBC: CPT | Performed by: FAMILY MEDICINE

## 2023-09-25 PROCEDURE — 90471 IMMUNIZATION ADMIN: CPT | Mod: S$GLB,,, | Performed by: FAMILY MEDICINE

## 2023-09-25 PROCEDURE — 1159F PR MEDICATION LIST DOCUMENTED IN MEDICAL RECORD: ICD-10-PCS | Mod: CPTII,S$GLB,, | Performed by: FAMILY MEDICINE

## 2023-09-25 PROCEDURE — 3079F DIAST BP 80-89 MM HG: CPT | Mod: CPTII,S$GLB,, | Performed by: FAMILY MEDICINE

## 2023-09-25 RX ORDER — ACETAMINOPHEN AND CODEINE PHOSPHATE 300; 30 MG/1; MG/1
TABLET ORAL
COMMUNITY

## 2023-09-25 NOTE — PROGRESS NOTES
"Routine Office Visit    Vanessa Jerez  1970  [id number]      Subjective     Vanessa is a 52 y.o. female who presents today for:    Annual physical   Recent knee surgery - Patient completes her physical therapy this week. She is scheduled for follow-up with ortho and states ortho has plans to start gel injections into her knee. She plans to be active once knee pain improves.     Objective     Review of Systems   Constitutional:  Negative for chills and fever.   HENT:  Negative for congestion.    Eyes:  Negative for blurred vision.   Respiratory:  Negative for cough.    Cardiovascular:  Negative for chest pain.   Gastrointestinal:  Negative for abdominal pain, constipation, diarrhea, heartburn, nausea and vomiting.   Genitourinary:  Negative for dysuria.   Musculoskeletal:  Negative for myalgias.   Skin:  Negative for itching and rash.   Neurological:  Negative for dizziness and headaches.   Psychiatric/Behavioral:  Negative for depression.      Do you have leakage of urine? No    /80   Pulse 61   Temp 98.1 °F (36.7 °C)   Ht 5' 9" (1.753 m)   Wt 109.6 kg (241 lb 10 oz)   LMP 04/01/2017   SpO2 98%   BMI 35.68 kg/m²   Physical Exam  Constitutional:       Appearance: Normal appearance. She is well-developed. She is obese.   HENT:      Head: Normocephalic and atraumatic.      Nose: Nose normal.   Eyes:      Extraocular Movements: Extraocular movements intact.      Conjunctiva/sclera: Conjunctivae normal.      Pupils: Pupils are equal, round, and reactive to light.   Cardiovascular:      Rate and Rhythm: Normal rate and regular rhythm.      Pulses: Normal pulses.      Heart sounds: Normal heart sounds. No murmur heard.     No friction rub. No gallop.   Pulmonary:      Effort: Pulmonary effort is normal. No respiratory distress.      Breath sounds: Normal breath sounds.   Abdominal:      General: Bowel sounds are normal. There is no distension.      Palpations: Abdomen is soft.      Tenderness: There is " no abdominal tenderness.   Musculoskeletal:         General: Normal range of motion.      Cervical back: Normal range of motion and neck supple.   Lymphadenopathy:      Cervical: No cervical adenopathy.   Skin:     General: Skin is warm.   Neurological:      Mental Status: She is alert and oriented to person, place, and time.   Psychiatric:         Mood and Affect: Mood normal.             Assessment     Health Maintenance         Date Due Completion Date    COVID-19 Vaccine (3 - Pfizer series) 07/22/2021 5/27/2021    Shingles Vaccine (2 of 2) 11/16/2021 9/21/2021    Mammogram 10/03/2023 10/3/2022    Override on 10/25/2017: Done (reconciled from [employer] outside record with patient approval)    Hemoglobin A1c (Diabetic Prevention Screening) 09/19/2025 9/19/2022    TETANUS VACCINE 04/17/2027 4/17/2017    Lipid Panel 09/19/2027 9/19/2022    Colorectal Cancer Screening 10/15/2031 10/15/2021              Problem List Items Addressed This Visit          Orthopedic    Chondromalacia of knee, right  S/p arthroscopic knee procedure  Continue f/u with ortho / sports medicine        Other Visit Diagnoses       Annual physical exam    -  Primary    Relevant Orders    CBC Auto Differential    Comprehensive Metabolic Panel    Lipid Panel    Hemoglobin A1C    TSH  I addressed all major concerns as it related to health maintenance.  All were ordered and scheduled based on the patients wishes.  Any additional health maintenance will be readdressed at the next physical if declined or deferred by the patient.       Breast cancer screening by mammogram        Relevant Orders    Mammo Digital Screening Bilat w/ Kevin    Need for prophylactic vaccination and inoculation against influenza        Relevant Orders    Influenza - Quadrivalent *Preferred* (6 months+) (PF) (Completed)    Obesity (BMI 30-39.9)      The patient is asked to make an attempt to improve diet and exercise patterns to aid in medical management of this problem.                      Follow up in about 1 year (around 9/25/2024), or if symptoms worsen or fail to improve, for yearly exam.

## 2023-09-26 ENCOUNTER — PATIENT MESSAGE (OUTPATIENT)
Dept: FAMILY MEDICINE | Facility: CLINIC | Age: 53
End: 2023-09-26
Payer: COMMERCIAL

## 2023-09-27 ENCOUNTER — CLINICAL SUPPORT (OUTPATIENT)
Dept: REHABILITATION | Facility: HOSPITAL | Age: 53
End: 2023-09-27
Payer: COMMERCIAL

## 2023-09-27 DIAGNOSIS — M25.561 ACUTE PAIN OF RIGHT KNEE: Primary | ICD-10-CM

## 2023-09-27 DIAGNOSIS — R29.898 WEAKNESS OF BOTH LOWER EXTREMITIES: ICD-10-CM

## 2023-09-27 DIAGNOSIS — Z74.09 IMPAIRED FUNCTIONAL MOBILITY, BALANCE, GAIT, AND ENDURANCE: ICD-10-CM

## 2023-09-27 DIAGNOSIS — M25.661 DECREASED RANGE OF MOTION OF RIGHT KNEE: ICD-10-CM

## 2023-09-27 PROCEDURE — 97110 THERAPEUTIC EXERCISES: CPT | Mod: PN

## 2023-09-27 NOTE — PROGRESS NOTES
[facility]   Physical Therapy Treatment Note     Name: Vanessa Jerez  Clinic Number: [id number]    Therapy Diagnosis:   Encounter Diagnoses   Name Primary?    Acute pain of right knee Yes    Decreased range of motion of right knee     Impaired functional mobility, balance, gait, and endurance     Weakness of both lower extremities      Physician: Altaf Fernández PA-C    Visit Date: 9/27/2023    Physician Orders: PT Eval and Treat  Medical Diagnosis from Referral: S83.281A (ICD-10-CM) - Acute lateral meniscus tear of right knee, initial encounter  Evaluation Date: 6/12/2023  Authorization Period Expiration: 12/29/2023  Plan of Care Expiration: 6/12/2023 to 12/31/2023  Visit # / Visits authorized: 21/30 (+Evaluation)     FOTO: 1st visit, 21st visit    PTA Visit #: 0/5     Time In: 8:15 AM  Time Out: 9:10 AM  Total Billable Time: 55 minutes    Precautions:   Procedure:  1. Right Knee Arthroscopy, with meniscectomy (medial OR lateral) 06046  2.  Right Knee Arthroscopy, knee, synovectomy, limited 32944     Post-Op Plan:  Knee arthroscopy protocol       SUBJECTIVE     Pt reports: Will begin receiving injections in her knee next Friday, 3 rounds. Would like to continue therapy for another 3 weeks to assist with further strengthening and prepare for discharge.     She was compliant with home exercise program.  Response to previous treatment: No adverse reactions  Functional change: NA    Pain: 1/10  Location: right anterior knee      OBJECTIVE     DOS: 6/8/2023. Patient is currently 15 weeks and 6 day(s) post-op.     R knee PROM: 3-0-125    Knee Girth Joint Line:   R: 46.5 cm   L: 47 cm    Knee 3cm above Joint Line:   R: 54 cm   L: 51.5 cm    All below testing performed in seated position. Gait belt used for quadriceps testing.     Lower extremity strength with [employer] handheld dynamometer Right  (Kgf) Left  (Kgf) Pain/dysfunction with movement   (approx 4 sec hold w/ max contraction)  "  Hip abduction 11.9  11.0  10.9     AV.3 10.8   10.1  10.5     AVG: 10.5   108% - 2023   Quadriceps 28.1  26.3  28.9     AV.8 33.1  34.3  25.3     AV.9 89% - 2023 - no change   Hamstrings 17.5  16.7  17.9     AV.4 17.3  14.6  16.5     AV.1   108% - 2023        Treatment     Vanessa received the treatments listed below:      Therapeutic exercises to develop strength, endurance, and ROM for 55 minutes including:  Upright Bike: 8 minutes, Level 5.0 (to improve range of motion and reduce edema)  SL Matrix Knee Extension: 5x8, with 3" hold, 20#  SL Sit to Stand from 24" box: 3x10, B  Single Leg Shuttle Press 4x10, 112#  Patient education      Patient Education and Home Exercises     Home Exercises Provided and Patient Education Provided     Education provided:   - Importance of continuing HEP to supplement therapy sessions.  - Pt was educated on how to properly wear knee brace    Written Home Exercises Provided: Patient instructed to cont prior HEP. Exercises were reviewed and Vanessa was able to demonstrate them prior to the end of the session.  Vanessa demonstrated good  understanding of the education provided. See EMR under Patient Instructions for exercises provided during therapy sessions    ASSESSMENT     Vanessa continues to present with increased swelling of R knee as compared to L. Limited session today secondary to tiredness and fatigue. Patient is preparing for injections beginning next Friday with 3 rounds. Will continue therapy for functional strengthening and reduced edema, for the next few weeks throughout injection series and then prepare for discharge and to independently perform exercises.     Vanessa Is progressing well towards her goals.   Pt prognosis is Fair.     Pt will continue to benefit from skilled outpatient physical therapy to address the deficits listed in the problem list box on initial evaluation, provide pt/family education and to maximize pt's level of " "independence in the home and community environment.     Pt's spiritual, cultural and educational needs considered and pt agreeable to plan of care and goals.     Anticipated barriers to physical therapy: COVID-19    Goals:   Long Term Goals Status:  1. Pt will be independent with updated HEP to supplement therapy sessions. Met  2. Pt will improve RLE impaired LSI scores to greater than or equal to 90% to demonstrate balance in function between LEs. Progressing, Not Met  3. Pt will improve R knee flexion ROM to greater than or equal to opposite knee flexion to improve mobility for functional tasks. Progressing, Not Met  4. Pt will walk on unlevel surfaces without AD or gait deficits to promote ability to ambulate within the home and community. Progressing, Not Met  5. Pt will return to PLOF with no pain. Progressing, Not Met          PLAN     Continue to progress strength, endurance, and ROM as tolerated. Continue to incorporate proprioception training in future sessions.    Not performed this session:   Supine SLR's: 4x10, 3#  DL Gluteal Bridges: 4x10, 15# dumbbell at hips  Sidelying Clamshells: 4x10, B, BlackTB  Squat Taps: 4x10, 15# kettlebell    Neuromuscular re-education activities to improve: Coordination and Proprioception for 20 minutes. The following activities were included:  Lateral Monster Walks: 25'x4 laps, BlackTB at ankles  Forward Heel Taps: 4x10, 6" step      Katie Savage PT     "

## 2023-09-29 ENCOUNTER — TELEPHONE (OUTPATIENT)
Dept: SPORTS MEDICINE | Facility: CLINIC | Age: 53
End: 2023-09-29
Payer: COMMERCIAL

## 2023-09-29 NOTE — TELEPHONE ENCOUNTER
Sent letter in patient portal.    Yazmin Wilhelm MS, OTC [facility]    ----- Message from Georgette Silver sent at 9/29/2023 11:30 AM CDT -----  Pt calling in regards to needing a letter from when she had knee surgery , needs the beginning to end  date can send through the portal       Confirmed patient's contact info below:  Contact Name: Vanessa Jerez  Phone Number: [phone]

## 2023-10-02 DIAGNOSIS — M17.11 PRIMARY OSTEOARTHRITIS OF RIGHT KNEE: Primary | ICD-10-CM

## 2023-10-04 ENCOUNTER — HOSPITAL ENCOUNTER (OUTPATIENT)
Dept: RADIOLOGY | Facility: HOSPITAL | Age: 53
Discharge: HOME | End: 2023-10-04
Attending: FAMILY MEDICINE
Payer: COMMERCIAL

## 2023-10-04 DIAGNOSIS — Z12.31 BREAST CANCER SCREENING BY MAMMOGRAM: ICD-10-CM

## 2023-10-04 PROCEDURE — 77063 BREAST TOMOSYNTHESIS BI: CPT | Mod: 26,,, | Performed by: RADIOLOGY

## 2023-10-04 PROCEDURE — 77067 SCR MAMMO BI INCL CAD: CPT | Mod: 26,,, | Performed by: RADIOLOGY

## 2023-10-04 PROCEDURE — 77067 SCR MAMMO BI INCL CAD: CPT | Mod: TC,PO

## 2023-10-04 PROCEDURE — 77063 MAMMO DIGITAL SCREENING BILAT WITH TOMO: ICD-10-PCS | Mod: 26,,, | Performed by: RADIOLOGY

## 2023-10-04 PROCEDURE — 77067 MAMMO DIGITAL SCREENING BILAT WITH TOMO: ICD-10-PCS | Mod: 26,,, | Performed by: RADIOLOGY

## 2023-10-05 ENCOUNTER — OFFICE VISIT (OUTPATIENT)
Dept: FAMILY MEDICINE | Facility: CLINIC | Age: 53
End: 2023-10-05
Payer: COMMERCIAL

## 2023-10-05 VITALS
WEIGHT: 242.06 LBS | HEART RATE: 76 BPM | BODY MASS INDEX: 35.75 KG/M2 | DIASTOLIC BLOOD PRESSURE: 78 MMHG | OXYGEN SATURATION: 99 % | TEMPERATURE: 98 F | SYSTOLIC BLOOD PRESSURE: 118 MMHG

## 2023-10-05 DIAGNOSIS — E66.9 OBESITY (BMI 30-39.9): ICD-10-CM

## 2023-10-05 DIAGNOSIS — M25.569 KNEE PAIN, UNSPECIFIED CHRONICITY, UNSPECIFIED LATERALITY: Primary | ICD-10-CM

## 2023-10-05 PROCEDURE — 1159F MED LIST DOCD IN RCRD: CPT | Mod: CPTII,S$GLB,, | Performed by: FAMILY MEDICINE

## 2023-10-05 PROCEDURE — 3044F HG A1C LEVEL LT 7.0%: CPT | Mod: CPTII,S$GLB,, | Performed by: FAMILY MEDICINE

## 2023-10-05 PROCEDURE — 1160F PR REVIEW ALL MEDS BY PRESCRIBER/CLIN PHARMACIST DOCUMENTED: ICD-10-PCS | Mod: CPTII,S$GLB,, | Performed by: FAMILY MEDICINE

## 2023-10-05 PROCEDURE — 99999 PR PBB SHADOW E&M-EST. PATIENT-LVL IV: CPT | Mod: PBBFAC,,, | Performed by: FAMILY MEDICINE

## 2023-10-05 PROCEDURE — 3074F PR MOST RECENT SYSTOLIC BLOOD PRESSURE < 130 MM HG: ICD-10-PCS | Mod: CPTII,S$GLB,, | Performed by: FAMILY MEDICINE

## 2023-10-05 PROCEDURE — 3008F BODY MASS INDEX DOCD: CPT | Mod: CPTII,S$GLB,, | Performed by: FAMILY MEDICINE

## 2023-10-05 PROCEDURE — 3074F SYST BP LT 130 MM HG: CPT | Mod: CPTII,S$GLB,, | Performed by: FAMILY MEDICINE

## 2023-10-05 PROCEDURE — 1160F RVW MEDS BY RX/DR IN RCRD: CPT | Mod: CPTII,S$GLB,, | Performed by: FAMILY MEDICINE

## 2023-10-05 PROCEDURE — 3044F PR MOST RECENT HEMOGLOBIN A1C LEVEL <7.0%: ICD-10-PCS | Mod: CPTII,S$GLB,, | Performed by: FAMILY MEDICINE

## 2023-10-05 PROCEDURE — 99999 PR PBB SHADOW E&M-EST. PATIENT-LVL IV: ICD-10-PCS | Mod: PBBFAC,,, | Performed by: FAMILY MEDICINE

## 2023-10-05 PROCEDURE — 1159F PR MEDICATION LIST DOCUMENTED IN MEDICAL RECORD: ICD-10-PCS | Mod: CPTII,S$GLB,, | Performed by: FAMILY MEDICINE

## 2023-10-05 PROCEDURE — 99214 PR OFFICE/OUTPT VISIT, EST, LEVL IV, 30-39 MIN: ICD-10-PCS | Mod: S$GLB,,, | Performed by: FAMILY MEDICINE

## 2023-10-05 PROCEDURE — 99214 OFFICE O/P EST MOD 30 MIN: CPT | Mod: S$GLB,,, | Performed by: FAMILY MEDICINE

## 2023-10-05 PROCEDURE — 3008F PR BODY MASS INDEX (BMI) DOCUMENTED: ICD-10-PCS | Mod: CPTII,S$GLB,, | Performed by: FAMILY MEDICINE

## 2023-10-05 PROCEDURE — 3078F DIAST BP <80 MM HG: CPT | Mod: CPTII,S$GLB,, | Performed by: FAMILY MEDICINE

## 2023-10-05 PROCEDURE — 3078F PR MOST RECENT DIASTOLIC BLOOD PRESSURE < 80 MM HG: ICD-10-PCS | Mod: CPTII,S$GLB,, | Performed by: FAMILY MEDICINE

## 2023-10-05 RX ORDER — PHENTERMINE HYDROCHLORIDE 37.5 MG/1
37.5 TABLET ORAL
Qty: 30 TABLET | Refills: 5 | Status: SHIPPED | OUTPATIENT
Start: 2023-10-05

## 2023-10-05 NOTE — PROGRESS NOTES
Routine Office Visit    Vanessa Jerez  1970  [id number]      Subjective     Vanessa is a 52 y.o. female who presents today for:    Weight management - Patient has been struggling with her weight in her 40s and then worse in her 50s. She has been working at improving her diet.     10/5/2023 - 242  Weight goal - <200    Diets tried - stopped red meats and cut back on starches, decreasing carbohydrates     Medications - phentermine - worked well for Patient in the past.     Sleep - 7.5 hours of sleep     Work - digital tech - sedentary     Exercise - walks up and down the stairs. Patient has not been exercising since knee surgery in June 2023. Patient has been cleared to ride bike. Plans to start     What do you feel you can improve  Exercise    24 hour recall   Breakfast - caramel venti machiato, and spinach wrap from [employer]   Dinner - chips   Snacks - chips   Lunch - none       Objective     Review of Systems   Constitutional:  Negative for chills and fever.   HENT:  Negative for congestion.    Eyes:  Negative for blurred vision.   Respiratory:  Negative for cough.    Cardiovascular:  Negative for chest pain.   Gastrointestinal:  Negative for abdominal pain, constipation, diarrhea, heartburn, nausea and vomiting.   Genitourinary:  Negative for dysuria.   Musculoskeletal:  Negative for myalgias.   Skin:  Negative for itching and rash.   Neurological:  Negative for dizziness and headaches.   Psychiatric/Behavioral:  Negative for depression.      Do you have leakage of urine? No    /78   Pulse 76   Temp 97.9 °F (36.6 °C)   Wt 109.8 kg (242 lb 1 oz)   LMP 04/01/2017   SpO2 99%   BMI 35.75 kg/m²   Physical Exam  Constitutional:       Appearance: She is well-developed.   HENT:      Head: Normocephalic and atraumatic.   Eyes:      Conjunctiva/sclera: Conjunctivae normal.      Pupils: Pupils are equal, round, and reactive to light.   Cardiovascular:      Rate and Rhythm: Normal rate and regular rhythm.       Heart sounds: Normal heart sounds. No murmur heard.     No friction rub. No gallop.   Pulmonary:      Effort: No respiratory distress.      Breath sounds: Normal breath sounds.   Abdominal:      General: Bowel sounds are normal. There is no distension.      Palpations: Abdomen is soft.      Tenderness: There is no abdominal tenderness.   Musculoskeletal:         General: Normal range of motion.      Cervical back: Normal range of motion and neck supple.   Lymphadenopathy:      Cervical: No cervical adenopathy.   Skin:     General: Skin is warm.   Neurological:      Mental Status: She is alert and oriented to person, place, and time.             Assessment     Health Maintenance         Date Due Completion Date    COVID-19 Vaccine (3 - Pfizer series) 07/22/2021 5/27/2021    Shingles Vaccine (2 of 2) 11/16/2021 9/21/2021    Mammogram 10/04/2024 10/4/2023    Override on 10/25/2017: Done (reconciled from [facility] outside record with patient approval)    Hemoglobin A1c (Diabetic Prevention Screening) 09/25/2026 9/25/2023    TETANUS VACCINE 04/17/2027 4/17/2017    Lipid Panel 09/25/2028 9/25/2023    Colorectal Cancer Screening 10/15/2031 10/15/2021              Problem List Items Addressed This Visit    None  Visit Diagnoses       Knee pain, unspecified chronicity, unspecified laterality    -  Primary  The current medical regimen is effective;  continue present plan and medications.       Obesity (BMI 30-39.9)        Relevant Medications    phentermine (ADIPEX-P) 37.5 mg tablet  Common side effects of this medication were discussed with the patient. Questions regarding medications were discussed during this visit.    The patient is asked to make an attempt to improve diet and exercise patterns to aid in medical management of this problem.   Recommend increase activity   Recommend balance diet                     Follow up in about 6 months (around 4/5/2024), or if symptoms worsen or fail to improve, for yearly exam.

## 2023-10-06 ENCOUNTER — OFFICE VISIT (OUTPATIENT)
Dept: SPORTS MEDICINE | Facility: CLINIC | Age: 53
End: 2023-10-06
Payer: COMMERCIAL

## 2023-10-06 VITALS
BODY MASS INDEX: 35.38 KG/M2 | HEART RATE: 68 BPM | HEIGHT: 69 IN | WEIGHT: 238.88 LBS | SYSTOLIC BLOOD PRESSURE: 104 MMHG | DIASTOLIC BLOOD PRESSURE: 67 MMHG

## 2023-10-06 DIAGNOSIS — M17.11 PRIMARY OSTEOARTHRITIS OF RIGHT KNEE: Primary | ICD-10-CM

## 2023-10-06 PROCEDURE — 99999 PR PBB SHADOW E&M-EST. PATIENT-LVL III: ICD-10-PCS | Mod: PBBFAC,,, | Performed by: PHYSICIAN ASSISTANT

## 2023-10-06 PROCEDURE — 99499 NO LOS: ICD-10-PCS | Mod: S$GLB,,, | Performed by: PHYSICIAN ASSISTANT

## 2023-10-06 PROCEDURE — 99999 PR PBB SHADOW E&M-EST. PATIENT-LVL III: CPT | Mod: PBBFAC,,, | Performed by: PHYSICIAN ASSISTANT

## 2023-10-06 PROCEDURE — 99499 UNLISTED E&M SERVICE: CPT | Mod: S$GLB,,, | Performed by: PHYSICIAN ASSISTANT

## 2023-10-06 PROCEDURE — 20611 DRAIN/INJ JOINT/BURSA W/US: CPT | Mod: RT,S$GLB,, | Performed by: PHYSICIAN ASSISTANT

## 2023-10-06 PROCEDURE — 20611 LARGE JOINT ASPIRATION/INJECTION: R KNEE: ICD-10-PCS | Mod: RT,S$GLB,, | Performed by: PHYSICIAN ASSISTANT

## 2023-10-06 NOTE — PROGRESS NOTES
Large Joint Aspiration/Injection: R knee    Date/Time: 10/6/2023 8:30 AM    Performed by: Altaf Fernández PA-C  Authorized by: Altaf Fernández PA-C    Consent Done?:  Yes (Verbal)  Indications:  Arthritis and pain  Site marked: the procedure site was marked    Timeout: prior to procedure the correct patient, procedure, and site was verified    Prep: patient was prepped and draped in usual sterile fashion      Local anesthesia used?: Yes    Local anesthetic:  Co-phenylcaine spray    Details:  Needle Size:  22 G  Ultrasonic Guidance for needle placement?: Yes (Ultrasound guidance was utilized for needle localization.  Dynamic visualization of the needle was continuous throughout the procedure and maintained accurate placement.  Images were saved and stored for documentation.)    Images are saved and documented.  Approach: superolateral.  Location:  Knee  Site:  R knee  Medications:  20 mg sodium hyaluronate (EUFLEXXA) 10 mg/mL(mw 2.4 -3.6 million)  Patient tolerance:  Patient tolerated the procedure well with no immediate complications  Sports Medicine US - Guidance for Needle Placement    Date/Time: 10/6/2023 8:30 AM    Performed by: Altaf Fernández PA-C  Authorized by: Altaf Fernández PA-C  Preparation: Patient was prepped and draped in the usual sterile fashion.  Local anesthesia used: yes    Anesthesia:  Local anesthesia used: yes  Local Anesthetic: co-phenylcaine spray    Sedation:  Patient sedated: no    Patient tolerance: patient tolerated the procedure well with no immediate complications

## 2023-10-11 ENCOUNTER — CLINICAL SUPPORT (OUTPATIENT)
Dept: REHABILITATION | Facility: HOSPITAL | Age: 53
End: 2023-10-11
Payer: COMMERCIAL

## 2023-10-11 DIAGNOSIS — M25.561 ACUTE PAIN OF RIGHT KNEE: Primary | ICD-10-CM

## 2023-10-11 DIAGNOSIS — M25.661 DECREASED RANGE OF MOTION OF RIGHT KNEE: ICD-10-CM

## 2023-10-11 DIAGNOSIS — R29.898 WEAKNESS OF BOTH LOWER EXTREMITIES: ICD-10-CM

## 2023-10-11 DIAGNOSIS — Z74.09 IMPAIRED FUNCTIONAL MOBILITY, BALANCE, GAIT, AND ENDURANCE: ICD-10-CM

## 2023-10-11 PROCEDURE — 97110 THERAPEUTIC EXERCISES: CPT | Mod: PN

## 2023-10-12 NOTE — PROGRESS NOTES
"[facility] OUTPATIENT THERAPY AND WELLNESS   Physical Therapy Treatment Note     Name: Vanessa Jerez  Clinic Number: [id number]    Therapy Diagnosis:   Encounter Diagnoses   Name Primary?    Acute pain of right knee Yes    Decreased range of motion of right knee     Impaired functional mobility, balance, gait, and endurance     Weakness of both lower extremities      Physician: Altaf Fernández PA-C    Visit Date: 10/11/2023    Physician Orders: PT Eval and Treat  Medical Diagnosis from Referral: S83.281A (ICD-10-CM) - Acute lateral meniscus tear of right knee, initial encounter  Evaluation Date: 6/12/2023  Authorization Period Expiration: 12/29/2023  Plan of Care Expiration: 6/12/2023 to 12/31/2023  Visit # / Visits authorized: 22/30 (+Evaluation)     FOTO: 1st visit, 21st visit    PTA Visit #: 0/5     Time In: 11:00 AM  Time Out: 12:00 PM  Total Billable Time: 60 minutes    Precautions:   Procedure:  1. Right Knee Arthroscopy, with meniscectomy (medial OR lateral) 91169  2.  Right Knee Arthroscopy, knee, synovectomy, limited 70627     Post-Op Plan:  Knee arthroscopy protocol       SUBJECTIVE     Pt reports: Had her first injection last Friday. No difference so far, pain-wise, but feels as though the swelling is improving.     She was compliant with home exercise program.  Response to previous treatment: No adverse reactions  Functional change: NA    Pain: 1/10  Location: right anterior knee      OBJECTIVE     DOS: 6/8/2023. Patient is currently 17 weeks and 6 day(s) post-op.     R knee PROM: 3-0-125    Objective measurements last taken on 9/27/2023.      Treatment     Vanessa received the treatments listed below:      Therapeutic exercises to develop strength, endurance, and ROM for 60 minutes including:  Upright Bike: 8 minutes, Level 5.0 (to improve range of motion and reduce edema)  SL Matrix Knee Extension: 5x8, with 3" hold, 20#  SL Matrix HS Curls: 5x8, 35#  Single Leg Shuttle Press 4x10, 112#  SL Sit to " "Stand from 18" box: 3x10, B, 20# [email or web address]  Forward Heel Taps: 3x10, 4" step  TRX Split Squats: 3x10  Patient education      Patient Education and Home Exercises     Home Exercises Provided and Patient Education Provided     Education provided:   - Importance of continuing HEP to supplement therapy sessions.  - Pt was educated on how to properly wear knee brace    Written Home Exercises Provided: Patient instructed to cont prior HEP. Exercises were reviewed and Vanessa was able to demonstrate them prior to the end of the session.  Vanessa demonstrated good  understanding of the education provided. See EMR under Patient Instructions for exercises provided during therapy sessions    ASSESSMENT     Vanessa continues to present with decreased swelling of R knee as compared to L since receiving first injection. Tolerated session well with continued focus on improving functional strength. Initiated TRX split squats this session with some cueing for form and body mechanics, but able to correct and perform without pain. Will continue to progress quadriceps strength as tolerated to improve LSI score.     Vanessa Is progressing well towards her goals.   Pt prognosis is Fair.     Pt will continue to benefit from skilled outpatient physical therapy to address the deficits listed in the problem list box on initial evaluation, provide pt/family education and to maximize pt's level of independence in the home and community environment.     Pt's spiritual, cultural and educational needs considered and pt agreeable to plan of care and goals.     Anticipated barriers to physical therapy: COVID-19    Goals:   Long Term Goals Status:  1. Pt will be independent with updated HEP to supplement therapy sessions. Met  2. Pt will improve RLE impaired LSI scores to greater than or equal to 90% to demonstrate balance in function between LEs. Progressing, Not Met  3. Pt will improve R knee flexion ROM to greater than or equal to opposite knee " "flexion to improve mobility for functional tasks. Progressing, Not Met  4. Pt will walk on unlevel surfaces without AD or gait deficits to promote ability to ambulate within the home and community. Progressing, Not Met  5. Pt will return to PLOF with no pain. Progressing, Not Met          PLAN     Continue to progress strength, endurance, and ROM as tolerated. Continue to incorporate proprioception training in future sessions.    Not performed this session:   Supine SLR's: 4x10, 3#  DL Gluteal Bridges: 4x10, 15# dumbbell at hips  Sidelying Clamshells: 4x10, B, BlackTB  Squat Taps: 4x10, 15# kettlebell    Neuromuscular re-education activities to improve: Coordination and Proprioception for 20 minutes. The following activities were included:  Lateral Monster Walks: 25'x4 laps, BlackTB at ankles  Forward Heel Taps: 4x10, 6" step      Katie Savage, PT     "

## 2023-10-13 ENCOUNTER — OFFICE VISIT (OUTPATIENT)
Dept: SPORTS MEDICINE | Facility: CLINIC | Age: 53
End: 2023-10-13
Payer: COMMERCIAL

## 2023-10-13 VITALS
HEART RATE: 69 BPM | DIASTOLIC BLOOD PRESSURE: 72 MMHG | BODY MASS INDEX: 35.25 KG/M2 | HEIGHT: 69 IN | WEIGHT: 238 LBS | SYSTOLIC BLOOD PRESSURE: 109 MMHG

## 2023-10-13 DIAGNOSIS — M25.561 ACUTE PAIN OF RIGHT KNEE: Primary | ICD-10-CM

## 2023-10-13 DIAGNOSIS — M17.11 PRIMARY OSTEOARTHRITIS OF RIGHT KNEE: Primary | ICD-10-CM

## 2023-10-13 PROCEDURE — 99499 NO LOS: ICD-10-PCS | Mod: S$GLB,,, | Performed by: PHYSICIAN ASSISTANT

## 2023-10-13 PROCEDURE — 99499 UNLISTED E&M SERVICE: CPT | Mod: S$GLB,,, | Performed by: PHYSICIAN ASSISTANT

## 2023-10-13 PROCEDURE — 20611 DRAIN/INJ JOINT/BURSA W/US: CPT | Mod: RT,S$GLB,, | Performed by: PHYSICIAN ASSISTANT

## 2023-10-13 PROCEDURE — 99999 PR PBB SHADOW E&M-EST. PATIENT-LVL III: CPT | Mod: PBBFAC,,, | Performed by: PHYSICIAN ASSISTANT

## 2023-10-13 PROCEDURE — 99999 PR PBB SHADOW E&M-EST. PATIENT-LVL III: ICD-10-PCS | Mod: PBBFAC,,, | Performed by: PHYSICIAN ASSISTANT

## 2023-10-13 PROCEDURE — 20611 LARGE JOINT ASPIRATION/INJECTION: R KNEE: ICD-10-PCS | Mod: RT,S$GLB,, | Performed by: PHYSICIAN ASSISTANT

## 2023-10-13 NOTE — PROGRESS NOTES
Sports Medicine US - Guidance for Needle Placement    Date/Time: 10/13/2023 9:00 AM    Performed by: Altaf Fernández PA-C  Authorized by: Altaf Fernández PA-C  Preparation: Patient was prepped and draped in the usual sterile fashion.  Local anesthesia used: yes    Anesthesia:  Local anesthesia used: yes  Local Anesthetic: topical anesthetic    Sedation:  Patient sedated: no    Patient tolerance: patient tolerated the procedure well with no immediate complications      Large Joint Aspiration/Injection: R knee    Date/Time: 10/13/2023 9:00 AM    Performed by: Altaf Fernández PA-C  Authorized by: Altaf Fernández PA-C    Consent Done?:  Yes (Verbal)  Indications:  Arthritis  Site marked: the procedure site was marked    Timeout: prior to procedure the correct patient, procedure, and site was verified    Prep: patient was prepped and draped in usual sterile fashion      Local anesthesia used?: Yes    Local anesthetic:  Topical anesthetic    Details:  Needle Size:  22 G  Ultrasonic Guidance for needle placement?: Yes (Ultrasound guidance was utilized for needle localization.  Dynamic visualization of the needle was continuous throughout the procedure and maintained accurate placement.  Images were saved and stored for documentation.)    Images are saved and documented.  Approach:  Anterolateral  Location:  Knee  Site:  R knee  Medications:  20 mg sodium hyaluronate (EUFLEXXA) 10 mg/mL(mw 2.4 -3.6 million)  Patient tolerance:  Patient tolerated the procedure well with no immediate complications     Euflexxa #2 of 3

## 2023-10-17 ENCOUNTER — CLINICAL SUPPORT (OUTPATIENT)
Dept: REHABILITATION | Facility: HOSPITAL | Age: 53
End: 2023-10-17
Payer: COMMERCIAL

## 2023-10-17 DIAGNOSIS — Z74.09 IMPAIRED FUNCTIONAL MOBILITY, BALANCE, GAIT, AND ENDURANCE: ICD-10-CM

## 2023-10-17 DIAGNOSIS — R29.898 WEAKNESS OF BOTH LOWER EXTREMITIES: ICD-10-CM

## 2023-10-17 DIAGNOSIS — M25.661 DECREASED RANGE OF MOTION OF RIGHT KNEE: ICD-10-CM

## 2023-10-17 DIAGNOSIS — M25.561 ACUTE PAIN OF RIGHT KNEE: Primary | ICD-10-CM

## 2023-10-17 PROCEDURE — 97110 THERAPEUTIC EXERCISES: CPT | Mod: PN

## 2023-10-17 NOTE — PROGRESS NOTES
"[facility]   Physical Therapy Treatment Note     Name: Vanessa Jerez  Clinic Number: [id number]    Therapy Diagnosis:   Encounter Diagnoses   Name Primary?    Acute pain of right knee Yes    Decreased range of motion of right knee     Impaired functional mobility, balance, gait, and endurance     Weakness of both lower extremities      Physician: Altaf Fernández PA-C    Visit Date: 10/17/2023    Physician Orders: PT Eval and Treat  Medical Diagnosis from Referral: S83.281A (ICD-10-CM) - Acute lateral meniscus tear of right knee, initial encounter  Evaluation Date: 6/12/2023  Authorization Period Expiration: 12/29/2023  Plan of Care Expiration: 6/12/2023 to 12/31/2023  Visit # / Visits authorized: 23/30 (+Evaluation)     FOTO: 1st visit, 21st visit    PTA Visit #: 0/5     Time In: 10:04 AM  Time Out: 11:00 PM  Total Billable Time: 56 minutes    Precautions:   Procedure:  1. Right Knee Arthroscopy, with meniscectomy (medial OR lateral) 49385  2.  Right Knee Arthroscopy, knee, synovectomy, limited 79609     Post-Op Plan:  Knee arthroscopy protocol       SUBJECTIVE     Pt reports: Had her second injection and continues to feel pain relief and decreased swelling.     She was compliant with home exercise program.  Response to previous treatment: No adverse reactions  Functional change: NA    Pain: 1/10  Location: right anterior knee      OBJECTIVE     DOS: 6/8/2023. Patient is currently 18 weeks and 6 day(s) post-op.     R knee PROM: 3-0-125    Objective measurements last taken on 9/27/2023.  Reassess next session upon discharge.     Treatment     Vanessa received the treatments listed below:      Therapeutic exercises to develop strength, endurance, and ROM for 56 minutes including:  Upright Bike: 8 minutes, Level 5.0 (to improve range of motion and reduce edema)  SL Matrix Knee Extension: 5x8, with 3" hold, 20#  SL Matrix HS Curls: 5x8, 40#  Single Leg Shuttle Press 4x10, 112#  Lateral " Monster Walks: 25'x3 laps, BlackTB at ankles  TRX Split Squats: 3x10  +Sled Pushes: 40'x3 laps, 25#  Patient education      Patient Education and Home Exercises     Home Exercises Provided and Patient Education Provided     Education provided:   - Importance of continuing HEP to supplement therapy sessions.  - Pt was educated on how to properly wear knee brace    Written Home Exercises Provided: Patient instructed to cont prior HEP. Exercises were reviewed and Vanessa was able to demonstrate them prior to the end of the session.  Vanessa demonstrated good  understanding of the education provided. See EMR under Patient Instructions for exercises provided during therapy sessions    ASSESSMENT     Vanessa continues to present with decreased swelling of R knee as compared to L since receiving first injection. Tolerated session well with continued focus on improving functional strength. Continued with progression of CKC strengthening with TRX split squats and sled pushes with good tolerance. Patient to receive her 3rd injection this Friday and preparing for PT discharge next week to continue exercises independently. Will reassess strength LSI next session.     Vanessa Is progressing well towards her goals.   Pt prognosis is Fair.     Pt will continue to benefit from skilled outpatient physical therapy to address the deficits listed in the problem list box on initial evaluation, provide pt/family education and to maximize pt's level of independence in the home and community environment.     Pt's spiritual, cultural and educational needs considered and pt agreeable to plan of care and goals.     Anticipated barriers to physical therapy: COVID-19    Goals:   Long Term Goals Status:  1. Pt will be independent with updated HEP to supplement therapy sessions. Met  2. Pt will improve RLE impaired LSI scores to greater than or equal to 90% to demonstrate balance in function between LEs. Progressing, Not Met  3. Pt will improve R  "knee flexion ROM to greater than or equal to opposite knee flexion to improve mobility for functional tasks. Progressing, Not Met  4. Pt will walk on unlevel surfaces without AD or gait deficits to promote ability to ambulate within the home and community. Progressing, Not Met  5. Pt will return to PLOF with no pain. Progressing, Not Met          PLAN     Continue to progress strength, endurance, and ROM as tolerated. Continue to incorporate proprioception training in future sessions.    Not performed this session:   Supine SLR's: 4x10, 3#  DL Gluteal Bridges: 4x10, 15# dumbbell at hips  Sidelying Clamshells: 4x10, B, BlackTB  Squat Taps: 4x10, 15# zee    Neuromuscular re-education activities to improve: Coordination and Proprioception for 20 minutes. The following activities were included:  Lateral Monster Walks: 25'x4 laps, BlackTB at ankles  Forward Heel Taps: 4x10, 6" step  SL Sit to Stand from 18" box: 3x10, B, 20# zee Savage, PT     "

## 2023-10-19 DIAGNOSIS — M25.561 ACUTE PAIN OF RIGHT KNEE: Primary | ICD-10-CM

## 2023-10-20 ENCOUNTER — OFFICE VISIT (OUTPATIENT)
Dept: SPORTS MEDICINE | Facility: CLINIC | Age: 53
End: 2023-10-20
Payer: COMMERCIAL

## 2023-10-20 VITALS
HEART RATE: 74 BPM | WEIGHT: 238 LBS | SYSTOLIC BLOOD PRESSURE: 102 MMHG | HEIGHT: 69 IN | DIASTOLIC BLOOD PRESSURE: 70 MMHG | BODY MASS INDEX: 35.25 KG/M2

## 2023-10-20 DIAGNOSIS — M17.11 PRIMARY OSTEOARTHRITIS OF RIGHT KNEE: Primary | ICD-10-CM

## 2023-10-20 PROCEDURE — 20611 LARGE JOINT ASPIRATION/INJECTION: R KNEE: ICD-10-PCS | Mod: RT,S$GLB,, | Performed by: PHYSICIAN ASSISTANT

## 2023-10-20 PROCEDURE — 99999 PR PBB SHADOW E&M-EST. PATIENT-LVL III: ICD-10-PCS | Mod: PBBFAC,,, | Performed by: PHYSICIAN ASSISTANT

## 2023-10-20 PROCEDURE — 20611 DRAIN/INJ JOINT/BURSA W/US: CPT | Mod: RT,S$GLB,, | Performed by: PHYSICIAN ASSISTANT

## 2023-10-20 PROCEDURE — 99999 PR PBB SHADOW E&M-EST. PATIENT-LVL III: CPT | Mod: PBBFAC,,, | Performed by: PHYSICIAN ASSISTANT

## 2023-10-20 PROCEDURE — 99499 NO LOS: ICD-10-PCS | Mod: S$GLB,,, | Performed by: PHYSICIAN ASSISTANT

## 2023-10-20 PROCEDURE — 99499 UNLISTED E&M SERVICE: CPT | Mod: S$GLB,,, | Performed by: PHYSICIAN ASSISTANT

## 2023-10-20 RX ORDER — BUPIVACAINE HYDROCHLORIDE 2.5 MG/ML
2 INJECTION, SOLUTION INFILTRATION; PERINEURAL
Status: DISCONTINUED | OUTPATIENT
Start: 2023-10-20 | End: 2023-10-20 | Stop reason: HOSPADM

## 2023-10-20 RX ADMIN — BUPIVACAINE HYDROCHLORIDE 2 ML: 2.5 INJECTION, SOLUTION INFILTRATION; PERINEURAL at 09:10

## 2023-10-20 NOTE — PROGRESS NOTES
Sports Medicine US - Guidance for Needle Placement    Date/Time: 10/20/2023 9:00 AM    Performed by: Altaf Fernández PA-C  Authorized by: Altaf Fernández PA-C  Preparation: Patient was prepped and draped in the usual sterile fashion.  Local anesthesia used: yes    Anesthesia:  Local anesthesia used: yes  Local Anesthetic: co-phenylcaine spray    Sedation:  Patient sedated: no    Patient tolerance: patient tolerated the procedure well with no immediate complications      Large Joint Aspiration/Injection: R knee    Date/Time: 10/20/2023 9:00 AM    Performed by: Altaf Fernández PA-C  Authorized by: Altaf Fernández PA-C    Consent Done?:  Yes (Verbal)  Indications:  Arthritis  Site marked: the procedure site was marked    Timeout: prior to procedure the correct patient, procedure, and site was verified    Prep: patient was prepped and draped in usual sterile fashion      Local anesthesia used?: Yes    Local anesthetic:  Co-phenylcaine spray    Details:  Needle Size:  22 G  Ultrasonic Guidance for needle placement?: Yes (Ultrasound guidance was utilized for needle localization.  Dynamic visualization of the needle was continuous throughout the procedure and maintained accurate placement.  Images were saved and stored for documentation.)    Images are saved and documented.  Approach: superolateral.  Location:  Knee  Site:  R knee  Medications:  20 mg sodium hyaluronate (EUFLEXXA) 10 mg/mL(mw 2.4 -3.6 million); 2 mL BUPivacaine 0.25% (2.5 mg/ml) 0.25 % (2.5 mg/mL)  Patient tolerance:  Patient tolerated the procedure well with no immediate complications     Euflexxa # 3 of 3

## 2023-10-26 ENCOUNTER — CLINICAL SUPPORT (OUTPATIENT)
Dept: REHABILITATION | Facility: HOSPITAL | Age: 53
End: 2023-10-26
Payer: COMMERCIAL

## 2023-10-26 DIAGNOSIS — R29.898 WEAKNESS OF BOTH LOWER EXTREMITIES: ICD-10-CM

## 2023-10-26 DIAGNOSIS — M25.561 ACUTE PAIN OF RIGHT KNEE: Primary | ICD-10-CM

## 2023-10-26 DIAGNOSIS — M25.661 DECREASED RANGE OF MOTION OF RIGHT KNEE: ICD-10-CM

## 2023-10-26 DIAGNOSIS — Z74.09 IMPAIRED FUNCTIONAL MOBILITY, BALANCE, GAIT, AND ENDURANCE: ICD-10-CM

## 2023-10-26 PROCEDURE — 97110 THERAPEUTIC EXERCISES: CPT | Mod: PN

## 2023-10-26 NOTE — PATIENT INSTRUCTIONS
Hamstring Stretch (Seated)    Sit on a raised flat surface where you can prop your affected leg up on it such as a treatment table, couch or bed.       While keeping your knee straight to slightly bent, slowly lean forward and reach your hands towards your foot until a gentle stretch is felt along the back of your knee/thigh. Hold for 15 seconds and then return to starting position and repeat 3 times on each leg, twice daily.     Calf Stretch    While in a seated position, place a towel around the ball of your foot and pull your ankle back until a stretch is felt on your calf area.     Keep your knee in a straightened position during the stretch. Hold 15 seconds, 3 times, twice daily.     Heel Prop    While seated, prop your foot up on another chair and allow gravity to stretch your knee towards a more straightened position. Hold for 3 minutes at a time x 2. Slowly building your way up to holding for 10 minutes at a time. Perform 5-6 times daily.      Straight Leg Raise     While lying on your back, flex your foot towards your nose, squeeze your quad and raise up your leg with a straight knee, KEEPING YOUR KNEE AS STRAIGHT AS POSSIBLE.  Keep the opposite knee bent with the foot planted on the ground.  Repeat 5 times left leg per set. Do 5 sets per session for a total of 25 times. Slowly working your way up to performing 10 sets, 3 times, for a total of 30 repetitions. Do 2 sessions per day.    Heel Slides     Start in a seated position wearing socks or placing a small hand towel under your heel.      Next, loop a belt, towel or bed sheet around your heel and pull your knee into a bend position as your foot slides towards your buttock. Hold a gentle stretch and then return leg to original position. Perform for 3 minutes at a time, working your way up to 5 minutes at a time. Perform 4-5 times daily.        Gluteal Bridges      While lying on your back with knees bent, tighten your lower abdominal muscles, squeeze your  "buttocks and then raise your buttocks off the floor/bed as creating a "Bridge" with your body. Hold and then lower yourself and repeat.    Sidelying Clamshells    While lying on your side with your knees bent and an elastic band wrapped around your knees, draw up the top knee while keeping contact of your feet together as shown.     Do not let your pelvis roll back during the lifting movement.        Long Arc Quads    Seated on chair or bed, with upright posture.  Attach a looped elastic band to your ankle and to the opposite foot.    Next, draw your lower leg upwards to a straighten knee position while your other foot anchors the band.    Sit to Stands/Squat Tap to Chair    Perform this exercise in front of a sturdy chair or similar. Perform squat as pictured, tapping hips gently on chair.     Keep your chest and head up the whole time during performance.     Forward Step Downs    Start with both feet on top of a step/box. Next, slowly lower the unaffected leg down forward off the step/box to lightly touch the heel to the floor. Then return to the original position with both feet on the step/box.     Maintain proper knee alignment: Knee in line with the 2nd toe and not passing in front of the toes.     Lateral Step Downs    Start with both feet on top of a step/box. Next, slowly lower the unaffected leg down off the side of the step/box to lightly touch the heel to the floor. Then return to the original position with both feet on the step/box.     Maintain proper knee alignment: Knee in line with the 2nd toe and not passing in front of the toes.     Lateral Monster Walks    Place a looped elastic band around both ankles.    Next, bend your knees and step to the side while keeping tension on the band the entire time.  After taking sidesteps to the side in one direction, reverse the direction taking sidesteps until you return to the starting position. Repeat.   "

## 2023-10-26 NOTE — PROGRESS NOTES
[facility] [place] [facility]   Physical Therapy Treatment/Discharge Note     Name: Vanessa Jerez  Clinic Number: [id number]    Therapy Diagnosis:   Encounter Diagnoses   Name Primary?    Acute pain of right knee Yes    Decreased range of motion of right knee     Impaired functional mobility, balance, gait, and endurance     Weakness of both lower extremities        Physician: Altaf Fernández PA-C    Visit Date: 10/26/2023    Physician Orders: PT Eval and Treat  Medical Diagnosis from Referral: S83.281A (ICD-10-CM) - Acute lateral meniscus tear of right knee, initial encounter  Evaluation Date: 2023  Authorization Period Expiration: 2023  Plan of Care Expiration: 2023 to 2023  Visit # / Visits authorized:  (+Evaluation)     FOTO: 1st visit,  visit. 25th visit    PTA Visit #: 0/5     Time In: 2:00 PM  Time Out: 2:45 PM  Total Billable Time: 45 minutes    Precautions:   Procedure:  1. Right Knee Arthroscopy, with meniscectomy (medial OR lateral) 51058  2.  Right Knee Arthroscopy, knee, synovectomy, limited 99404     Post-Op Plan:  Knee arthroscopy protocol       SUBJECTIVE     Pt reports: Overall feels as though her recovery is in the high 80% since surgery. Feels as though when she walks quickly she starts to limp. Otherwise she is happy with her recovery.     She was compliant with home exercise program.  Response to previous treatment: No adverse reactions  Functional change: NA    Pain: 10  Location: right anterior knee      OBJECTIVE     DOS: 2023. Patient is currently 20 weeks and 0 day(s) post-op.     R knee PROM: 3-0-130  L Knee PROM: 3-0-135    All below testing performed in seated position. Gait belt used for quadriceps testing.     Lower extremity strength with [employer] handheld dynamometer Right  (Kgf) Left  (Kgf) Pain/dysfunction with movement   (approx 4 sec hold w/ max contraction)   Hip abduction 11.9  11.0  10.9     AV.3 10.8   10.1  10.5      AVG: 10.5    108% - 2023   Quadriceps 25.8  27.3  25.0     AV.0 25.7  26.0  29.9     AV.2 89% - 2023    96% - 10/26/2023   Hamstrings 17.5  16.7  17.9     AV.4 17.3  14.6  16.5     AV.1    108% - 2023           Treatment     Vanessa received the treatments listed below:      Therapeutic exercises to develop strength, endurance, and ROM for 56 minutes including:  Upright Bike: 8 minutes, Level 5.0 (to improve range of motion and reduce edema)  Objective measurements (see above)  Heavy patient education      Patient Education and Home Exercises     Home Exercises Provided and Patient Education Provided     Education provided:   - Importance of continuing HEP to supplement therapy sessions.  - Pt was educated on how to properly wear knee brace    Written Home Exercises Provided: Patient instructed to cont prior HEP. Exercises were reviewed and Vanessa was able to demonstrate them prior to the end of the session.  Vanessa demonstrated good  understanding of the education provided. See EMR under Patient Instructions for exercises provided during therapy sessions    ASSESSMENT     Vanessa presented to therapy with reports of improvements in function and only having difficulty with brisk walking/light jogging that causes some limping, but no pain. Reassessed quadriceps strength LSI this session with improvements from 87% to 96%. PROM also improved with flexion and seemed to be slightly limited with extension, but improved significantly following a heel prop. Heavily educated on the importance of re-starting heel propping throughout the day for the next month in order to improve extension ROM and improve carryover. Also extensively reviewed and updated home exercise program in order for patient to maintain progress made within therapy. FOTO score improved past predicted score and patient also met all of her STG's and LTG's. At this time patient is appropriate for discharge and is formally  discharged from skilled physical therapy.     Vanessa Is progressing well towards her goals.   Pt prognosis is Fair.     Pt will continue to benefit from skilled outpatient physical therapy to address the deficits listed in the problem list box on initial evaluation, provide pt/family education and to maximize pt's level of independence in the home and community environment.     Pt's spiritual, cultural and educational needs considered and pt agreeable to plan of care and goals.     Anticipated barriers to physical therapy: COVID-19    Goals:   Long Term Goals Status:  1. Pt will be independent with updated HEP to supplement therapy sessions. Met  2. Pt will improve RLE impaired LSI scores to greater than or equal to 90% to demonstrate balance in function between LEs. Met  3. Pt will improve R knee flexion ROM to greater than or equal to opposite knee flexion to improve mobility for functional tasks. Met  4. Pt will walk on unlevel surfaces without AD or gait deficits to promote ability to ambulate within the home and community. Met  5. Pt will return to PLOF with no pain. Met      PLAN     Patient formally discharged from skilled physical therapy.       Katie Savage, PT

## 2024-01-30 NOTE — TELEPHONE ENCOUNTER
Left message for Pt to call back regarding her OB-GYN  referral. Letter mailed out.  
lethargic/somnolent/confused

## 2024-03-11 ENCOUNTER — OFFICE VISIT (OUTPATIENT)
Dept: OBSTETRICS AND GYNECOLOGY | Facility: CLINIC | Age: 54
End: 2024-03-11
Payer: COMMERCIAL

## 2024-03-11 VITALS — WEIGHT: 235.69 LBS | BODY MASS INDEX: 34.8 KG/M2

## 2024-03-11 DIAGNOSIS — B37.31 YEAST INFECTION OF THE VAGINA: ICD-10-CM

## 2024-03-11 DIAGNOSIS — Z90.710 STATUS POST HYSTERECTOMY: Primary | ICD-10-CM

## 2024-03-11 DIAGNOSIS — Z01.419 ROUTINE GYNECOLOGICAL EXAMINATION: ICD-10-CM

## 2024-03-11 PROCEDURE — 99999 PR PBB SHADOW E&M-EST. PATIENT-LVL II: CPT | Mod: PBBFAC,,, | Performed by: OBSTETRICS & GYNECOLOGY

## 2024-03-11 PROCEDURE — 99396 PREV VISIT EST AGE 40-64: CPT | Mod: S$GLB,,, | Performed by: OBSTETRICS & GYNECOLOGY

## 2024-03-11 PROCEDURE — 3008F BODY MASS INDEX DOCD: CPT | Mod: CPTII,S$GLB,, | Performed by: OBSTETRICS & GYNECOLOGY

## 2024-03-11 RX ORDER — FLUCONAZOLE 150 MG/1
150 TABLET ORAL DAILY
Qty: 1 TABLET | Refills: 0 | Status: SHIPPED | OUTPATIENT
Start: 2024-03-11 | End: 2024-04-05

## 2024-03-11 NOTE — PROGRESS NOTES
Subjective:      Patient ID: Vanessa Jerez is a 53 y.o. female.    Chief Complaint:  Well Woman      History of Present Illness  HPI  Annual Exam-Postmenopausal  Patient presents for annual exam. The patient has no complaints today. The patient is sexually active. GYN screening history:  pt had [facility] 2017 for benign indications . The patient is not taking hormone replacement therapy. Patient denies post-menopausal vaginal bleeding. The patient wears seatbelts: yes. The patient participates in regular exercise: no. Has the patient ever been transfused or tattooed?: no. The patient reports that there is not domestic violence in her life.    MMG:  10/2023  Colonoscopy:  f/u age 60    GYN & OB History  Patient's last menstrual period was 2017.   Date of Last Pap: No result found    OB History    Para Term  AB Living   2 2 2         SAB IAB Ectopic Multiple Live Births                  # Outcome Date GA Lbr [place]/2nd Weight Sex Delivery Anes PTL Lv   2 Term            1 Term                Review of Systems  Review of Systems   Constitutional: Negative.    Respiratory: Negative.     Cardiovascular: Negative.    Gastrointestinal: Negative.    Endocrine: Negative.    Genitourinary: Negative.    Musculoskeletal:  Positive for arthralgias, joint swelling, leg pain and myalgias.   Neurological: Negative.    Psychiatric/Behavioral: Negative.     Breast: negative.         Objective:     Physical Exam:   Constitutional: She is oriented to person, place, and time. She appears well-developed and well-nourished. No distress.    HENT:   Head: Normocephalic and atraumatic.     Neck: No thyromegaly present.     Pulmonary/Chest: Effort normal. No respiratory distress. Right breast exhibits no inverted nipple, no mass, no nipple discharge, no skin change, no tenderness, presence, no bleeding, no swelling, no mastectomy, no augmentation and no lumpectomy. Left breast exhibits no inverted nipple, no mass, no nipple  discharge, no skin change, no tenderness, presence, no bleeding, no swelling, no mastectomy, no augmentation and no lumpectomy. Breasts are symmetrical.        Abdominal: Soft. She exhibits no distension and no mass. There is no abdominal tenderness. There is no rebound and no guarding.     Genitourinary:    Urethra, bladder, right adnexa and left adnexa normal.      Pelvic exam was performed with patient in the lithotomy position.   The external female genitalia was normal.   No external genitalia lesions identified,Genitalia hair distrobution normal .     Labial bartholins normal.There is no rash, tenderness, lesion or injury on the right labia. There is no rash, tenderness, lesion or injury on the left labia. No no adexnal prolapse or no masses or organomegaly. Right adnexum displays no mass, no tenderness and no fullness. Left adnexum displays no mass, no tenderness and no fullness. Vagina exhibits no lesion. There is vaginal discharge (copious clumpy white, cottage-cheese like) in the vagina. No erythema, tenderness, bleeding, rectocele, cystocele or prolapse of vaginal walls in the vagina.    No foreign body in the vagina.      No signs of injury in the vagina.   Cervix is absent.Uterus is absent. Normal urethral meatus.Urethral Meatus exhibits: urethral lesionUrethra findings: no urethral mass, no tenderness, no urethral scarring and prolapsedBladder findings: no bladder distention and no bladder tenderness          Musculoskeletal: Normal range of motion and moves all extremeties. No tenderness.       Neurological: She is alert and oriented to person, place, and time. No cranial nerve deficit. Coordination normal.    Skin: She is not diaphoretic.    Psychiatric: She has a normal mood and affect. Her behavior is normal. Judgment and thought content normal.       Assessment:     1. Status post hysterectomy    2. Routine gynecological examination    3. Yeast infection of the vagina              Plan:      Pap  smears no longer indicated  Pt up to date with both MMG and colonoscopy  Rx diflucan 150 mgx 1

## 2024-04-05 ENCOUNTER — OFFICE VISIT (OUTPATIENT)
Dept: FAMILY MEDICINE | Facility: CLINIC | Age: 54
End: 2024-04-05
Payer: COMMERCIAL

## 2024-04-05 VITALS
OXYGEN SATURATION: 99 % | HEART RATE: 70 BPM | BODY MASS INDEX: 34.87 KG/M2 | WEIGHT: 235.44 LBS | HEIGHT: 69 IN | TEMPERATURE: 98 F | DIASTOLIC BLOOD PRESSURE: 82 MMHG | SYSTOLIC BLOOD PRESSURE: 118 MMHG

## 2024-04-05 DIAGNOSIS — Z00.00 GENERAL MEDICAL EXAM: ICD-10-CM

## 2024-04-05 DIAGNOSIS — R73.03 PREDIABETES: ICD-10-CM

## 2024-04-05 DIAGNOSIS — M25.569 KNEE PAIN, UNSPECIFIED CHRONICITY, UNSPECIFIED LATERALITY: Primary | ICD-10-CM

## 2024-04-05 DIAGNOSIS — E66.9 OBESITY (BMI 30-39.9): ICD-10-CM

## 2024-04-05 PROCEDURE — 1159F MED LIST DOCD IN RCRD: CPT | Mod: CPTII,S$GLB,, | Performed by: FAMILY MEDICINE

## 2024-04-05 PROCEDURE — 99999 PR PBB SHADOW E&M-EST. PATIENT-LVL III: CPT | Mod: PBBFAC,,, | Performed by: FAMILY MEDICINE

## 2024-04-05 PROCEDURE — 1160F RVW MEDS BY RX/DR IN RCRD: CPT | Mod: CPTII,S$GLB,, | Performed by: FAMILY MEDICINE

## 2024-04-05 PROCEDURE — 3074F SYST BP LT 130 MM HG: CPT | Mod: CPTII,S$GLB,, | Performed by: FAMILY MEDICINE

## 2024-04-05 PROCEDURE — 99214 OFFICE O/P EST MOD 30 MIN: CPT | Mod: S$GLB,,, | Performed by: FAMILY MEDICINE

## 2024-04-05 PROCEDURE — 3079F DIAST BP 80-89 MM HG: CPT | Mod: CPTII,S$GLB,, | Performed by: FAMILY MEDICINE

## 2024-04-05 PROCEDURE — 3008F BODY MASS INDEX DOCD: CPT | Mod: CPTII,S$GLB,, | Performed by: FAMILY MEDICINE

## 2024-04-05 RX ORDER — PHENTERMINE HYDROCHLORIDE 37.5 MG/1
37.5 TABLET ORAL
Qty: 30 TABLET | Refills: 5 | Status: SHIPPED | OUTPATIENT
Start: 2024-04-05

## 2024-04-05 NOTE — PROGRESS NOTES
"Routine Office Visit    Vanessa Jerez  1970  [id number]      Subjective     Vanessa is a 53 y.o. female who presents today for:    Weight management - Patient has been struggling with her weight in her 40s and then worse in her 50s. She has been working at improving her diet. She was on phentermine with relief of symptoms and reports no side effects from medication.      10/5/2023 - 242  4/5/2024 - 235  Weight goal - <200     Diets tried - stopped red meats and cut back on starches, decreasing carbohydrates      Medications - phentermine - worked well for Patient in the past.      Sleep - 7.5 hours of sleep      Work - digital tech - sedentary      Exercise - walks up and down the stairs. Patient has not been exercising since knee surgery in June 2023. Patient has been cleared to ride bike.      What do you feel you can improve  Exercise     Objective     Review of Systems   Constitutional:  Negative for chills and fever.   HENT:  Negative for congestion.    Eyes:  Negative for blurred vision.   Respiratory:  Negative for cough.    Cardiovascular:  Negative for chest pain.   Gastrointestinal:  Negative for abdominal pain, constipation, diarrhea, heartburn, nausea and vomiting.   Genitourinary:  Negative for dysuria.   Musculoskeletal:  Negative for myalgias.   Skin:  Negative for itching and rash.   Neurological:  Negative for dizziness and headaches.   Psychiatric/Behavioral:  Negative for depression.        /82   Pulse 70   Temp 97.8 °F (36.6 °C) (Oral)   Ht 5' 9" (1.753 m)   Wt 106.8 kg (235 lb 7.2 oz)   LMP 04/01/2017   SpO2 99%   BMI 34.77 kg/m²   Physical Exam  Constitutional:       Appearance: She is well-developed.   HENT:      Head: Normocephalic and atraumatic.   Eyes:      Conjunctiva/sclera: Conjunctivae normal.      Pupils: Pupils are equal, round, and reactive to light.   Cardiovascular:      Rate and Rhythm: Normal rate and regular rhythm.      Heart sounds: Normal heart sounds. No " murmur heard.     No friction rub. No gallop.   Pulmonary:      Effort: No respiratory distress.      Breath sounds: Normal breath sounds.   Abdominal:      General: Bowel sounds are normal. There is no distension.      Palpations: Abdomen is soft.      Tenderness: There is no abdominal tenderness.   Musculoskeletal:         General: Normal range of motion.      Cervical back: Normal range of motion and neck supple.   Lymphadenopathy:      Cervical: No cervical adenopathy.   Skin:     General: Skin is warm.   Neurological:      Mental Status: She is alert and oriented to person, place, and time.             Assessment     Health Maintenance         Date Due Completion Date    Shingles Vaccine (2 of 2) 11/16/2021 9/21/2021    COVID-19 Vaccine (3 - 2023-24 season) 09/01/2023 5/27/2021    Hemoglobin A1c (Prediabetes) 09/25/2024 9/25/2023    Mammogram 10/04/2024 10/4/2023    Override on 10/25/2017: Done (reconciled from [facility] outside record with patient approval)    TETANUS VACCINE 04/17/2027 4/17/2017    Lipid Panel 09/25/2028 9/25/2023    Colorectal Cancer Screening 10/15/2031 10/15/2021              Problem List Items Addressed This Visit    None  Visit Diagnoses       Knee pain, unspecified chronicity, unspecified laterality    -  Primary  Continue with therapy   Continue with bike exercises       Obesity (BMI 30-39.9)        Relevant Medications    phentermine (ADIPEX-P) 37.5 mg tablet  Reviewed LA   The current medical regimen is effective;  continue present plan and medications.   The patient is asked to make an attempt to improve diet and exercise patterns to aid in medical management of this problem.       Prediabetes        General medical exam        Relevant Orders    CBC Auto Differential    Comprehensive Metabolic Panel    Lipid Panel    TSH    Hemoglobin A1C                  Follow up in about 5 months (around 9/5/2024), or if symptoms worsen or fail to improve.

## 2024-04-08 ENCOUNTER — PATIENT OUTREACH (OUTPATIENT)
Dept: ADMINISTRATIVE | Facility: HOSPITAL | Age: 54
End: 2024-04-08
Payer: COMMERCIAL

## 2024-04-17 ENCOUNTER — OFFICE VISIT (OUTPATIENT)
Dept: URGENT CARE | Facility: CLINIC | Age: 54
End: 2024-04-17
Payer: COMMERCIAL

## 2024-04-17 VITALS
HEART RATE: 94 BPM | DIASTOLIC BLOOD PRESSURE: 84 MMHG | WEIGHT: 233 LBS | HEIGHT: 69 IN | SYSTOLIC BLOOD PRESSURE: 126 MMHG | BODY MASS INDEX: 34.51 KG/M2 | RESPIRATION RATE: 16 BRPM | TEMPERATURE: 99 F | OXYGEN SATURATION: 98 %

## 2024-04-17 DIAGNOSIS — B34.9 VIRAL SYNDROME: ICD-10-CM

## 2024-04-17 DIAGNOSIS — R05.9 COUGH, UNSPECIFIED TYPE: Primary | ICD-10-CM

## 2024-04-17 LAB
CTP QC/QA: YES
CTP QC/QA: YES
POC MOLECULAR INFLUENZA A AGN: NEGATIVE
POC MOLECULAR INFLUENZA B AGN: NEGATIVE
SARS-COV-2 AG RESP QL IA.RAPID: NEGATIVE

## 2024-04-17 PROCEDURE — 99213 OFFICE O/P EST LOW 20 MIN: CPT | Mod: S$GLB,,, | Performed by: FAMILY MEDICINE

## 2024-04-17 PROCEDURE — 87502 INFLUENZA DNA AMP PROBE: CPT | Mod: QW,S$GLB,, | Performed by: FAMILY MEDICINE

## 2024-04-17 PROCEDURE — 87811 SARS-COV-2 COVID19 W/OPTIC: CPT | Mod: QW,S$GLB,, | Performed by: FAMILY MEDICINE

## 2024-04-17 RX ORDER — IBUPROFEN 600 MG/1
600 TABLET ORAL 3 TIMES DAILY
Qty: 21 TABLET | Refills: 0 | Status: SHIPPED | OUTPATIENT
Start: 2024-04-17 | End: 2024-04-24

## 2024-04-17 RX ORDER — FLUTICASONE PROPIONATE 50 MCG
1 SPRAY, SUSPENSION (ML) NASAL DAILY
Qty: 18.2 ML | Refills: 0 | Status: SHIPPED | OUTPATIENT
Start: 2024-04-17

## 2024-04-17 RX ORDER — CETIRIZINE HYDROCHLORIDE 10 MG/1
10 TABLET ORAL DAILY
Qty: 30 TABLET | Refills: 0 | Status: SHIPPED | OUTPATIENT
Start: 2024-04-17 | End: 2024-05-17

## 2024-04-17 NOTE — PROGRESS NOTES
"Subjective:      Patient ID: Vanessa Jerez is a 53 y.o. female.    Vitals:  height is 5' 9" (1.753 m) and weight is 105.7 kg (233 lb). Her oral temperature is 98.6 °F (37 °C). Her blood pressure is 126/84 and her pulse is 94. Her respiration is 16 and oxygen saturation is 98%.     Chief Complaint: Headache    Pt present today for headache that started yesterday.     Headache   This is a new problem. The current episode started yesterday. The problem occurs constantly. The problem has been unchanged. The pain is located in the Temporal region. The pain does not radiate. The pain quality is not similar to prior headaches. The quality of the pain is described as aching. The pain is at a severity of 8/10. The pain is severe. Associated symptoms include a fever. Associated symptoms comments: Body aches. Nothing aggravates the symptoms. She has tried nothing for the symptoms. The treatment provided no relief.       Constitution: Positive for fever.   Neurological:  Positive for headaches.      Objective:     Physical Exam   Constitutional: She is oriented to person, place, and time.   HENT:   Head: Normocephalic.   Ears:   Right Ear: External ear normal.   Left Ear: External ear normal.   Nose: Nose normal.   Mouth/Throat: Mucous membranes are moist.   Eyes: Conjunctivae are normal.   Cardiovascular: Normal rate.   Pulmonary/Chest: Effort normal.   Musculoskeletal: Normal range of motion.         General: Normal range of motion.   Neurological: She is alert and oriented to person, place, and time.   Skin: Skin is dry.   Psychiatric: Her behavior is normal.     Results for orders placed or performed in visit on 04/17/24   SARS Coronavirus 2 Antigen, POCT Manual Read   Result Value Ref Range    SARS Coronavirus 2 Antigen Negative Negative     Acceptable Yes    POCT Influenza A/B MOLECULAR   Result Value Ref Range    POC Molecular Influenza A Ag Negative Negative    POC Molecular Influenza B Ag Negative " Negative     Acceptable Yes        Assessment:     1. Cough, unspecified type    2. Viral syndrome        Plan:       Cough, unspecified type  -     SARS Coronavirus 2 Antigen, POCT Manual Read  -     POCT Influenza A/B MOLECULAR    Viral syndrome  -     cetirizine (ZYRTEC) 10 MG tablet; Take 1 tablet (10 mg total) by mouth once daily.  Dispense: 30 tablet; Refill: 0  -     fluticasone propionate (FLONASE) 50 mcg/actuation nasal spray; 1 spray (50 mcg total) by Each Nostril route once daily.  Dispense: 18.2 mL; Refill: 0  -     ibuprofen (ADVIL,MOTRIN) 600 MG tablet; Take 1 tablet (600 mg total) by mouth 3 (three) times daily. for 7 days  Dispense: 21 tablet; Refill: 0      Take covid home test in 36 -48 hours.   Work note given  Rtc prn

## 2024-04-17 NOTE — LETTER
April 17, 2024      [facility]  [address], [address]  [place] [address]  Phone: [phone]  Fax: [phone]       Patient: Vanessa Jerez   YOB: 1970  Date of Visit: 04/17/2024    To Whom It May Concern:    Brigitte Jerez  was at [facility] on 04/17/2024. The patient may return to work/school on 04.19.24 with no restrictions. If you have any questions or concerns, or if I can be of further assistance, please do not hesitate to contact me.    Sincerely,    Vanessa Carvalho MD

## 2024-04-17 NOTE — PATIENT INSTRUCTIONS
EmPATH Unit - Psychiatry  Combined Observation Note and Discharge Summary  [facility] Emergency Department  Observation Initiation Date: Apr 11, 2024    Cachorro King MRN: [id number]   Age: 31 year old YOB: 1992     History     Chief Complaint   Patient presents with    Psychiatric Evaluation     HPI  Cachorro King is a 31 year old male with a past history notable for schizoaffective disorder who initially presented to the emergency room apartment last week following an episode of agitation in the context of a manic episode.  I had met with the patient in the emergency department for psychiatric consultation at which time we discontinued fluoxetine and discussed the possibility that his antidepressant medication may have led to the episode of mood destabilization.  As he was awaiting bed availability to transfer to inpatient psychiatry, he continued to meet with Dr. Wright through the psychiatry consultation team to optimize the dose of Seroquel for additional mood stabilization.  He continued to improve and no longer appeared to meet criteria for psychiatric hospitalization.  He was subsequently transferred to the EmPATH unit for continued care and management.  He is now approaching 142 hours in the emergency department.  Overnight, there were no acute issues.  On examination today, the patient was able to recall our initial meeting.  He shares with me the improvements he has noted since that time.  He is sleeping well at night.  He expressed insight into the symptoms which preceded his arrival and interprets that his manic episode has resolved.  He reports his mood as being euthymic.  Energy is normal.  Appetite is adequate.  He denied racing thoughts.  He denied psychotic symptoms.  He is tolerating his medications without side effects.  He notes that he is taking the immediate release version of Seroquel and had questions regarding that as he typically takes the extended release version.  He has  Oral fluids  Rest  Steam (hot showers, hot tea)  Blow nose often  Droplet and contact precautions  Self quarantine x 5 days-ok to come out of quarantine as long as symptoms are improving on day 6  Continue to wear mask for 10 days  Follow up with worsening symptoms  Seek ER care with symptoms such as wheeze, respiratory distress, lethargy, dehydration      Call [facility] to inform them of covid: [phone] ([facility])   "a supply of his medications at home.  He requested a prescription for the additional dose as he recognizes the dose is 50 mg higher.  He is looking forward to discharging back home today.  There is no indication of homicidal ideation.      Past Medical History  No past medical history on file.  No past surgical history on file.  QUEtiapine ER (SEROQUEL XR) 400 MG 24 hr tablet  QUEtiapine ER (SEROQUEL XR) 50 MG TB24 24 hr tablet      No Known Allergies  Family History  No family history on file.  Social History           Review of Systems  A medically appropriate review of systems was performed with pertinent positives and negatives noted in the HPI, and all other systems negative.    Physical Examination   BP: 138/86  Pulse: 87  Temp: 97.5  F (36.4  C)  Resp: 16  Height: 175.3 cm (5' 9\")  Weight: 86.2 kg (190 lb)  SpO2: 98 %    Physical Exam  General: Appears stated age.   Neuro: Alert and fully oriented. Extremities appear to demonstrate normal strength on visual inspection.   Integumentary/Skin: no rash visualized, normal color    Psychiatric Examination   Appearance: awake, alert  Attitude:  cooperative  Eye Contact:  fair  Mood:  better  Affect:  appropriate and in normal range  Speech:  clear, coherent  Psychomotor Behavior:  no evidence of tardive dyskinesia, dystonia, or tics  Thought Process:  logical and linear  Associations:  no loose associations  Thought Content:  no evidence of suicidal ideation or homicidal ideation and no evidence of psychotic thought  Insight:  fair  Judgement:  fair  Oriented to:  time, person, and place  Attention Span and Concentration:  fair  Recent and Remote Memory:  fair  Language: able to name/identify objects without impairment  Fund of Knowledge: intact with awareness of current and past events    ED Course        Labs Ordered and Resulted from Time of ED Arrival to Time of ED Departure   BASIC METABOLIC PANEL - Abnormal       Result Value    Sodium 140      Potassium 4.3   "    Chloride 102      Carbon Dioxide (CO2) 22      Anion Gap 16 (*)     Urea Nitrogen 17.1      Creatinine 1.03      GFR Estimate >90      Calcium 9.6      Glucose 95     CBC WITH PLATELETS AND DIFFERENTIAL - Abnormal    WBC Count 11.3 (*)     RBC Count 5.29      Hemoglobin 15.8      Hematocrit 46.9      MCV 89      MCH 29.9      MCHC 33.7      RDW 12.6      Platelet Count 354      % Neutrophils 67      % Lymphocytes 22      % Monocytes 9      % Eosinophils 1      % Basophils 1      % Immature Granulocytes 0      NRBCs per 100 WBC 0      Absolute Neutrophils 7.6      Absolute Lymphocytes 2.5      Absolute Monocytes 1.0      Absolute Eosinophils 0.1      Absolute Basophils 0.1      Absolute Immature Granulocytes 0.0      Absolute NRBCs 0.0     TSH WITH FREE T4 REFLEX - Normal    TSH 1.93     URINE DRUG SCREEN PANEL - Normal    Amphetamines Urine Screen Negative      Barbituates Urine Screen Negative      Benzodiazepine Urine Screen Negative      Cannabinoids Urine Screen Negative      Cocaine Urine Screen Negative      Fentanyl Qual Urine Screen Negative      Opiates Urine Screen Negative      PCP Urine Screen Negative         Assessments & Plan (with Medical Decision Making)   Patient presenting with . Nursing notes reviewed noting no acute issues.     I have reviewed the assessment completed by the [facility].     During the observation period, the patient did not require medications for agitation, and did not require restraints/seclusion for patient and/or provider safety.    The patient was found to have a psychiatric condition that would benefit from an observation stay in the emergency department for further psychiatric stabilization and/or coordination of a safe disposition. The observation plan includes serial assessments of psychiatric condition, potential administration of medications if indicated, further disposition pending the patient's psychiatric course during the monitoring period.     Preliminary  diagnosis:    ICD-10-CM    1. Alicia (H)  F30.9       2. Schizoaffective disorder, bipolar type (H)  F25.0            Treatment Plan:  -Continue Seroquel  mg nightly for mood stabilization and to maintain remission of psychosis.  After maintaining symptom stability for meaningful period of time, consider reducing the dose back to 400 mg nightly to help find the least effective dose, noting that Prozac has since been discontinued and may have resulted in the episode of mood instability leading to the higher dose of Seroquel.  -Resume outpatient medication management appointments  -Referral for individual psychotherapy  -Referral for case management services in the community for additional psychosocial support.  -Continue to coordinate disposition and treatment plans with his parents/guardians  -Anticipate discharge home today.  Psychiatric hospitalization no longer appears to be warranted given the improvements he has demonstrated over his 142+ hour stay in the emergency department.    After the period in observation care, the patient's circumstances and mental state were safe for outpatient management. After counseling on the diagnosis, work-up, and treatment plan, the patient was discharged. Close follow-up with a psychiatrist and/or therapist was recommended and community psychiatric resources were provided. Patient is to return to the ED if any urgent or potentially life-threatening concerns.      At the time of discharge, the patient's acute suicide risk was determined to be low due to the following factors: Reduction in the intensity of mood/anxiety symptoms that preceded the admission, denial of suicidal thoughts, denies feeling helpless or helpless, not currently under the influence of alcohol or illicit substances, denies experiencing command hallucinations, no immediate access to firearms. The patient's acute risk could be higher if noncompliant with their treatment plan, medications, follow-up  appointments or using illicit substances or alcohol. Protective factors include: social supports, stable housing  --  Brad Garcia MD   [facility] EMERGENCY DEPT  EmPATH Unit        Brad Garcia MD  04/17/24 [id number]

## 2024-05-06 ENCOUNTER — PATIENT MESSAGE (OUTPATIENT)
Dept: FAMILY MEDICINE | Facility: CLINIC | Age: 54
End: 2024-05-06
Payer: COMMERCIAL

## 2024-05-06 NOTE — TELEPHONE ENCOUNTER
Spoke with patient and she informed me that over the pass 3 days anything she eats it makes her stomach cramp real bad. Patient informed me that she has been having soft tan color bowel movement everyday. Offer patient office visit and she agree to be see by GENA Cooper on 5/8/2024 this is her only off day this week. Advise patient if she start to feel more discomfort , severe camping along with bloating and are passing any blood; she need to go to the emergency room. Patient verbalize understanding.

## 2024-05-08 ENCOUNTER — OFFICE VISIT (OUTPATIENT)
Dept: FAMILY MEDICINE | Facility: CLINIC | Age: 54
End: 2024-05-08
Payer: COMMERCIAL

## 2024-05-08 VITALS
DIASTOLIC BLOOD PRESSURE: 78 MMHG | SYSTOLIC BLOOD PRESSURE: 120 MMHG | HEART RATE: 69 BPM | TEMPERATURE: 98 F | WEIGHT: 233.38 LBS | OXYGEN SATURATION: 98 % | BODY MASS INDEX: 34.46 KG/M2

## 2024-05-08 DIAGNOSIS — E66.9 OBESITY (BMI 30-39.9): ICD-10-CM

## 2024-05-08 DIAGNOSIS — R10.13 EPIGASTRIC ABDOMINAL PAIN: Primary | ICD-10-CM

## 2024-05-08 PROCEDURE — 1159F MED LIST DOCD IN RCRD: CPT | Mod: CPTII,S$GLB,,

## 2024-05-08 PROCEDURE — 3078F DIAST BP <80 MM HG: CPT | Mod: CPTII,S$GLB,,

## 2024-05-08 PROCEDURE — 99999 PR PBB SHADOW E&M-EST. PATIENT-LVL IV: CPT | Mod: PBBFAC,,,

## 2024-05-08 PROCEDURE — 3074F SYST BP LT 130 MM HG: CPT | Mod: CPTII,S$GLB,,

## 2024-05-08 PROCEDURE — 87338 HPYLORI STOOL AG IA: CPT

## 2024-05-08 PROCEDURE — 99214 OFFICE O/P EST MOD 30 MIN: CPT | Mod: S$GLB,,,

## 2024-05-08 PROCEDURE — 3008F BODY MASS INDEX DOCD: CPT | Mod: CPTII,S$GLB,,

## 2024-05-08 RX ORDER — OMEPRAZOLE 20 MG/1
20 CAPSULE, DELAYED RELEASE ORAL DAILY
Qty: 60 CAPSULE | Refills: 0 | Status: SHIPPED | OUTPATIENT
Start: 2024-05-08

## 2024-05-08 NOTE — PROGRESS NOTES
"  HPI     Chief Complaint:  Chief Complaint   Patient presents with    GI Problem     Two days without eating anything. But is able to drink water.       Vanessa Jerez is a 53 y.o. female with multiple medical diagnoses as listed in the medical history and problem list that presents for abd pain.  Pt is new to me but seen in clinic with last appointment 4/5/2024.      HPI      Wt - on phentermine for weight mgmt.  Epigastric complaints -  Pt reports epigastric fullness started over weekend. Unable to tolerate food past two days because causes gas and discomfort. Denies pain. Pt does report looser stools Friday of last week that were more frequently. Today bowel movement was more formed. Denies blood in stool. Pt does admit history of stomach "ulcer" in her 20s. Denies increased belching.     Other concerns below  Assessment & Plan       Problem List Items Addressed This Visit    None  Visit Diagnoses       Epigastric abdominal pain    -  Primary  No labs or imaging given no acute pain or symptoms    Given pt's reported history of PUD, will r/o H.pylori. Provided sample prior to starting trial of omeprazole.  GERD diet  Lifestyle modifications, provided education.     Return precautions.     Relevant Medications    omeprazole (PRILOSEC) 20 MG capsule    Other Relevant Orders    H. pylori antigen, stool    Obesity (BMI 30-39.9)      Pt not currently taking adipex, doesn't take as prescribed but still takes sometimes to help with weight loss.             --------------------------------------------      Health Maintenance:  Health Maintenance         Date Due Completion Date    Shingles Vaccine (2 of 2) 11/16/2021 9/21/2021    COVID-19 Vaccine (3 - 2023-24 season) 09/01/2023 5/27/2021    Hemoglobin A1c (Prediabetes) 09/25/2024 9/25/2023    Mammogram 10/04/2024 10/4/2023    Override on 10/25/2017: Done (reconciled from [facility] outside record with patient approval)    TETANUS VACCINE 04/17/2027 4/17/2017    Lipid Panel " 09/25/2028 9/25/2023    Colorectal Cancer Screening 10/15/2031 10/15/2021            Health maintenance reviewed    Follow Up:  Follow up if symptoms worsen or fail to improve.    Discussed DDx, condition, and treatment.   Education sent to patient portal/included in after visit summary.  ED precautions given.   Notify provider if symptoms do not resolve or increase in severity.   Patient verbalizes understanding and agrees with plan of care.    Exam     Review of Systems:  (as noted above)  Review of Systems    Physical Exam:   Physical Exam  Constitutional:       General: She is not in acute distress.     Appearance: Normal appearance. She is not toxic-appearing.   Cardiovascular:      Rate and Rhythm: Normal rate and regular rhythm.      Heart sounds: Normal heart sounds.   Pulmonary:      Effort: Pulmonary effort is normal.      Breath sounds: Normal breath sounds.   Abdominal:      General: Bowel sounds are normal.      Palpations: Abdomen is soft.      Tenderness: There is no abdominal tenderness. Negative signs include Moscoso's sign and obturator sign.       Musculoskeletal:      Cervical back: Normal range of motion and neck supple.      Right lower leg: No edema.      Left lower leg: No edema.   Skin:     General: Skin is warm.      Capillary Refill: Capillary refill takes less than 2 seconds.   Neurological:      General: No focal deficit present.      Mental Status: She is alert and oriented to person, place, and time.   Psychiatric:         Mood and Affect: Mood normal.       Vitals:    05/08/24 1425   BP: 120/78   Pulse: 69   Temp: 98.1 °F (36.7 °C)   TempSrc: Oral   SpO2: 98%   Weight: 105.8 kg (233 lb 5.7 oz)      Body mass index is 34.46 kg/m².        History     Past Medical History:  Past Medical History:   Diagnosis Date    Anemia        Past Surgical History:  Past Surgical History:   Procedure Laterality Date    ARTHROSCOPIC CHONDROPLASTY OF KNEE JOINT Right 6/8/2023    Procedure: ARTHROSCOPY,  KNEE, WITH CHONDROPLASTY;  Surgeon: Carlotta Christensen MD;  Location: [facility] OR;  Service: Orthopedics;  Laterality: Right;    COLONOSCOPY N/A 10/15/2021    Procedure: COLONOSCOPY;  Surgeon: Jez Henriquez MD;  Location: [facility] ENDO;  Service: Endoscopy;  Laterality: N/A;  covid-10/12/95-[id number]    HYSTERECTOMY      KNEE ARTHROSCOPY W/ MENISCECTOMY Right 6/8/2023    Procedure: ARTHROSCOPY, KNEE, WITH MENISCECTOMY;  Surgeon: Carlotta Christensen MD;  Location: [facility] OR;  Service: Orthopedics;  Laterality: Right;  lateral meniscectomy    TUBAL LIGATION         Social History:  Social History     Socioeconomic History    Marital status: Single   Occupational History    Occupation: [employer]     Employer: [employer]   Tobacco Use    Smoking status: Never    Smokeless tobacco: Never   Substance and Sexual Activity    Alcohol use: Yes     Comment: RARELY    Drug use: No    Sexual activity: Yes     Partners: Male     Social Determinants of Health     Stress: No Stress Concern Present (9/14/2020)    [facility] [place] [facility] - Occupational Stress Questionnaire     Feeling of Stress : Not at all       Family History:  Family History   Problem Relation Name Age of Onset    No Known Problems Mother      No Known Problems Father      Diabetes Maternal Grandmother      Diabetes Maternal Grandfather      Cancer Neg Hx      Heart disease Neg Hx      Hypertension Neg Hx      Stroke Neg Hx         Allergies and Medications: (updated and reviewed)  Review of patient's allergies indicates:   Allergen Reactions    Morphine      Slowed down heart rate     Current Outpatient Medications   Medication Sig Dispense Refill    cetirizine (ZYRTEC) 10 MG tablet Take 1 tablet (10 mg total) by mouth once daily. 30 tablet 0    fluticasone propionate (FLONASE) 50 mcg/actuation nasal spray 1 spray (50 mcg total) by Each Nostril route once daily. 18.2 mL 0    phentermine (ADIPEX-P) 37.5 mg tablet  Take 1 tablet (37.5 mg total) by mouth before breakfast. 30 tablet 5    omeprazole (PRILOSEC) 20 MG capsule Take 1 capsule (20 mg total) by mouth once daily. 60 capsule 0     No current facility-administered medications for this visit.       Patient Care Team:  Bethanie Sandoval MD as PCP - General (Family Medicine)  Naga Hernadez MA as Care Coordinator         - The patient is given an After Visit Summary that lists all medications with directions, allergies, education, orders placed during this encounter and follow-up instructions.      - I have reviewed the patient's medical information including past medical, family, and social history sections including the medications and allergies.      - We discussed the patient's current medications.     This note was created by combination of typed  and MModal dictation.  Transcription errors may be present.  If there are any questions, please contact me.

## 2024-05-08 NOTE — PATIENT INSTRUCTIONS
Please drop off stool sample and then start omeprazole trial.     Lifestyle: Do not eat meal or drink carbonated beverages within 3 hours of bedtime. Decrease amount of friend, fatty, and spicy foods to decrease gastric acid production. Raise HOB 4 to 6 inches especially if nocturnal symptoms present. Lose weight. Avoid foods (chocolate, peppermint, high-fat foods, citrus fruits, spicy foods, tomatoes, coffee, caffeinated beverages). Decrease or eliminate NSAIDs, nicotine, and alcohol use. Try chewing gum to help increase salivation and neutralize acid.     Over-the-counter Antacids (Tums): 1 hour before meals, after meals, and at bedtime; rapid onset but short duration.     Pharm therapy:    PPI daily (omeprazole or pantoprazole) x 8 weeks then gradually decrease dose. Sudden withdrawal may cause rebound reflux.

## 2024-05-18 LAB — H PYLORI AG STL QL IA: NOT DETECTED

## 2024-07-26 ENCOUNTER — OFFICE VISIT (OUTPATIENT)
Dept: OBSTETRICS AND GYNECOLOGY | Facility: CLINIC | Age: 54
End: 2024-07-26
Payer: COMMERCIAL

## 2024-07-26 VITALS — WEIGHT: 229.25 LBS | BODY MASS INDEX: 33.86 KG/M2

## 2024-07-26 DIAGNOSIS — B37.31 YEAST VAGINITIS: Primary | ICD-10-CM

## 2024-07-26 PROCEDURE — 99999 PR PBB SHADOW E&M-EST. PATIENT-LVL II: CPT | Mod: PBBFAC,,, | Performed by: OBSTETRICS & GYNECOLOGY

## 2024-07-26 RX ORDER — TERCONAZOLE 80 MG/1
80 SUPPOSITORY VAGINAL NIGHTLY
Qty: 3 SUPPOSITORY | Refills: 0 | Status: SHIPPED | OUTPATIENT
Start: 2024-07-26 | End: 2024-07-29

## 2024-07-26 NOTE — PROGRESS NOTES
Cc:  concerns for yeast infection    HPI:  53 yr who is up to date with routine gyn care and has hx of [facility] in 2017 presents for concerns for yeast infection.  Pt admits getting yeast infections frequently.  Pt admits to both showering and tub baths, and with various perfumed soaps.    Vitals:    07/26/24 1428   Weight: 104 kg (229 lb 4.5 oz)     Physical Exam:   Constitutional: She is oriented to person, place, and time. She appears well-developed and well-nourished. No distress.    HENT:   Head: Normocephalic and atraumatic.     Neck: No thyromegaly present.     Pulmonary/Chest: Effort normal. No respiratory distress.        Abdominal: Soft. She exhibits no distension and no mass. There is no abdominal tenderness. There is no rebound and no guarding.     Genitourinary:    Urethra, bladder, right adnexa and left adnexa normal.      Pelvic exam was performed with patient in the lithotomy position.   The external female genitalia was normal.   No external genitalia lesions identified,Genitalia hair distrobution normal .     Labial bartholins normal.There is no rash, tenderness, lesion or injury on the right labia. There is no rash, tenderness, lesion or injury on the left labia. Vagina exhibits no lesion. There is erythema and vaginal discharge in the vagina. No tenderness, bleeding, rectocele, cystocele or prolapse of vaginal walls in the vagina.    No foreign body in the vagina.      No signs of injury in the vagina.   Cervix is absent.Uterus is absent. Normal urethral meatus.Urethral Meatus exhibits: urethral lesionUrethra findings: no urethral mass, no tenderness, no urethral scarring and prolapsedBladder findings: no bladder distention and no bladder tenderness          Musculoskeletal: Normal range of motion and moves all extremeties. No tenderness.       Neurological: She is alert and oriented to person, place, and time. No cranial nerve deficit. Coordination normal.    Skin: Skin is warm. She is not  diaphoretic. No erythema.    Psychiatric: She has a normal mood and affect. Her behavior is normal. Judgment and thought content normal.     Female chaperone, Eulalia Goncalves,  present for entire exam    Assessment/Plan  1. Yeast vaginitis      Rx terazol 3 vag supp x 3 days  Stressed need to avoid perfumed soaps in bath water, only use warm water

## 2024-08-09 ENCOUNTER — TELEPHONE (OUTPATIENT)
Dept: FAMILY MEDICINE | Facility: CLINIC | Age: 54
End: 2024-08-09
Payer: COMMERCIAL

## 2024-08-09 DIAGNOSIS — Z11.1 VISIT FOR TB SKIN TEST: Primary | ICD-10-CM

## 2024-08-12 ENCOUNTER — LAB VISIT (OUTPATIENT)
Dept: LAB | Facility: HOSPITAL | Age: 54
End: 2024-08-12
Attending: FAMILY MEDICINE
Payer: COMMERCIAL

## 2024-08-12 DIAGNOSIS — Z11.1 VISIT FOR TB SKIN TEST: ICD-10-CM

## 2024-08-12 PROCEDURE — 86480 TB TEST CELL IMMUN MEASURE: CPT | Performed by: FAMILY MEDICINE

## 2024-08-13 LAB
GAMMA INTERFERON BACKGROUND BLD IA-ACNC: 0.01 IU/ML
M TB IFN-G CD4+ BCKGRND COR BLD-ACNC: 0 IU/ML
M TB IFN-G CD4+ BCKGRND COR BLD-ACNC: 0 IU/ML
MITOGEN IGNF BCKGRD COR BLD-ACNC: 9.99 IU/ML
TB GOLD PLUS: NEGATIVE

## 2024-09-13 ENCOUNTER — PATIENT OUTREACH (OUTPATIENT)
Dept: ADMINISTRATIVE | Facility: HOSPITAL | Age: 54
End: 2024-09-13
Payer: COMMERCIAL

## 2024-09-17 ENCOUNTER — LAB VISIT (OUTPATIENT)
Dept: LAB | Facility: HOSPITAL | Age: 54
End: 2024-09-17
Attending: FAMILY MEDICINE
Payer: COMMERCIAL

## 2024-09-17 ENCOUNTER — PATIENT OUTREACH (OUTPATIENT)
Dept: ADMINISTRATIVE | Facility: HOSPITAL | Age: 54
End: 2024-09-17
Payer: COMMERCIAL

## 2024-09-17 DIAGNOSIS — Z00.00 GENERAL MEDICAL EXAM: ICD-10-CM

## 2024-09-17 DIAGNOSIS — Z12.31 BREAST CANCER SCREENING BY MAMMOGRAM: Primary | ICD-10-CM

## 2024-09-17 LAB
ALBUMIN SERPL BCP-MCNC: 3.7 G/DL (ref 3.5–5.2)
ALP SERPL-CCNC: 95 U/L (ref 55–135)
ALT SERPL W/O P-5'-P-CCNC: 12 U/L (ref 10–44)
ANION GAP SERPL CALC-SCNC: 7 MMOL/L (ref 8–16)
AST SERPL-CCNC: 17 U/L (ref 10–40)
BASOPHILS # BLD AUTO: 0.03 K/UL (ref 0–0.2)
BASOPHILS NFR BLD: 0.5 % (ref 0–1.9)
BILIRUB SERPL-MCNC: 0.5 MG/DL (ref 0.1–1)
BUN SERPL-MCNC: 11 MG/DL (ref 6–20)
CALCIUM SERPL-MCNC: 10.7 MG/DL (ref 8.7–10.5)
CHLORIDE SERPL-SCNC: 109 MMOL/L (ref 95–110)
CHOLEST SERPL-MCNC: 200 MG/DL (ref 120–199)
CHOLEST/HDLC SERPL: 2.5 {RATIO} (ref 2–5)
CO2 SERPL-SCNC: 25 MMOL/L (ref 23–29)
CREAT SERPL-MCNC: 0.8 MG/DL (ref 0.5–1.4)
DIFFERENTIAL METHOD BLD: ABNORMAL
EOSINOPHIL # BLD AUTO: 0.3 K/UL (ref 0–0.5)
EOSINOPHIL NFR BLD: 5.2 % (ref 0–8)
ERYTHROCYTE [DISTWIDTH] IN BLOOD BY AUTOMATED COUNT: 13.2 % (ref 11.5–14.5)
EST. GFR  (NO RACE VARIABLE): >60 ML/MIN/1.73 M^2
ESTIMATED AVG GLUCOSE: 105 MG/DL (ref 68–131)
GLUCOSE SERPL-MCNC: 86 MG/DL (ref 70–110)
HBA1C MFR BLD: 5.3 % (ref 4–5.6)
HCT VFR BLD AUTO: 39.1 % (ref 37–48.5)
HDLC SERPL-MCNC: 81 MG/DL (ref 40–75)
HDLC SERPL: 40.5 % (ref 20–50)
HGB BLD-MCNC: 12.2 G/DL (ref 12–16)
IMM GRANULOCYTES # BLD AUTO: 0.02 K/UL (ref 0–0.04)
IMM GRANULOCYTES NFR BLD AUTO: 0.3 % (ref 0–0.5)
LDLC SERPL CALC-MCNC: 105.2 MG/DL (ref 63–159)
LYMPHOCYTES # BLD AUTO: 3 K/UL (ref 1–4.8)
LYMPHOCYTES NFR BLD: 49.5 % (ref 18–48)
MCH RBC QN AUTO: 29 PG (ref 27–31)
MCHC RBC AUTO-ENTMCNC: 31.2 G/DL (ref 32–36)
MCV RBC AUTO: 93 FL (ref 82–98)
MONOCYTES # BLD AUTO: 0.4 K/UL (ref 0.3–1)
MONOCYTES NFR BLD: 6.5 % (ref 4–15)
NEUTROPHILS # BLD AUTO: 2.3 K/UL (ref 1.8–7.7)
NEUTROPHILS NFR BLD: 38 % (ref 38–73)
NONHDLC SERPL-MCNC: 119 MG/DL
NRBC BLD-RTO: 0 /100 WBC
PLATELET # BLD AUTO: 270 K/UL (ref 150–450)
PMV BLD AUTO: 11 FL (ref 9.2–12.9)
POTASSIUM SERPL-SCNC: 4 MMOL/L (ref 3.5–5.1)
PROT SERPL-MCNC: 7.3 G/DL (ref 6–8.4)
RBC # BLD AUTO: 4.21 M/UL (ref 4–5.4)
SODIUM SERPL-SCNC: 141 MMOL/L (ref 136–145)
TRIGL SERPL-MCNC: 69 MG/DL (ref 30–150)
TSH SERPL DL<=0.005 MIU/L-ACNC: 1.62 UIU/ML (ref 0.4–4)
WBC # BLD AUTO: 5.96 K/UL (ref 3.9–12.7)

## 2024-09-17 PROCEDURE — 80053 COMPREHEN METABOLIC PANEL: CPT | Performed by: FAMILY MEDICINE

## 2024-09-17 PROCEDURE — 80061 LIPID PANEL: CPT | Performed by: FAMILY MEDICINE

## 2024-09-17 PROCEDURE — 84443 ASSAY THYROID STIM HORMONE: CPT | Performed by: FAMILY MEDICINE

## 2024-09-17 PROCEDURE — 36415 COLL VENOUS BLD VENIPUNCTURE: CPT | Mod: PO | Performed by: FAMILY MEDICINE

## 2024-09-17 PROCEDURE — 83036 HEMOGLOBIN GLYCOSYLATED A1C: CPT | Performed by: FAMILY MEDICINE

## 2024-09-17 PROCEDURE — 85025 COMPLETE CBC W/AUTO DIFF WBC: CPT | Performed by: FAMILY MEDICINE

## 2024-09-24 ENCOUNTER — OFFICE VISIT (OUTPATIENT)
Dept: FAMILY MEDICINE | Facility: CLINIC | Age: 54
End: 2024-09-24
Payer: COMMERCIAL

## 2024-09-24 VITALS
SYSTOLIC BLOOD PRESSURE: 130 MMHG | HEIGHT: 69 IN | TEMPERATURE: 98 F | WEIGHT: 238.13 LBS | OXYGEN SATURATION: 98 % | HEART RATE: 72 BPM | BODY MASS INDEX: 35.27 KG/M2 | DIASTOLIC BLOOD PRESSURE: 80 MMHG

## 2024-09-24 DIAGNOSIS — M25.569 KNEE PAIN, UNSPECIFIED CHRONICITY, UNSPECIFIED LATERALITY: ICD-10-CM

## 2024-09-24 DIAGNOSIS — Z00.00 ANNUAL PHYSICAL EXAM: Primary | ICD-10-CM

## 2024-09-24 DIAGNOSIS — E66.9 OBESITY (BMI 30-39.9): ICD-10-CM

## 2024-09-24 PROCEDURE — 1160F RVW MEDS BY RX/DR IN RCRD: CPT | Mod: CPTII,S$GLB,, | Performed by: FAMILY MEDICINE

## 2024-09-24 PROCEDURE — 99999 PR PBB SHADOW E&M-EST. PATIENT-LVL IV: CPT | Mod: PBBFAC,,, | Performed by: FAMILY MEDICINE

## 2024-09-24 PROCEDURE — 1159F MED LIST DOCD IN RCRD: CPT | Mod: CPTII,S$GLB,, | Performed by: FAMILY MEDICINE

## 2024-09-24 PROCEDURE — 3044F HG A1C LEVEL LT 7.0%: CPT | Mod: CPTII,S$GLB,, | Performed by: FAMILY MEDICINE

## 2024-09-24 PROCEDURE — 3075F SYST BP GE 130 - 139MM HG: CPT | Mod: CPTII,S$GLB,, | Performed by: FAMILY MEDICINE

## 2024-09-24 PROCEDURE — 99396 PREV VISIT EST AGE 40-64: CPT | Mod: S$GLB,,, | Performed by: FAMILY MEDICINE

## 2024-09-24 PROCEDURE — 3008F BODY MASS INDEX DOCD: CPT | Mod: CPTII,S$GLB,, | Performed by: FAMILY MEDICINE

## 2024-09-24 PROCEDURE — 3079F DIAST BP 80-89 MM HG: CPT | Mod: CPTII,S$GLB,, | Performed by: FAMILY MEDICINE

## 2024-09-24 RX ORDER — PHENTERMINE HYDROCHLORIDE 37.5 MG/1
37.5 TABLET ORAL
Qty: 30 TABLET | Refills: 4 | Status: SHIPPED | OUTPATIENT
Start: 2024-09-24

## 2024-09-24 NOTE — PROGRESS NOTES
"Routine Office Visit    Vanessa Jerez  1970  [id number]      Subjective     Vanessa is a 53 y.o. female who presents today for:    Annual physical   Weight management - Patient has been struggling with her weight in her 40s and then worse in her 50s. She has been working at improving her diet. She was on phentermine with relief of symptoms and reports no side effects from medication.     10/5/2023 - 242  4/5/2024 - 235  9/24/2024 238  Weight goal - <200     Diets tried - stopped red meats and cut back on starches, decreasing carbohydrates      Medications - phentermine - worked well for Patient in the past.      Sleep - 7.5 hours of sleep      Work - [employer] at [employer] - sedentary      Exercise - walks up and down the stairs. Patient has not been exercising since knee surgery in June 2023. Patient has been cleared to ride bike.      What do you feel you can improve  Exercise    Objective     Review of Systems   Constitutional:  Negative for chills and fever.   HENT:  Negative for congestion.    Eyes:  Negative for blurred vision.   Respiratory:  Negative for cough.    Cardiovascular:  Negative for chest pain.   Gastrointestinal:  Negative for abdominal pain, constipation, diarrhea, heartburn, nausea and vomiting.   Genitourinary:  Negative for dysuria.   Musculoskeletal:  Negative for myalgias.   Skin:  Negative for itching and rash.   Neurological:  Negative for dizziness and headaches.   Psychiatric/Behavioral:  Negative for depression.        /80   Pulse 72   Temp 98 °F (36.7 °C) (Oral)   Ht 5' 9" (1.753 m)   Wt 108 kg (238 lb 1.6 oz)   LMP 04/01/2017   SpO2 98%   BMI 35.16 kg/m²   Physical Exam  Constitutional:       Appearance: She is well-developed.   HENT:      Head: Normocephalic and atraumatic.      Right Ear: External ear normal.      Left Ear: External ear normal.   Eyes:      Conjunctiva/sclera: Conjunctivae normal.   Pulmonary:      Effort: Pulmonary effort is normal. "   Musculoskeletal:      Cervical back: Normal range of motion.   Skin:     Coloration: Skin is not pale.   Neurological:      Mental Status: She is alert and oriented to person, place, and time.   Psychiatric:         Behavior: Behavior normal.         Thought Content: Thought content normal.         Judgment: Judgment normal.             Assessment     Health Maintenance         Date Due Completion Date    Shingles Vaccine (2 of 2) 11/16/2021 9/21/2021    Influenza Vaccine (1) 09/01/2024 9/25/2023    COVID-19 Vaccine (3 - 2023-24 season) 09/01/2024 5/27/2021    Mammogram 10/04/2024 10/4/2023    Override on 10/25/2017: Done (reconciled from [facility] outside record with patient approval)    Hemoglobin A1c (Prediabetes) 09/17/2025 9/17/2024    TETANUS VACCINE 04/17/2027 4/17/2017    Lipid Panel 09/17/2029 9/17/2024    Colorectal Cancer Screening 10/15/2031 10/15/2021              Problem List Items Addressed This Visit    None  Visit Diagnoses       Annual physical exam    -  Primary  I addressed all major concerns as it related to health maintenance.  All were ordered and scheduled based on the patients wishes.  Any additional health maintenance will be readdressed at the next physical if declined or deferred by the patient.       Obesity (BMI 30-39.9)        Relevant Medications    phentermine (ADIPEX-P) 37.5 mg tablet    Knee pain, unspecified chronicity, unspecified laterality                      Follow up in about 4 months (around 1/24/2025), or if symptoms worsen or fail to improve.

## 2024-10-18 ENCOUNTER — HOSPITAL ENCOUNTER (OUTPATIENT)
Dept: RADIOLOGY | Facility: HOSPITAL | Age: 54
Discharge: HOME | End: 2024-10-18
Attending: FAMILY MEDICINE
Payer: COMMERCIAL

## 2024-10-18 DIAGNOSIS — Z12.31 BREAST CANCER SCREENING BY MAMMOGRAM: ICD-10-CM

## 2024-10-18 PROCEDURE — 77063 BREAST TOMOSYNTHESIS BI: CPT | Mod: TC,PO

## 2024-10-18 PROCEDURE — 77063 BREAST TOMOSYNTHESIS BI: CPT | Mod: 26,,, | Performed by: RADIOLOGY

## 2024-10-18 PROCEDURE — 77067 SCR MAMMO BI INCL CAD: CPT | Mod: 26,,, | Performed by: RADIOLOGY

## 2024-10-28 NOTE — PROGRESS NOTES
1. Carotid stenosis, asymptomatic, left  Assessment & Plan:  High-grade left carotid artery stenosis.  Asymptomatic.  The patient has severe aortic stenosis and appears to be in the scheduling process for TAVR.  We would need to perform a TAVR prior to elective carotid surgery.  She can maintain on her medical regimen currently.  We will rediscuss in 6 months with another duplex.  I left a voicemail for her daughter Piedad.      [facility]   Physical Therapy Treatment Note     Name: Vanessa Jerez  Clinic Number: [id number]    Therapy Diagnosis:   Encounter Diagnoses   Name Primary?    Acute pain of right knee Yes    Decreased range of motion of right knee     Impaired functional mobility, balance, gait, and endurance     Weakness of both lower extremities      Physician: Altaf Fernández PA-C    Visit Date: 7/26/2023    Physician Orders: PT Eval and Treat  Medical Diagnosis from Referral: S83.281A (ICD-10-CM) - Acute lateral meniscus tear of right knee, initial encounter  Evaluation Date: 6/12/2023  Authorization Period Expiration: 12/29/2023  Plan of Care Expiration: 6/12/2023 to 8/31/2023  Visit # / Visits authorized: 11/20 (+Evaluation)     FOTO: 5/5 NEXT SESSION    PTA Visit #: 0/5     Time In: 8:00 AM  Time Out: 9:00 AM  Total Billable Time: 60 minutes (4 TE)    Precautions:   Procedure:  1. Right Knee Arthroscopy, with meniscectomy (medial OR lateral) 96113  2.  Right Knee Arthroscopy, knee, synovectomy, limited 43503     Post-Op Plan:  Knee arthroscopy protocol       SUBJECTIVE     Pt reports: Continuing to feel improvements in pain and function.    She was compliant with home exercise program.  Response to previous treatment: No adverse reactions  Functional change: NA    Pain: 1/10  Location: right anterior knee      OBJECTIVE     DOS: 6/8/2023. Patient is currently 6 weeks and 6 day(s) post-op.     7/26/23:     R knee PROM: 3-0-121    Isometric strength testing was last performed on 7/12/23.     Treatment     Vanessa received the treatments listed below:      Therapeutic exercises to develop strength, endurance, and ROM for 57 minutes including:  Upright Bike: 8 minutes, Level 3.5 (To improve range of motion and reduce edema)  Supine SLR's: 3x10, 2#  DL Gluteal Bridges: 3x10, 10# dumbbell at hips  Sidelying Clamshells: 3x10, BlueTB, B  SL Matrix Knee Extension: 3x10, 15#  SL Matrix HS Curls: 3x10,  "50#  Single Leg Shuttle Press 3x12, 62.5#  Patient education      Neuromuscular re-education activities to improve: Coordination and Proprioception for 3 minutes. The following activities were included:  Lateral Monster Walks: 25'x4 laps, BlueTB at knee  Forward Heel Taps: 3x10, 4" step (Verbal Cueing required to prevent knee valgus) - NOT TODAY  Forward Step Ups: 3x10, 4" step (emphasis on reducing valgus movement at knee, used finger tips to help with balance) - NOT TODAY      Patient Education and Home Exercises     Home Exercises Provided and Patient Education Provided     Education provided:   - Importance of continuing HEP to supplement therapy sessions.  - Pt was educated on how to properly wear knee brace    Written Home Exercises Provided: Patient instructed to cont prior HEP. Exercises were reviewed and Vanessa was able to demonstrate them prior to the end of the session.  Vanessa demonstrated good  understanding of the education provided. See EMR under Patient Instructions for exercises provided during therapy sessions    ASSESSMENT     Vanessa presented to PT with reports of improvements in pain and function over the last few sessions. Continued with gross LE strength as tolerated with good tolerance. Increased resistance and repetitions this session with good tolerance. Overall the pt is progressing fairly well and will continue to work on functional strength and endurance.    Vanessa Is progressing well towards her goals.   Pt prognosis is Fair.     Pt will continue to benefit from skilled outpatient physical therapy to address the deficits listed in the problem list box on initial evaluation, provide pt/family education and to maximize pt's level of independence in the home and community environment.     Pt's spiritual, cultural and educational needs considered and pt agreeable to plan of care and goals.     Anticipated barriers to physical therapy: COVID-19    Goals: Short Term Goals (6 weeks)   1. " "Patient will be independent with HEP to supplement therapy sessions. Progressing, Not Met  2. Pt will improve RLE impaired LSI scores to greater than or equal to 70% to demonstrate balance in function between LEs. Progressing, Not Met  3. Pt will improve R knee flexion ROM to greater than or equal to 100 degrees to improve mobility for functional tasks. Progressing, Not Met  4. Pt will improve R knee extension/hyperextension equal to opposite knee to demonstrate balance in function between LE's. Progressing, Not Met     Long Term Goals (12 weeks )   1. Pt will be independent with updated HEP to supplement therapy sessions. Progressing, Not Met  2. Pt will improve RLE impaired LSI scores to greater than or equal to 90% to demonstrate balance in function between LEs. Progressing, Not Met  3. Pt will improve R knee flexion ROM to greater than or equal to opposite knee flexion to improve mobility for functional tasks. Progressing, Not Met  4. Pt will walk on unlevel surfaces without AD or gait deficits to promote ability to ambulate within the home and community. Progressing, Not Met  5. Pt will return to PLOF with no pain. Progressing, Not Met    PLAN     Continue to progress strength, endurance, and ROM as tolerated. Incorporate an emphasis on proprioception training in future sessions.    Not performed this session:   Heel Slides: 5 minutes, 5" holds in flexion  +Lateral Monster Walks: 25'x2 laps RTB at hips  Heel Slides: 3 minutes, Level 3.0  TKE: 3x10, 5" Holds, pink sport cord  Forward Heel Taps: 3x10, RLE leading, 6" step    Supine SLR's: 3x10, 3" holds, +3#  DL Gluteal Bridges: 3x10, 5" holds  SL Shuttle Press: 3x10, 50#  Sit to Stands: 3x10, 24" box, 10#      Katie Savage, PT     "

## 2024-12-13 NOTE — ED TRIAGE NOTES
Patient presents to Ed for pain to right knee after a fall on Dec 2nd, patient reports she was able to get swelling down, but still has tenderness to touch. Patient reports being able to ambulate with no pain, but middle of knee is tender to touch.   
Performed

## 2025-07-09 ENCOUNTER — TELEPHONE (OUTPATIENT)
Dept: FAMILY MEDICINE | Facility: CLINIC | Age: 55
End: 2025-07-09

## 2025-07-09 NOTE — TELEPHONE ENCOUNTER
Copied from CRM #[id number]. Topic: General Inquiry - Patient Advice  >> Jul 9, 2025  4:08 PM Aram wrote:  Type: Patient Call Back    Who called: Self     What is the request in detail: pt wants to know if the physicians office does physicals for employment. May you contact to advise.     Can the clinic reply by [facility]?    Would the patient rather a call back or a response via [facility]? Call back     Best call back number: [phone]      Additional Information:

## (undated) DEVICE — DRESSING N ADH OIL EMUL 3X3

## (undated) DEVICE — COVER CAMERA OPERATING ROOM

## (undated) DEVICE — COVER PROXIMA MAYO STAND

## (undated) DEVICE — NDL 18GA X1 1/2 REG BEVEL

## (undated) DEVICE — UNDERGLOVES BIOGEL PI SZ 7 LF

## (undated) DEVICE — COVER LIGHT HANDLE 80/CA

## (undated) DEVICE — PROBE ARTHSCP EDGE ENERGY 50

## (undated) DEVICE — APPLICATOR CHLORAPREP ORN 26ML

## (undated) DEVICE — TOURNIQUET SB QC DP 34X4IN

## (undated) DEVICE — SOL NACL IRR 3000ML

## (undated) DEVICE — UNDERGLOVES BIOGEL PI SIZE 8

## (undated) DEVICE — SET INFLOW TUBE ARTHSCP

## (undated) DEVICE — GLOVE PROTEXIS LTX MICRO 8

## (undated) DEVICE — BANDAGE MATRIX HK LOOP 6IN 5YD

## (undated) DEVICE — GLOVE BIOGEL SKINSENSE PI 6.5

## (undated) DEVICE — PAD CAST SPECIALIST STRL 6

## (undated) DEVICE — DRAPE ARTHSCP FLD CTRL POUCH

## (undated) DEVICE — DRAPE TOP 53X102IN

## (undated) DEVICE — GLOVE PROTEXIS LIGHT BROWN 8.5

## (undated) DEVICE — DRAPE STERI U-SHAPED 47X51IN

## (undated) DEVICE — GOWN ECLIPSE REINF L4 XLNG XXL

## (undated) DEVICE — Device

## (undated) DEVICE — GOWN ECLIPSE REINF LVL4 TWL LG

## (undated) DEVICE — BLADE SHAVER 15D 13CMX4.2MM

## (undated) DEVICE — GLOVE BIOGEL SKINSENSE PI 8.0

## (undated) DEVICE — NDL SAFETY 22G X 1.5 ECLIPSE

## (undated) DEVICE — GAUZE SPONGE 4X4 12PLY

## (undated) DEVICE — SOL IRR SOD CHL .9% POUR

## (undated) DEVICE — SUT ETHILON 3-0 PS2 18 BLK